# Patient Record
Sex: FEMALE | Race: WHITE | NOT HISPANIC OR LATINO | Employment: OTHER | URBAN - METROPOLITAN AREA
[De-identification: names, ages, dates, MRNs, and addresses within clinical notes are randomized per-mention and may not be internally consistent; named-entity substitution may affect disease eponyms.]

---

## 2019-09-06 DIAGNOSIS — R10.32 ABDOMINAL PAIN, LEFT LOWER QUADRANT: Primary | ICD-10-CM

## 2019-09-10 DIAGNOSIS — K21.9 GASTROESOPHAGEAL REFLUX DISEASE, ESOPHAGITIS PRESENCE NOT SPECIFIED: Primary | ICD-10-CM

## 2019-09-10 RX ORDER — ESOMEPRAZOLE MAGNESIUM 40 MG/1
40 CAPSULE, DELAYED RELEASE ORAL 2 TIMES DAILY
COMMUNITY
Start: 2019-03-11 | End: 2019-09-10 | Stop reason: SDUPTHER

## 2019-09-10 NOTE — TELEPHONE ENCOUNTER
----- Message from Lizzette Williamson sent at 9/10/2019  3:08 PM CDT -----  Contact: Danielle  Refill the brand name Nexium 2 a day no  Generic. [facility] in  [facility] 90 pts # [phone] GH

## 2019-09-11 RX ORDER — ESOMEPRAZOLE MAGNESIUM 40 MG/1
40 CAPSULE, DELAYED RELEASE ORAL 2 TIMES DAILY
Qty: 60 CAPSULE | Refills: 11 | Status: SHIPPED | OUTPATIENT
Start: 2019-09-11 | End: 2019-11-05 | Stop reason: SDUPTHER

## 2019-09-13 ENCOUNTER — HOSPITAL ENCOUNTER (OUTPATIENT)
Dept: RADIOLOGY | Facility: HOSPITAL | Age: 74
Discharge: HOME | End: 2019-09-13
Attending: INTERNAL MEDICINE
Payer: MEDICARE

## 2019-09-13 DIAGNOSIS — R10.32 ABDOMINAL PAIN, LEFT LOWER QUADRANT: ICD-10-CM

## 2019-09-13 LAB
CREAT SERPL-MCNC: 1 MG/DL (ref 0.5–1.4)
SAMPLE: NORMAL

## 2019-09-13 PROCEDURE — 25500020 PHARM REV CODE 255: Mod: PO | Performed by: INTERNAL MEDICINE

## 2019-09-13 PROCEDURE — 74177 CT ABD & PELVIS W/CONTRAST: CPT | Mod: TC,PO

## 2019-09-13 RX ADMIN — IOHEXOL 100 ML: 350 INJECTION, SOLUTION INTRAVENOUS at 09:09

## 2019-11-04 ENCOUNTER — TELEPHONE (OUTPATIENT)
Dept: FAMILY MEDICINE | Facility: CLINIC | Age: 74
End: 2019-11-04

## 2019-11-04 PROBLEM — M51.9 LUMBAR DISC DISEASE: Status: ACTIVE | Noted: 2017-04-26

## 2019-11-04 PROBLEM — R13.10 DYSPHAGIA: Status: ACTIVE | Noted: 2019-11-04

## 2019-11-04 PROBLEM — M75.122 COMPLETE TEAR OF LEFT ROTATOR CUFF: Status: ACTIVE | Noted: 2019-11-04

## 2019-11-04 PROBLEM — J44.89 OTHER SPECIFIED CHRONIC OBSTRUCTIVE AIRWAYS DISEASE: Status: ACTIVE | Noted: 2018-09-27

## 2019-11-04 PROBLEM — D64.9 ANEMIA: Status: ACTIVE | Noted: 2019-11-04

## 2019-11-05 ENCOUNTER — OFFICE VISIT (OUTPATIENT)
Dept: FAMILY MEDICINE | Facility: CLINIC | Age: 74
End: 2019-11-05
Payer: MEDICARE

## 2019-11-05 VITALS
DIASTOLIC BLOOD PRESSURE: 70 MMHG | WEIGHT: 205.38 LBS | BODY MASS INDEX: 41.4 KG/M2 | SYSTOLIC BLOOD PRESSURE: 130 MMHG | TEMPERATURE: 98 F | HEART RATE: 62 BPM | HEIGHT: 59 IN

## 2019-11-05 DIAGNOSIS — E03.9 ACQUIRED HYPOTHYROIDISM: ICD-10-CM

## 2019-11-05 DIAGNOSIS — R73.03 PRE-DIABETES: ICD-10-CM

## 2019-11-05 DIAGNOSIS — J43.1 PANLOBULAR EMPHYSEMA: ICD-10-CM

## 2019-11-05 DIAGNOSIS — I10 ESSENTIAL (PRIMARY) HYPERTENSION: ICD-10-CM

## 2019-11-05 DIAGNOSIS — Z23 FLU VACCINE NEED: Primary | ICD-10-CM

## 2019-11-05 DIAGNOSIS — R13.10 DYSPHAGIA, UNSPECIFIED TYPE: ICD-10-CM

## 2019-11-05 DIAGNOSIS — K21.9 GASTRO-ESOPHAGEAL REFLUX DISEASE WITHOUT ESOPHAGITIS: ICD-10-CM

## 2019-11-05 DIAGNOSIS — D62 ANEMIA DUE TO ACUTE BLOOD LOSS: ICD-10-CM

## 2019-11-05 DIAGNOSIS — F41.1 GENERALIZED ANXIETY DISORDER: ICD-10-CM

## 2019-11-05 DIAGNOSIS — M51.9 LUMBAR DISC DISEASE: ICD-10-CM

## 2019-11-05 DIAGNOSIS — I48.0 PAROXYSMAL ATRIAL FIBRILLATION: ICD-10-CM

## 2019-11-05 DIAGNOSIS — F34.1 DYSTHYMIA: ICD-10-CM

## 2019-11-05 DIAGNOSIS — K21.9 GASTROESOPHAGEAL REFLUX DISEASE, ESOPHAGITIS PRESENCE NOT SPECIFIED: ICD-10-CM

## 2019-11-05 PROCEDURE — 90662 FLU VACCINE - HIGH DOSE (65+) PRESERVATIVE FREE IM: ICD-10-PCS | Mod: S$GLB,,, | Performed by: FAMILY MEDICINE

## 2019-11-05 PROCEDURE — 90662 IIV NO PRSV INCREASED AG IM: CPT | Mod: S$GLB,,, | Performed by: FAMILY MEDICINE

## 2019-11-05 PROCEDURE — G0008 FLU VACCINE - HIGH DOSE (65+) PRESERVATIVE FREE IM: ICD-10-PCS | Mod: S$GLB,,, | Performed by: FAMILY MEDICINE

## 2019-11-05 PROCEDURE — 99214 OFFICE O/P EST MOD 30 MIN: CPT | Mod: 25,S$GLB,, | Performed by: FAMILY MEDICINE

## 2019-11-05 PROCEDURE — G0008 ADMIN INFLUENZA VIRUS VAC: HCPCS | Mod: S$GLB,,, | Performed by: FAMILY MEDICINE

## 2019-11-05 PROCEDURE — 99214 PR OFFICE/OUTPT VISIT, EST, LEVL IV, 30-39 MIN: ICD-10-PCS | Mod: 25,S$GLB,, | Performed by: FAMILY MEDICINE

## 2019-11-05 RX ORDER — FLUOXETINE HYDROCHLORIDE 20 MG/1
20 CAPSULE ORAL DAILY
Qty: 30 CAPSULE | Refills: 5 | Status: SHIPPED | OUTPATIENT
Start: 2019-11-05 | End: 2021-03-05

## 2019-11-05 RX ORDER — NITROGLYCERIN 0.4 MG/1
0.4 TABLET SUBLINGUAL EVERY 5 MIN PRN
COMMUNITY
End: 2021-08-26 | Stop reason: SDUPTHER

## 2019-11-05 RX ORDER — CANDESARTAN CILEXETIL AND HYDROCHLOROTHIAZIDE 32; 12.5 MG/1; MG/1
1 TABLET ORAL DAILY
Qty: 30 TABLET | Refills: 5 | Status: ON HOLD | OUTPATIENT
Start: 2019-11-05 | End: 2020-05-28 | Stop reason: HOSPADM

## 2019-11-05 RX ORDER — ALBUTEROL SULFATE 90 UG/1
2 AEROSOL, METERED RESPIRATORY (INHALATION) EVERY 4 HOURS PRN
Qty: 1 INHALER | Refills: 5 | Status: SHIPPED | OUTPATIENT
Start: 2019-11-05 | End: 2021-08-26 | Stop reason: SDUPTHER

## 2019-11-05 RX ORDER — LEVOTHYROXINE SODIUM 25 UG/1
25 TABLET ORAL
Qty: 30 TABLET | Refills: 5 | Status: SHIPPED | OUTPATIENT
Start: 2019-11-05 | End: 2021-04-28 | Stop reason: SDUPTHER

## 2019-11-05 RX ORDER — LOSARTAN POTASSIUM 50 MG/1
TABLET ORAL
COMMUNITY
End: 2019-11-05 | Stop reason: ALTCHOICE

## 2019-11-05 RX ORDER — ALBUTEROL SULFATE 90 UG/1
AEROSOL, METERED RESPIRATORY (INHALATION)
COMMUNITY
End: 2019-11-05 | Stop reason: SDUPTHER

## 2019-11-05 RX ORDER — GABAPENTIN 300 MG/1
300 CAPSULE ORAL 2 TIMES DAILY
Qty: 60 CAPSULE | Refills: 5 | Status: ON HOLD | OUTPATIENT
Start: 2019-11-05 | End: 2020-05-28 | Stop reason: HOSPADM

## 2019-11-05 RX ORDER — LEVOTHYROXINE SODIUM 25 UG/1
TABLET ORAL
COMMUNITY
End: 2019-11-05 | Stop reason: SDUPTHER

## 2019-11-05 RX ORDER — ALPRAZOLAM 1 MG/1
1 TABLET ORAL
COMMUNITY
Start: 2017-09-18 | End: 2019-11-05 | Stop reason: SDUPTHER

## 2019-11-05 RX ORDER — GABAPENTIN 300 MG/1
CAPSULE ORAL
COMMUNITY
Start: 2018-05-17 | End: 2019-11-05 | Stop reason: SDUPTHER

## 2019-11-05 RX ORDER — ALPRAZOLAM 1 MG/1
1 TABLET ORAL 2 TIMES DAILY PRN
Qty: 60 TABLET | Refills: 2 | Status: SHIPPED | OUTPATIENT
Start: 2019-11-05 | End: 2020-03-25 | Stop reason: SDUPTHER

## 2019-11-05 RX ORDER — APIXABAN 5 MG/1
5 TABLET, FILM COATED ORAL 2 TIMES DAILY
Qty: 60 TABLET | Refills: 5 | Status: SHIPPED | OUTPATIENT
Start: 2019-11-05 | End: 2021-04-28 | Stop reason: SDUPTHER

## 2019-11-05 RX ORDER — ESOMEPRAZOLE MAGNESIUM 40 MG/1
40 CAPSULE, DELAYED RELEASE ORAL 2 TIMES DAILY
Qty: 60 CAPSULE | Refills: 11 | Status: SHIPPED | OUTPATIENT
Start: 2019-11-05 | End: 2020-01-10 | Stop reason: SDUPTHER

## 2019-11-05 RX ORDER — APIXABAN 5 MG/1
5 TABLET, FILM COATED ORAL 2 TIMES DAILY
Refills: 0 | COMMUNITY
Start: 2019-10-03 | End: 2019-11-05 | Stop reason: SDUPTHER

## 2019-11-05 RX ORDER — SOTALOL HYDROCHLORIDE 80 MG/1
TABLET ORAL
COMMUNITY
End: 2019-11-05 | Stop reason: SDUPTHER

## 2019-11-05 RX ORDER — CANDESARTAN CILEXETIL AND HYDROCHLOROTHIAZIDE 32; 12.5 MG/1; MG/1
TABLET ORAL
COMMUNITY
End: 2019-11-05 | Stop reason: SDUPTHER

## 2019-11-05 RX ORDER — FLUTICASONE PROPIONATE 50 MCG
1 SPRAY, SUSPENSION (ML) NASAL DAILY PRN
COMMUNITY
End: 2021-08-26 | Stop reason: SDUPTHER

## 2019-11-05 RX ORDER — SOTALOL HYDROCHLORIDE 80 MG/1
80 TABLET ORAL 2 TIMES DAILY
Qty: 60 TABLET | Refills: 5 | Status: SHIPPED | OUTPATIENT
Start: 2019-11-05 | End: 2021-04-28

## 2019-11-05 NOTE — PROGRESS NOTES
SUBJECTIVE:    Patient ID: Danielle Martinez is a 74 y.o. female.    Chief Complaint: Follow-up; Rash (On both arms; started about a month ago); and Immunizations (Receiving flu shot.)    This 74-year-old female comes in for a recheck on her anxiety.  She has been having numerous family members with serious health problems and her stress and anxiety levels have increased.  She uses Xanax p.r.n..  She has chronic back pain and has not been taking her gabapentin are Cymbalta.  She also has rectal bleeding that is bright red in small amounts she has been scoped for colonoscopy in 2016 by Dr. Crowe.  She wishes to go cm in the office as soon as possible to get this recheck to get she has some straining with soft stools.  She has some GERD symptoms.  She does not exercise she has gained a few lb and is worried about this.  She complains about a rash on her upper arms that is self excoriations from itching.      Hospital Outpatient Visit on 09/13/2019   Component Date Value Ref Range Status    POC Creatinine 09/13/2019 1.0  0.5 - 1.4 mg/dL Final    Sample 09/13/2019 JULIA   Final       Past Medical History:   Diagnosis Date    Arthritis     Juvenile diagnosed age 17    Hx of hiatal hernia      Past Surgical History:   Procedure Laterality Date    BREAST LUMPECTOMY Left     in 40's    COLONOSCOPY  2016    [facility] 5 years    HIATAL HERNIA REPAIR  2005    SHOULDER ARTHROSCOPY  2015     History reviewed. No pertinent family history.    Marital Status:   Alcohol History:  reports that she drank alcohol.  Tobacco History:  reports that she has never smoked. She has never used smokeless tobacco.  Drug History:  has no drug history on file.    Review of patient's allergies indicates:   Allergen Reactions    Clove flavoring [flavoring agent]     Latex, natural rubber        Current Outpatient Medications:     albuterol (VENTOLIN HFA) 90 mcg/actuation inhaler, Inhale 2 puffs into the lungs every 4 (four)  hours as needed for Wheezing. Rescue, Disp: 1 Inhaler, Rfl: 5    ALPRAZolam (XANAX) 1 MG tablet, Take 1 tablet (1 mg total) by mouth 2 (two) times daily as needed for Anxiety., Disp: 60 tablet, Rfl: 2    candesartan-hydrochlorothiazid (ATACAND HCT) 32-12.5 mg per tablet, Take 1 tablet by mouth once daily., Disp: 30 tablet, Rfl: 5    ELIQUIS 5 mg Tab, Take 1 tablet (5 mg total) by mouth 2 (two) times daily., Disp: 60 tablet, Rfl: 5    esomeprazole (NEXIUM) 40 MG capsule, Take 1 capsule (40 mg total) by mouth 2 (two) times daily. BRAND NAME ONLY, Disp: 60 capsule, Rfl: 11    fluticasone propionate (FLONASE) 50 mcg/actuation nasal spray, Flonase 50 mcg/actuation nasal spray,suspension, Disp: , Rfl:     fluticasone-umeclidin-vilanter (TRELEGY ELLIPTA) 100-62.5-25 mcg DsDv, Trelegy Ellipta, Disp: , Rfl:     fluticasone-umeclidin-vilanter (TRELEGY ELLIPTA) 100-62.5-25 mcg DsDv, Trelegy Ellipta 100 mcg-62.5 mcg-25 mcg powder for inhalation, Disp: , Rfl:     gabapentin (NEURONTIN) 300 MG capsule, Take 1 capsule (300 mg total) by mouth 2 (two) times daily., Disp: 60 capsule, Rfl: 5    levothyroxine (SYNTHROID) 25 MCG tablet, Take 1 tablet (25 mcg total) by mouth before breakfast. Name brand only.  RIRI, Disp: 30 tablet, Rfl: 5    nitroGLYCERIN (NITROSTAT) 0.4 MG SL tablet, nitroglycerin 0.4 mg sublingual tablet, Disp: , Rfl:     sotalol (BETAPACE) 80 MG tablet, Take 1 tablet (80 mg total) by mouth 2 (two) times daily., Disp: 60 tablet, Rfl: 5    FLUoxetine 20 MG capsule, Take 1 capsule (20 mg total) by mouth once daily., Disp: 30 capsule, Rfl: 5    Review of Systems   Constitutional: Positive for appetite change. Negative for chills, fatigue, fever and unexpected weight change.   HENT: Negative for congestion, ear pain, sinus pain, sore throat and trouble swallowing.    Eyes: Negative for pain, discharge and visual disturbance.   Respiratory: Negative for apnea, cough, shortness of breath and wheezing.   "  Cardiovascular: Negative for chest pain, palpitations and leg swelling.   Gastrointestinal: Positive for anal bleeding (bleeding BRBPR). Negative for abdominal pain, blood in stool, constipation, diarrhea, nausea and vomiting.        Some straining, soft stools,    Endocrine: Negative for heat intolerance, polydipsia and polyuria.   Genitourinary: Negative for difficulty urinating, dyspareunia, dysuria, frequency, hematuria and menstrual problem.   Musculoskeletal: Negative for arthralgias, back pain, gait problem, joint swelling and myalgias.   Allergic/Immunologic: Negative for environmental allergies, food allergies and immunocompromised state.   Neurological: Negative for dizziness, tremors, seizures, numbness and headaches.   Psychiatric/Behavioral: Positive for agitation and dysphoric mood. Negative for behavioral problems, confusion, hallucinations and suicidal ideas. The patient is nervous/anxious.         Anxiety over  Family's health issues ,           Objective:      Vitals:    11/05/19 1156   BP: 130/70   Pulse: 62   Temp: 97.8 °F (36.6 °C)   Weight: 93.2 kg (205 lb 6.4 oz)   Height: 4' 11" (1.499 m)     Body mass index is 41.49 kg/m².  Physical Exam      Assessment:       1. Flu vaccine need    2. Pre-diabetes    3. Lumbar disc disease    4. Generalized anxiety disorder    5. Dysthymia    6. Panlobular emphysema    7. Essential (primary) hypertension    8. Paroxysmal atrial fibrillation    9. Anemia due to acute blood loss    10. Dysphagia, unspecified type    11. Gastro-esophageal reflux disease without esophagitis    12. Gastroesophageal reflux disease, esophagitis presence not specified    13. Acquired hypothyroidism         Plan:       Flu vaccine need  -     Influenza - High Dose (65+) (PF) (IM)  Flu shot today  Pre-diabetes  -     Hemoglobin A1c; Future; Expected date: 11/05/2019  A1c ordered  Lumbar disc disease  -     gabapentin (NEURONTIN) 300 MG capsule; Take 1 capsule (300 mg total) by " mouth 2 (two) times daily.  Dispense: 60 capsule; Refill: 5  Restart the gabapentin 300 mg twice a day for pain management  Generalized anxiety disorder  -     ALPRAZolam (XANAX) 1 MG tablet; Take 1 tablet (1 mg total) by mouth 2 (two) times daily as needed for Anxiety.  Dispense: 60 tablet; Refill: 2  Continue p.r.n. use of Xanax  Dysthymia  -     FLUoxetine 20 MG capsule; Take 1 capsule (20 mg total) by mouth once daily.  Dispense: 30 capsule; Refill: 5  Added fluoxetine today to help reduce anxiety and lift her moods  Panlobular emphysema  -     albuterol (VENTOLIN HFA) 90 mcg/actuation inhaler; Inhale 2 puffs into the lungs every 4 (four) hours as needed for Wheezing. Rescue  Dispense: 1 Inhaler; Refill: 5  Okay to refill rescue inhaler  Essential (primary) hypertension  -     CBC auto differential; Future; Expected date: 11/05/2019  -     Comprehensive metabolic panel; Future; Expected date: 11/05/2019  -     Lipid panel; Future; Expected date: 11/05/2019  -     TSH; Future; Expected date: 11/05/2019  -     candesartan-hydrochlorothiazid (ATACAND HCT) 32-12.5 mg per tablet; Take 1 tablet by mouth once daily.  Dispense: 30 tablet; Refill: 5  Controlled well with blood pressure medicines as is  Paroxysmal atrial fibrillation  -     ELIQUIS 5 mg Tab; Take 1 tablet (5 mg total) by mouth 2 (two) times daily.  Dispense: 60 tablet; Refill: 5  -     sotalol (BETAPACE) 80 MG tablet; Take 1 tablet (80 mg total) by mouth 2 (two) times daily.  Dispense: 60 tablet; Refill: 5    Anemia due to acute blood loss  CBC ordered.  Refer to Dr. Crowe for further colon evaluation  Dysphagia, unspecified type  -     esomeprazole (NEXIUM) 40 MG capsule; Take 1 capsule (40 mg total) by mouth 2 (two) times daily. BRAND NAME ONLY  Dispense: 60 capsule; Refill: 11    Gastro-esophageal reflux disease without esophagitis    Gastroesophageal reflux disease, esophagitis presence not specified  -     esomeprazole (NEXIUM) 40 MG capsule; Take  1 capsule (40 mg total) by mouth 2 (two) times daily. BRAND NAME ONLY  Dispense: 60 capsule; Refill: 11    Acquired hypothyroidism  -     levothyroxine (SYNTHROID) 25 MCG tablet; Take 1 tablet (25 mcg total) by mouth before breakfast. Name brand only.  RIRI  Dispense: 30 tablet; Refill: 5  Name brand Levothyroid recommended.  TSH due now    No follow-ups on file.

## 2019-11-05 NOTE — PROGRESS NOTES
SUBJECTIVE:    Patient ID: Danielle Martinez is a 74 y.o. female.    Chief Complaint: Follow-up; Rash (On both arms; started about a month ago); and Immunizations (Receiving flu shot.)    [facility]    Hospital Outpatient Visit on 09/13/2019   Component Date Value Ref Range Status    POC Creatinine 09/13/2019 1.0  0.5 - 1.4 mg/dL Final    Sample 09/13/2019 JULIA   Final       Past Medical History:   Diagnosis Date    Arthritis     Juvenile diagnosed age 17    Hx of hiatal hernia      Past Surgical History:   Procedure Laterality Date    BREAST LUMPECTOMY Left     in 40's    HIATAL HERNIA REPAIR  2005    SHOULDER ARTHROSCOPY  2015     History reviewed. No pertinent family history.    Marital Status:   Alcohol History:  reports that she drank alcohol.  Tobacco History:  reports that she has never smoked. She has never used smokeless tobacco.  Drug History:  has no drug history on file.    Review of patient's allergies indicates:   Allergen Reactions    Clove flavoring [flavoring agent]     Latex, natural rubber        Current Outpatient Medications:     albuterol (VENTOLIN HFA) 90 mcg/actuation inhaler, Ventolin HFA 90 mcg/actuation aerosol inhaler, Disp: , Rfl:     ALPRAZolam (XANAX) 1 MG tablet, Take 1 mg by mouth., Disp: , Rfl:     candesartan-hydrochlorothiazid (ATACAND HCT) 32-12.5 mg per tablet, candesartan 32 mg-hydrochlorothiazide 12.5 mg tablet, Disp: , Rfl:     ELIQUIS 5 mg Tab, Take 5 mg by mouth 2 (two) times daily., Disp: , Rfl: 0    esomeprazole (NEXIUM) 40 MG capsule, Take 1 capsule (40 mg total) by mouth 2 (two) times daily. BRAND NAME ONLY, Disp: 60 capsule, Rfl: 11    fluticasone propionate (FLONASE) 50 mcg/actuation nasal spray, Flonase 50 mcg/actuation nasal spray,suspension, Disp: , Rfl:     fluticasone-umeclidin-vilanter (TRELEGY ELLIPTA) 100-62.5-25 mcg DsDv, Trelegy Ellipta, Disp: , Rfl:     fluticasone-umeclidin-vilanter (TRELEGY ELLIPTA) 100-62.5-25 mcg DsDv, Trelegy Ellipta 100  "mcg-62.5 mcg-25 mcg powder for inhalation, Disp: , Rfl:     gabapentin (NEURONTIN) 300 MG capsule, gabapentin 300 mg capsule, Disp: , Rfl:     levothyroxine (SYNTHROID) 25 MCG tablet, Synthroid 25 mcg tablet, Disp: , Rfl:     losartan (COZAAR) 50 MG tablet, losartan 50 mg tablet   1 tablet every day by oral route., Disp: , Rfl:     nitroGLYCERIN (NITROSTAT) 0.4 MG SL tablet, nitroglycerin 0.4 mg sublingual tablet, Disp: , Rfl:     sotalol (BETAPACE) 80 MG tablet, sotalol 80 mg tablet, Disp: , Rfl:     Review of Systems   Constitutional: Negative for appetite change, chills, fatigue, fever and unexpected weight change.   HENT: Negative for congestion, ear pain, sinus pain, sore throat and trouble swallowing.    Eyes: Negative for pain, discharge and visual disturbance.   Respiratory: Negative for apnea, cough, shortness of breath and wheezing.    Cardiovascular: Negative for chest pain, palpitations and leg swelling.   Gastrointestinal: Negative for abdominal pain, blood in stool, constipation, diarrhea, nausea and vomiting.   Endocrine: Negative for heat intolerance, polydipsia and polyuria.   Genitourinary: Negative for difficulty urinating, dyspareunia, dysuria, frequency, hematuria and menstrual problem.   Musculoskeletal: Negative for arthralgias, back pain, gait problem, joint swelling and myalgias.   Allergic/Immunologic: Negative for environmental allergies, food allergies and immunocompromised state.   Neurological: Negative for dizziness, tremors, seizures, numbness and headaches.   Psychiatric/Behavioral: Negative for behavioral problems, confusion, hallucinations and suicidal ideas. The patient is not nervous/anxious.           Objective:      Vitals:    11/05/19 1156   BP: 130/70   Pulse: 62   Temp: 97.8 °F (36.6 °C)   Weight: 93.2 kg (205 lb 6.4 oz)   Height: 4' 11" (1.499 m)     Body mass index is 41.49 kg/m².  Physical Exam      Assessment:       1. Flu vaccine need         Plan:       Flu " vaccine need      No follow-ups on file.

## 2019-11-06 LAB
ALBUMIN SERPL-MCNC: 4.3 G/DL (ref 3.6–5.1)
ALBUMIN/GLOB SERPL: 1.5 (CALC) (ref 1–2.5)
ALP SERPL-CCNC: 63 U/L (ref 33–130)
ALT SERPL-CCNC: 13 U/L (ref 6–29)
AST SERPL-CCNC: 13 U/L (ref 10–35)
BASOPHILS # BLD AUTO: 62 CELLS/UL (ref 0–200)
BASOPHILS NFR BLD AUTO: 0.7 %
BILIRUB SERPL-MCNC: 0.6 MG/DL (ref 0.2–1.2)
BUN SERPL-MCNC: 24 MG/DL (ref 7–25)
BUN/CREAT SERPL: NORMAL (CALC) (ref 6–22)
CALCIUM SERPL-MCNC: 9.9 MG/DL (ref 8.6–10.4)
CHLORIDE SERPL-SCNC: 103 MMOL/L (ref 98–110)
CHOLEST SERPL-MCNC: 199 MG/DL
CHOLEST/HDLC SERPL: 4.9 (CALC)
CO2 SERPL-SCNC: 32 MMOL/L (ref 20–32)
CREAT SERPL-MCNC: 0.77 MG/DL (ref 0.6–0.93)
EOSINOPHIL # BLD AUTO: 134 CELLS/UL (ref 15–500)
EOSINOPHIL NFR BLD AUTO: 1.5 %
ERYTHROCYTE [DISTWIDTH] IN BLOOD BY AUTOMATED COUNT: 13.3 % (ref 11–15)
GFRSERPLBLD MDRD-ARVRAT: 77 ML/MIN/1.73M2
GLOBULIN SER CALC-MCNC: 2.8 G/DL (CALC) (ref 1.9–3.7)
GLUCOSE SERPL-MCNC: 124 MG/DL (ref 65–139)
HBA1C MFR BLD: 6.8 % OF TOTAL HGB
HCT VFR BLD AUTO: 37.6 % (ref 35–45)
HDLC SERPL-MCNC: 41 MG/DL
HGB BLD-MCNC: 12.3 G/DL (ref 11.7–15.5)
LDLC SERPL CALC-MCNC: 125 MG/DL (CALC)
LYMPHOCYTES # BLD AUTO: 2456 CELLS/UL (ref 850–3900)
LYMPHOCYTES NFR BLD AUTO: 27.6 %
MCH RBC QN AUTO: 29.3 PG (ref 27–33)
MCHC RBC AUTO-ENTMCNC: 32.7 G/DL (ref 32–36)
MCV RBC AUTO: 89.5 FL (ref 80–100)
MONOCYTES # BLD AUTO: 694 CELLS/UL (ref 200–950)
MONOCYTES NFR BLD AUTO: 7.8 %
NEUTROPHILS # BLD AUTO: 5554 CELLS/UL (ref 1500–7800)
NEUTROPHILS NFR BLD AUTO: 62.4 %
NONHDLC SERPL-MCNC: 158 MG/DL (CALC)
PLATELET # BLD AUTO: 384 THOUSAND/UL (ref 140–400)
PMV BLD REES-ECKER: 10.1 FL (ref 7.5–12.5)
POTASSIUM SERPL-SCNC: 4 MMOL/L (ref 3.5–5.3)
PROT SERPL-MCNC: 7.1 G/DL (ref 6.1–8.1)
RBC # BLD AUTO: 4.2 MILLION/UL (ref 3.8–5.1)
SODIUM SERPL-SCNC: 144 MMOL/L (ref 135–146)
TRIGL SERPL-MCNC: 211 MG/DL
TSH SERPL-ACNC: 1.64 MIU/L (ref 0.4–4.5)
WBC # BLD AUTO: 8.9 THOUSAND/UL (ref 3.8–10.8)

## 2019-11-11 ENCOUNTER — TELEPHONE (OUTPATIENT)
Dept: FAMILY MEDICINE | Facility: CLINIC | Age: 74
End: 2019-11-11

## 2019-11-11 NOTE — TELEPHONE ENCOUNTER
----- Message from Antony Lopez MD sent at 11/9/2019  9:17 PM CST -----  Call patient.  Diabetes is under good control with an A1c of 6.8.  CBC shows no anemia, kidneys and liver functions are very normal.  Thyroid looks normal as well.  Continue current medications and recheck in 6 months

## 2020-01-10 DIAGNOSIS — K21.9 GASTROESOPHAGEAL REFLUX DISEASE, ESOPHAGITIS PRESENCE NOT SPECIFIED: ICD-10-CM

## 2020-01-10 DIAGNOSIS — R13.10 DYSPHAGIA, UNSPECIFIED TYPE: ICD-10-CM

## 2020-01-10 RX ORDER — ESOMEPRAZOLE MAGNESIUM 40 MG/1
40 CAPSULE, DELAYED RELEASE ORAL 2 TIMES DAILY
Qty: 60 CAPSULE | Refills: 11 | Status: ON HOLD | OUTPATIENT
Start: 2020-01-10 | End: 2020-05-28 | Stop reason: HOSPADM

## 2020-01-10 NOTE — TELEPHONE ENCOUNTER
"----- Message from Hugh Victoria sent at 1/10/2020 10:55 AM CST -----  Contact: Danielle Skeltons  Pt says she can't take the generic Nexium it has to be the brand name . She says that the pharmacy will be call to "Ok" this with the doctor.   Pt# [phone]  "

## 2020-01-13 ENCOUNTER — TELEPHONE (OUTPATIENT)
Dept: FAMILY MEDICINE | Facility: CLINIC | Age: 75
End: 2020-01-13

## 2020-01-13 NOTE — TELEPHONE ENCOUNTER
----- Message from Lizzette Williamson sent at 1/13/2020  5:13 PM CST -----  Contact: LMOR   The patient said we need to call this # [phone] and tell them she has to have name brand Nexium. pts # [phone] GH

## 2020-01-23 DIAGNOSIS — K21.9 GASTRO-ESOPHAGEAL REFLUX DISEASE WITHOUT ESOPHAGITIS: Primary | ICD-10-CM

## 2020-01-23 RX ORDER — PANTOPRAZOLE SODIUM 40 MG/1
40 TABLET, DELAYED RELEASE ORAL DAILY
Qty: 30 TABLET | Refills: 1 | Status: SHIPPED | OUTPATIENT
Start: 2020-01-23 | End: 2021-04-28

## 2020-01-23 NOTE — TELEPHONE ENCOUNTER
----- Message from Sona Castro MA sent at 1/23/2020  9:45 AM CST -----  Contact: Danielle Juan      ----- Message -----  From: Hugh Victoria  Sent: 1/21/2020  11:16 AM CST  To: [facility] Staff    Pt says she spoke with her insurance in regards to her insurance. They says that she needs to try at least 3 different Generic versions of the Nexium before she can get the Brand name. The patient says that previously was sick from the generic that was prescribed . She says she will try 1 more generic brand of the Nexium that's not the Esomeprazole and let dr. Lopez know how it works out for her  Pt# [phone] or [phone]

## 2020-01-23 NOTE — TELEPHONE ENCOUNTER
Spoke with pt let her know and she said okay thank you and that she is not going to take it and will discuss this with Dr. Lopez at her next visit

## 2020-02-17 ENCOUNTER — TELEPHONE (OUTPATIENT)
Dept: FAMILY MEDICINE | Facility: CLINIC | Age: 75
End: 2020-02-17

## 2020-02-17 NOTE — TELEPHONE ENCOUNTER
----- Message from Ramon Simeon sent at 2/17/2020  2:07 PM CST -----  Pt is needing a callback about a med that is cheaper because of her insurance   Pt [phone]

## 2020-03-16 ENCOUNTER — TELEPHONE (OUTPATIENT)
Dept: FAMILY MEDICINE | Facility: CLINIC | Age: 75
End: 2020-03-16

## 2020-03-16 NOTE — TELEPHONE ENCOUNTER
----- Message from Hugh Victoria sent at 3/16/2020  2:54 PM CDT -----  Contact: Danielle Juan  Pt needs the nurse to give her a call back . Tomorrow she has to go see Dr. Kate and would like to get [facility] for the [facility] at [facility] or at least go somewhere to get tested.  She says she is only having congestion which has gotten a little better, but she wants to be on the safe side.    Please give the patient a call back # [phone]

## 2020-03-25 DIAGNOSIS — F41.1 GENERALIZED ANXIETY DISORDER: ICD-10-CM

## 2020-03-25 RX ORDER — ALPRAZOLAM 1 MG/1
1 TABLET ORAL 2 TIMES DAILY PRN
Qty: 60 TABLET | Refills: 2 | Status: SHIPPED | OUTPATIENT
Start: 2020-03-25 | End: 2020-09-03 | Stop reason: SDUPTHER

## 2020-03-25 NOTE — TELEPHONE ENCOUNTER
----- Message from Arina Rodriguez MA sent at 3/25/2020  9:45 AM CDT -----  Pt is requesting a Rx refill    Alprazolam 1 mg     [place]  [facility] [address] in [place]    Pt - [phone]

## 2020-03-30 ENCOUNTER — TELEPHONE (OUTPATIENT)
Dept: FAMILY MEDICINE | Facility: CLINIC | Age: 75
End: 2020-03-30

## 2020-03-30 NOTE — TELEPHONE ENCOUNTER
"----- Message from Arina Rodriguez MA sent at 3/30/2020 11:05 AM CDT -----  Pt states she needs Dr. Lopez to send an "urgent appeal for name brand Nexium" to [phone].  Pt states the pharmacy is going to send a fax to us. States this is the "only" medication she can take.    Pt - [phone]  "

## 2020-05-14 ENCOUNTER — TELEPHONE (OUTPATIENT)
Dept: FAMILY MEDICINE | Facility: CLINIC | Age: 75
End: 2020-05-14

## 2020-05-14 NOTE — TELEPHONE ENCOUNTER
----- Message from Tess Garner sent at 5/14/2020 11:38 AM CDT -----  Refill on Levothyroxine to [facility] in [place].  Pt would like an appt to discuss heartburn.   cb # [phone]

## 2020-05-22 ENCOUNTER — TELEPHONE (OUTPATIENT)
Dept: FAMILY MEDICINE | Facility: CLINIC | Age: 75
End: 2020-05-22

## 2020-05-22 ENCOUNTER — HOSPITAL ENCOUNTER (INPATIENT)
Facility: HOSPITAL | Age: 75
LOS: 6 days | Discharge: HOME HEALTH | DRG: 682 | End: 2020-05-28
Attending: EMERGENCY MEDICINE | Admitting: INTERNAL MEDICINE
Payer: MEDICARE

## 2020-05-22 DIAGNOSIS — M54.30 SCIATICA, UNSPECIFIED LATERALITY: ICD-10-CM

## 2020-05-22 DIAGNOSIS — R53.1 WEAKNESS GENERALIZED: ICD-10-CM

## 2020-05-22 DIAGNOSIS — E87.6 HYPOKALEMIA: Primary | ICD-10-CM

## 2020-05-22 DIAGNOSIS — I50.9 CHF (CONGESTIVE HEART FAILURE): ICD-10-CM

## 2020-05-22 DIAGNOSIS — I48.0 PAROXYSMAL ATRIAL FIBRILLATION: ICD-10-CM

## 2020-05-22 DIAGNOSIS — K21.9 GASTRO-ESOPHAGEAL REFLUX DISEASE WITHOUT ESOPHAGITIS: ICD-10-CM

## 2020-05-22 DIAGNOSIS — I10 ESSENTIAL (PRIMARY) HYPERTENSION: ICD-10-CM

## 2020-05-22 DIAGNOSIS — B37.0 ORAL THRUSH: ICD-10-CM

## 2020-05-22 DIAGNOSIS — N17.9 AKI (ACUTE KIDNEY INJURY): ICD-10-CM

## 2020-05-22 DIAGNOSIS — G47.33 OSA ON CPAP: ICD-10-CM

## 2020-05-22 PROBLEM — R19.7 DIARRHEA: Status: ACTIVE | Noted: 2020-05-22

## 2020-05-22 PROBLEM — J81.0 ACUTE PULMONARY EDEMA: Status: ACTIVE | Noted: 2020-05-22

## 2020-05-22 LAB
ALBUMIN SERPL BCP-MCNC: 3.8 G/DL (ref 3.5–5.2)
ALP SERPL-CCNC: 42 U/L (ref 55–135)
ALT SERPL W/O P-5'-P-CCNC: 8 U/L (ref 10–44)
ANION GAP SERPL CALC-SCNC: 11 MMOL/L (ref 8–16)
AST SERPL-CCNC: 12 U/L (ref 10–40)
BACTERIA #/AREA URNS HPF: NEGATIVE /HPF
BASOPHILS # BLD AUTO: 0.05 K/UL (ref 0–0.2)
BASOPHILS NFR BLD: 0.5 % (ref 0–1.9)
BILIRUB SERPL-MCNC: 1.2 MG/DL (ref 0.1–1)
BILIRUB UR QL STRIP: NEGATIVE
BUN SERPL-MCNC: 60 MG/DL (ref 8–23)
CALCIUM SERPL-MCNC: 8.8 MG/DL (ref 8.7–10.5)
CHLORIDE SERPL-SCNC: 105 MMOL/L (ref 95–110)
CK SERPL-CCNC: 17 U/L (ref 20–180)
CLARITY UR: CLEAR
CO2 SERPL-SCNC: 25 MMOL/L (ref 23–29)
COLOR UR: YELLOW
CREAT SERPL-MCNC: 5.2 MG/DL (ref 0.5–1.4)
DIFFERENTIAL METHOD: ABNORMAL
EOSINOPHIL # BLD AUTO: 0.2 K/UL (ref 0–0.5)
EOSINOPHIL NFR BLD: 1.8 % (ref 0–8)
ERYTHROCYTE [DISTWIDTH] IN BLOOD BY AUTOMATED COUNT: 16.5 % (ref 11.5–14.5)
EST. GFR  (AFRICAN AMERICAN): 8.8 ML/MIN/1.73 M^2
EST. GFR  (NON AFRICAN AMERICAN): 7.6 ML/MIN/1.73 M^2
GLUCOSE SERPL-MCNC: 107 MG/DL (ref 70–110)
GLUCOSE UR QL STRIP: NEGATIVE
HCT VFR BLD AUTO: 37.9 % (ref 37–48.5)
HGB BLD-MCNC: 12.1 G/DL (ref 12–16)
HGB UR QL STRIP: NEGATIVE
HYALINE CASTS #/AREA URNS LPF: 7 /LPF
IMM GRANULOCYTES # BLD AUTO: 0.03 K/UL (ref 0–0.04)
IMM GRANULOCYTES NFR BLD AUTO: 0.3 % (ref 0–0.5)
KETONES UR QL STRIP: NEGATIVE
LEUKOCYTE ESTERASE UR QL STRIP: ABNORMAL
LYMPHOCYTES # BLD AUTO: 1.6 K/UL (ref 1–4.8)
LYMPHOCYTES NFR BLD: 15.3 % (ref 18–48)
MAGNESIUM SERPL-MCNC: 2.4 MG/DL (ref 1.6–2.6)
MCH RBC QN AUTO: 27.6 PG (ref 27–31)
MCHC RBC AUTO-ENTMCNC: 31.9 G/DL (ref 32–36)
MCV RBC AUTO: 87 FL (ref 82–98)
MICROSCOPIC COMMENT: ABNORMAL
MONOCYTES # BLD AUTO: 1.1 K/UL (ref 0.3–1)
MONOCYTES NFR BLD: 10.5 % (ref 4–15)
NEUTROPHILS # BLD AUTO: 7.7 K/UL (ref 1.8–7.7)
NEUTROPHILS NFR BLD: 71.6 % (ref 38–73)
NITRITE UR QL STRIP: NEGATIVE
NRBC BLD-RTO: 0 /100 WBC
PH UR STRIP: 5 [PH] (ref 5–8)
PLATELET # BLD AUTO: 308 K/UL (ref 150–350)
PMV BLD AUTO: 10.5 FL (ref 9.2–12.9)
POTASSIUM SERPL-SCNC: 3.1 MMOL/L (ref 3.5–5.1)
PROT SERPL-MCNC: 7.5 G/DL (ref 6–8.4)
PROT UR QL STRIP: NEGATIVE
RBC # BLD AUTO: 4.38 M/UL (ref 4–5.4)
RBC #/AREA URNS HPF: 7 /HPF (ref 0–4)
SARS-COV-2 RDRP RESP QL NAA+PROBE: NEGATIVE
SODIUM SERPL-SCNC: 141 MMOL/L (ref 136–145)
SP GR UR STRIP: 1.01 (ref 1–1.03)
SQUAMOUS #/AREA URNS HPF: 9 /HPF
TROPONIN I SERPL DL<=0.01 NG/ML-MCNC: <0.03 NG/ML
TSH SERPL DL<=0.005 MIU/L-ACNC: 2.36 UIU/ML (ref 0.34–5.6)
URN SPEC COLLECT METH UR: ABNORMAL
UROBILINOGEN UR STRIP-ACNC: NEGATIVE EU/DL
WBC # BLD AUTO: 10.67 K/UL (ref 3.9–12.7)
WBC #/AREA URNS HPF: 21 /HPF (ref 0–5)

## 2020-05-22 PROCEDURE — 81001 URINALYSIS AUTO W/SCOPE: CPT

## 2020-05-22 PROCEDURE — 85025 COMPLETE CBC W/AUTO DIFF WBC: CPT

## 2020-05-22 PROCEDURE — 82550 ASSAY OF CK (CPK): CPT

## 2020-05-22 PROCEDURE — 36415 COLL VENOUS BLD VENIPUNCTURE: CPT

## 2020-05-22 PROCEDURE — 96374 THER/PROPH/DIAG INJ IV PUSH: CPT

## 2020-05-22 PROCEDURE — 25000003 PHARM REV CODE 250: Performed by: NURSE PRACTITIONER

## 2020-05-22 PROCEDURE — 84484 ASSAY OF TROPONIN QUANT: CPT

## 2020-05-22 PROCEDURE — 96375 TX/PRO/DX INJ NEW DRUG ADDON: CPT

## 2020-05-22 PROCEDURE — 12000002 HC ACUTE/MED SURGE SEMI-PRIVATE ROOM

## 2020-05-22 PROCEDURE — 80053 COMPREHEN METABOLIC PANEL: CPT

## 2020-05-22 PROCEDURE — U0002 COVID-19 LAB TEST NON-CDC: HCPCS

## 2020-05-22 PROCEDURE — 87086 URINE CULTURE/COLONY COUNT: CPT

## 2020-05-22 PROCEDURE — 83735 ASSAY OF MAGNESIUM: CPT

## 2020-05-22 PROCEDURE — 84443 ASSAY THYROID STIM HORMONE: CPT

## 2020-05-22 PROCEDURE — 99285 EMERGENCY DEPT VISIT HI MDM: CPT | Mod: 25

## 2020-05-22 PROCEDURE — 93005 ELECTROCARDIOGRAM TRACING: CPT | Performed by: INTERNAL MEDICINE

## 2020-05-22 PROCEDURE — 63600175 PHARM REV CODE 636 W HCPCS: Performed by: EMERGENCY MEDICINE

## 2020-05-22 PROCEDURE — 25000003 PHARM REV CODE 250: Performed by: EMERGENCY MEDICINE

## 2020-05-22 RX ORDER — TALC
6 POWDER (GRAM) TOPICAL NIGHTLY PRN
Status: DISCONTINUED | OUTPATIENT
Start: 2020-05-22 | End: 2020-05-28 | Stop reason: HOSPADM

## 2020-05-22 RX ORDER — FLUTICASONE PROPIONATE 50 MCG
1 SPRAY, SUSPENSION (ML) NASAL DAILY
Status: DISCONTINUED | OUTPATIENT
Start: 2020-05-23 | End: 2020-05-28 | Stop reason: HOSPADM

## 2020-05-22 RX ORDER — POTASSIUM CHLORIDE 20 MEQ/1
40 TABLET, EXTENDED RELEASE ORAL ONCE
Status: COMPLETED | OUTPATIENT
Start: 2020-05-22 | End: 2020-05-22

## 2020-05-22 RX ORDER — HYDROMORPHONE HYDROCHLORIDE 1 MG/ML
0.5 INJECTION, SOLUTION INTRAMUSCULAR; INTRAVENOUS; SUBCUTANEOUS
Status: COMPLETED | OUTPATIENT
Start: 2020-05-22 | End: 2020-05-22

## 2020-05-22 RX ORDER — SODIUM CHLORIDE 9 MG/ML
1000 INJECTION, SOLUTION INTRAVENOUS CONTINUOUS
Status: ACTIVE | OUTPATIENT
Start: 2020-05-22 | End: 2020-05-22

## 2020-05-22 RX ORDER — NITROGLYCERIN 0.4 MG/1
0.4 TABLET SUBLINGUAL EVERY 5 MIN PRN
Status: DISCONTINUED | OUTPATIENT
Start: 2020-05-22 | End: 2020-05-28 | Stop reason: HOSPADM

## 2020-05-22 RX ORDER — LEVOTHYROXINE SODIUM 25 UG/1
25 TABLET ORAL
Status: DISCONTINUED | OUTPATIENT
Start: 2020-05-23 | End: 2020-05-28 | Stop reason: HOSPADM

## 2020-05-22 RX ORDER — ALPRAZOLAM 0.5 MG/1
1 TABLET ORAL 2 TIMES DAILY PRN
Status: DISCONTINUED | OUTPATIENT
Start: 2020-05-22 | End: 2020-05-28 | Stop reason: HOSPADM

## 2020-05-22 RX ORDER — ACETAMINOPHEN 325 MG/1
650 TABLET ORAL EVERY 4 HOURS PRN
Status: DISCONTINUED | OUTPATIENT
Start: 2020-05-22 | End: 2020-05-28 | Stop reason: HOSPADM

## 2020-05-22 RX ORDER — AMLODIPINE BESYLATE 5 MG/1
5 TABLET ORAL DAILY
Status: DISCONTINUED | OUTPATIENT
Start: 2020-05-23 | End: 2020-05-22

## 2020-05-22 RX ORDER — SOTALOL HYDROCHLORIDE 80 MG/1
80 TABLET ORAL 2 TIMES DAILY
Status: DISCONTINUED | OUTPATIENT
Start: 2020-05-22 | End: 2020-05-23

## 2020-05-22 RX ORDER — SODIUM CHLORIDE 9 MG/ML
1000 INJECTION, SOLUTION INTRAVENOUS CONTINUOUS
Status: DISCONTINUED | OUTPATIENT
Start: 2020-05-22 | End: 2020-05-22

## 2020-05-22 RX ORDER — SODIUM CHLORIDE 0.9 % (FLUSH) 0.9 %
10 SYRINGE (ML) INJECTION
Status: DISCONTINUED | OUTPATIENT
Start: 2020-05-22 | End: 2020-05-28 | Stop reason: HOSPADM

## 2020-05-22 RX ORDER — PANTOPRAZOLE SODIUM 40 MG/1
40 TABLET, DELAYED RELEASE ORAL DAILY
Status: DISCONTINUED | OUTPATIENT
Start: 2020-05-22 | End: 2020-05-28 | Stop reason: HOSPADM

## 2020-05-22 RX ORDER — NYSTATIN 100000 [USP'U]/ML
500000 SUSPENSION ORAL
Status: DISCONTINUED | OUTPATIENT
Start: 2020-05-22 | End: 2020-05-28 | Stop reason: HOSPADM

## 2020-05-22 RX ORDER — ASCORBIC ACID 500 MG
500 TABLET ORAL DAILY
Status: DISCONTINUED | OUTPATIENT
Start: 2020-05-23 | End: 2020-05-24

## 2020-05-22 RX ORDER — OXYCODONE HYDROCHLORIDE 5 MG/1
5 TABLET ORAL EVERY 6 HOURS PRN
Status: DISCONTINUED | OUTPATIENT
Start: 2020-05-22 | End: 2020-05-24

## 2020-05-22 RX ORDER — GABAPENTIN 100 MG/1
100 CAPSULE ORAL 3 TIMES DAILY
Status: DISCONTINUED | OUTPATIENT
Start: 2020-05-22 | End: 2020-05-28 | Stop reason: HOSPADM

## 2020-05-22 RX ORDER — FLUOXETINE HYDROCHLORIDE 20 MG/1
20 CAPSULE ORAL DAILY
Status: DISCONTINUED | OUTPATIENT
Start: 2020-05-22 | End: 2020-05-28 | Stop reason: HOSPADM

## 2020-05-22 RX ORDER — ONDANSETRON 2 MG/ML
4 INJECTION INTRAMUSCULAR; INTRAVENOUS
Status: COMPLETED | OUTPATIENT
Start: 2020-05-22 | End: 2020-05-22

## 2020-05-22 RX ORDER — CALCIUM CARBONATE 200(500)MG
1 TABLET,CHEWABLE ORAL DAILY
COMMUNITY
End: 2021-09-26

## 2020-05-22 RX ADMIN — POTASSIUM CHLORIDE 40 MEQ: 20 TABLET, EXTENDED RELEASE ORAL at 12:05

## 2020-05-22 RX ADMIN — NYSTATIN 500000 UNITS: 500000 SUSPENSION ORAL at 04:05

## 2020-05-22 RX ADMIN — HYDROMORPHONE HYDROCHLORIDE 0.5 MG: 1 INJECTION, SOLUTION INTRAMUSCULAR; INTRAVENOUS; SUBCUTANEOUS at 12:05

## 2020-05-22 RX ADMIN — NYSTATIN 500000 UNITS: 500000 SUSPENSION ORAL at 08:05

## 2020-05-22 RX ADMIN — SODIUM CHLORIDE 500 ML: 0.9 INJECTION, SOLUTION INTRAVENOUS at 03:05

## 2020-05-22 RX ADMIN — GABAPENTIN 100 MG: 100 CAPSULE ORAL at 08:05

## 2020-05-22 RX ADMIN — SODIUM CHLORIDE 1000 ML: 0.9 INJECTION, SOLUTION INTRAVENOUS at 12:05

## 2020-05-22 RX ADMIN — ONDANSETRON 4 MG: 2 INJECTION INTRAMUSCULAR; INTRAVENOUS at 12:05

## 2020-05-22 RX ADMIN — SODIUM CHLORIDE 1000 ML: 0.9 INJECTION, SOLUTION INTRAVENOUS at 04:05

## 2020-05-22 RX ADMIN — MELATONIN 6 MG: at 11:05

## 2020-05-22 RX ADMIN — APIXABAN 2.5 MG: 2.5 TABLET, FILM COATED ORAL at 08:05

## 2020-05-22 RX ADMIN — OXYCODONE HYDROCHLORIDE 5 MG: 5 TABLET ORAL at 08:05

## 2020-05-22 NOTE — H&P
[facility]  History & Physical    DOS: 05/22/2020  1:18 PM      Patient Name: Danielle Martinez  MRN: [id number]  Admission Date: 5/22/2020  Attending Physician: Dr. Valenzuela  Primary Care Provider: Antony Lopez MD         Patient information was obtained from patient and ER records.     Subjective:     Principal Problem:HAILY (acute kidney injury)    Chief Complaint:   Chief Complaint   Patient presents with    Leg Pain     c/o L sciatica pain, generalized weakness. pt states hasn't eaten well this week due to thrush in mouth        HPI: Ms. Martinez presents today with complaints of weakness. It is severe. It is associated with leg cramps, diarrhea, mouth pain, oliguria, anorexia, and fatigue. She denies fever, chills, N/V, dizziness, cough, chest pain, flank pain, or LOC. She has a history of HTN, HLD, PAF on eliquis, chronic back pain, anxiety/depression, and diverticulitis. She called her doctor with complaints of diarrhea and was prescribed an unknown abx which she's been taking over a week. This hasn't stopped her diarrhea, but has caused thrush in her mouth. D/t the thrush, she hasn't been able to eat. She's had multiple episodes of watery diarrhea, with the last one, last night. She is unsure the last time she urinated. She is sleepy on exam, but did recently receive dilaudid for back pain. A renal US had just completed at the time of exam.     Past Medical History:   Diagnosis Date    Arthritis     Juvenile diagnosed age 17    Hx of hiatal hernia        Past Surgical History:   Procedure Laterality Date    BREAST LUMPECTOMY Left     in 40's    COLONOSCOPY  2016    Dr. Reyes 5 years    COLONOSCOPY  2019    Dr. Reyes 5 years    HIATAL HERNIA REPAIR  2005    SHOULDER ARTHROSCOPY  2015       Review of patient's allergies indicates:   Allergen Reactions    Promethazine Anaphylaxis    Clove flavoring [flavoring agent]     Codeine Itching    Latex, natural  rubber     Lorazepam Other (See Comments)    Lovastatin Other (See Comments)    Meclizine Other (See Comments)    Morphine Itching    Simvastatin Other (See Comments)    Tramadol-acetaminophen Itching       No current facility-administered medications on file prior to encounter.      Current Outpatient Medications on File Prior to Encounter   Medication Sig    albuterol (VENTOLIN HFA) 90 mcg/actuation inhaler Inhale 2 puffs into the lungs every 4 (four) hours as needed for Wheezing. Rescue    ALPRAZolam (XANAX) 1 MG tablet Take 1 tablet (1 mg total) by mouth 2 (two) times daily as needed for Anxiety.    ascorbic acid, vitamin C, (VITAMIN C) 100 MG tablet Take 100 mg by mouth once daily.    calcium carbonate (TUMS) 200 mg calcium (500 mg) chewable tablet Take 1 tablet by mouth once daily.    candesartan-hydrochlorothiazid (ATACAND HCT) 32-12.5 mg per tablet Take 1 tablet by mouth once daily.    ELIQUIS 5 mg Tab Take 1 tablet (5 mg total) by mouth 2 (two) times daily.    FLUoxetine 20 MG capsule Take 1 capsule (20 mg total) by mouth once daily.    fluticasone propionate (FLONASE) 50 mcg/actuation nasal spray 1 spray by Each Nostril route daily as needed.     fluticasone-umeclidin-vilanter (TRELEGY ELLIPTA) 100-62.5-25 mcg DsDv Inhale 1 puff into the lungs daily as needed.     gabapentin (NEURONTIN) 300 MG capsule Take 1 capsule (300 mg total) by mouth 2 (two) times daily.    levothyroxine (SYNTHROID) 25 MCG tablet Take 1 tablet (25 mcg total) by mouth before breakfast. Name brand only.  RIRI    mv-min/folic/vit K/lut/[id number] (ALIVE WOMEN'S 50 PLUS, BLEND, ORAL) Take 1 tablet by mouth daily as needed.    pantoprazole (PROTONIX) 40 MG tablet Take 1 tablet (40 mg total) by mouth once daily.    sotalol (BETAPACE) 80 MG tablet Take 1 tablet (80 mg total) by mouth 2 (two) times daily.    esomeprazole (NEXIUM) 40 MG capsule Take 1 capsule (40 mg total) by mouth 2 (two) times daily. BRAND NAME ONLY     fluticasone-umeclidin-vilanter (TRELEGY ELLIPTA) 100-62.5-25 mcg DsDv Trelegy Ellipta    nitroGLYCERIN (NITROSTAT) 0.4 MG SL tablet Place 0.4 mg under the tongue every 5 (five) minutes as needed.      Family History     None        Tobacco Use    Smoking status: Never Smoker    Smokeless tobacco: Never Used   Substance and Sexual Activity    Alcohol use: Not Currently    Drug use: Not on file    Sexual activity: Not on file     Review of Systems   Constitutional: Positive for fatigue. Negative for activity change, appetite change, chills, diaphoresis, fever and unexpected weight change.   HENT: Negative for congestion, ear pain, facial swelling, hearing loss, sore throat and trouble swallowing.    Eyes: Negative for pain and discharge.   Respiratory: Negative for cough, chest tightness, shortness of breath and wheezing.    Cardiovascular: Negative for chest pain, palpitations and leg swelling.   Gastrointestinal: Positive for diarrhea. Negative for abdominal pain, blood in stool, nausea and vomiting.   Endocrine: Negative for polydipsia, polyphagia and polyuria.   Genitourinary: Positive for decreased urine volume. Negative for difficulty urinating, dysuria, flank pain, frequency and urgency.   Musculoskeletal: Positive for back pain. Negative for arthralgias, joint swelling, neck pain and neck stiffness.   Skin: Negative for rash and wound.   Allergic/Immunologic: Negative for environmental allergies and immunocompromised state.   Neurological: Positive for weakness. Negative for dizziness, seizures, syncope, speech difficulty, light-headedness, numbness and headaches.   Hematological: Negative for adenopathy.   Psychiatric/Behavioral: Negative for sleep disturbance and suicidal ideas. The patient is not nervous/anxious.    All other systems reviewed and are negative.    Objective:     Vital Signs (Most Recent):  Temp: 98 °F (36.7 °C) (05/22/20 1346)  Pulse: (!) 52 (05/22/20 1331)  Resp: (!) 21 (05/22/20  1331)  BP: (!) 104/49 (05/22/20 1331)  SpO2: 99 % (05/22/20 1331) Vital Signs (24h Range):  Temp:  [98 °F (36.7 °C)] 98 °F (36.7 °C)  Pulse:  [52-58] 52  Resp:  [17-23] 21  SpO2:  [91 %-99 %] 99 %  BP: (104-140)/(49-63) 104/49     Weight: 90.7 kg (200 lb)  Body mass index is 40.4 kg/m².    Physical Exam   Constitutional: She is oriented to person, place, and time. She appears well-developed and well-nourished.   HENT:   Head: Normocephalic and atraumatic.   Eyes: Pupils are equal, round, and reactive to light. EOM are normal.   Neck: Normal range of motion. Neck supple.   Cardiovascular: Regular rhythm, normal heart sounds and intact distal pulses.   No murmur heard.  bradycardia   Pulmonary/Chest: Effort normal and breath sounds normal. No stridor. No respiratory distress. She has no wheezes.   Abdominal: Soft. Bowel sounds are normal. She exhibits no distension. There is no tenderness.   Musculoskeletal: Normal range of motion.   Neurological: She is oriented to person, place, and time.   drowsy   Skin: Skin is warm and dry. Capillary refill takes less than 2 seconds.   Psychiatric: She has a normal mood and affect.   Nursing note and vitals reviewed.        CRANIAL NERVES     CN III, IV, VI   Pupils are equal, round, and reactive to light.  Extraocular motions are normal.        Significant Labs:   CBC:   Recent Labs   Lab 05/22/20  1024   WBC 10.67   HGB 12.1   HCT 37.9        CMP:   Recent Labs   Lab 05/22/20  1024      K 3.1*      CO2 25      BUN 60*   CREATININE 5.2*   CALCIUM 8.8   PROT 7.5   ALBUMIN 3.8   BILITOT 1.2*   ALKPHOS 42*   AST 12   ALT 8*   ANIONGAP 11   EGFRNONAA 7.6*       Significant Imaging: CXR: I have reviewed all pertinent results/findings within the past 24 hours and my personal findings are:  cardiomegaly with atelectasis vs. mild pulmonary vascular congestion    Assessment/Plan:     * HAILY (acute kidney injury)  Admit to med/surg tele  Consult   Kovachev  Check CPK  Likely hypovolemia compounded with nephrotoxic meds   Hold ARB/HCTZ   Renally dose all meds  Repeat bmp in AM   IV hydration - monitor for overload      Hypokalemia  Replaced in ED   Repeat level in AM         Thrush  Nystatin swish and swallow      Acute pulmonary edema  Mild pulm edema vs. Atelectasis   Monitor for overload        Diarrhea  Stool studies  Check c. Diff        Paroxysmal atrial fibrillation  Continue amio  Renally dose eliquis  Currently sinus ford      Essential (primary) hypertension  Held ARB/HCTZ start amlodipine in the AM         VTE Risk Mitigation (From admission, onward)         Ordered     apixaban tablet 2.5 mg  2 times daily      05/22/20 1248     IP VTE HIGH RISK PATIENT  Once      05/22/20 1248     Place sequential compression device  Until discontinued      05/22/20 1248                   Suha Petit NP  Department of Hospital Medicine   [facility]

## 2020-05-22 NOTE — ASSESSMENT & PLAN NOTE
Admit to med/surg tele  Consult Dr. Cabrera  Check CPK  Likely hypovolemia compounded with nephrotoxic meds   Hold ARB/HCTZ   Renally dose all meds  Repeat bmp in AM   IV hydration - monitor for overload

## 2020-05-22 NOTE — ED NOTES
Pt states having left leg pain for known Sciatic. States took Xanax last night but unable to sleep. Denies CP or SOB.

## 2020-05-22 NOTE — TELEPHONE ENCOUNTER
----- Message from Ramon Simeon sent at 5/22/2020  8:56 AM CDT -----  Pt just went to [facility], she hasn't been eating and cant walk.   Pt  santos [phone]

## 2020-05-22 NOTE — HPI
Ms. Martinez presents today with complaints of weakness. It is severe. It is associated with leg cramps, diarrhea, mouth pain, oliguria, anorexia, and fatigue. She denies fever, chills, N/V, dizziness, cough, chest pain, flank pain, or LOC. She has a history of HTN, HLD, PAF on eliquis, chronic back pain, anxiety/depression, and diverticulitis. She called her doctor with complaints of diarrhea and was prescribed an unknown abx which she's been taking over a week. This hasn't stopped her diarrhea, but has caused thrush in her mouth. D/t the thrush, she hasn't been able to eat. She's had multiple episodes of watery diarrhea, with the last one, last night. She is unsure the last time she urinated. She is sleepy on exam, but did recently receive dilaudid for back pain. A renal US had just completed at the time of exam.

## 2020-05-22 NOTE — SUBJECTIVE & OBJECTIVE
Past Medical History:   Diagnosis Date    Arthritis     Juvenile diagnosed age 17    Hx of hiatal hernia        Past Surgical History:   Procedure Laterality Date    BREAST LUMPECTOMY Left     in 40's    COLONOSCOPY  2016    Dr. Crowe-RTADOLPH 5 years    COLONOSCOPY  2019    Dr. Reyes 5 years    HIATAL HERNIA REPAIR  2005    SHOULDER ARTHROSCOPY  2015       Review of patient's allergies indicates:   Allergen Reactions    Promethazine Anaphylaxis    Clove flavoring [flavoring agent]     Codeine Itching    Latex, natural rubber     Lorazepam Other (See Comments)    Lovastatin Other (See Comments)    Meclizine Other (See Comments)    Morphine Itching    Simvastatin Other (See Comments)    Tramadol-acetaminophen Itching       No current facility-administered medications on file prior to encounter.      Current Outpatient Medications on File Prior to Encounter   Medication Sig    albuterol (VENTOLIN HFA) 90 mcg/actuation inhaler Inhale 2 puffs into the lungs every 4 (four) hours as needed for Wheezing. Rescue    ALPRAZolam (XANAX) 1 MG tablet Take 1 tablet (1 mg total) by mouth 2 (two) times daily as needed for Anxiety.    ascorbic acid, vitamin C, (VITAMIN C) 100 MG tablet Take 100 mg by mouth once daily.    calcium carbonate (TUMS) 200 mg calcium (500 mg) chewable tablet Take 1 tablet by mouth once daily.    candesartan-hydrochlorothiazid (ATACAND HCT) 32-12.5 mg per tablet Take 1 tablet by mouth once daily.    ELIQUIS 5 mg Tab Take 1 tablet (5 mg total) by mouth 2 (two) times daily.    FLUoxetine 20 MG capsule Take 1 capsule (20 mg total) by mouth once daily.    fluticasone propionate (FLONASE) 50 mcg/actuation nasal spray 1 spray by Each Nostril route daily as needed.     fluticasone-umeclidin-vilanter (TRELEGY ELLIPTA) 100-62.5-25 mcg DsDv Inhale 1 puff into the lungs daily as needed.     gabapentin (NEURONTIN) 300 MG capsule Take 1 capsule (300 mg total) by mouth 2 (two) times daily.     levothyroxine (SYNTHROID) 25 MCG tablet Take 1 tablet (25 mcg total) by mouth before breakfast. Name brand only.  RIRI    mv-min/folic/vit K/lut/[id number] (ALIVE WOMEN'S 50 PLUS, BLEND, ORAL) Take 1 tablet by mouth daily as needed.    pantoprazole (PROTONIX) 40 MG tablet Take 1 tablet (40 mg total) by mouth once daily.    sotalol (BETAPACE) 80 MG tablet Take 1 tablet (80 mg total) by mouth 2 (two) times daily.    esomeprazole (NEXIUM) 40 MG capsule Take 1 capsule (40 mg total) by mouth 2 (two) times daily. BRAND NAME ONLY    fluticasone-umeclidin-vilanter (TRELEGY ELLIPTA) 100-62.5-25 mcg DsDv Trelegy Ellipta    nitroGLYCERIN (NITROSTAT) 0.4 MG SL tablet Place 0.4 mg under the tongue every 5 (five) minutes as needed.      Family History     None        Tobacco Use    Smoking status: Never Smoker    Smokeless tobacco: Never Used   Substance and Sexual Activity    Alcohol use: Not Currently    Drug use: Not on file    Sexual activity: Not on file     Review of Systems   Constitutional: Positive for fatigue. Negative for activity change, appetite change, chills, diaphoresis, fever and unexpected weight change.   HENT: Negative for congestion, ear pain, facial swelling, hearing loss, sore throat and trouble swallowing.    Eyes: Negative for pain and discharge.   Respiratory: Negative for cough, chest tightness, shortness of breath and wheezing.    Cardiovascular: Negative for chest pain, palpitations and leg swelling.   Gastrointestinal: Positive for diarrhea. Negative for abdominal pain, blood in stool, nausea and vomiting.   Endocrine: Negative for polydipsia, polyphagia and polyuria.   Genitourinary: Positive for decreased urine volume. Negative for difficulty urinating, dysuria, flank pain, frequency and urgency.   Musculoskeletal: Positive for back pain. Negative for arthralgias, joint swelling, neck pain and neck stiffness.   Skin: Negative for rash and wound.   Allergic/Immunologic: Negative for  environmental allergies and immunocompromised state.   Neurological: Positive for weakness. Negative for dizziness, seizures, syncope, speech difficulty, light-headedness, numbness and headaches.   Hematological: Negative for adenopathy.   Psychiatric/Behavioral: Negative for sleep disturbance and suicidal ideas. The patient is not nervous/anxious.    All other systems reviewed and are negative.    Objective:     Vital Signs (Most Recent):  Temp: 98 °F (36.7 °C) (05/22/20 1346)  Pulse: (!) 52 (05/22/20 1331)  Resp: (!) 21 (05/22/20 1331)  BP: (!) 104/49 (05/22/20 1331)  SpO2: 99 % (05/22/20 1331) Vital Signs (24h Range):  Temp:  [98 °F (36.7 °C)] 98 °F (36.7 °C)  Pulse:  [52-58] 52  Resp:  [17-23] 21  SpO2:  [91 %-99 %] 99 %  BP: (104-140)/(49-63) 104/49     Weight: 90.7 kg (200 lb)  Body mass index is 40.4 kg/m².    Physical Exam   Constitutional: She is oriented to person, place, and time. She appears well-developed and well-nourished.   HENT:   Head: Normocephalic and atraumatic.   Eyes: Pupils are equal, round, and reactive to light. EOM are normal.   Neck: Normal range of motion. Neck supple.   Cardiovascular: Regular rhythm, normal heart sounds and intact distal pulses.   No murmur heard.  bradycardia   Pulmonary/Chest: Effort normal and breath sounds normal. No stridor. No respiratory distress. She has no wheezes.   Abdominal: Soft. Bowel sounds are normal. She exhibits no distension. There is no tenderness.   Musculoskeletal: Normal range of motion.   Neurological: She is oriented to person, place, and time.   drowsy   Skin: Skin is warm and dry. Capillary refill takes less than 2 seconds.   Psychiatric: She has a normal mood and affect.   Nursing note and vitals reviewed.        CRANIAL NERVES     CN III, IV, VI   Pupils are equal, round, and reactive to light.  Extraocular motions are normal.        Significant Labs:   CBC:   Recent Labs   Lab 05/22/20  1024   WBC 10.67   HGB 12.1   HCT 37.9         CMP:   Recent Labs   Lab 05/22/20  1024      K 3.1*      CO2 25      BUN 60*   CREATININE 5.2*   CALCIUM 8.8   PROT 7.5   ALBUMIN 3.8   BILITOT 1.2*   ALKPHOS 42*   AST 12   ALT 8*   ANIONGAP 11   EGFRNONAA 7.6*       Significant Imaging: CXR: I have reviewed all pertinent results/findings within the past 24 hours and my personal findings are:  cardiomegaly with atelectasis vs. mild pulmonary vascular congestion

## 2020-05-22 NOTE — CONSULTS
Nephrology Consult Note        Patient Name: Danielle Martinez  MRN: [id number]    Patient Class: IP- Inpatient   Admission Date: 5/22/2020  Length of Stay: 0 days  Date of Service: 5/22/2020    Attending Physician: Ernie Henley Jr., *  Primary Care Provider: Antony Lopez MD    Reason for Consult: haily/hypokalemia/diarrhea/diverticulitis/htn/anemia    SUBJECTIVE:     HPI: 74F with GERD, HTN, hypothyroidism, depression, COPD, BRITTANIE, pAF, anemia presents with generalized weakness, nausea without any vomiting, diarrhea. Has prior GI bleed and currently on therapy for diverticulitis, does complain of having thrush orally as well. Also has left-sided leg pain consistent with her prior sciatica. She has been out of her thyroid medicine for at least a week. Renal is consulted for HAILY, hypokalemia - sCr is 5.2, was previously 0.7. Renal US is pending, as well as UA. Advised ID to give her some IVF - 100 cc/hr.    Past Medical History:   Diagnosis Date    Arthritis     Juvenile diagnosed age 17    Hx of hiatal hernia      Past Surgical History:   Procedure Laterality Date    BREAST LUMPECTOMY Left     in 40's    COLONOSCOPY  2016    Dr. Reyes 5 years    COLONOSCOPY  2019    Dr. Reyes 5 years    HIATAL HERNIA REPAIR  2005    SHOULDER ARTHROSCOPY  2015     No family history on file.  Social History     Tobacco Use    Smoking status: Never Smoker    Smokeless tobacco: Never Used   Substance Use Topics    Alcohol use: Not Currently    Drug use: Not on file       Review of patient's allergies indicates:   Allergen Reactions    Promethazine Anaphylaxis    Clove flavoring [flavoring agent]     Codeine Itching    Latex, natural rubber     Lorazepam Other (See Comments)    Lovastatin Other (See Comments)    Meclizine Other (See Comments)    Morphine Itching    Simvastatin Other (See Comments)    Tramadol-acetaminophen Itching       Outpatient meds:  No current facility-administered medications on  file prior to encounter.      Current Outpatient Medications on File Prior to Encounter   Medication Sig Dispense Refill    albuterol (VENTOLIN HFA) 90 mcg/actuation inhaler Inhale 2 puffs into the lungs every 4 (four) hours as needed for Wheezing. Rescue 1 Inhaler 5    ALPRAZolam (XANAX) 1 MG tablet Take 1 tablet (1 mg total) by mouth 2 (two) times daily as needed for Anxiety. 60 tablet 2    ascorbic acid, vitamin C, (VITAMIN C) 100 MG tablet Take 100 mg by mouth once daily.      calcium carbonate (TUMS) 200 mg calcium (500 mg) chewable tablet Take 1 tablet by mouth once daily.      candesartan-hydrochlorothiazid (ATACAND HCT) 32-12.5 mg per tablet Take 1 tablet by mouth once daily. 30 tablet 5    ELIQUIS 5 mg Tab Take 1 tablet (5 mg total) by mouth 2 (two) times daily. 60 tablet 5    FLUoxetine 20 MG capsule Take 1 capsule (20 mg total) by mouth once daily. 30 capsule 5    fluticasone propionate (FLONASE) 50 mcg/actuation nasal spray 1 spray by Each Nostril route daily as needed.       fluticasone-umeclidin-vilanter (TRELEGY ELLIPTA) 100-62.5-25 mcg DsDv Inhale 1 puff into the lungs daily as needed.       gabapentin (NEURONTIN) 300 MG capsule Take 1 capsule (300 mg total) by mouth 2 (two) times daily. 60 capsule 5    levothyroxine (SYNTHROID) 25 MCG tablet Take 1 tablet (25 mcg total) by mouth before breakfast. Name brand only.  RIRI 30 tablet 5    mv-min/folic/vit K/lut/sgiz125 (ALIVE WOMEN'S 50 PLUS, BLEND, ORAL) Take 1 tablet by mouth daily as needed.      pantoprazole (PROTONIX) 40 MG tablet Take 1 tablet (40 mg total) by mouth once daily. 30 tablet 1    sotalol (BETAPACE) 80 MG tablet Take 1 tablet (80 mg total) by mouth 2 (two) times daily. 60 tablet 5    esomeprazole (NEXIUM) 40 MG capsule Take 1 capsule (40 mg total) by mouth 2 (two) times daily. BRAND NAME ONLY 60 capsule 11    fluticasone-umeclidin-vilanter (TRELEGY ELLIPTA) 100-62.5-25 mcg DsDv Trelegy Ellipta      nitroGLYCERIN  (NITROSTAT) 0.4 MG SL tablet Place 0.4 mg under the tongue every 5 (five) minutes as needed.          Scheduled meds:   [START ON 5/23/2020] amLODIPine  5 mg Oral Daily    apixaban  2.5 mg Oral BID    ascorbic acid (vitamin C)  100 mg Oral Daily    FLUoxetine  20 mg Oral Daily    [START ON 5/23/2020] fluticasone propionate  1 spray Each Nostril Daily    fluticasone-umeclidin-vilanter  1 puff Inhalation Daily    gabapentin  100 mg Oral TID    [START ON 5/23/2020] levothyroxine  25 mcg Oral Before breakfast    nystatin  500,000 Units Oral QID (WM & HS)    pantoprazole  40 mg Oral Daily    sotaloL  80 mg Oral BID       Infusions:   sodium chloride 0.9% 1,000 mL (05/22/20 1228)       PRN meds:  acetaminophen, ALPRAZolam, melatonin, nitroGLYCERIN, oxyCODONE, sodium chloride 0.9%    Review of Systems:  Review of Systems   Constitutional: Positive for malaise/fatigue. Negative for chills, fever and weight loss.   HENT: Negative for hearing loss and nosebleeds.    Eyes: Negative for blurred vision, double vision and photophobia.   Respiratory: Negative for cough, shortness of breath and wheezing.    Cardiovascular: Negative for chest pain, palpitations and leg swelling.   Gastrointestinal: Positive for diarrhea and nausea. Negative for abdominal pain, constipation, heartburn and vomiting.   Genitourinary: Negative for dysuria, frequency and urgency.   Musculoskeletal: Negative for falls, joint pain and myalgias.   Skin: Positive for rash (oral thrush). Negative for itching.   Neurological: Negative for dizziness, speech change, focal weakness, loss of consciousness and headaches.   Endo/Heme/Allergies: Does not bruise/bleed easily.   Psychiatric/Behavioral: Negative for depression and substance abuse. The patient is not nervous/anxious.        OBJECTIVE:     Vital Signs and IO (Last 24H):  Temp:  [98 °F (36.7 °C)]   Pulse:  [54-58]   Resp:  [17-23]   BP: (112-140)/(56-63)   SpO2:  [91 %-96 %]   No intake/output  data recorded.    Wt Readings from Last 5 Encounters:   05/22/20 90.7 kg (200 lb)   11/05/19 93.2 kg (205 lb 6.4 oz)         Physical Exam:  Physical Exam   Constitutional: She is oriented to person, place, and time. She appears well-developed and well-nourished.   HENT:   Head: Normocephalic and atraumatic.   Mouth/Throat: Oropharynx is clear and moist.   Eyes: Pupils are equal, round, and reactive to light. EOM are normal. No scleral icterus.   Neck: Neck supple.   Cardiovascular: Normal rate and regular rhythm.   Pulmonary/Chest: Effort normal. No stridor. No respiratory distress.   Abdominal: Soft. She exhibits no distension.   Musculoskeletal: Normal range of motion. She exhibits no edema or deformity.   Neurological: She is alert and oriented to person, place, and time. No cranial nerve deficit.   Skin: Skin is warm and dry. No rash noted. She is not diaphoretic. No erythema.   Psychiatric: She has a normal mood and affect. Her behavior is normal.       Body mass index is 40.4 kg/m².    Laboratory:  Recent Labs   Lab 05/22/20  1024      K 3.1*      CO2 25   BUN 60*   CREATININE 5.2*   ESTGFRAFRICA 8.8*   EGFRNONAA 7.6*          Recent Labs   Lab 05/22/20  1024   CALCIUM 8.8   ALBUMIN 3.8   MG 2.4             No results for input(s): POCTGLUCOSE in the last 168 hours.    Recent Labs   Lab 11/05/19  1337   Hemoglobin A1C 6.8 H       Recent Labs   Lab 05/22/20  1024   WBC 10.67   HGB 12.1   HCT 37.9      MCV 87   MCHC 31.9*   MONO 10.5  1.1*       Recent Labs   Lab 05/22/20  1024   BILITOT 1.2*   PROT 7.5   ALBUMIN 3.8   ALKPHOS 42*   ALT 8*   AST 12             Invalid input(s): LEUCOCYTESUR    Recent Labs   Lab 09/13/19  0912   Sample JULIA       Microbiology Results (last 7 days)     ** No results found for the last 168 hours. **          ASSESSMENT/PLAN:     Active Hospital Problems    Diagnosis  POA    HAILY (acute kidney injury) [N17.9]  Yes      Resolved Hospital Problems   No  resolved problems to display.     HAILY, likely due to volume depletion from diarrhea, meds, poor oral intake due to thrush  Diverticulitis treated with broad abx  Diarrhea  Hypokalemia  No NSAIDs, ACEI/ARB, IV contrast or other nephrotoxins.  Keep MAP > 60, SBP > 100.  Dose meds for GFR < 30 ml/min.  Renal diet - low K, low phos.  IVF for now, do not replete K unless < 3. Mg is OK.  Pending renal US and UA.  Diagnose and treat any infection ? C.diff.    Anemia, non-CKD  Hgb and HCT are acceptable. Monitor.    HTN  BP seem controlled.   Tolerate asymptomatic HTN up to -160.  Hold home meds.    Thank you for allowing us to participate in the care of your patient!   We will follow the patient and provide recommendations as needed.    Duke Cabrera MD    [facility]  [address] [place] [address]  [place] [id number]    [phone] - tel  [phone] - fax    5/22/2020 1:14 PM

## 2020-05-22 NOTE — ED NOTES
Pt requested something for pain but unable to tell what she can take due to allergies of most meds

## 2020-05-22 NOTE — ED PROVIDER NOTES
Encounter Date: 5/22/2020       History   No chief complaint on file.    Old female with a history of multiple medical problems including GERD, hypertension, hypothyroidism, hyperlipidemia, prior GI bleed, diverticulitis for which she is currently being treated, colon polyps, shingles, depression, COPD, paroxysmal atrial fib, obstructive sleep apnea, presents now with complaints of generalized weakness.  The patient admits she has had nausea without any vomiting but has had diarrhea.  She does complain of having thrush orally.  She is currently also complaining of having left-sided leg pain consistent with her prior sciatica.  She is on unknown antibiotics for her diverticular disease for which she attributes to development of thrush.  No fever or chills.  Her weight has been stable.  The patient admits that she has been out of her thyroid medicine for at least a week.  No complaints of any shortness of breath or chest pain.        Review of patient's allergies indicates:   Allergen Reactions    Promethazine Anaphylaxis    Clove flavoring [flavoring agent]     Codeine Itching    Latex, natural rubber     Lorazepam Other (See Comments)    Lovastatin Other (See Comments)    Meclizine Other (See Comments)    Morphine Itching    Simvastatin Other (See Comments)    Tramadol-acetaminophen Itching     Past Medical History:   Diagnosis Date    Arthritis     Juvenile diagnosed age 17    Hx of hiatal hernia      Past Surgical History:   Procedure Laterality Date    BREAST LUMPECTOMY Left     in 40's    COLONOSCOPY  2016    Dr. Crowe-JULISSA 5 years    COLONOSCOPY  2019    Dr. Reyes 5 years    HIATAL HERNIA REPAIR  2005    SHOULDER ARTHROSCOPY  2015     No family history on file.  Social History     Tobacco Use    Smoking status: Never Smoker    Smokeless tobacco: Never Used   Substance Use Topics    Alcohol use: Not Currently    Drug use: Not on file     Review of Systems   Constitutional: Positive  for activity change and appetite change. Negative for chills, diaphoresis and fever.   HENT: Positive for mouth sores and trouble swallowing. Negative for sore throat.    Respiratory: Negative for cough, shortness of breath and wheezing.    Cardiovascular: Negative for chest pain and palpitations.   Gastrointestinal: Positive for abdominal pain, diarrhea and nausea. Negative for blood in stool and vomiting.   Genitourinary: Negative for difficulty urinating, dysuria and hematuria.   Musculoskeletal: Negative for back pain.   Skin: Negative for pallor and rash.   Neurological: Negative for dizziness, syncope, weakness, light-headedness and headaches.   Hematological: Does not bruise/bleed easily.   All other systems reviewed and are negative.      Physical Exam     Initial Vitals   BP Pulse Resp Temp SpO2   -- -- -- -- --      MAP       --         Physical Exam    Vitals reviewed.  Constitutional: She appears well-developed and well-nourished. She is not diaphoretic. No distress.   HENT:   Head: Normocephalic and atraumatic.   Nose: Nose normal.   Mouth/Throat: Oropharynx is clear and moist. No oropharyngeal exudate.   Eyes: Conjunctivae are normal. Pupils are equal, round, and reactive to light. Right eye exhibits no discharge. Left eye exhibits no discharge.   Neck: Normal range of motion. Neck supple. No JVD present.   Cardiovascular: Normal rate, regular rhythm, normal heart sounds and intact distal pulses. Exam reveals no gallop and no friction rub.    No murmur heard.  Pulmonary/Chest: Breath sounds normal. No respiratory distress. She has no wheezes. She has no rhonchi. She has no rales.   Abdominal: Soft. Bowel sounds are normal. She exhibits no distension. There is no tenderness. There is no rebound and no guarding.   Musculoskeletal: Normal range of motion. She exhibits no edema or tenderness.   Tenderness to no direct palpation of the lumbar spine midline.  Left SI joint tenderness is also noted.  Sciatic  notch tenderness is noted.  Distal sensation is intact.   Lymphadenopathy:     She has no cervical adenopathy.   Neurological: She is alert and oriented to person, place, and time. She has normal strength. GCS score is 15. GCS eye subscore is 4. GCS verbal subscore is 5. GCS motor subscore is 6.   Skin: Skin is warm and dry. Capillary refill takes less than 2 seconds. No rash noted. No erythema. No pallor.   Psychiatric: She has a normal mood and affect. Her behavior is normal. Judgment and thought content normal.         ED Course   Procedures  Labs Reviewed - No data to display       Imaging Results    None                       Attending Attestation:             Attending ED Notes:   This 74-year-old female who presented with complaints of thrush, sciatica and generalized weakness, has evidence of an acute kidney injury on labs with BUN of 16 and creatinine of 5.2.  Six months ago the BUN and creatinine was 24 and 0.77 respectively.  Additionally the patient was noted to have had a potassium of 3.1 for which she was given 40 mEq orally.  As a consequence hospital Medicine is consulted along with nephrology.  Dr. Cabrera will be seeing the patient in consult.  Gentle hydration is provided the patient will have renal ultrasound.  Hospital Medicine is consulted for primary admission.                        Clinical Impression:       ICD-10-CM ICD-9-CM   1. Hypokalemia E87.6 276.8   2. Weakness generalized R53.1 780.79   3. HAILY (acute kidney injury) N17.9 584.9   4. Oral thrush B37.0 112.0   5. Sciatica, unspecified laterality M54.30 724.3                                Ernie Henley Jr., MD  05/22/20 1216

## 2020-05-22 NOTE — PROGRESS NOTES
Dr. Valenzuela notified pt BP 90/50 with pt in trendelenburg. Pt bp before position change was 76/52 with manual cuff. Pt states she feels very sleepy. Orders for NS bolus of 500. Will reassess BP after bolus is given.

## 2020-05-23 LAB
ALBUMIN SERPL BCP-MCNC: 3.6 G/DL (ref 3.5–5.2)
ALP SERPL-CCNC: 41 U/L (ref 55–135)
ALT SERPL W/O P-5'-P-CCNC: 9 U/L (ref 10–44)
ANION GAP SERPL CALC-SCNC: 9 MMOL/L (ref 8–16)
AST SERPL-CCNC: 11 U/L (ref 10–40)
BASOPHILS # BLD AUTO: 0.06 K/UL (ref 0–0.2)
BASOPHILS NFR BLD: 0.6 % (ref 0–1.9)
BILIRUB SERPL-MCNC: 1 MG/DL (ref 0.1–1)
BUN SERPL-MCNC: 57 MG/DL (ref 8–23)
CALCIUM SERPL-MCNC: 8.3 MG/DL (ref 8.7–10.5)
CHLORIDE SERPL-SCNC: 109 MMOL/L (ref 95–110)
CO2 SERPL-SCNC: 23 MMOL/L (ref 23–29)
CREAT SERPL-MCNC: 4.3 MG/DL (ref 0.5–1.4)
DIFFERENTIAL METHOD: ABNORMAL
EOSINOPHIL # BLD AUTO: 0.4 K/UL (ref 0–0.5)
EOSINOPHIL NFR BLD: 3.6 % (ref 0–8)
ERYTHROCYTE [DISTWIDTH] IN BLOOD BY AUTOMATED COUNT: 16.8 % (ref 11.5–14.5)
EST. GFR  (AFRICAN AMERICAN): 11.1 ML/MIN/1.73 M^2
EST. GFR  (NON AFRICAN AMERICAN): 9.6 ML/MIN/1.73 M^2
GLUCOSE SERPL-MCNC: 142 MG/DL (ref 70–110)
HCT VFR BLD AUTO: 37.7 % (ref 37–48.5)
HGB BLD-MCNC: 11.7 G/DL (ref 12–16)
IMM GRANULOCYTES # BLD AUTO: 0.03 K/UL (ref 0–0.04)
IMM GRANULOCYTES NFR BLD AUTO: 0.3 % (ref 0–0.5)
LYMPHOCYTES # BLD AUTO: 1.2 K/UL (ref 1–4.8)
LYMPHOCYTES NFR BLD: 12 % (ref 18–48)
MAGNESIUM SERPL-MCNC: 2.3 MG/DL (ref 1.6–2.6)
MCH RBC QN AUTO: 27.6 PG (ref 27–31)
MCHC RBC AUTO-ENTMCNC: 31 G/DL (ref 32–36)
MCV RBC AUTO: 89 FL (ref 82–98)
MONOCYTES # BLD AUTO: 1 K/UL (ref 0.3–1)
MONOCYTES NFR BLD: 9.6 % (ref 4–15)
NEUTROPHILS # BLD AUTO: 7.6 K/UL (ref 1.8–7.7)
NEUTROPHILS NFR BLD: 73.9 % (ref 38–73)
NRBC BLD-RTO: 0 /100 WBC
PHOSPHATE SERPL-MCNC: 5 MG/DL (ref 2.7–4.5)
PLATELET # BLD AUTO: 284 K/UL (ref 150–350)
PMV BLD AUTO: 10.6 FL (ref 9.2–12.9)
POTASSIUM SERPL-SCNC: 3.8 MMOL/L (ref 3.5–5.1)
PROT SERPL-MCNC: 7.2 G/DL (ref 6–8.4)
RBC # BLD AUTO: 4.24 M/UL (ref 4–5.4)
SODIUM SERPL-SCNC: 141 MMOL/L (ref 136–145)
WBC # BLD AUTO: 10.33 K/UL (ref 3.9–12.7)

## 2020-05-23 PROCEDURE — 84100 ASSAY OF PHOSPHORUS: CPT

## 2020-05-23 PROCEDURE — 25000242 PHARM REV CODE 250 ALT 637 W/ HCPCS: Performed by: NURSE PRACTITIONER

## 2020-05-23 PROCEDURE — 85025 COMPLETE CBC W/AUTO DIFF WBC: CPT

## 2020-05-23 PROCEDURE — 25000242 PHARM REV CODE 250 ALT 637 W/ HCPCS: Performed by: INTERNAL MEDICINE

## 2020-05-23 PROCEDURE — 12000002 HC ACUTE/MED SURGE SEMI-PRIVATE ROOM

## 2020-05-23 PROCEDURE — 99900035 HC TECH TIME PER 15 MIN (STAT)

## 2020-05-23 PROCEDURE — 94640 AIRWAY INHALATION TREATMENT: CPT

## 2020-05-23 PROCEDURE — 80053 COMPREHEN METABOLIC PANEL: CPT

## 2020-05-23 PROCEDURE — 25000003 PHARM REV CODE 250: Performed by: NURSE PRACTITIONER

## 2020-05-23 PROCEDURE — 25000003 PHARM REV CODE 250: Performed by: HOSPITALIST

## 2020-05-23 PROCEDURE — 36415 COLL VENOUS BLD VENIPUNCTURE: CPT

## 2020-05-23 PROCEDURE — 94761 N-INVAS EAR/PLS OXIMETRY MLT: CPT

## 2020-05-23 PROCEDURE — 83735 ASSAY OF MAGNESIUM: CPT

## 2020-05-23 RX ORDER — SODIUM CHLORIDE 9 MG/ML
INJECTION, SOLUTION INTRAVENOUS CONTINUOUS
Status: DISCONTINUED | OUTPATIENT
Start: 2020-05-23 | End: 2020-05-24

## 2020-05-23 RX ORDER — ALBUTEROL SULFATE 0.83 MG/ML
2.5 SOLUTION RESPIRATORY (INHALATION) EVERY 4 HOURS PRN
Status: DISCONTINUED | OUTPATIENT
Start: 2020-05-23 | End: 2020-05-28 | Stop reason: HOSPADM

## 2020-05-23 RX ADMIN — SODIUM CHLORIDE: 0.9 INJECTION, SOLUTION INTRAVENOUS at 10:05

## 2020-05-23 RX ADMIN — ALBUTEROL SULFATE 2.5 MG: 2.5 SOLUTION RESPIRATORY (INHALATION) at 07:05

## 2020-05-23 RX ADMIN — APIXABAN 2.5 MG: 2.5 TABLET, FILM COATED ORAL at 08:05

## 2020-05-23 RX ADMIN — SOTALOL HYDROCHLORIDE 80 MG: 80 TABLET ORAL at 08:05

## 2020-05-23 RX ADMIN — FLUOXETINE 20 MG: 20 CAPSULE ORAL at 08:05

## 2020-05-23 RX ADMIN — GABAPENTIN 100 MG: 100 CAPSULE ORAL at 08:05

## 2020-05-23 RX ADMIN — SODIUM CHLORIDE: 0.9 INJECTION, SOLUTION INTRAVENOUS at 11:05

## 2020-05-23 RX ADMIN — NYSTATIN 500000 UNITS: 500000 SUSPENSION ORAL at 05:05

## 2020-05-23 RX ADMIN — FLUTICASONE PROPIONATE 50 MCG: 50 SPRAY, METERED NASAL at 09:05

## 2020-05-23 RX ADMIN — NYSTATIN 500000 UNITS: 500000 SUSPENSION ORAL at 12:05

## 2020-05-23 RX ADMIN — NYSTATIN 500000 UNITS: 500000 SUSPENSION ORAL at 08:05

## 2020-05-23 RX ADMIN — OXYCODONE HYDROCHLORIDE AND ACETAMINOPHEN 500 MG: 500 TABLET ORAL at 08:05

## 2020-05-23 RX ADMIN — GABAPENTIN 100 MG: 100 CAPSULE ORAL at 02:05

## 2020-05-23 RX ADMIN — OXYCODONE HYDROCHLORIDE 5 MG: 5 TABLET ORAL at 05:05

## 2020-05-23 RX ADMIN — LEVOTHYROXINE SODIUM 25 MCG: 25 TABLET ORAL at 05:05

## 2020-05-23 RX ADMIN — PANTOPRAZOLE SODIUM 40 MG: 40 TABLET, DELAYED RELEASE ORAL at 05:05

## 2020-05-23 NOTE — PLAN OF CARE
Problem: Fall Injury Risk  Goal: Absence of Fall and Fall-Related Injury  Outcome: Ongoing, Progressing     Problem: Adult Inpatient Plan of Care  Goal: Optimal Comfort and Wellbeing  Outcome: Ongoing, Progressing

## 2020-05-23 NOTE — ASSESSMENT & PLAN NOTE
Stool studies  Check c. Diff  Since yesterday patient had multiple bowel movements unfortunately no stool studies were sent by nursing staff

## 2020-05-23 NOTE — HOSPITAL COURSE
74-year-old white female with known past medical history of osteoarthritis, hiatal hernia, paroxysmal AFib admitted 05/22/2020 with diagnosis of acute kidney injury due to diarrhea at home, hypokalemia, thrush  Patient was admitted to [facility] with tele monitoring.  Dr. Cabrera was consulted, Arb and HCTZ held, all meds were renally dosed.  Nephrotoxic medications were avoided, IV hydration cautiously with monitoring for fluid overload.  Patient was also dehydrated, patient was hydrated as well as given  Stool studies was ordered for diarrhea.  Patient was consuming lot of Glucerna at home in an attempt to lose weight.  Patient complained of being very weak and chronic joint pains in left leg.   Diarrhea improved, developed some but cough.  Patient was given 40 of IV Lasix with good diuresis and cough improvement  Renal functions improved markedly with hydration  I consulted PT OT for evaluation for sniff versus home health.  Patient informed that she has a camper next to her home which is only 2 steps I and she would stay there.  PT OT evaluation recommended home with PT and OT upon discharge with home health  Kidney functions continue to improve.  Patient ambulating in the room without assistance and difficulty.  Patient does want to go home.  I did inform patient that her A1c in November of 2019 was 6.8 which means patient is diabetic but due to her age I would rather recommend dietary control instead of putting her on hypoglycemic medications.  I discussed with patient the foods that she should and should not eat and how to prevent from becoming uncontrolled diabetic, patient verbalized understanding    I discussed the case with Dr. Swanson who recommended patient can be discharged today for as she is doing much better.  Noted her BUN creatinine was 37/2.2 today (was 60/5.2 on admission), discussed proper hydration with the patient.    I discussed the discharge plan with the patient.  Patient agreed.  Discussed  with patient that I will repeat her serum potassium level around 12:00 p.m. and if it is within normal range I will discharge her later today around 3:04 p.m..  Patient verbalized understanding    Nurse called with the report that her serum potassium is 3.7.  Patient ready to go home.  I have requested discharge with home health with skilled nursing 2 times a week, PT 2 times a week and OT 2 times a week for her    Patient was seen and examined on the day of discharge  For complete physical exam please see the note from earlier today    Advised patient to follow-up with her primary care physician in 2 weeks post hospital discharge  Follow-up with Dr. Cabrera in 2 weeks post hospital discharge follow-up for HAILY

## 2020-05-23 NOTE — PROGRESS NOTES
[facility] Medicine  Progress Note    DOS: 05/23/2020    Patient Name: Danielle Martinez  MRN: [id number]  Patient Class: IP- Inpatient   Admission Date: 5/22/2020  Length of Stay: 1 days  Attending Physician: Kristofer Jimenez MD  Primary Care Provider: Antony Lopez MD        Subjective:     Principal Problem:HAILY (acute kidney injury)        HPI:  Ms. Martinez presents today with complaints of weakness. It is severe. It is associated with leg cramps, diarrhea, mouth pain, oliguria, anorexia, and fatigue. She denies fever, chills, N/V, dizziness, cough, chest pain, flank pain, or LOC. She has a history of HTN, HLD, PAF on eliquis, chronic back pain, anxiety/depression, and diverticulitis. She called her doctor with complaints of diarrhea and was prescribed an unknown abx which she's been taking over a week. This hasn't stopped her diarrhea, but has caused thrush in her mouth. D/t the thrush, she hasn't been able to eat. She's had multiple episodes of watery diarrhea, with the last one, last night. She is unsure the last time she urinated. She is sleepy on exam, but did recently receive dilaudid for back pain. A renal US had just completed at the time of exam.     Overview/Hospital Course:  05/23:  Patient was seen and examined bedside.  She tells me that she is experiencing on and off diarrhea since last 1 month and was given antibiotics for diverticulitis.  Denies any new symptoms except being very weak and chronic joint pains in left leg.  Unfortunately stool studies were not sent by nursing staff.  Received IV hydration.     Interval History: Patient was seen and examined bedside.  She tells me that she is experiencing on and off diarrhea since last 1 month and was given antibiotics for diverticulitis.  Denies any new symptoms except being very weak and chronic joint pains in left leg.  Unfortunately stool studies were not sent by nursing staff.  Received IV hydration.       Review of Systems    Constitutional: Positive for fatigue. Negative for activity change, appetite change, chills, diaphoresis, fever and unexpected weight change.   HENT: Negative for congestion, ear pain, facial swelling, hearing loss, sore throat and trouble swallowing.    Eyes: Negative for pain and discharge.   Respiratory: Negative for cough, chest tightness, shortness of breath and wheezing.    Cardiovascular: Negative for chest pain, palpitations and leg swelling.   Gastrointestinal: Positive for diarrhea. Negative for abdominal pain, blood in stool, nausea and vomiting.   Endocrine: Negative for polydipsia, polyphagia and polyuria.   Genitourinary: Positive for decreased urine volume. Negative for difficulty urinating, dysuria, flank pain, frequency and urgency.   Musculoskeletal: Positive for back pain. Negative for arthralgias, joint swelling, neck pain and neck stiffness.   Skin: Negative for rash and wound.   Allergic/Immunologic: Negative for environmental allergies and immunocompromised state.   Neurological: Positive for weakness. Negative for dizziness, seizures, syncope, speech difficulty, light-headedness, numbness and headaches.   Hematological: Negative for adenopathy.   Psychiatric/Behavioral: Negative for sleep disturbance and suicidal ideas. The patient is not nervous/anxious.    All other systems reviewed and are negative.    Objective:     Vital Signs (Most Recent):  Temp: 98.9 °F (37.2 °C) (05/23/20 1130)  Pulse: (!) 58 (05/23/20 1130)  Resp: 19 (05/23/20 1130)  BP: 111/66 (05/23/20 1130)  SpO2: 95 % (05/23/20 1130) Vital Signs (24h Range):  Temp:  [98.2 °F (36.8 °C)-98.9 °F (37.2 °C)] 98.9 °F (37.2 °C)  Pulse:  [51-62] 58  Resp:  [18-20] 19  SpO2:  [95 %-100 %] 95 %  BP: ()/(35-74) 111/66     Weight: 90.7 kg (199 lb 15.3 oz)  Body mass index is 40.39 kg/m².    Intake/Output Summary (Last 24 hours) at 5/23/2020 2489  Last data filed at 5/23/2020 1054  Gross per 24 hour   Intake --   Output 350 ml   Net  -350 ml      Physical Exam   Constitutional: She is oriented to person, place, and time. She appears well-developed and well-nourished.   HENT:   Head: Normocephalic and atraumatic.   Eyes: Pupils are equal, round, and reactive to light. EOM are normal.   Neck: Normal range of motion. Neck supple.   Cardiovascular: Regular rhythm, normal heart sounds and intact distal pulses.   No murmur heard.  bradycardia   Pulmonary/Chest: Effort normal and breath sounds normal. No stridor. No respiratory distress. She has no wheezes.   Abdominal: Soft. Bowel sounds are normal. She exhibits no distension. There is no tenderness.   Musculoskeletal: Normal range of motion.   Neurological: She is oriented to person, place, and time.   drowsy   Skin: Skin is warm and dry. Capillary refill takes less than 2 seconds.   Psychiatric: She has a normal mood and affect.   Nursing note and vitals reviewed.      Significant Labs: All pertinent labs within the past 24 hours have been reviewed.    Significant Imaging: I have reviewed all pertinent imaging results/findings within the past 24 hours.      Assessment/Plan:      * HAILY (acute kidney injury)  Improving  Likely due to ongoing diarrhea  Consult Dr. Cabrera  Hold ARB/HCTZ   Renally dose all meds  Repeat bmp in AM   Gentle IV hydration - monitor for overload  Accurate charting of urine output      Diarrhea  Stool studies  Check c. Diff  Since yesterday patient had multiple bowel movements unfortunately no stool studies were sent by nursing staff        Thrush  Nystatin swish and swallow.      Hypokalemia  Replaced in ED   Repeat level in AM  Judicious replacement considering renal failure        Acute pulmonary edema  Mild pulm edema vs. Atelectasis   Monitor for overload.        Paroxysmal atrial fibrillation  Continue amio  Renally dose eliquis and sotalol  Currently sinus ford      Essential (primary) hypertension  Held ARB/HCTZ start amlodipine in the AM         VTE Risk Mitigation  (From admission, onward)         Ordered     apixaban tablet 2.5 mg  2 times daily      05/22/20 1248     IP VTE HIGH RISK PATIENT  Once      05/22/20 1248     Place sequential compression device  Until discontinued      05/22/20 1248                      Kristofer Jimenez MD  Department of Hospital Medicine   [facility]

## 2020-05-23 NOTE — CONSULTS
Nephrology Consult Note        Patient Name: Danielle Martinez  MRN: [id number]    Patient Class: IP- Inpatient   Admission Date: 5/22/2020  Length of Stay: 1 days  Date of Service: 5/23/2020    Attending Physician: Singh Valenzuela MD  Primary Care Provider: Antony Lopez MD    Reason for Consult: haily/hypokalemia/diarrhea/diverticulitis/htn/anemia    SUBJECTIVE:     HPI: 74F with GERD, HTN, hypothyroidism, depression, COPD, BRITTANIE, pAF, anemia presents with generalized weakness, nausea without any vomiting, diarrhea. Has prior GI bleed and currently on therapy for diverticulitis, does complain of having thrush orally as well. Also has left-sided leg pain consistent with her prior sciatica. She has been out of her thyroid medicine for at least a week. Renal is consulted for HAILY, hypokalemia - sCr is 5.2, was previously 0.7. Renal US is unremarkable, UA is clear.    5/23 VSS, no new complains.    Past Medical History:   Diagnosis Date    HAILY (acute kidney injury)     Arthritis     Juvenile diagnosed age 17    COPD (chronic obstructive pulmonary disease)     Diverticulitis     GERD (gastroesophageal reflux disease)     Hx of hiatal hernia     Hypertension     Sleep apnea      Past Surgical History:   Procedure Laterality Date    BREAST LUMPECTOMY Left     in 40's    COLONOSCOPY  2016    Dr. Crowe-JULISSA 5 years    COLONOSCOPY  2019    Dr. Reyes 5 years    HIATAL HERNIA REPAIR  2005    SHOULDER ARTHROSCOPY  2015     History reviewed. No pertinent family history.  Social History     Tobacco Use    Smoking status: Never Smoker    Smokeless tobacco: Never Used   Substance Use Topics    Alcohol use: Not Currently    Drug use: Not on file       Review of patient's allergies indicates:   Allergen Reactions    Promethazine Anaphylaxis    Clove flavoring [flavoring agent]     Codeine Itching    Latex, natural rubber     Lorazepam Other (See Comments)    Lovastatin Other (See Comments)    Meclizine Other  (See Comments)    Morphine Itching    Simvastatin Other (See Comments)    Tramadol-acetaminophen Itching       Outpatient meds:  No current facility-administered medications on file prior to encounter.      Current Outpatient Medications on File Prior to Encounter   Medication Sig Dispense Refill    albuterol (VENTOLIN HFA) 90 mcg/actuation inhaler Inhale 2 puffs into the lungs every 4 (four) hours as needed for Wheezing. Rescue 1 Inhaler 5    ALPRAZolam (XANAX) 1 MG tablet Take 1 tablet (1 mg total) by mouth 2 (two) times daily as needed for Anxiety. 60 tablet 2    ascorbic acid, vitamin C, (VITAMIN C) 100 MG tablet Take 100 mg by mouth once daily.      calcium carbonate (TUMS) 200 mg calcium (500 mg) chewable tablet Take 1 tablet by mouth once daily.      candesartan-hydrochlorothiazid (ATACAND HCT) 32-12.5 mg per tablet Take 1 tablet by mouth once daily. 30 tablet 5    ELIQUIS 5 mg Tab Take 1 tablet (5 mg total) by mouth 2 (two) times daily. 60 tablet 5    FLUoxetine 20 MG capsule Take 1 capsule (20 mg total) by mouth once daily. 30 capsule 5    fluticasone propionate (FLONASE) 50 mcg/actuation nasal spray 1 spray by Each Nostril route daily as needed.       fluticasone-umeclidin-vilanter (TRELEGY ELLIPTA) 100-62.5-25 mcg DsDv Inhale 1 puff into the lungs daily as needed.       gabapentin (NEURONTIN) 300 MG capsule Take 1 capsule (300 mg total) by mouth 2 (two) times daily. 60 capsule 5    levothyroxine (SYNTHROID) 25 MCG tablet Take 1 tablet (25 mcg total) by mouth before breakfast. Name brand only.  RIRI 30 tablet 5    mv-min/folic/vit K/lut/vnox710 (ALIVE WOMEN'S 50 PLUS, BLEND, ORAL) Take 1 tablet by mouth daily as needed.      pantoprazole (PROTONIX) 40 MG tablet Take 1 tablet (40 mg total) by mouth once daily. 30 tablet 1    sotalol (BETAPACE) 80 MG tablet Take 1 tablet (80 mg total) by mouth 2 (two) times daily. 60 tablet 5    esomeprazole (NEXIUM) 40 MG capsule Take 1 capsule (40 mg  total) by mouth 2 (two) times daily. BRAND NAME ONLY 60 capsule 11    fluticasone-umeclidin-vilanter (TRELEGY ELLIPTA) 100-62.5-25 mcg DsDv Trelegy Ellipta      nitroGLYCERIN (NITROSTAT) 0.4 MG SL tablet Place 0.4 mg under the tongue every 5 (five) minutes as needed.          Scheduled meds:   apixaban  2.5 mg Oral BID    ascorbic acid (vitamin C)  500 mg Oral Daily    FLUoxetine  20 mg Oral Daily    fluticasone propionate  1 spray Each Nostril Daily    fluticasone-umeclidin-vilanter  1 puff Inhalation Daily    gabapentin  100 mg Oral TID    levothyroxine  25 mcg Oral Before breakfast    nystatin  500,000 Units Oral QID (WM & HS)    pantoprazole  40 mg Oral Daily    sodium chloride 0.9%  1,000 mL Intravenous Once    sotaloL  80 mg Oral BID       Infusions:      PRN meds:  acetaminophen, ALPRAZolam, melatonin, nitroGLYCERIN, oxyCODONE, sodium chloride 0.9%    Review of Systems:  ROS    OBJECTIVE:     Vital Signs and IO (Last 24H):  Temp:  [98 °F (36.7 °C)-98.5 °F (36.9 °C)]   Pulse:  [51-62]   Resp:  [17-23]   BP: ()/(35-74)   SpO2:  [91 %-100 %]   No intake/output data recorded.    Wt Readings from Last 5 Encounters:   05/22/20 90.7 kg (199 lb 15.3 oz)   11/05/19 93.2 kg (205 lb 6.4 oz)         Physical Exam:  Physical Exam   Constitutional: She is oriented to person, place, and time. She appears well-developed and well-nourished.   HENT:   Head: Normocephalic and atraumatic.   Mouth/Throat: Oropharynx is clear and moist.   Eyes: Pupils are equal, round, and reactive to light. EOM are normal. No scleral icterus.   Neck: Neck supple.   Cardiovascular: Normal rate and regular rhythm.   Pulmonary/Chest: Effort normal. No stridor. No respiratory distress.   Abdominal: Soft. She exhibits no distension.   Musculoskeletal: Normal range of motion. She exhibits no edema or deformity.   Neurological: She is alert and oriented to person, place, and time. No cranial nerve deficit.   Skin: Skin is warm and  dry. No rash noted. She is not diaphoretic. No erythema.   Psychiatric: She has a normal mood and affect. Her behavior is normal.       Body mass index is 40.39 kg/m².    Laboratory:  Recent Labs   Lab 05/22/20  1024 05/23/20  0512    141   K 3.1* 3.8    109   CO2 25 23   BUN 60* 57*   CREATININE 5.2* 4.3*   ESTGFRAFRICA 8.8* 11.1*   EGFRNONAA 7.6* 9.6*    142*       Recent Labs   Lab 05/22/20  1024 05/23/20  0512   CALCIUM 8.8 8.3*   ALBUMIN 3.8 3.6   PHOS  --  5.0*   MG 2.4 2.3             No results for input(s): POCTGLUCOSE in the last 168 hours.    Recent Labs   Lab 11/05/19  1337   Hemoglobin A1C 6.8 H       Recent Labs   Lab 05/22/20  1024 05/23/20  0512   WBC 10.67 10.33   HGB 12.1 11.7*   HCT 37.9 37.7    284   MCV 87 89   MCHC 31.9* 31.0*   MONO 10.5  1.1* 9.6  1.0       Recent Labs   Lab 05/22/20  1024 05/23/20  0512   BILITOT 1.2* 1.0   PROT 7.5 7.2   ALBUMIN 3.8 3.6   ALKPHOS 42* 41*   ALT 8* 9*   AST 12 11       Recent Labs   Lab 05/22/20  1747   Color, UA Yellow   Appearance, UA Clear   pH, UA 5.0   Specific Gravity, UA 1.010   Protein, UA Negative   Glucose, UA Negative   Ketones, UA Negative   Urobilinogen, UA Negative   Bilirubin (UA) Negative   Occult Blood UA Negative   Nitrite, UA Negative   RBC, UA 7 H   WBC, UA 21 H   Bacteria Negative   Hyaline Casts, UA 7 A       Recent Labs   Lab 09/13/19  0912   Sample JULIA       Microbiology Results (last 7 days)     Procedure Component Value Units Date/Time    Urine culture [[id number]] Collected:  05/22/20 1747    Order Status:  No result Specimen:  Urine Updated:  05/22/20 1813    Stool culture [[id number]]     Order Status:  No result Specimen:  Stool     Clostridium difficile EIA [[id number]]     Order Status:  No result Specimen:  Stool           ASSESSMENT/PLAN:     Active Hospital Problems    Diagnosis  POA    *HAILY (acute kidney injury) [N17.9]  Yes    Diarrhea [R19.7]  Yes    Acute pulmonary edema [J81.0]  Yes     Thrush [B37.0]  Yes    Hypokalemia [E87.6]  Yes    Paroxysmal atrial fibrillation [I48.0]  Yes    Essential (primary) hypertension [I10]  Yes      Resolved Hospital Problems   No resolved problems to display.     HAILY, likely due to volume depletion from diarrhea, meds, poor oral intake due to thrush  Diverticulitis treated with broad abx  Diarrhea  Hypokalemia  No NSAIDs, ACEI/ARB, IV contrast or other nephrotoxins.  Keep MAP > 60, SBP > 100.  Dose meds for GFR < 30 ml/min.  Renal diet - low K, low phos.  IVF for now, do not replete K unless < 3. Mg is OK.  Diagnose and treat any infection ? C.diff.    Anemia, non-CKD  Hgb and HCT are acceptable. Monitor.    HTN  BP seem controlled.   Tolerate asymptomatic HTN up to -160.  Hold home meds.    Thank you for allowing us to participate in the care of your patient!   We will follow the patient and provide recommendations as needed.    Duke Cabrera MD    [facility]  [address] [place] [address]  [place] [address]    [phone] - tel  [phone] - fax    5/23/2020 1:14 PM

## 2020-05-23 NOTE — ASSESSMENT & PLAN NOTE
Improving  Likely due to ongoing diarrhea  Consult Dr. Cabrera  Hold ARB/HCTZ   Renally dose all meds  Repeat bmp in AM   Gentle IV hydration - monitor for overload  Accurate charting of urine output

## 2020-05-23 NOTE — SUBJECTIVE & OBJECTIVE
Interval History: Patient was seen and examined bedside.  She tells me that she is experiencing on and off diarrhea since last 1 month and was given antibiotics for diverticulitis.  Denies any new symptoms except being very weak and chronic joint pains in left leg.  Unfortunately stool studies were not sent by nursing staff.  Received IV hydration.       Review of Systems   Constitutional: Positive for fatigue. Negative for activity change, appetite change, chills, diaphoresis, fever and unexpected weight change.   HENT: Negative for congestion, ear pain, facial swelling, hearing loss, sore throat and trouble swallowing.    Eyes: Negative for pain and discharge.   Respiratory: Negative for cough, chest tightness, shortness of breath and wheezing.    Cardiovascular: Negative for chest pain, palpitations and leg swelling.   Gastrointestinal: Positive for diarrhea. Negative for abdominal pain, blood in stool, nausea and vomiting.   Endocrine: Negative for polydipsia, polyphagia and polyuria.   Genitourinary: Positive for decreased urine volume. Negative for difficulty urinating, dysuria, flank pain, frequency and urgency.   Musculoskeletal: Positive for back pain. Negative for arthralgias, joint swelling, neck pain and neck stiffness.   Skin: Negative for rash and wound.   Allergic/Immunologic: Negative for environmental allergies and immunocompromised state.   Neurological: Positive for weakness. Negative for dizziness, seizures, syncope, speech difficulty, light-headedness, numbness and headaches.   Hematological: Negative for adenopathy.   Psychiatric/Behavioral: Negative for sleep disturbance and suicidal ideas. The patient is not nervous/anxious.    All other systems reviewed and are negative.    Objective:     Vital Signs (Most Recent):  Temp: 98.9 °F (37.2 °C) (05/23/20 1130)  Pulse: (!) 58 (05/23/20 1130)  Resp: 19 (05/23/20 1130)  BP: 111/66 (05/23/20 1130)  SpO2: 95 % (05/23/20 1130) Vital Signs (24h  Range):  Temp:  [98.2 °F (36.8 °C)-98.9 °F (37.2 °C)] 98.9 °F (37.2 °C)  Pulse:  [51-62] 58  Resp:  [18-20] 19  SpO2:  [95 %-100 %] 95 %  BP: ()/(35-74) 111/66     Weight: 90.7 kg (199 lb 15.3 oz)  Body mass index is 40.39 kg/m².    Intake/Output Summary (Last 24 hours) at 5/23/2020 1455  Last data filed at 5/23/2020 1054  Gross per 24 hour   Intake --   Output 350 ml   Net -350 ml      Physical Exam   Constitutional: She is oriented to person, place, and time. She appears well-developed and well-nourished.   HENT:   Head: Normocephalic and atraumatic.   Eyes: Pupils are equal, round, and reactive to light. EOM are normal.   Neck: Normal range of motion. Neck supple.   Cardiovascular: Regular rhythm, normal heart sounds and intact distal pulses.   No murmur heard.  bradycardia   Pulmonary/Chest: Effort normal and breath sounds normal. No stridor. No respiratory distress. She has no wheezes.   Abdominal: Soft. Bowel sounds are normal. She exhibits no distension. There is no tenderness.   Musculoskeletal: Normal range of motion.   Neurological: She is oriented to person, place, and time.   drowsy   Skin: Skin is warm and dry. Capillary refill takes less than 2 seconds.   Psychiatric: She has a normal mood and affect.   Nursing note and vitals reviewed.      Significant Labs: All pertinent labs within the past 24 hours have been reviewed.    Significant Imaging: I have reviewed all pertinent imaging results/findings within the past 24 hours.

## 2020-05-23 NOTE — PLAN OF CARE
Problem: Fall Injury Risk  Goal: Absence of Fall and Fall-Related Injury  Outcome: Ongoing, Progressing     Problem: Adult Inpatient Plan of Care  Goal: Plan of Care Review  Outcome: Ongoing, Progressing     Problem: Adult Inpatient Plan of Care  Goal: Patient-Specific Goal (Individualization)  Outcome: Ongoing, Progressing

## 2020-05-24 ENCOUNTER — CLINICAL SUPPORT (OUTPATIENT)
Dept: CARDIOLOGY | Facility: HOSPITAL | Age: 75
DRG: 682 | End: 2020-05-24
Attending: HOSPITALIST
Payer: MEDICARE

## 2020-05-24 LAB
ALBUMIN SERPL BCP-MCNC: 3.4 G/DL (ref 3.5–5.2)
ALP SERPL-CCNC: 41 U/L (ref 55–135)
ALT SERPL W/O P-5'-P-CCNC: 9 U/L (ref 10–44)
ANION GAP SERPL CALC-SCNC: 9 MMOL/L (ref 8–16)
AST SERPL-CCNC: 11 U/L (ref 10–40)
BACTERIA UR CULT: NORMAL
BACTERIA UR CULT: NORMAL
BASOPHILS # BLD AUTO: 0.04 K/UL (ref 0–0.2)
BASOPHILS NFR BLD: 0.4 % (ref 0–1.9)
BILIRUB SERPL-MCNC: 1.1 MG/DL (ref 0.1–1)
BUN SERPL-MCNC: 47 MG/DL (ref 8–23)
CALCIUM SERPL-MCNC: 8.3 MG/DL (ref 8.7–10.5)
CHLORIDE SERPL-SCNC: 110 MMOL/L (ref 95–110)
CO2 SERPL-SCNC: 22 MMOL/L (ref 23–29)
CREAT SERPL-MCNC: 3.7 MG/DL (ref 0.5–1.4)
DIFFERENTIAL METHOD: ABNORMAL
EOSINOPHIL # BLD AUTO: 0.3 K/UL (ref 0–0.5)
EOSINOPHIL NFR BLD: 2.9 % (ref 0–8)
ERYTHROCYTE [DISTWIDTH] IN BLOOD BY AUTOMATED COUNT: 16.7 % (ref 11.5–14.5)
EST. GFR  (AFRICAN AMERICAN): 13.3 ML/MIN/1.73 M^2
EST. GFR  (NON AFRICAN AMERICAN): 11.5 ML/MIN/1.73 M^2
GLUCOSE SERPL-MCNC: 129 MG/DL (ref 70–110)
HCT VFR BLD AUTO: 36.3 % (ref 37–48.5)
HGB BLD-MCNC: 11.2 G/DL (ref 12–16)
IMM GRANULOCYTES # BLD AUTO: 0.03 K/UL (ref 0–0.04)
IMM GRANULOCYTES NFR BLD AUTO: 0.3 % (ref 0–0.5)
LYMPHOCYTES # BLD AUTO: 1.3 K/UL (ref 1–4.8)
LYMPHOCYTES NFR BLD: 13.7 % (ref 18–48)
MAGNESIUM SERPL-MCNC: 2.1 MG/DL (ref 1.6–2.6)
MCH RBC QN AUTO: 27.7 PG (ref 27–31)
MCHC RBC AUTO-ENTMCNC: 30.9 G/DL (ref 32–36)
MCV RBC AUTO: 90 FL (ref 82–98)
MONOCYTES # BLD AUTO: 0.9 K/UL (ref 0.3–1)
MONOCYTES NFR BLD: 9.2 % (ref 4–15)
NEUTROPHILS # BLD AUTO: 7.2 K/UL (ref 1.8–7.7)
NEUTROPHILS NFR BLD: 73.5 % (ref 38–73)
NRBC BLD-RTO: 0 /100 WBC
PHOSPHATE SERPL-MCNC: 4 MG/DL (ref 2.7–4.5)
PLATELET # BLD AUTO: 291 K/UL (ref 150–350)
PMV BLD AUTO: 10.3 FL (ref 9.2–12.9)
POTASSIUM SERPL-SCNC: 3.6 MMOL/L (ref 3.5–5.1)
PROT SERPL-MCNC: 7.1 G/DL (ref 6–8.4)
RBC # BLD AUTO: 4.05 M/UL (ref 4–5.4)
SODIUM SERPL-SCNC: 141 MMOL/L (ref 136–145)
WBC # BLD AUTO: 9.72 K/UL (ref 3.9–12.7)

## 2020-05-24 PROCEDURE — 94640 AIRWAY INHALATION TREATMENT: CPT

## 2020-05-24 PROCEDURE — 80053 COMPREHEN METABOLIC PANEL: CPT

## 2020-05-24 PROCEDURE — 99900035 HC TECH TIME PER 15 MIN (STAT)

## 2020-05-24 PROCEDURE — 84100 ASSAY OF PHOSPHORUS: CPT

## 2020-05-24 PROCEDURE — 36415 COLL VENOUS BLD VENIPUNCTURE: CPT

## 2020-05-24 PROCEDURE — 25000242 PHARM REV CODE 250 ALT 637 W/ HCPCS: Performed by: INTERNAL MEDICINE

## 2020-05-24 PROCEDURE — 12000002 HC ACUTE/MED SURGE SEMI-PRIVATE ROOM

## 2020-05-24 PROCEDURE — 83735 ASSAY OF MAGNESIUM: CPT

## 2020-05-24 PROCEDURE — 27000221 HC OXYGEN, UP TO 24 HOURS

## 2020-05-24 PROCEDURE — 25000003 PHARM REV CODE 250: Performed by: NURSE PRACTITIONER

## 2020-05-24 PROCEDURE — 25000003 PHARM REV CODE 250: Performed by: HOSPITALIST

## 2020-05-24 PROCEDURE — 85025 COMPLETE CBC W/AUTO DIFF WBC: CPT

## 2020-05-24 PROCEDURE — 94761 N-INVAS EAR/PLS OXIMETRY MLT: CPT

## 2020-05-24 PROCEDURE — 93306 TTE W/DOPPLER COMPLETE: CPT

## 2020-05-24 PROCEDURE — 63600175 PHARM REV CODE 636 W HCPCS: Performed by: HOSPITALIST

## 2020-05-24 RX ORDER — FUROSEMIDE 10 MG/ML
40 INJECTION INTRAMUSCULAR; INTRAVENOUS ONCE
Status: COMPLETED | OUTPATIENT
Start: 2020-05-24 | End: 2020-05-24

## 2020-05-24 RX ORDER — OXYCODONE HYDROCHLORIDE 5 MG/1
5 TABLET ORAL EVERY 4 HOURS PRN
Status: DISCONTINUED | OUTPATIENT
Start: 2020-05-24 | End: 2020-05-28 | Stop reason: HOSPADM

## 2020-05-24 RX ADMIN — OXYCODONE HYDROCHLORIDE 5 MG: 5 TABLET ORAL at 10:05

## 2020-05-24 RX ADMIN — APIXABAN 2.5 MG: 2.5 TABLET, FILM COATED ORAL at 08:05

## 2020-05-24 RX ADMIN — FUROSEMIDE 40 MG: 10 INJECTION, SOLUTION INTRAMUSCULAR; INTRAVENOUS at 02:05

## 2020-05-24 RX ADMIN — NYSTATIN 500000 UNITS: 500000 SUSPENSION ORAL at 11:05

## 2020-05-24 RX ADMIN — LEVOTHYROXINE SODIUM 25 MCG: 25 TABLET ORAL at 05:05

## 2020-05-24 RX ADMIN — NYSTATIN 500000 UNITS: 500000 SUSPENSION ORAL at 08:05

## 2020-05-24 RX ADMIN — OXYCODONE HYDROCHLORIDE 5 MG: 5 TABLET ORAL at 08:05

## 2020-05-24 RX ADMIN — GABAPENTIN 100 MG: 100 CAPSULE ORAL at 08:05

## 2020-05-24 RX ADMIN — OXYCODONE HYDROCHLORIDE AND ACETAMINOPHEN 500 MG: 500 TABLET ORAL at 08:05

## 2020-05-24 RX ADMIN — OXYCODONE HYDROCHLORIDE 5 MG: 5 TABLET ORAL at 04:05

## 2020-05-24 RX ADMIN — GABAPENTIN 100 MG: 100 CAPSULE ORAL at 02:05

## 2020-05-24 RX ADMIN — PANTOPRAZOLE SODIUM 40 MG: 40 TABLET, DELAYED RELEASE ORAL at 05:05

## 2020-05-24 RX ADMIN — ALBUTEROL SULFATE 2.5 MG: 2.5 SOLUTION RESPIRATORY (INHALATION) at 08:05

## 2020-05-24 RX ADMIN — FLUTICASONE PROPIONATE 50 MCG: 50 SPRAY, METERED NASAL at 08:05

## 2020-05-24 RX ADMIN — NYSTATIN 500000 UNITS: 500000 SUSPENSION ORAL at 05:05

## 2020-05-24 NOTE — PLAN OF CARE
05/23/20 1902   Patient Assessment/Suction   Level of Consciousness (AVPU) alert   Respiratory Effort Normal;Unlabored   Expansion/Accessory Muscles/Retractions no retractions;expansion symmetric;no use of accessory muscles   All Lung Fields Breath Sounds wheezes, expiratory;coarse;equal bilaterally   PRE-TX-O2   O2 Device (Oxygen Therapy) nasal cannula   Flow (L/min) 2   SpO2 (!) 94 %   Pulse Oximetry Type Intermittent   $ Pulse Oximetry - Multiple Charge Pulse Oximetry - Multiple   Pulse 64   Resp 20   Aerosol Therapy   $ Aerosol Therapy Charges Aerosol Treatment   Daily Review of Necessity (SVN) completed   Respiratory Treatment Status (SVN) given   Treatment Route (SVN) mask;oxygen   Patient Position (SVN) HOB elevated   Post Treatment Assessment (SVN) breath sounds improved   Signs of Intolerance (SVN) none   Breath Sounds Post-Respiratory Treatment   Post-treatment Heart Rate (beats/min) 65   Post-treatment Resp Rate (breaths/min) 20   Respiratory Evaluation   $ Care Plan Tech Time 15 min   Evaluation For New Orders

## 2020-05-24 NOTE — PLAN OF CARE
Problem: Fall Injury Risk  Goal: Absence of Fall and Fall-Related Injury  Outcome: Ongoing, Progressing     Problem: Adult Inpatient Plan of Care  Goal: Absence of Hospital-Acquired Illness or Injury  Outcome: Ongoing, Progressing  Goal: Optimal Comfort and Wellbeing  Outcome: Ongoing, Progressing

## 2020-05-24 NOTE — PROGRESS NOTES
Critical access hospital Medicine  Progress Note    DOS: 05/24/2020    Patient Name: Danielle Martinez  MRN: [id number]  Patient Class: IP- Inpatient   Admission Date: 5/22/2020  Length of Stay: 2 days  Attending Physician: Kristofer Jimenez MD  Primary Care Provider: Antony Lopez MD        Subjective:     Principal Problem:HAILY (acute kidney injury)        HPI:  Ms. Martinez presents today with complaints of weakness. It is severe. It is associated with leg cramps, diarrhea, mouth pain, oliguria, anorexia, and fatigue. She denies fever, chills, N/V, dizziness, cough, chest pain, flank pain, or LOC. She has a history of HTN, HLD, PAF on eliquis, chronic back pain, anxiety/depression, and diverticulitis. She called her doctor with complaints of diarrhea and was prescribed an unknown abx which she's been taking over a week. This hasn't stopped her diarrhea, but has caused thrush in her mouth. D/t the thrush, she hasn't been able to eat. She's had multiple episodes of watery diarrhea, with the last one, last night. She is unsure the last time she urinated. She is sleepy on exam, but did recently receive dilaudid for back pain. A renal US had just completed at the time of exam.     Overview/Hospital Course:  05/23:  Patient was seen and examined bedside.  She tells me that she is experiencing on and off diarrhea since last 1 month and was given antibiotics for diverticulitis.  Denies any new symptoms except being very weak and chronic joint pains in left leg.  Unfortunately stool studies were not sent by nursing staff.  Received IV hydration.     05/24:  Patient was seen and examined bedside.  She keeps complaining of her chronic left sciatic pain.  She is also having some wet cough.  No bowel movement since yesterday hence no stool studies nor C diff sample.  No other acute events overnight as per nursing staff.     Interval History: Patient was seen and examined bedside.  She keeps complaining of her chronic  left sciatic pain.  She is also having some wet cough.  No bowel movement since yesterday hence no stool studies nor C diff sample.  No other acute events overnight as per nursing staff.     Review of Systems   Constitutional: Positive for fatigue. Negative for activity change, appetite change, chills, diaphoresis, fever and unexpected weight change.   HENT: Negative for congestion, ear pain, facial swelling, hearing loss, sore throat and trouble swallowing.    Eyes: Negative for pain and discharge.   Respiratory: Negative for cough, chest tightness, shortness of breath and wheezing.    Cardiovascular: Negative for chest pain, palpitations and leg swelling.   Gastrointestinal: Negative for abdominal pain, blood in stool, nausea and vomiting.   Endocrine: Negative for polydipsia, polyphagia and polyuria.   Genitourinary: Positive for decreased urine volume. Negative for difficulty urinating, dysuria, flank pain, frequency and urgency.   Musculoskeletal: Positive for back pain. Negative for arthralgias, joint swelling, neck pain and neck stiffness.   Skin: Negative for rash and wound.   Allergic/Immunologic: Negative for environmental allergies and immunocompromised state.   Neurological: Positive for weakness. Negative for dizziness, seizures, syncope, speech difficulty, light-headedness, numbness and headaches.   Hematological: Negative for adenopathy.   Psychiatric/Behavioral: Negative for sleep disturbance and suicidal ideas. The patient is not nervous/anxious.    All other systems reviewed and are negative.    Objective:     Vital Signs (Most Recent):  Temp: 98 °F (36.7 °C) (05/24/20 1142)  Pulse: 62 (05/24/20 1142)  Resp: 18 (05/24/20 1142)  BP: 131/61 (05/24/20 1142)  SpO2: 97 % (05/24/20 1142) Vital Signs (24h Range):  Temp:  [97.9 °F (36.6 °C)-98.9 °F (37.2 °C)] 98 °F (36.7 °C)  Pulse:  [60-72] 62  Resp:  [18-20] 18  SpO2:  [92 %-98 %] 97 %  BP: (114-143)/(54-78) 131/61     Weight: 94.5 kg (208 lb 5.4  oz)  Body mass index is 42.08 kg/m².    Intake/Output Summary (Last 24 hours) at 5/24/2020 1241  Last data filed at 5/24/2020 0537  Gross per 24 hour   Intake 675 ml   Output 700 ml   Net -25 ml      Physical Exam   Constitutional: She is oriented to person, place, and time. She appears well-developed and well-nourished.   HENT:   Head: Normocephalic and atraumatic.   Eyes: Pupils are equal, round, and reactive to light. EOM are normal.   Neck: Normal range of motion. Neck supple.   Cardiovascular: Regular rhythm, normal heart sounds and intact distal pulses.   No murmur heard.  Pulmonary/Chest: Effort normal and breath sounds normal. No stridor. No respiratory distress. She has no wheezes.   Bilateral basal crackles, on oxygen   Abdominal: Soft. Bowel sounds are normal. She exhibits no distension. There is no tenderness.   Musculoskeletal: Normal range of motion.   Neurological: She is oriented to person, place, and time.   Skin: Skin is warm and dry. Capillary refill takes less than 2 seconds.   Psychiatric: She has a normal mood and affect.   Nursing note and vitals reviewed.      Significant Labs: All pertinent labs within the past 24 hours have been reviewed.    Significant Imaging: I have reviewed all pertinent imaging results/findings within the past 24 hours.      Assessment/Plan:      * HAILY (acute kidney injury)  Improving  Likely due to ongoing diarrhea  Consult Dr. Cabrera  Hold ARB/HCTZ   Renally dose all meds  Stop IV hydration as patient has crackles on examined pulmonary edema on chest x-ray.  Trial of 40 mg IV Lasix  Accurate charting of urine output      Acute pulmonary edema  Worsening  2D echo  Trial of 40 mg IV Lasix  Accurate charting of urine output      Diarrhea  Stool studies  Check c. Diff  No bowel movement in last 24 hr.         Thrush  Nystatin swish and swallow      Essential (primary) hypertension  Held ARB/HCTZ    BRITTANIE on CPAP  CPAP q.h.s.      Paroxysmal atrial fibrillation  Continue  amio  Renally dose eliquis and sotalol  Currently sinus    Hypokalemia  Replaced in ED   Repeat level in AM  Judicious replacement considering renal failure        Lumbar disc disease  Chronic left lower extremity pain for which she goes for nerve block every 6 months  P.r.n. Analgesia  PT, OT        VTE Risk Mitigation (From admission, onward)         Ordered     apixaban tablet 2.5 mg  2 times daily      05/22/20 1248     IP VTE HIGH RISK PATIENT  Once      05/22/20 1248     Place sequential compression device  Until discontinued      05/22/20 1248                      Kristofer Jimenez MD  Department of Hospital Medicine   [facility]

## 2020-05-24 NOTE — NURSING
1845  Upon entering the room, pt was coughing excessively. Audible course, crackles noted. Dinner tray was in front of her, but she denies choking on anything. Pt sitting in high fowlers in bed with O2 2L NC, O2 Sat 96%. Notified Respiratory requesting breathing treatment.     1915 Pt stated she is feeling better post breathing treatment. Advised to call if her symptoms worsened.     1945 Pt stated she coughed up moderate amount of thick sputum and is starting to feel better. Expiratory course lung sounds noted. Advised to notify if symptoms worsened. Pt verbalized understanding. No distress noted. Pt resting in bed, visitor at bedside. Will continue to monitor.

## 2020-05-24 NOTE — PLAN OF CARE
05/24/20 0807   Patient Assessment/Suction   Level of Consciousness (AVPU) alert   Respiratory Effort Normal;Unlabored   Expansion/Accessory Muscles/Retractions no use of accessory muscles;no retractions;expansion symmetric   All Lung Fields Breath Sounds clear   Rhythm/Pattern, Respiratory unlabored;pattern regular;depth regular;chest wiggle adequate;no shortness of breath reported   Cough Frequency no cough   PRE-TX-O2   O2 Device (Oxygen Therapy) room air   SpO2 95 %   Pulse Oximetry Type Intermittent   $ Pulse Oximetry - Multiple Charge Pulse Oximetry - Multiple   Pulse 66   Resp 18   Aerosol Therapy   $ Aerosol Therapy Charges PRN treatment not required   Respiratory Evaluation   $ Care Plan Tech Time 15 min

## 2020-05-24 NOTE — ASSESSMENT & PLAN NOTE
Chronic left lower extremity pain for which she goes for nerve block every 6 months  P.r.n. Analgesia  PT, OT

## 2020-05-24 NOTE — CONSULTS
Nephrology Consult Note        Patient Name: Danielle Martinez  MRN: [id number]    Patient Class: IP- Inpatient   Admission Date: 5/22/2020  Length of Stay: 2 days  Date of Service: 5/24/2020    Attending Physician: Kristofer Jimenez MD  Primary Care Provider: Antony Lopez MD    Reason for Consult: haily/hypokalemia/diarrhea/diverticulitis/htn/anemia    SUBJECTIVE:     HPI: 74F with GERD, HTN, hypothyroidism, depression, COPD, BRITTANIE, pAF, anemia presents with generalized weakness, nausea without any vomiting, diarrhea. Has prior GI bleed and currently on therapy for diverticulitis, does complain of having thrush orally as well. Also has left-sided leg pain consistent with her prior sciatica. She has been out of her thyroid medicine for at least a week. Renal is consulted for HAILY, hypokalemia - sCr is 5.2, was previously 0.7. Renal US is unremarkable, UA is clear.    5/23 VSS, no new complains.  5/24 VSS, no new complains.    Past Medical History:   Diagnosis Date    HAILY (acute kidney injury)     Arthritis     Juvenile diagnosed age 17    COPD (chronic obstructive pulmonary disease)     Diverticulitis     GERD (gastroesophageal reflux disease)     Hx of hiatal hernia     Hypertension     Sleep apnea      Past Surgical History:   Procedure Laterality Date    BREAST LUMPECTOMY Left     in 40's    COLONOSCOPY  2016    Dr. Crowe-JULISSA 5 years    COLONOSCOPY  2019    Dr. Reyes 5 years    HIATAL HERNIA REPAIR  2005    SHOULDER ARTHROSCOPY  2015     History reviewed. No pertinent family history.  Social History     Tobacco Use    Smoking status: Never Smoker    Smokeless tobacco: Never Used   Substance Use Topics    Alcohol use: Not Currently    Drug use: Not on file       Review of patient's allergies indicates:   Allergen Reactions    Promethazine Anaphylaxis    Clove flavoring [flavoring agent]     Codeine Itching    Latex, natural rubber     Lorazepam Other (See Comments)    Lovastatin Other  (See Comments)    Meclizine Other (See Comments)    Morphine Itching    Simvastatin Other (See Comments)    Tramadol-acetaminophen Itching       Outpatient meds:  No current facility-administered medications on file prior to encounter.      Current Outpatient Medications on File Prior to Encounter   Medication Sig Dispense Refill    albuterol (VENTOLIN HFA) 90 mcg/actuation inhaler Inhale 2 puffs into the lungs every 4 (four) hours as needed for Wheezing. Rescue 1 Inhaler 5    ALPRAZolam (XANAX) 1 MG tablet Take 1 tablet (1 mg total) by mouth 2 (two) times daily as needed for Anxiety. 60 tablet 2    ascorbic acid, vitamin C, (VITAMIN C) 100 MG tablet Take 100 mg by mouth once daily.      calcium carbonate (TUMS) 200 mg calcium (500 mg) chewable tablet Take 1 tablet by mouth once daily.      candesartan-hydrochlorothiazid (ATACAND HCT) 32-12.5 mg per tablet Take 1 tablet by mouth once daily. 30 tablet 5    ELIQUIS 5 mg Tab Take 1 tablet (5 mg total) by mouth 2 (two) times daily. 60 tablet 5    FLUoxetine 20 MG capsule Take 1 capsule (20 mg total) by mouth once daily. 30 capsule 5    fluticasone propionate (FLONASE) 50 mcg/actuation nasal spray 1 spray by Each Nostril route daily as needed.       fluticasone-umeclidin-vilanter (TRELEGY ELLIPTA) 100-62.5-25 mcg DsDv Inhale 1 puff into the lungs daily as needed.       gabapentin (NEURONTIN) 300 MG capsule Take 1 capsule (300 mg total) by mouth 2 (two) times daily. 60 capsule 5    levothyroxine (SYNTHROID) 25 MCG tablet Take 1 tablet (25 mcg total) by mouth before breakfast. Name brand only.  RIRI 30 tablet 5    mv-min/folic/vit K/lut/vxer995 (ALIVE WOMEN'S 50 PLUS, BLEND, ORAL) Take 1 tablet by mouth daily as needed.      pantoprazole (PROTONIX) 40 MG tablet Take 1 tablet (40 mg total) by mouth once daily. 30 tablet 1    sotalol (BETAPACE) 80 MG tablet Take 1 tablet (80 mg total) by mouth 2 (two) times daily. 60 tablet 5    esomeprazole (NEXIUM) 40 MG  capsule Take 1 capsule (40 mg total) by mouth 2 (two) times daily. BRAND NAME ONLY 60 capsule 11    fluticasone-umeclidin-vilanter (TRELEGY ELLIPTA) 100-62.5-25 mcg DsDv Trelegy Ellipta      nitroGLYCERIN (NITROSTAT) 0.4 MG SL tablet Place 0.4 mg under the tongue every 5 (five) minutes as needed.          Scheduled meds:   apixaban  2.5 mg Oral BID    ascorbic acid (vitamin C)  500 mg Oral Daily    FLUoxetine  20 mg Oral Daily    fluticasone propionate  1 spray Each Nostril Daily    fluticasone-umeclidin-vilanter  1 puff Inhalation Daily    gabapentin  100 mg Oral TID    levothyroxine  25 mcg Oral Before breakfast    nystatin  500,000 Units Oral QID (WM & HS)    pantoprazole  40 mg Oral Daily       Infusions:   sodium chloride 0.9% 75 mL/hr at 05/23/20 2301       PRN meds:  acetaminophen, albuterol sulfate, ALPRAZolam, melatonin, nitroGLYCERIN, oxyCODONE, sodium chloride 0.9%    Review of Systems:  ROS    OBJECTIVE:     Vital Signs and IO (Last 24H):  Temp:  [97.9 °F (36.6 °C)-98.9 °F (37.2 °C)]   Pulse:  [58-72]   Resp:  [18-20]   BP: (111-143)/(54-78)   SpO2:  [92 %-98 %]   I/O last 3 completed shifts:  In: 675 [I.V.:675]  Out: 1050 [Urine:1050]    Wt Readings from Last 5 Encounters:   05/24/20 94.5 kg (208 lb 5.4 oz)   11/05/19 93.2 kg (205 lb 6.4 oz)         Physical Exam:  Physical Exam   Constitutional: She is oriented to person, place, and time. She appears well-developed and well-nourished.   HENT:   Head: Normocephalic and atraumatic.   Mouth/Throat: Oropharynx is clear and moist.   Eyes: Pupils are equal, round, and reactive to light. EOM are normal. No scleral icterus.   Neck: Neck supple.   Cardiovascular: Normal rate and regular rhythm.   Pulmonary/Chest: Effort normal. No stridor. No respiratory distress.   Abdominal: Soft. She exhibits no distension.   Musculoskeletal: Normal range of motion. She exhibits no edema or deformity.   Neurological: She is alert and oriented to person, place,  and time. No cranial nerve deficit.   Skin: Skin is warm and dry. No rash noted. She is not diaphoretic. No erythema.   Psychiatric: She has a normal mood and affect. Her behavior is normal.       Body mass index is 42.08 kg/m².    Laboratory:  Recent Labs   Lab 05/22/20  1024 05/23/20  0512 05/24/20  0610    141 141   K 3.1* 3.8 3.6    109 110   CO2 25 23 22*   BUN 60* 57* 47*   CREATININE 5.2* 4.3* 3.7*   ESTGFRAFRICA 8.8* 11.1* 13.3*   EGFRNONAA 7.6* 9.6* 11.5*    142* 129*       Recent Labs   Lab 05/22/20  1024 05/23/20  0512 05/24/20  0610   CALCIUM 8.8 8.3* 8.3*   ALBUMIN 3.8 3.6 3.4*   PHOS  --  5.0* 4.0   MG 2.4 2.3 2.1             No results for input(s): POCTGLUCOSE in the last 168 hours.    Recent Labs   Lab 11/05/19  1337   Hemoglobin A1C 6.8 H       Recent Labs   Lab 05/22/20  1024 05/23/20  0512 05/24/20  0610   WBC 10.67 10.33 9.72   HGB 12.1 11.7* 11.2*   HCT 37.9 37.7 36.3*    284 291   MCV 87 89 90   MCHC 31.9* 31.0* 30.9*   MONO 10.5  1.1* 9.6  1.0 9.2  0.9       Recent Labs   Lab 05/22/20  1024 05/23/20  0512 05/24/20  0610   BILITOT 1.2* 1.0 1.1*   PROT 7.5 7.2 7.1   ALBUMIN 3.8 3.6 3.4*   ALKPHOS 42* 41* 41*   ALT 8* 9* 9*   AST 12 11 11       Recent Labs   Lab 05/22/20  1747   Color, UA Yellow   Appearance, UA Clear   pH, UA 5.0   Specific Gravity, UA 1.010   Protein, UA Negative   Glucose, UA Negative   Ketones, UA Negative   Urobilinogen, UA Negative   Bilirubin (UA) Negative   Occult Blood UA Negative   Nitrite, UA Negative   RBC, UA 7 H   WBC, UA 21 H   Bacteria Negative   Hyaline Casts, UA 7 A       Recent Labs   Lab 09/13/19  0912   Sample JULIA       Microbiology Results (last 7 days)     Procedure Component Value Units Date/Time    Urine culture [[id number]] Collected:  05/22/20 1747    Order Status:  Completed Specimen:  Urine Updated:  05/24/20 0711     Urine Culture, Routine Multiple organisms isolated. None in predominance.  Repeat if      clinically  necessary.    Narrative:       Preferred Collection Type->Urine, Clean Catch  Specimen Source->Urine    Stool culture [[id number]]     Order Status:  No result Specimen:  Stool     Clostridium difficile EIA [[id number]]     Order Status:  Canceled Specimen:  Stool           ASSESSMENT/PLAN:     Active Hospital Problems    Diagnosis  POA    *HAILY (acute kidney injury) [N17.9]  Yes    Diarrhea [R19.7]  Yes    Acute pulmonary edema [J81.0]  Yes    Thrush [B37.0]  Yes    Hypokalemia [E87.6]  Yes    Paroxysmal atrial fibrillation [I48.0]  Yes    Essential (primary) hypertension [I10]  Yes      Resolved Hospital Problems   No resolved problems to display.     HAILY, likely due to volume depletion from diarrhea, meds, poor oral intake due to thrush  Diverticulitis treated with broad abx  Diarrhea  Hypokalemia  No NSAIDs, ACEI/ARB, IV contrast or other nephrotoxins.  Keep MAP > 60, SBP > 100.  Dose meds for GFR < 30 ml/min.  Renal diet - low K, low phos.  IVF for now, do not replete K unless < 3. Mg is OK.  Diagnose and treat any infection.    Anemia, non-CKD  Hgb and HCT are acceptable. Monitor.    HTN  BP seem controlled.   Tolerate asymptomatic HTN up to -160.  Hold home meds.    Thank you for allowing us to participate in the care of your patient!   We will follow the patient and provide recommendations as needed.    Duke Cabrera MD    [facility]  [address] [place] [address]  BR[place] [id number]    [phone] - tel  [phone] - fax    5/24/2020 1:14 PM

## 2020-05-24 NOTE — ASSESSMENT & PLAN NOTE
Improving  Likely due to ongoing diarrhea  Consult Dr. Cabrera  Hold ARB/HCTZ   Renally dose all meds  Stop IV hydration as patient has crackles on examined pulmonary edema on chest x-ray.  Trial of 40 mg IV Lasix  Accurate charting of urine output

## 2020-05-24 NOTE — SUBJECTIVE & OBJECTIVE
Interval History: Patient was seen and examined bedside.  She keeps complaining of her chronic left sciatic pain.  She is also having some wet cough.  No bowel movement since yesterday hence no stool studies nor C diff sample.  No other acute events overnight as per nursing staff.     Review of Systems   Constitutional: Positive for fatigue. Negative for activity change, appetite change, chills, diaphoresis, fever and unexpected weight change.   HENT: Negative for congestion, ear pain, facial swelling, hearing loss, sore throat and trouble swallowing.    Eyes: Negative for pain and discharge.   Respiratory: Negative for cough, chest tightness, shortness of breath and wheezing.    Cardiovascular: Negative for chest pain, palpitations and leg swelling.   Gastrointestinal: Negative for abdominal pain, blood in stool, nausea and vomiting.   Endocrine: Negative for polydipsia, polyphagia and polyuria.   Genitourinary: Positive for decreased urine volume. Negative for difficulty urinating, dysuria, flank pain, frequency and urgency.   Musculoskeletal: Positive for back pain. Negative for arthralgias, joint swelling, neck pain and neck stiffness.   Skin: Negative for rash and wound.   Allergic/Immunologic: Negative for environmental allergies and immunocompromised state.   Neurological: Positive for weakness. Negative for dizziness, seizures, syncope, speech difficulty, light-headedness, numbness and headaches.   Hematological: Negative for adenopathy.   Psychiatric/Behavioral: Negative for sleep disturbance and suicidal ideas. The patient is not nervous/anxious.    All other systems reviewed and are negative.    Objective:     Vital Signs (Most Recent):  Temp: 98 °F (36.7 °C) (05/24/20 1142)  Pulse: 62 (05/24/20 1142)  Resp: 18 (05/24/20 1142)  BP: 131/61 (05/24/20 1142)  SpO2: 97 % (05/24/20 1142) Vital Signs (24h Range):  Temp:  [97.9 °F (36.6 °C)-98.9 °F (37.2 °C)] 98 °F (36.7 °C)  Pulse:  [60-72] 62  Resp:  [18-20]  18  SpO2:  [92 %-98 %] 97 %  BP: (114-143)/(54-78) 131/61     Weight: 94.5 kg (208 lb 5.4 oz)  Body mass index is 42.08 kg/m².    Intake/Output Summary (Last 24 hours) at 5/24/2020 1241  Last data filed at 5/24/2020 0537  Gross per 24 hour   Intake 675 ml   Output 700 ml   Net -25 ml      Physical Exam   Constitutional: She is oriented to person, place, and time. She appears well-developed and well-nourished.   HENT:   Head: Normocephalic and atraumatic.   Eyes: Pupils are equal, round, and reactive to light. EOM are normal.   Neck: Normal range of motion. Neck supple.   Cardiovascular: Regular rhythm, normal heart sounds and intact distal pulses.   No murmur heard.  Pulmonary/Chest: Effort normal and breath sounds normal. No stridor. No respiratory distress. She has no wheezes.   Bilateral basal crackles, on oxygen   Abdominal: Soft. Bowel sounds are normal. She exhibits no distension. There is no tenderness.   Musculoskeletal: Normal range of motion.   Neurological: She is oriented to person, place, and time.   Skin: Skin is warm and dry. Capillary refill takes less than 2 seconds.   Psychiatric: She has a normal mood and affect.   Nursing note and vitals reviewed.      Significant Labs: All pertinent labs within the past 24 hours have been reviewed.    Significant Imaging: I have reviewed all pertinent imaging results/findings within the past 24 hours.

## 2020-05-25 LAB
ALBUMIN SERPL BCP-MCNC: 3.4 G/DL (ref 3.5–5.2)
ALP SERPL-CCNC: 51 U/L (ref 55–135)
ALT SERPL W/O P-5'-P-CCNC: 13 U/L (ref 10–44)
ANION GAP SERPL CALC-SCNC: 7 MMOL/L (ref 8–16)
AORTIC ROOT ANNULUS: 2.52 CM
AORTIC VALVE CUSP SEPERATION: 1.28 CM
AST SERPL-CCNC: 18 U/L (ref 10–40)
AV INDEX (PROSTH): 0.82
AV MEAN GRADIENT: 5 MMHG
AV PEAK GRADIENT: 11 MMHG
AV VALVE AREA: 2.62 CM2
AV VELOCITY RATIO: 70.8
BASOPHILS # BLD AUTO: 0.04 K/UL (ref 0–0.2)
BASOPHILS NFR BLD: 0.4 % (ref 0–1.9)
BILIRUB SERPL-MCNC: 1.2 MG/DL (ref 0.1–1)
BSA FOR ECHO PROCEDURE: 1.94 M2
BUN SERPL-MCNC: 45 MG/DL (ref 8–23)
C DIFF GDH STL QL: NEGATIVE
C DIFF TOX A+B STL QL IA: NEGATIVE
CALCIUM SERPL-MCNC: 8.5 MG/DL (ref 8.7–10.5)
CHLORIDE SERPL-SCNC: 106 MMOL/L (ref 95–110)
CO2 SERPL-SCNC: 23 MMOL/L (ref 23–29)
CREAT SERPL-MCNC: 3.5 MG/DL (ref 0.5–1.4)
CV ECHO LV RWT: 0.43 CM
DIFFERENTIAL METHOD: ABNORMAL
DOP CALC AO PEAK VEL: 1.64 M/S
DOP CALC AO VTI: 26.91 CM
DOP CALC LVOT AREA: 3.2 CM2
DOP CALC LVOT DIAMETER: 2.02 CM
DOP CALC LVOT PEAK VEL: 116.11 M/S
DOP CALC LVOT STROKE VOLUME: 70.37 CM3
DOP CALCLVOT PEAK VEL VTI: 21.97 CM
E WAVE DECELERATION TIME: 178.19 MSEC
E/A RATIO: 1.8
E/E' RATIO: 11.7 M/S
ECHO LV POSTERIOR WALL: 0.92 CM (ref 0.6–1.1)
EOSINOPHIL # BLD AUTO: 0.3 K/UL (ref 0–0.5)
EOSINOPHIL NFR BLD: 2.5 % (ref 0–8)
ERYTHROCYTE [DISTWIDTH] IN BLOOD BY AUTOMATED COUNT: 16.6 % (ref 11.5–14.5)
EST. GFR  (AFRICAN AMERICAN): 14.2 ML/MIN/1.73 M^2
EST. GFR  (NON AFRICAN AMERICAN): 12.3 ML/MIN/1.73 M^2
FRACTIONAL SHORTENING: 38 % (ref 28–44)
GLUCOSE SERPL-MCNC: 143 MG/DL (ref 70–110)
HCT VFR BLD AUTO: 38.4 % (ref 37–48.5)
HGB BLD-MCNC: 11.8 G/DL (ref 12–16)
IMM GRANULOCYTES # BLD AUTO: 0.04 K/UL (ref 0–0.04)
IMM GRANULOCYTES NFR BLD AUTO: 0.4 % (ref 0–0.5)
INTERVENTRICULAR SEPTUM: 0.92 CM (ref 0.6–1.1)
LEFT ATRIUM SIZE: 4.4 CM
LEFT INTERNAL DIMENSION IN SYSTOLE: 2.63 CM (ref 2.1–4)
LEFT VENTRICLE DIASTOLIC VOLUME INDEX: 31.81 ML/M2
LEFT VENTRICLE DIASTOLIC VOLUME: 59.68 ML
LEFT VENTRICLE MASS INDEX: 66 G/M2
LEFT VENTRICLE SYSTOLIC VOLUME INDEX: 14.6 ML/M2
LEFT VENTRICLE SYSTOLIC VOLUME: 27.31 ML
LEFT VENTRICULAR INTERNAL DIMENSION IN DIASTOLE: 4.23 CM (ref 3.5–6)
LEFT VENTRICULAR MASS: 123.7 G
LV LATERAL E/E' RATIO: 10.64 M/S
LV SEPTAL E/E' RATIO: 13 M/S
LYMPHOCYTES # BLD AUTO: 1.4 K/UL (ref 1–4.8)
LYMPHOCYTES NFR BLD: 14.1 % (ref 18–48)
MAGNESIUM SERPL-MCNC: 1.9 MG/DL (ref 1.6–2.6)
MCH RBC QN AUTO: 27.4 PG (ref 27–31)
MCHC RBC AUTO-ENTMCNC: 30.7 G/DL (ref 32–36)
MCV RBC AUTO: 89 FL (ref 82–98)
MONOCYTES # BLD AUTO: 0.8 K/UL (ref 0.3–1)
MONOCYTES NFR BLD: 8.4 % (ref 4–15)
MV PEAK A VEL: 0.65 M/S
MV PEAK E VEL: 1.17 M/S
NEUTROPHILS # BLD AUTO: 7.3 K/UL (ref 1.8–7.7)
NEUTROPHILS NFR BLD: 74.2 % (ref 38–73)
NRBC BLD-RTO: 0 /100 WBC
OB PNL STL: NEGATIVE
PHOSPHATE SERPL-MCNC: 3.6 MG/DL (ref 2.7–4.5)
PISA TR MAX VEL: 4.72 M/S
PLATELET # BLD AUTO: 306 K/UL (ref 150–350)
PMV BLD AUTO: 10.2 FL (ref 9.2–12.9)
POTASSIUM SERPL-SCNC: 3.5 MMOL/L (ref 3.5–5.1)
PROT SERPL-MCNC: 7.2 G/DL (ref 6–8.4)
PV PEAK VELOCITY: 105.11 CM/S
RA PRESSURE: 15 MMHG
RBC # BLD AUTO: 4.3 M/UL (ref 4–5.4)
SODIUM SERPL-SCNC: 136 MMOL/L (ref 136–145)
TDI LATERAL: 0.11 M/S
TDI SEPTAL: 0.09 M/S
TDI: 0.1 M/S
TR MAX PG: 89 MMHG
TV REST PULMONARY ARTERY PRESSURE: 104 MMHG
WBC # BLD AUTO: 9.87 K/UL (ref 3.9–12.7)
WBC #/AREA STL HPF: NORMAL /[HPF]

## 2020-05-25 PROCEDURE — 97535 SELF CARE MNGMENT TRAINING: CPT

## 2020-05-25 PROCEDURE — 87045 FECES CULTURE AEROBIC BACT: CPT

## 2020-05-25 PROCEDURE — 36415 COLL VENOUS BLD VENIPUNCTURE: CPT

## 2020-05-25 PROCEDURE — 87209 SMEAR COMPLEX STAIN: CPT

## 2020-05-25 PROCEDURE — 25000003 PHARM REV CODE 250: Performed by: NURSE PRACTITIONER

## 2020-05-25 PROCEDURE — 97530 THERAPEUTIC ACTIVITIES: CPT

## 2020-05-25 PROCEDURE — 97165 OT EVAL LOW COMPLEX 30 MIN: CPT

## 2020-05-25 PROCEDURE — 87046 STOOL CULTR AEROBIC BACT EA: CPT | Mod: 59

## 2020-05-25 PROCEDURE — 87015 SPECIMEN INFECT AGNT CONCNTJ: CPT

## 2020-05-25 PROCEDURE — 99900035 HC TECH TIME PER 15 MIN (STAT)

## 2020-05-25 PROCEDURE — 83735 ASSAY OF MAGNESIUM: CPT

## 2020-05-25 PROCEDURE — 97162 PT EVAL MOD COMPLEX 30 MIN: CPT

## 2020-05-25 PROCEDURE — 89055 LEUKOCYTE ASSESSMENT FECAL: CPT

## 2020-05-25 PROCEDURE — 84100 ASSAY OF PHOSPHORUS: CPT

## 2020-05-25 PROCEDURE — 85025 COMPLETE CBC W/AUTO DIFF WBC: CPT

## 2020-05-25 PROCEDURE — 87449 NOS EACH ORGANISM AG IA: CPT

## 2020-05-25 PROCEDURE — 94640 AIRWAY INHALATION TREATMENT: CPT

## 2020-05-25 PROCEDURE — 82272 OCCULT BLD FECES 1-3 TESTS: CPT

## 2020-05-25 PROCEDURE — 80053 COMPREHEN METABOLIC PANEL: CPT

## 2020-05-25 PROCEDURE — 87324 CLOSTRIDIUM AG IA: CPT

## 2020-05-25 PROCEDURE — 97116 GAIT TRAINING THERAPY: CPT

## 2020-05-25 PROCEDURE — 94761 N-INVAS EAR/PLS OXIMETRY MLT: CPT

## 2020-05-25 PROCEDURE — 27000221 HC OXYGEN, UP TO 24 HOURS

## 2020-05-25 PROCEDURE — 12000002 HC ACUTE/MED SURGE SEMI-PRIVATE ROOM

## 2020-05-25 RX ORDER — GUAIFENESIN/DEXTROMETHORPHAN 100-10MG/5
5 SYRUP ORAL EVERY 4 HOURS PRN
Status: DISCONTINUED | OUTPATIENT
Start: 2020-05-25 | End: 2020-05-28 | Stop reason: HOSPADM

## 2020-05-25 RX ADMIN — NYSTATIN 500000 UNITS: 500000 SUSPENSION ORAL at 09:05

## 2020-05-25 RX ADMIN — FLUOXETINE 20 MG: 20 CAPSULE ORAL at 09:05

## 2020-05-25 RX ADMIN — APIXABAN 2.5 MG: 2.5 TABLET, FILM COATED ORAL at 09:05

## 2020-05-25 RX ADMIN — LEVOTHYROXINE SODIUM 25 MCG: 25 TABLET ORAL at 05:05

## 2020-05-25 RX ADMIN — GABAPENTIN 100 MG: 100 CAPSULE ORAL at 09:05

## 2020-05-25 RX ADMIN — PANTOPRAZOLE SODIUM 40 MG: 40 TABLET, DELAYED RELEASE ORAL at 05:05

## 2020-05-25 RX ADMIN — NYSTATIN 500000 UNITS: 500000 SUSPENSION ORAL at 04:05

## 2020-05-25 NOTE — PLAN OF CARE
05/24/20 2000   Patient Assessment/Suction   Level of Consciousness (AVPU) alert   Respiratory Effort Unlabored   Expansion/Accessory Muscles/Retractions no use of accessory muscles   All Lung Fields Breath Sounds clear   Rhythm/Pattern, Respiratory unlabored   Cough Frequency no cough   PRE-TX-O2   O2 Device (Oxygen Therapy) CPAP  (PTS HOME CPAP NO O2)   SpO2 99 %   Pulse 65   Resp 18   Aerosol Therapy   $ Aerosol Therapy Charges PRN treatment not required   Respiratory Treatment Status (SVN) PRN treatment not required   Respiratory Evaluation   $ Care Plan Tech Time 15 min   Evaluation For   (CARE PLAN)

## 2020-05-25 NOTE — CONSULTS
Nephrology Consult Note        Patient Name: Danielle Martinez  MRN: [id number]    Patient Class: IP- Inpatient   Admission Date: 5/22/2020  Length of Stay: 3 days  Date of Service: 5/25/2020    Attending Physician: Kristofer Jimenez MD  Primary Care Provider: Antony Lopez MD    Reason for Consult: haily/hypokalemia/diarrhea/diverticulitis/htn/anemia    SUBJECTIVE:     HPI: 74F with GERD, HTN, hypothyroidism, depression, COPD, BRITTANIE, pAF, anemia presents with generalized weakness, nausea without any vomiting, diarrhea. Has prior GI bleed and currently on therapy for diverticulitis, does complain of having thrush orally as well. Also has left-sided leg pain consistent with her prior sciatica. She has been out of her thyroid medicine for at least a week. Renal is consulted for HAILY, hypokalemia - sCr is 5.2, was previously 0.7. Renal US is unremarkable, UA is clear.    5/23 VSS, no new complains.  5/24 VSS, no new complains.  5/25 VSS, no new complains.    Past Medical History:   Diagnosis Date    HAILY (acute kidney injury)     Arthritis     Juvenile diagnosed age 17    COPD (chronic obstructive pulmonary disease)     Diverticulitis     GERD (gastroesophageal reflux disease)     Hx of hiatal hernia     Hypertension     Sleep apnea      Past Surgical History:   Procedure Laterality Date    BREAST LUMPECTOMY Left     in 40's    COLONOSCOPY  2016    Dr. Crowe-RTADOLPH 5 years    COLONOSCOPY  2019    Dr. Reyes 5 years    HIATAL HERNIA REPAIR  2005    SHOULDER ARTHROSCOPY  2015     History reviewed. No pertinent family history.  Social History     Tobacco Use    Smoking status: Never Smoker    Smokeless tobacco: Never Used   Substance Use Topics    Alcohol use: Not Currently    Drug use: Not on file       Review of patient's allergies indicates:   Allergen Reactions    Promethazine Anaphylaxis    Clove flavoring [flavoring agent]     Codeine Itching    Latex, natural rubber     Lorazepam Other (See  Comments)    Lovastatin Other (See Comments)    Meclizine Other (See Comments)    Morphine Itching    Simvastatin Other (See Comments)    Tramadol-acetaminophen Itching       Outpatient meds:  No current facility-administered medications on file prior to encounter.      Current Outpatient Medications on File Prior to Encounter   Medication Sig Dispense Refill    albuterol (VENTOLIN HFA) 90 mcg/actuation inhaler Inhale 2 puffs into the lungs every 4 (four) hours as needed for Wheezing. Rescue 1 Inhaler 5    ALPRAZolam (XANAX) 1 MG tablet Take 1 tablet (1 mg total) by mouth 2 (two) times daily as needed for Anxiety. 60 tablet 2    ascorbic acid, vitamin C, (VITAMIN C) 100 MG tablet Take 100 mg by mouth once daily.      calcium carbonate (TUMS) 200 mg calcium (500 mg) chewable tablet Take 1 tablet by mouth once daily.      candesartan-hydrochlorothiazid (ATACAND HCT) 32-12.5 mg per tablet Take 1 tablet by mouth once daily. 30 tablet 5    ELIQUIS 5 mg Tab Take 1 tablet (5 mg total) by mouth 2 (two) times daily. 60 tablet 5    FLUoxetine 20 MG capsule Take 1 capsule (20 mg total) by mouth once daily. 30 capsule 5    fluticasone propionate (FLONASE) 50 mcg/actuation nasal spray 1 spray by Each Nostril route daily as needed.       fluticasone-umeclidin-vilanter (TRELEGY ELLIPTA) 100-62.5-25 mcg DsDv Inhale 1 puff into the lungs daily as needed.       gabapentin (NEURONTIN) 300 MG capsule Take 1 capsule (300 mg total) by mouth 2 (two) times daily. 60 capsule 5    levothyroxine (SYNTHROID) 25 MCG tablet Take 1 tablet (25 mcg total) by mouth before breakfast. Name brand only.  RIRI 30 tablet 5    mv-min/folic/vit K/lut/[id number] (ALIVE WOMEN'S 50 PLUS, BLEND, ORAL) Take 1 tablet by mouth daily as needed.      pantoprazole (PROTONIX) 40 MG tablet Take 1 tablet (40 mg total) by mouth once daily. 30 tablet 1    sotalol (BETAPACE) 80 MG tablet Take 1 tablet (80 mg total) by mouth 2 (two) times daily. 60 tablet 5     esomeprazole (NEXIUM) 40 MG capsule Take 1 capsule (40 mg total) by mouth 2 (two) times daily. BRAND NAME ONLY 60 capsule 11    fluticasone-umeclidin-vilanter (TRELEGY ELLIPTA) 100-62.5-25 mcg DsDv Trelegy Ellipta      nitroGLYCERIN (NITROSTAT) 0.4 MG SL tablet Place 0.4 mg under the tongue every 5 (five) minutes as needed.          Scheduled meds:   apixaban  2.5 mg Oral BID    FLUoxetine  20 mg Oral Daily    fluticasone propionate  1 spray Each Nostril Daily    fluticasone-umeclidin-vilanter  1 puff Inhalation Daily    gabapentin  100 mg Oral TID    levothyroxine  25 mcg Oral Before breakfast    nystatin  500,000 Units Oral QID (WM & HS)    pantoprazole  40 mg Oral Daily       Infusions:      PRN meds:  acetaminophen, albuterol sulfate, ALPRAZolam, melatonin, nitroGLYCERIN, oxyCODONE, sodium chloride 0.9%    Review of Systems:  ROS    OBJECTIVE:     Vital Signs and IO (Last 24H):  Temp:  [98 °F (36.7 °C)-99.6 °F (37.6 °C)]   Pulse:  []   Resp:  [17-19]   BP: ()/(48-70)   SpO2:  [95 %-99 %]   I/O last 3 completed shifts:  In: -   Out: 2400 [Urine:2400]    Wt Readings from Last 5 Encounters:   05/24/20 94.5 kg (208 lb 5.4 oz)   11/05/19 93.2 kg (205 lb 6.4 oz)         Physical Exam:  Physical Exam   Constitutional: She is oriented to person, place, and time. She appears well-developed and well-nourished.   HENT:   Head: Normocephalic and atraumatic.   Mouth/Throat: Oropharynx is clear and moist.   Eyes: Pupils are equal, round, and reactive to light. EOM are normal. No scleral icterus.   Neck: Neck supple.   Cardiovascular: Normal rate and regular rhythm.   Pulmonary/Chest: Effort normal. No stridor. No respiratory distress.   Abdominal: Soft. She exhibits no distension.   Musculoskeletal: Normal range of motion. She exhibits no edema or deformity.   Neurological: She is alert and oriented to person, place, and time. No cranial nerve deficit.   Skin: Skin is warm and dry. No rash noted. She  is not diaphoretic. No erythema.   Psychiatric: She has a normal mood and affect. Her behavior is normal.       Body mass index is 42.08 kg/m².    Laboratory:  Recent Labs   Lab 05/23/20  0512 05/24/20  0610 05/25/20  0524    141 136   K 3.8 3.6 3.5    110 106   CO2 23 22* 23   BUN 57* 47* 45*   CREATININE 4.3* 3.7* 3.5*   ESTGFRAFRICA 11.1* 13.3* 14.2*   EGFRNONAA 9.6* 11.5* 12.3*   * 129* 143*       Recent Labs   Lab 05/23/20 0512 05/24/20 0610 05/25/20  0524   CALCIUM 8.3* 8.3* 8.5*   ALBUMIN 3.6 3.4* 3.4*   PHOS 5.0* 4.0 3.6   MG 2.3 2.1 1.9             No results for input(s): POCTGLUCOSE in the last 168 hours.    Recent Labs   Lab 11/05/19  1337   Hemoglobin A1C 6.8 H       Recent Labs   Lab 05/23/20  0512 05/24/20  0610 05/25/20  0524   WBC 10.33 9.72 9.87   HGB 11.7* 11.2* 11.8*   HCT 37.7 36.3* 38.4    291 306   MCV 89 90 89   MCHC 31.0* 30.9* 30.7*   MONO 9.6  1.0 9.2  0.9 8.4  0.8       Recent Labs   Lab 05/23/20  0512 05/24/20  0610 05/25/20  0524   BILITOT 1.0 1.1* 1.2*   PROT 7.2 7.1 7.2   ALBUMIN 3.6 3.4* 3.4*   ALKPHOS 41* 41* 51*   ALT 9* 9* 13   AST 11 11 18       Recent Labs   Lab 05/22/20  1747   Color, UA Yellow   Appearance, UA Clear   pH, UA 5.0   Specific Gravity, UA 1.010   Protein, UA Negative   Glucose, UA Negative   Ketones, UA Negative   Urobilinogen, UA Negative   Bilirubin (UA) Negative   Occult Blood UA Negative   Nitrite, UA Negative   RBC, UA 7 H   WBC, UA 21 H   Bacteria Negative   Hyaline Casts, UA 7 A       Recent Labs   Lab 09/13/19  0912   Sample JULIA       Microbiology Results (last 7 days)     Procedure Component Value Units Date/Time    Urine culture [[id number]] Collected:  05/22/20 8667    Order Status:  Completed Specimen:  Urine Updated:  05/24/20 0711     Urine Culture, Routine Multiple organisms isolated. None in predominance.  Repeat if      clinically necessary.    Narrative:       Preferred Collection Type->Urine, Clean Catch  Specimen  Source->Urine    Stool culture [[id number]]     Order Status:  No result Specimen:  Stool     Clostridium difficile EIA [[id number]]     Order Status:  Canceled Specimen:  Stool           ASSESSMENT/PLAN:     Active Hospital Problems    Diagnosis  POA    *HAILY (acute kidney injury) [N17.9]  Yes    Diarrhea [R19.7]  Yes    Acute pulmonary edema [J81.0]  Yes    Thrush [B37.0]  Yes    Hypokalemia [E87.6]  Yes    Lumbar disc disease [M51.9]  Yes    Paroxysmal atrial fibrillation [I48.0]  Yes    BRITTANIE on CPAP [G47.33, Z99.89]  Not Applicable    Essential (primary) hypertension [I10]  Yes      Resolved Hospital Problems   No resolved problems to display.     HAILY, likely due to volume depletion from diarrhea, meds, poor oral intake due to thrush  Diverticulitis treated with broad abx  Diarrhea  Hypokalemia  No NSAIDs, ACEI/ARB, IV contrast or other nephrotoxins.  Keep MAP > 60, SBP > 100.  Dose meds for GFR < 30 ml/min.  Renal diet - low K, low phos.  IVF for now, do not replete K unless < 3. Mg is OK.  Diagnose and treat any infection.    Anemia, non-CKD  Hgb and HCT are acceptable. Monitor.    HTN  BP seem controlled.   Tolerate asymptomatic HTN up to -160.  Hold home meds.    Thank you for allowing us to participate in the care of your patient!   We will follow the patient and provide recommendations as needed.    Duke Cabrera MD    [facility]  [address] [place] [address]  [place] [address]    [phone] - tel  [phone] - fax    5/25/2020 1:14 PM

## 2020-05-25 NOTE — ASSESSMENT & PLAN NOTE
Stool studies  Check c. Diff-so far unable to provide sample  No bowel movement in last 24 hr.

## 2020-05-25 NOTE — PLAN OF CARE
Problem: Occupational Therapy Goal  Goal: Occupational Therapy Goal  Description  Goals to be met by: d/c     Patient will increase functional independence with ADLs by performing:    LE Dressing with Stand-by Assistance.  Grooming while standing at sink with Stand-by Assistance.  Toileting from toilet with Stand-by Assistance for hygiene and clothing management.   Toilet transfer to toilet with Stand-by Assistance.     Outcome: Ongoing, Progressing

## 2020-05-25 NOTE — ASSESSMENT & PLAN NOTE
Improved  2D echo  Tried 40 mg Lasix yesterday and made good urine output.  We might try 20 mg IV Lasix today blood pressure permits  Accurate charting of urine output

## 2020-05-25 NOTE — ASSESSMENT & PLAN NOTE
Improving  Likely due to ongoing diarrhea  Consult Dr. Cabrera  Hold ARB/HCTZ  Renally dose all meds  She made excellent urine output with 40 mg IV Lasix yesterday.  Today blood pressure is running on the lower side however we might try 20 mg IV Lasix later in the day if blood pressure permits  Accurate charting of urine output

## 2020-05-25 NOTE — PT/OT/SLP EVAL
Physical Therapy Evaluation    Patient Name:  Danielle Martinez   MRN:  [id number]    Recommendations:     Discharge Recommendations:  home with home health   Discharge Equipment Recommendations: none   Barriers to discharge: None and pt has 24 steps to enter her home but has camper with AC on her property with 2 steps to enter and her  can stay with her.     Assessment:     Danielle Martinez is a 74 y.o. female admitted with a medical diagnosis of HAILY (acute kidney injury).  She presents with the following impairments/functional limitations:  weakness, impaired functional mobilty, gait instability, impaired endurance, pain, impaired balance, impaired self care skills . Pt agrees to PT. Pt reports chronic pain in left hip and usually gets rhizotomy's every 6 months but has not been able to go due to Covid crisis. PLOF independent, driving and able to ambulate up and down 24 steps with rail. Pt on 2L o2, does not wear at home.     Rehab Prognosis: Good; patient would benefit from acute skilled PT services to address these deficits and reach maximum level of function.    Recent Surgery: * No surgery found *      Plan:     During this hospitalization, patient to be seen 6 x/week to address the identified rehab impairments via gait training, therapeutic activities, therapeutic exercises and progress toward the following goals:    · Plan of Care Expires:  06/25/20    Subjective     Chief Complaint: weakness and Left hip pain  Patient/Family Comments/goals: to get better  Pain/Comfort:  · Pain Rating 1: (pain in left leg--sciatica, chronic, did not give number)  · Location - Side 1: Left  · Location 1: leg  · Pain Addressed 1: Reposition    Patients cultural, spiritual, Anglican conflicts given the current situation:      Living Environment:  Pt lives with , does not use assistive device, drives, no oxygen. Has 24 steps to enter house with rail.  Prior to admission, patients level of function was modified  independent for time.  Equipment used at home: none.  DME owned (not currently used): rollator.  Upon discharge, patient will have assistance from family.    Objective:     Communicated with rn prior to session.  Patient found supine with bed alarm, oxygen, peripheral IV, telemetry  upon PT entry to room.    General Precautions: Standard, fall   Orthopedic Precautions:N/A   Braces: N/A     Exams:  · Cognitive Exam:  Patient is oriented to Person, Place, Time and Situation  · Gross Motor Coordination:  WFL  · RUE ROM: WFL  · LUE ROM: WFL  · RLE ROM: WFL  · RLE Strength: WFL  · LLE ROM: limited to 75% due to pain  · LLE Strength: 3/5 due to pain    Functional Mobility:  · Bed Mobility:     · Rolling Left:  minimum assistance  · Scooting: minimum assistance  · Supine to Sit: minimum assistance  · Transfers:     · Sit to Stand:  contact guard assistance with rolling walker  · Gait: pt able to ambulate in room slowly 15 ft RW mild limp on left, cues needed for walker. Mild dizziness, no lob, but noted fatigue.       Therapeutic Activities and Exercises:   education, fall prevention, poc, dc planning.     AM-PAC 6 CLICK MOBILITY  Total Score:17     Patient left up in chair with all lines intact, call button in reach, rn notified and O2 intact at 2L, added extension to it. .    GOALS:   Multidisciplinary Problems     Physical Therapy Goals        Problem: Physical Therapy Goal    Goal Priority Disciplines Outcome Goal Variances Interventions   Physical Therapy Goal     PT, PT/OT      Description:  Goals to be met by: discharge     Patient will increase functional independence with mobility by performin. Supine to sit with Stand-by Assistance  2. Sit to stand transfer with Supervision  3. Bed to chair transfer with Supervision using Rolling Walker  4. Gait  x 50 feet with Supervision using Rolling Walker.                       History:     Past Medical History:   Diagnosis Date    HAILY (acute kidney injury)      Arthritis     Juvenile diagnosed age 17    COPD (chronic obstructive pulmonary disease)     Diverticulitis     GERD (gastroesophageal reflux disease)     Hx of hiatal hernia     Hypertension     Sleep apnea        Past Surgical History:   Procedure Laterality Date    BREAST LUMPECTOMY Left     in 40's    COLONOSCOPY  2016    Dr. Reyes 5 years    COLONOSCOPY  2019    Dr. Reyes 5 years    HIATAL HERNIA REPAIR  2005    SHOULDER ARTHROSCOPY  2015       Time Tracking:     PT Received On: 05/25/20  PT Start Time: 0855     PT Stop Time: 0925  PT Total Time (min): 30 min     Billable Minutes: Evaluation 7, Gait Training 11 and Therapeutic Activity 12      Ami Pinon, PT  05/25/2020

## 2020-05-25 NOTE — PROGRESS NOTES
[facility]  Progress Note    DOS: 05/25/2020    Patient Name: Danielle Martinez  MRN: [id number]  Patient Class: IP- Inpatient   Admission Date: 5/22/2020  Length of Stay: 3 days  Attending Physician: Kristofer Jimenez MD  Primary Care Provider: Antony Lopez MD        Subjective:     Principal Problem:HAILY (acute kidney injury)        HPI:  Ms. Martinez presents today with complaints of weakness. It is severe. It is associated with leg cramps, diarrhea, mouth pain, oliguria, anorexia, and fatigue. She denies fever, chills, N/V, dizziness, cough, chest pain, flank pain, or LOC. She has a history of HTN, HLD, PAF on eliquis, chronic back pain, anxiety/depression, and diverticulitis. She called her doctor with complaints of diarrhea and was prescribed an unknown abx which she's been taking over a week. This hasn't stopped her diarrhea, but has caused thrush in her mouth. D/t the thrush, she hasn't been able to eat. She's had multiple episodes of watery diarrhea, with the last one, last night. She is unsure the last time she urinated. She is sleepy on exam, but did recently receive dilaudid for back pain. A renal US had just completed at the time of exam.     Overview/Hospital Course:  05/23:  Patient was seen and examined bedside.  She tells me that she is experiencing on and off diarrhea since last 1 month and was given antibiotics for diverticulitis.  Denies any new symptoms except being very weak and chronic joint pains in left leg.  Unfortunately stool studies were not sent by nursing staff.  Received IV hydration.     05/24:  Patient was seen and examined bedside.  She keeps complaining of her chronic left sciatic pain.  She is also having some wet cough.  No bowel movement since yesterday hence no stool studies nor C diff sample.  No other acute events overnight as per nursing staff.     05/25:  Patient was seen and examined at bedside.  She had 1 small bowel movement however it was  formed and C diff testing was not possible.  She made good urine output with 40 mg IV Lasix yesterday.  Keeps complaining of her chronic left sciatic pain.  Cough has improved.  No other acute events overnight as per nursing staff.     Interval History: Patient was seen and examined at bedside.  She had 1 small bowel movement however it was formed and C diff testing was not possible.  She made good urine output with 40 mg IV Lasix yesterday.  Keeps complaining of her chronic left sciatic pain.  Cough has improved.  No other acute events overnight as per nursing staff.       Review of Systems   Constitutional: Positive for fatigue. Negative for activity change, appetite change, chills, diaphoresis, fever and unexpected weight change.   HENT: Negative for congestion, ear pain, facial swelling, hearing loss, sore throat and trouble swallowing.    Eyes: Negative for pain and discharge.   Respiratory: Negative for cough, chest tightness, shortness of breath and wheezing.    Cardiovascular: Negative for chest pain, palpitations and leg swelling.   Gastrointestinal: Negative for abdominal pain, blood in stool, nausea and vomiting.   Endocrine: Negative for polydipsia, polyphagia and polyuria.   Genitourinary: Negative for difficulty urinating, dysuria, flank pain, frequency and urgency.   Musculoskeletal: Positive for back pain. Negative for arthralgias, joint swelling, neck pain and neck stiffness.   Skin: Negative for rash and wound.   Allergic/Immunologic: Negative for environmental allergies and immunocompromised state.   Neurological: Positive for weakness. Negative for dizziness, seizures, syncope, speech difficulty, light-headedness, numbness and headaches.   Hematological: Negative for adenopathy.   Psychiatric/Behavioral: Negative for sleep disturbance and suicidal ideas. The patient is not nervous/anxious.    All other systems reviewed and are negative.    Objective:     Vital Signs (Most Recent):  Temp: 99.2 °F  (37.3 °C) (05/25/20 1222)  Pulse: 95 (05/25/20 1222)  Resp: 18 (05/25/20 1222)  BP: (!) 88/61 (05/25/20 1222)  SpO2: 97 % (05/25/20 1222) Vital Signs (24h Range):  Temp:  [98.3 °F (36.8 °C)-99.6 °F (37.6 °C)] 99.2 °F (37.3 °C)  Pulse:  [] 95  Resp:  [17-19] 18  SpO2:  [95 %-99 %] 97 %  BP: ()/(48-70) 88/61     Weight: 94.5 kg (208 lb 5.4 oz)  Body mass index is 42.08 kg/m².    Intake/Output Summary (Last 24 hours) at 5/25/2020 1300  Last data filed at 5/25/2020 0500  Gross per 24 hour   Intake --   Output 1600 ml   Net -1600 ml      Physical Exam   Constitutional: She is oriented to person, place, and time. She appears well-developed and well-nourished.   HENT:   Head: Normocephalic and atraumatic.   Eyes: Pupils are equal, round, and reactive to light. EOM are normal.   Neck: Normal range of motion. Neck supple.   Cardiovascular: Regular rhythm, normal heart sounds and intact distal pulses.   No murmur heard.  Pulmonary/Chest: Effort normal and breath sounds normal. No stridor. No respiratory distress. She has no wheezes.   Bilateral basal crackles-improved, on oxygen-improved   Abdominal: Soft. Bowel sounds are normal. She exhibits no distension. There is no tenderness.   Musculoskeletal: Normal range of motion.   Neurological: She is oriented to person, place, and time.   Skin: Skin is warm and dry. Capillary refill takes less than 2 seconds.   Psychiatric: She has a normal mood and affect.   Nursing note and vitals reviewed.      Significant Labs: All pertinent labs within the past 24 hours have been reviewed.    Significant Imaging: I have reviewed all pertinent imaging results/findings within the past 24 hours.      Assessment/Plan:      * HAILY (acute kidney injury)  Improving  Likely due to ongoing diarrhea  Consult Dr. Cabrera  Hold ARB/HCTZ  Renally dose all meds  She made excellent urine output with 40 mg IV Lasix yesterday.  Today blood pressure is running on the lower side however we might  try 20 mg IV Lasix later in the day if blood pressure permits  Accurate charting of urine output    Acute pulmonary edema  Improved  2D echo  Tried 40 mg Lasix yesterday and made good urine output.  We might try 20 mg IV Lasix today blood pressure permits  Accurate charting of urine output      Diarrhea  Stool studies  Check c. Diff-so far unable to provide sample  No bowel movement in last 24 hr.         Thrush  Nystatin swish and swallow.      Essential (primary) hypertension  Held ARB/HCTZ.    BRITTANIE on CPAP  CPAP q.h.s      Paroxysmal atrial fibrillation  Continue amio  Renally dose eliquis and sotalol  Currently sinus.    Hypokalemia  Replaced in ED   Repeat level in AM  Judicious replacement considering renal failure        Lumbar disc disease  Chronic left lower extremity pain for which she goes for nerve block every 6 months  P.r.n. Analgesia  PT, OT        VTE Risk Mitigation (From admission, onward)         Ordered     apixaban tablet 2.5 mg  2 times daily      05/22/20 1248     IP VTE HIGH RISK PATIENT  Once      05/22/20 1248     Place sequential compression device  Until discontinued      05/22/20 1248                      Kristofer Jimenez MD  Department of Hospital Medicine   [facility]

## 2020-05-25 NOTE — PLAN OF CARE
Problem: Fall Injury Risk  Goal: Absence of Fall and Fall-Related Injury  Outcome: Ongoing, Progressing     Problem: Adult Inpatient Plan of Care  Goal: Plan of Care Review  Outcome: Ongoing, Progressing  Goal: Patient-Specific Goal (Individualization)  Outcome: Ongoing, Progressing  Goal: Absence of Hospital-Acquired Illness or Injury  Outcome: Ongoing, Progressing  Goal: Optimal Comfort and Wellbeing  Outcome: Ongoing, Progressing  Goal: Readiness for Transition of Care  Outcome: Ongoing, Progressing  Goal: Rounds/Family Conference  Outcome: Ongoing, Progressing     Problem: Bariatric Environmental Safety  Goal: Safety Maintained with Care  Outcome: Ongoing, Progressing     Problem: Infection  Goal: Infection Symptom Resolution  Outcome: Ongoing, Progressing     Problem: Electrolyte Imbalance (Acute Kidney Injury/Impairment)  Goal: Serum Electrolyte Balance  Outcome: Ongoing, Progressing     Problem: Fluid Imbalance (Acute Kidney Injury/Impairment)  Goal: Optimal Fluid Balance  Outcome: Ongoing, Progressing     Problem: Hematologic Alteration (Acute Kidney Injury/Impairment)  Goal: Hemoglobin, Hematocrit and Platelets Within Normal Range  Outcome: Ongoing, Progressing     Problem: Oral Intake Inadequate (Acute Kidney Injury/Impairment)  Goal: Optimal Nutrition Intake  Outcome: Ongoing, Progressing     Problem: Renal Function Impairment (Acute Kidney Injury/Impairment)  Goal: Effective Renal Function  Outcome: Ongoing, Progressing     Problem: Skin Injury Risk Increased  Goal: Skin Health and Integrity  Outcome: Ongoing, Progressing     Problem: Suicide Risk  Goal: Absence of Self-Harm  Outcome: Ongoing, Progressing

## 2020-05-25 NOTE — PLAN OF CARE
05/25/20 0700   Patient Assessment/Suction   Level of Consciousness (AVPU) alert   Respiratory Effort Unlabored   All Lung Fields Breath Sounds clear;diminished   PRE-TX-O2   O2 Device (Oxygen Therapy) nasal cannula   $ Is the patient on Low Flow Oxygen? Yes   Flow (L/min) 1   SpO2 96 %   Pulse Oximetry Type Intermittent   $ Pulse Oximetry - Multiple Charge Pulse Oximetry - Multiple   Pulse 86   Resp 17   Inhaler   $ Inhaler Charges MDI (Metered Dose Inahler) Treatment   Daily Review of Necessity (Inhaler) completed   Respiratory Treatment Status (Inhaler) given   Treatment Route (Inhaler) mouthpiece   Patient Position (Inhaler) HOB elevated   Post Treatment Assessment (Inhaler) breath sounds unchanged   Signs of Intolerance (Inhaler) none

## 2020-05-25 NOTE — PT/OT/SLP EVAL
Occupational Therapy   Evaluation    Name: Danielle Martinez  MRN: [id number]  Admitting Diagnosis:  HAILY (acute kidney injury)      Recommendations:     Discharge Recommendations: home with home health  Discharge Equipment Recommendations:  none  Barriers to discharge:  Decreased caregiver support    Assessment:     Danielle Martinez is a 74 y.o. female with a medical diagnosis of HAILY (acute kidney injury).  She presents with generalized weakness/fatigue. Performance deficits affecting function: weakness, impaired endurance, impaired self care skills, impaired functional mobilty, gait instability, impaired balance.      Rehab Prognosis: Good; patient would benefit from acute skilled OT services to address these deficits and reach maximum level of function.       Plan:     Patient to be seen 5 x/week to address the above listed problems via self-care/home management, therapeutic activities, therapeutic exercises  · Plan of Care Expires: 06/24/20  · Plan of Care Reviewed with: patient    Subjective     Chief Complaint: fatigue  Patient/Family Comments/goals: to go home     Occupational Profile:  Living Environment: Lives with spouse in raised home with 24STE (with rails); plans to stay either with one of her children or in her camper (2 KRISTI and working A/C) at d/c  Previous level of function: (I) to Mod (I) at times with rollator   Roles and Routines: caretaker for self, mother, spouse, grandmother   Equipment Used at Home:  rollator(owns but does not use shower chair)  Assistance upon Discharge: family     Pain/Comfort:  · Pain Rating 1: (pain in L leg, sciatica, not rated)  · Pain Rating Post-Intervention 1: (pain in L leg, sciatica, not rated)      Objective:     Communicated with: RN prior to session.  Patient found on bedside commode with bed alarm, oxygen, peripheral IV, telemetry upon OT entry to room.    General Precautions: Standard, fall   Orthopedic Precautions:N/A   Braces: N/A     Occupational Performance:    Bed  Mobility:    · Patient completed Sit to Supine with minimum assistance    Functional Mobility/Transfers:  · Patient completed Sit <> Stand Transfer with contact guard assistance  with  rolling walker   · Patient completed SPT from bedside commode to EOB with CGA with rolling walker     Activities of Daily Living:  · Lower Body Dressing: maximal assistance to don depends  · Toileting: maximal assistance for hygiene/clothing mgmt on BSC    Cognitive/Visual Perceptual:  Cognitive/Psychosocial Skills:     -       Oriented to: Person, Place, Time and Situation   -       Follows Commands/attention:Follows multistep  commands  -       Communication: clear/fluent  -       Memory: TBA  -       Safety awareness/insight to disability: intact   -       Mood/Affect/Coping skills/emotional control: Appropriate to situation    Physical Exam:  UE strength/ROM TBA    AMPAC 6 Click ADL:  AMPAC Total Score: 16    Treatment & Education:  OT role/POC  Education:    Patient left supine with all lines intact, call button in reach and bed alarm on    GOALS:   Multidisciplinary Problems     Occupational Therapy Goals        Problem: Occupational Therapy Goal    Goal Priority Disciplines Outcome Interventions   Occupational Therapy Goal     OT, PT/OT Ongoing, Progressing    Description:  Goals to be met by: d/c     Patient will increase functional independence with ADLs by performing:    LE Dressing with Stand-by Assistance.  Grooming while standing at sink with Stand-by Assistance.  Toileting from toilet with Stand-by Assistance for hygiene and clothing management.   Toilet transfer to toilet with Stand-by Assistance.                      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 Reyes 5 years    COLONOSCOPY  2019    Dr. Reyes 5 years    HIATAL HERNIA REPAIR  2005    SHOULDER ARTHROSCOPY  2015       Time Tracking:     OT Date of Treatment: 05/25/20  OT Start Time: 1009  OT Stop Time: 1039  OT Total Time (min): 30 min    Billable Minutes:Evaluation 07  Self Care/Home Management 23    Chris Monge, OT  5/25/2020

## 2020-05-25 NOTE — SUBJECTIVE & OBJECTIVE
Interval History: Patient was seen and examined at bedside.  She had 1 small bowel movement however it was formed and C diff testing was not possible.  She made good urine output with 40 mg IV Lasix yesterday.  Keeps complaining of her chronic left sciatic pain.  Cough has improved.  No other acute events overnight as per nursing staff.       Review of Systems   Constitutional: Positive for fatigue. Negative for activity change, appetite change, chills, diaphoresis, fever and unexpected weight change.   HENT: Negative for congestion, ear pain, facial swelling, hearing loss, sore throat and trouble swallowing.    Eyes: Negative for pain and discharge.   Respiratory: Negative for cough, chest tightness, shortness of breath and wheezing.    Cardiovascular: Negative for chest pain, palpitations and leg swelling.   Gastrointestinal: Negative for abdominal pain, blood in stool, nausea and vomiting.   Endocrine: Negative for polydipsia, polyphagia and polyuria.   Genitourinary: Negative for difficulty urinating, dysuria, flank pain, frequency and urgency.   Musculoskeletal: Positive for back pain. Negative for arthralgias, joint swelling, neck pain and neck stiffness.   Skin: Negative for rash and wound.   Allergic/Immunologic: Negative for environmental allergies and immunocompromised state.   Neurological: Positive for weakness. Negative for dizziness, seizures, syncope, speech difficulty, light-headedness, numbness and headaches.   Hematological: Negative for adenopathy.   Psychiatric/Behavioral: Negative for sleep disturbance and suicidal ideas. The patient is not nervous/anxious.    All other systems reviewed and are negative.    Objective:     Vital Signs (Most Recent):  Temp: 99.2 °F (37.3 °C) (05/25/20 1222)  Pulse: 95 (05/25/20 1222)  Resp: 18 (05/25/20 1222)  BP: (!) 88/61 (05/25/20 1222)  SpO2: 97 % (05/25/20 1222) Vital Signs (24h Range):  Temp:  [98.3 °F (36.8 °C)-99.6 °F (37.6 °C)] 99.2 °F (37.3 °C)  Pulse:   [] 95  Resp:  [17-19] 18  SpO2:  [95 %-99 %] 97 %  BP: ()/(48-70) 88/61     Weight: 94.5 kg (208 lb 5.4 oz)  Body mass index is 42.08 kg/m².    Intake/Output Summary (Last 24 hours) at 5/25/2020 1300  Last data filed at 5/25/2020 0500  Gross per 24 hour   Intake --   Output 1600 ml   Net -1600 ml      Physical Exam   Constitutional: She is oriented to person, place, and time. She appears well-developed and well-nourished.   HENT:   Head: Normocephalic and atraumatic.   Eyes: Pupils are equal, round, and reactive to light. EOM are normal.   Neck: Normal range of motion. Neck supple.   Cardiovascular: Regular rhythm, normal heart sounds and intact distal pulses.   No murmur heard.  Pulmonary/Chest: Effort normal and breath sounds normal. No stridor. No respiratory distress. She has no wheezes.   Bilateral basal crackles-improved, on oxygen-improved   Abdominal: Soft. Bowel sounds are normal. She exhibits no distension. There is no tenderness.   Musculoskeletal: Normal range of motion.   Neurological: She is oriented to person, place, and time.   Skin: Skin is warm and dry. Capillary refill takes less than 2 seconds.   Psychiatric: She has a normal mood and affect.   Nursing note and vitals reviewed.      Significant Labs: All pertinent labs within the past 24 hours have been reviewed.    Significant Imaging: I have reviewed all pertinent imaging results/findings within the past 24 hours.

## 2020-05-26 LAB
ALBUMIN SERPL BCP-MCNC: 3.1 G/DL (ref 3.5–5.2)
ALP SERPL-CCNC: 54 U/L (ref 55–135)
ALT SERPL W/O P-5'-P-CCNC: 18 U/L (ref 10–44)
ANION GAP SERPL CALC-SCNC: 11 MMOL/L (ref 8–16)
AST SERPL-CCNC: 27 U/L (ref 10–40)
BASOPHILS # BLD AUTO: 0.07 K/UL (ref 0–0.2)
BASOPHILS NFR BLD: 0.8 % (ref 0–1.9)
BILIRUB SERPL-MCNC: 0.9 MG/DL (ref 0.1–1)
BUN SERPL-MCNC: 48 MG/DL (ref 8–23)
CALCIUM SERPL-MCNC: 8.8 MG/DL (ref 8.7–10.5)
CHLORIDE SERPL-SCNC: 111 MMOL/L (ref 95–110)
CO2 SERPL-SCNC: 21 MMOL/L (ref 23–29)
CREAT SERPL-MCNC: 3.2 MG/DL (ref 0.5–1.4)
DIFFERENTIAL METHOD: ABNORMAL
EOSINOPHIL # BLD AUTO: 0.4 K/UL (ref 0–0.5)
EOSINOPHIL NFR BLD: 4.9 % (ref 0–8)
ERYTHROCYTE [DISTWIDTH] IN BLOOD BY AUTOMATED COUNT: 16.2 % (ref 11.5–14.5)
EST. GFR  (AFRICAN AMERICAN): 15.8 ML/MIN/1.73 M^2
EST. GFR  (NON AFRICAN AMERICAN): 13.7 ML/MIN/1.73 M^2
GLUCOSE SERPL-MCNC: 117 MG/DL (ref 70–110)
HCT VFR BLD AUTO: 38.9 % (ref 37–48.5)
HGB BLD-MCNC: 12 G/DL (ref 12–16)
IMM GRANULOCYTES # BLD AUTO: 0.02 K/UL (ref 0–0.04)
IMM GRANULOCYTES NFR BLD AUTO: 0.2 % (ref 0–0.5)
LYMPHOCYTES # BLD AUTO: 1.6 K/UL (ref 1–4.8)
LYMPHOCYTES NFR BLD: 19.5 % (ref 18–48)
MAGNESIUM SERPL-MCNC: 2 MG/DL (ref 1.6–2.6)
MCH RBC QN AUTO: 27.5 PG (ref 27–31)
MCHC RBC AUTO-ENTMCNC: 30.8 G/DL (ref 32–36)
MCV RBC AUTO: 89 FL (ref 82–98)
MONOCYTES # BLD AUTO: 0.8 K/UL (ref 0.3–1)
MONOCYTES NFR BLD: 9.5 % (ref 4–15)
NEUTROPHILS # BLD AUTO: 5.4 K/UL (ref 1.8–7.7)
NEUTROPHILS NFR BLD: 65.1 % (ref 38–73)
NRBC BLD-RTO: 0 /100 WBC
PHOSPHATE SERPL-MCNC: 3.2 MG/DL (ref 2.7–4.5)
PLATELET # BLD AUTO: 323 K/UL (ref 150–350)
PMV BLD AUTO: 10.8 FL (ref 9.2–12.9)
POTASSIUM SERPL-SCNC: 3.9 MMOL/L (ref 3.5–5.1)
PROT SERPL-MCNC: 6.5 G/DL (ref 6–8.4)
RBC # BLD AUTO: 4.36 M/UL (ref 4–5.4)
SODIUM SERPL-SCNC: 143 MMOL/L (ref 136–145)
WBC # BLD AUTO: 8.31 K/UL (ref 3.9–12.7)

## 2020-05-26 PROCEDURE — 25000003 PHARM REV CODE 250: Performed by: NURSE PRACTITIONER

## 2020-05-26 PROCEDURE — 84100 ASSAY OF PHOSPHORUS: CPT

## 2020-05-26 PROCEDURE — 36415 COLL VENOUS BLD VENIPUNCTURE: CPT

## 2020-05-26 PROCEDURE — 85025 COMPLETE CBC W/AUTO DIFF WBC: CPT

## 2020-05-26 PROCEDURE — 97535 SELF CARE MNGMENT TRAINING: CPT

## 2020-05-26 PROCEDURE — 27000221 HC OXYGEN, UP TO 24 HOURS

## 2020-05-26 PROCEDURE — 83735 ASSAY OF MAGNESIUM: CPT

## 2020-05-26 PROCEDURE — 25000003 PHARM REV CODE 250: Performed by: FAMILY MEDICINE

## 2020-05-26 PROCEDURE — 94640 AIRWAY INHALATION TREATMENT: CPT

## 2020-05-26 PROCEDURE — 99900035 HC TECH TIME PER 15 MIN (STAT)

## 2020-05-26 PROCEDURE — 80053 COMPREHEN METABOLIC PANEL: CPT

## 2020-05-26 PROCEDURE — 94761 N-INVAS EAR/PLS OXIMETRY MLT: CPT

## 2020-05-26 PROCEDURE — 12000002 HC ACUTE/MED SURGE SEMI-PRIVATE ROOM

## 2020-05-26 RX ADMIN — GUAIFENESIN AND DEXTROMETHORPHAN 5 ML: 100; 10 SYRUP ORAL at 09:05

## 2020-05-26 RX ADMIN — LEVOTHYROXINE SODIUM 25 MCG: 25 TABLET ORAL at 05:05

## 2020-05-26 RX ADMIN — NYSTATIN 500000 UNITS: 500000 SUSPENSION ORAL at 09:05

## 2020-05-26 RX ADMIN — NYSTATIN 500000 UNITS: 500000 SUSPENSION ORAL at 08:05

## 2020-05-26 RX ADMIN — APIXABAN 2.5 MG: 2.5 TABLET, FILM COATED ORAL at 08:05

## 2020-05-26 RX ADMIN — GABAPENTIN 100 MG: 100 CAPSULE ORAL at 03:05

## 2020-05-26 RX ADMIN — FLUOXETINE 20 MG: 20 CAPSULE ORAL at 09:05

## 2020-05-26 RX ADMIN — GABAPENTIN 100 MG: 100 CAPSULE ORAL at 09:05

## 2020-05-26 RX ADMIN — NYSTATIN 500000 UNITS: 500000 SUSPENSION ORAL at 04:05

## 2020-05-26 RX ADMIN — FLUTICASONE PROPIONATE 50 MCG: 50 SPRAY, METERED NASAL at 09:05

## 2020-05-26 RX ADMIN — APIXABAN 2.5 MG: 2.5 TABLET, FILM COATED ORAL at 09:05

## 2020-05-26 RX ADMIN — GUAIFENESIN AND DEXTROMETHORPHAN 5 ML: 100; 10 SYRUP ORAL at 12:05

## 2020-05-26 RX ADMIN — PANTOPRAZOLE SODIUM 40 MG: 40 TABLET, DELAYED RELEASE ORAL at 05:05

## 2020-05-26 NOTE — PLAN OF CARE
Problem: Oral Intake Inadequate  Goal: Improved Oral Intake  Outcome: Ongoing, Progressing  Intervention: Promote and Optimize Oral Intake  Flowsheets (Taken 5/26/2020 1153)  Oral Nutrition Promotion: calorie dense liquids provided   Added Nepro BID to aid in meeting estimated needs when meal intake is insufficient.

## 2020-05-26 NOTE — PT/OT/SLP PROGRESS
Physical Therapy      Patient Name:  Danielle Martinez   MRN:  [id number]    Patient not seen today secondary to Other (Comment)(1st attempt- patient unavailable working with OT. Second attempt- patient declined tx stating that she just got up with OT). Will follow-up 05/27/20.    Corinne Velez, PTA

## 2020-05-26 NOTE — PROGRESS NOTES
"[facility]  Adult Nutrition   Progress Note (Initial Assessment)     SUMMARY     Recommendations/Interventions:    Recommendation/Intervention: 1. Continue current diet as tolerated, encourage intake. 2. Added Nepro BID to aid in meeting estimated needs when meal intake is insufficient.  Goals: 1. Patient to meet at least 75% of estimated needs via PO intake of meals and supplements.  Nutrition Goal Status: new    Dietitian Rounds Brief:  · Seen 2' LOS. Per progress notes: admitted due to  "complaints of weakness. It is severe. It is associated with leg cramps, diarrhea, mouth pain, oliguria, anorexia, and fatigue"  · Persistent diarrhea prior to admit, MD believes this could be the cause of HAILY. C-Diff negative. Intake poor-provided ONS. Will continue to monitor intake, labs, and plan of care.    Reason for Assessment  Reason For Assessment: length of stay  Diagnosis: (HAILY)  Relevant Medical History: HAILY, COPD, GERD, Diverticulitis, HTN  Interdisciplinary Rounds: did not attend    Nutrition Risk Screen  Nutrition Risk Screen: no indicators present    Nutrition/Diet History  Food Allergies: (Latex, clove flavoring)  Factors Affecting Nutritional Intake: decreased appetite    Anthropometrics  Temp: 98.1 °F (36.7 °C)  Height Method: Stated  Height: 4' 11" (149.9 cm)  Height (inches): 59 in  Weight Method: Bed Scale  Weight: 94.5 kg (208 lb 5.4 oz)  Weight (lb): 208.34 lb  Ideal Body Weight (IBW), Female: 95 lb  % Ideal Body Weight, Female (lb): 210.48 %  BMI (Calculated): 42.1  BMI Grade: greater than 40 - morbid obesity     Weight History:  Wt Readings from Last 10 Encounters:   05/24/20 94.5 kg (208 lb 5.4 oz)   11/05/19 93.2 kg (205 lb 6.4 oz)     Lab/Procedures/Meds: Pertinent Labs Reviewed  Clinical Chemistry:  Recent Labs   Lab 05/24/20  0610 05/25/20  0524 05/26/20  0422    136 143   K 3.6 3.5 3.9    106 111*   CO2 22* 23 21*   * 143* 117*   BUN 47* 45* 48*   CREATININE 3.7* " 3.5* 3.2*   CALCIUM 8.3* 8.5* 8.8   PROT 7.1 7.2 6.5   ALBUMIN 3.4* 3.4* 3.1*   BILITOT 1.1* 1.2* 0.9   ALKPHOS 41* 51* 54*   AST 11 18 27   ALT 9* 13 18   ANIONGAP 9 7* 11   ESTGFRAFRICA 13.3* 14.2* 15.8*   EGFRNONAA 11.5* 12.3* 13.7*   MG 2.1 1.9 2.0   PHOS 4.0 3.6 3.2     CBC:   Recent Labs   Lab 05/26/20  0422   WBC 8.31   RBC 4.36   HGB 12.0   HCT 38.9      MCV 89   MCH 27.5   MCHC 30.8*     Cardiac Profile:  Recent Labs   Lab 05/22/20  1024   CPK 17*   TROPONINI <0.030     Thyroid & Parathyroid:  Recent Labs   Lab 05/22/20  1024   TSH 2.360     Medications: Pertinent Medications reviewed  Scheduled Meds:   apixaban  2.5 mg Oral BID    FLUoxetine  20 mg Oral Daily    fluticasone propionate  1 spray Each Nostril Daily    fluticasone-umeclidin-vilanter  1 puff Inhalation Daily    gabapentin  100 mg Oral TID    levothyroxine  25 mcg Oral Before breakfast    nystatin  500,000 Units Oral QID (WM & HS)    pantoprazole  40 mg Oral Daily     Continuous Infusions:  PRN Meds:.acetaminophen, albuterol sulfate, ALPRAZolam, dextromethorphan-guaifenesin  mg/5 ml, melatonin, nitroGLYCERIN, oxyCODONE, sodium chloride 0.9%    Estimated/Assessed Needs    Weight Used For Calorie Calculations: 94.5 kg (208 lb 5.4 oz)  Energy Calorie Requirements (kcal): 1890 kcals/day (20 kcals/kg)  Energy Need Method: Kcal/kg  Protein Requirements: 57-76 g/day (0.6-1.8 g/kg)  Weight Used For Protein Calculations: 94.5 kg (208 lb 5.4 oz)     Estimated Fluid Requirement Method: RDA Method    Nutrition Prescription Ordered    Current Diet Order: Renal Diet     Evaluation of Received Nutrient/Fluid Intake    Energy Calories Required: not meeting needs  Protein Required: not meeting needs  Fluid Required: meeting needs  Tolerance: tolerating  % Intake of Estimated Energy Needs: 0 - 25 %  % Meal Intake: 0 - 25 %    Intake/Output Summary (Last 24 hours) at 5/26/2020 1149  Last data filed at 5/26/2020 0832  Gross per 24 hour   Intake  240 ml   Output 650 ml   Net -410 ml      Nutrition Risk    Level of Risk/Frequency of Follow-up: high   Monitor and Evaluation    Food and Nutrient Intake: energy intake, food and beverage intake  Food and Nutrient Adminstration: diet order  Physical Activity and Function: nutrition-related ADLs and IADLs, factors affecting access to physical activity  Anthropometric Measurements: weight, weight change  Biochemical Data, Medical Tests and Procedures: electrolyte and renal panel, inflammatory profile, lipid profile, gastrointestinal profile, glucose/endocrine profile  Nutrition-Focused Physical Findings: overall appearance     Nutrition Follow-Up    RD Follow-up?: Yes  Danielle He RD 05/26/2020 11:53 AM

## 2020-05-26 NOTE — PLAN OF CARE
05/26/20 0700   Patient Assessment/Suction   Level of Consciousness (AVPU) alert   Respiratory Effort Unlabored   All Lung Fields Breath Sounds diminished;clear   Cough Type congested   PRE-TX-O2   O2 Device (Oxygen Therapy) nasal cannula   $ Is the patient on Low Flow Oxygen? Yes   Flow (L/min) 2   SpO2 (!) 90 %   Pulse Oximetry Type Intermittent   $ Pulse Oximetry - Multiple Charge Pulse Oximetry - Multiple   Pulse 90   Resp 18   Inhaler   $ Inhaler Charges MDI (Metered Dose Inahler) Treatment   Daily Review of Necessity (Inhaler) completed   Respiratory Treatment Status (Inhaler) given   Treatment Route (Inhaler) mouthpiece   Patient Position (Inhaler) HOB elevated   Post Treatment Assessment (Inhaler) breath sounds unchanged   Signs of Intolerance (Inhaler) none   Respiratory Evaluation   $ Care Plan Tech Time 15 min

## 2020-05-26 NOTE — PLAN OF CARE
05/25/20 2127   Patient Assessment/Suction   Level of Consciousness (AVPU) alert   Respiratory Effort Unlabored;Normal   Expansion/Accessory Muscles/Retractions no use of accessory muscles   All Lung Fields Breath Sounds diminished   Rhythm/Pattern, Respiratory unlabored   Cough Frequency no cough   PRE-TX-O2   O2 Device (Oxygen Therapy) room air   SpO2 (!) 93 %   Pulse Oximetry Type Intermittent   $ Pulse Oximetry - Multiple Charge Pulse Oximetry - Multiple   Pulse 71   Resp 18   Respiratory Evaluation   $ Care Plan Tech Time 15 min

## 2020-05-26 NOTE — PLAN OF CARE
Problem: Fall Injury Risk  Goal: Absence of Fall and Fall-Related Injury  Outcome: Ongoing, Progressing     Problem: Adult Inpatient Plan of Care  Goal: Plan of Care Review  Outcome: Ongoing, Progressing     Problem: Adult Inpatient Plan of Care  Goal: Optimal Comfort and Wellbeing  Outcome: Ongoing, Progressing     Problem: Renal Function Impairment (Acute Kidney Injury/Impairment)  Goal: Effective Renal Function  Outcome: Ongoing, Progressing     Problem: Skin Injury Risk Increased  Goal: Skin Health and Integrity  Outcome: Ongoing, Progressing

## 2020-05-26 NOTE — PLAN OF CARE
05/26/20 1123   Discharge Assessment   Assessment Type Discharge Planning Assessment   Confirmed/corrected address and phone number on facesheet? Yes   Assessment information obtained from? Patient   Communicated expected length of stay with patient/caregiver yes   Prior to hospitilization cognitive status: Alert/Oriented   Prior to hospitalization functional status: Needs Assistance   Current cognitive status: Alert/Oriented   Current Functional Status: Needs Assistance   Lives With spouse   Able to Return to Prior Arrangements yes   Is patient able to care for self after discharge? Yes   Patient's perception of discharge disposition home or selfcare   Readmission Within the Last 30 Days no previous admission in last 30 days   Patient currently being followed by outpatient case management? No   Patient currently receives any other outside agency services? No   Equipment Currently Used at Home CPAP;walker, standard   Do you have any problems affording any of your prescribed medications? No   Is the patient taking medications as prescribed? yes   Does the patient have transportation home? Yes   Transportation Anticipated family or friend will provide   Dialysis Name and Scheduled days N/A   Does the patient receive services at the [facility]? No   Discharge Plan A Home   Discharge Plan B Home with family   DME Needed Upon Discharge  none   Patient/Family in Agreement with Plan yes   No needs at this time.

## 2020-05-26 NOTE — CONSULTS
Nephrology Consult Note        Patient Name: Danielle Martinez  MRN: [id number]    Patient Class: IP- Inpatient   Admission Date: 5/22/2020  Length of Stay: 4 days  Date of Service: 5/26/2020    Attending Physician: Luke Rivero MD  Primary Care Provider: Antony Lopez MD    Reason for Consult: haily/hypokalemia/diarrhea/diverticulitis/htn/anemia    SUBJECTIVE:     HPI: 74F with GERD, HTN, hypothyroidism, depression, COPD, BRITTANIE, pAF, anemia presents with generalized weakness, nausea without any vomiting, diarrhea. Has prior GI bleed and currently on therapy for diverticulitis, does complain of having thrush orally as well. Also has left-sided leg pain consistent with her prior sciatica. She has been out of her thyroid medicine for at least a week. Renal is consulted for HAILY, hypokalemia - sCr is 5.2, was previously 0.7. Renal US is unremarkable, UA is clear.    5/23 VSS, no new complains.  5/24 VSS, no new complains.  5/25 VSS, no new complains.  5/26 VSS, No nausea, chest pain, sob, fever, urinary or bowel complaint, new neurologic symptoms, new joint pain,      Past Medical History:   Diagnosis Date    HAILY (acute kidney injury)     Arthritis     Juvenile diagnosed age 17    COPD (chronic obstructive pulmonary disease)     Diverticulitis     GERD (gastroesophageal reflux disease)     Hx of hiatal hernia     Hypertension     Sleep apnea      Past Surgical History:   Procedure Laterality Date    BREAST LUMPECTOMY Left     in 40's    COLONOSCOPY  2016    Dr. Reyes 5 years    COLONOSCOPY  2019    Dr. Reyes 5 years    HIATAL HERNIA REPAIR  2005    SHOULDER ARTHROSCOPY  2015     History reviewed. No pertinent family history.  Social History     Tobacco Use    Smoking status: Never Smoker    Smokeless tobacco: Never Used   Substance Use Topics    Alcohol use: Not Currently    Drug use: Not on file       Review of patient's allergies indicates:   Allergen Reactions    Promethazine Anaphylaxis     Clove flavoring [flavoring agent]     Codeine Itching    Latex, natural rubber     Lorazepam Other (See Comments)    Lovastatin Other (See Comments)    Meclizine Other (See Comments)    Morphine Itching    Simvastatin Other (See Comments)    Tramadol-acetaminophen Itching       Outpatient meds:  No current facility-administered medications on file prior to encounter.      Current Outpatient Medications on File Prior to Encounter   Medication Sig Dispense Refill    albuterol (VENTOLIN HFA) 90 mcg/actuation inhaler Inhale 2 puffs into the lungs every 4 (four) hours as needed for Wheezing. Rescue 1 Inhaler 5    ALPRAZolam (XANAX) 1 MG tablet Take 1 tablet (1 mg total) by mouth 2 (two) times daily as needed for Anxiety. 60 tablet 2    ascorbic acid, vitamin C, (VITAMIN C) 100 MG tablet Take 100 mg by mouth once daily.      calcium carbonate (TUMS) 200 mg calcium (500 mg) chewable tablet Take 1 tablet by mouth once daily.      candesartan-hydrochlorothiazid (ATACAND HCT) 32-12.5 mg per tablet Take 1 tablet by mouth once daily. 30 tablet 5    ELIQUIS 5 mg Tab Take 1 tablet (5 mg total) by mouth 2 (two) times daily. 60 tablet 5    FLUoxetine 20 MG capsule Take 1 capsule (20 mg total) by mouth once daily. 30 capsule 5    fluticasone propionate (FLONASE) 50 mcg/actuation nasal spray 1 spray by Each Nostril route daily as needed.       fluticasone-umeclidin-vilanter (TRELEGY ELLIPTA) 100-62.5-25 mcg DsDv Inhale 1 puff into the lungs daily as needed.       gabapentin (NEURONTIN) 300 MG capsule Take 1 capsule (300 mg total) by mouth 2 (two) times daily. 60 capsule 5    levothyroxine (SYNTHROID) 25 MCG tablet Take 1 tablet (25 mcg total) by mouth before breakfast. Name brand only.  RIRI 30 tablet 5    mv-min/folic/vit K/lut/nisv936 (ALIVE WOMEN'S 50 PLUS, BLEND, ORAL) Take 1 tablet by mouth daily as needed.      pantoprazole (PROTONIX) 40 MG tablet Take 1 tablet (40 mg total) by mouth once daily. 30 tablet 1     sotalol (BETAPACE) 80 MG tablet Take 1 tablet (80 mg total) by mouth 2 (two) times daily. 60 tablet 5    esomeprazole (NEXIUM) 40 MG capsule Take 1 capsule (40 mg total) by mouth 2 (two) times daily. BRAND NAME ONLY 60 capsule 11    fluticasone-umeclidin-vilanter (TRELEGY ELLIPTA) 100-62.5-25 mcg DsDv Trelegy Ellipta      nitroGLYCERIN (NITROSTAT) 0.4 MG SL tablet Place 0.4 mg under the tongue every 5 (five) minutes as needed.          Scheduled meds:   apixaban  2.5 mg Oral BID    FLUoxetine  20 mg Oral Daily    fluticasone propionate  1 spray Each Nostril Daily    fluticasone-umeclidin-vilanter  1 puff Inhalation Daily    gabapentin  100 mg Oral TID    levothyroxine  25 mcg Oral Before breakfast    nystatin  500,000 Units Oral QID (WM & HS)    pantoprazole  40 mg Oral Daily       Infusions:      PRN meds:  acetaminophen, albuterol sulfate, ALPRAZolam, dextromethorphan-guaifenesin  mg/5 ml, melatonin, nitroGLYCERIN, oxyCODONE, sodium chloride 0.9%    Review of Systems:  ROS    OBJECTIVE:     Vital Signs and IO (Last 24H):  Temp:  [98 °F (36.7 °C)-99.2 °F (37.3 °C)]   Pulse:  [71-95]   Resp:  [18-19]   BP: ()/(51-79)   SpO2:  [90 %-97 %]   I/O last 3 completed shifts:  In: -   Out: 1850 [Urine:1850]    Wt Readings from Last 5 Encounters:   05/24/20 94.5 kg (208 lb 5.4 oz)   11/05/19 93.2 kg (205 lb 6.4 oz)         Physical Exam:  Physical Exam   Constitutional: She is oriented to person, place, and time. She appears well-developed and well-nourished.   HENT:   Head: Normocephalic and atraumatic.   Mouth/Throat: Oropharynx is clear and moist.   Eyes: Pupils are equal, round, and reactive to light. EOM are normal. No scleral icterus.   Neck: Neck supple.   Cardiovascular: Normal rate and regular rhythm.   Pulmonary/Chest: Effort normal. No stridor. No respiratory distress.   Abdominal: Soft. She exhibits no distension.   Musculoskeletal: Normal range of motion. She exhibits no edema or  deformity.   Neurological: She is alert and oriented to person, place, and time. No cranial nerve deficit.   Skin: Skin is warm and dry. No rash noted. She is not diaphoretic. No erythema.   Psychiatric: She has a normal mood and affect. Her behavior is normal.       Body mass index is 42.08 kg/m².    Laboratory:  Recent Labs   Lab 05/24/20 0610 05/25/20 0524 05/26/20  0422    136 143   K 3.6 3.5 3.9    106 111*   CO2 22* 23 21*   BUN 47* 45* 48*   CREATININE 3.7* 3.5* 3.2*   ESTGFRAFRICA 13.3* 14.2* 15.8*   EGFRNONAA 11.5* 12.3* 13.7*   * 143* 117*       Recent Labs   Lab 05/24/20 0610 05/25/20 0524 05/26/20 0422   CALCIUM 8.3* 8.5* 8.8   ALBUMIN 3.4* 3.4* 3.1*   PHOS 4.0 3.6 3.2   MG 2.1 1.9 2.0             No results for input(s): POCTGLUCOSE in the last 168 hours.    Recent Labs   Lab 11/05/19  1337   Hemoglobin A1C 6.8 H       Recent Labs   Lab 05/24/20 0610 05/25/20 0524 05/26/20  0422   WBC 9.72 9.87 8.31   HGB 11.2* 11.8* 12.0   HCT 36.3* 38.4 38.9    306 323   MCV 90 89 89   MCHC 30.9* 30.7* 30.8*   MONO 9.2  0.9 8.4  0.8 9.5  0.8       Recent Labs   Lab 05/24/20 0610 05/25/20 0524 05/26/20  0422   BILITOT 1.1* 1.2* 0.9   PROT 7.1 7.2 6.5   ALBUMIN 3.4* 3.4* 3.1*   ALKPHOS 41* 51* 54*   ALT 9* 13 18   AST 11 18 27       Recent Labs   Lab 05/22/20  1747   Color, UA Yellow   Appearance, UA Clear   pH, UA 5.0   Specific Gravity, UA 1.010   Protein, UA Negative   Glucose, UA Negative   Ketones, UA Negative   Urobilinogen, UA Negative   Bilirubin (UA) Negative   Occult Blood UA Negative   Nitrite, UA Negative   RBC, UA 7 H   WBC, UA 21 H   Bacteria Negative   Hyaline Casts, UA 7 A       Recent Labs   Lab 09/13/19  0912   Sample JULIA       Microbiology Results (last 7 days)     Procedure Component Value Units Date/Time    Stool culture [[id number]] Collected:  05/25/20 1032    Order Status:  Completed Specimen:  Stool Updated:  05/26/20 0714     Stool Culture Nothing  significant to date    Clostridium difficile EIA [[id number]] Collected:  05/25/20 2137    Order Status:  Completed Specimen:  Stool Updated:  05/25/20 2347     C. diff Antigen Negative     C difficile Toxins A+B, EIA Negative     Comment: Testing not recommended for children <24 months old.       Urine culture [[id number]] Collected:  05/22/20 1747    Order Status:  Completed Specimen:  Urine Updated:  05/24/20 0711     Urine Culture, Routine Multiple organisms isolated. None in predominance.  Repeat if      clinically necessary.    Narrative:       Preferred Collection Type->Urine, Clean Catch  Specimen Source->Urine    Clostridium difficile EIA [[id number]]     Order Status:  Canceled Specimen:  Stool           ASSESSMENT/PLAN:     Active Hospital Problems    Diagnosis  POA    *HAILY (acute kidney injury) [N17.9]  Yes    Diarrhea [R19.7]  Yes    Acute pulmonary edema [J81.0]  Yes    Thrush [B37.0]  Yes    Hypokalemia [E87.6]  Yes    Lumbar disc disease [M51.9]  Yes    Paroxysmal atrial fibrillation [I48.0]  Yes    BRITTANIE on CPAP [G47.33, Z99.89]  Not Applicable    Essential (primary) hypertension [I10]  Yes      Resolved Hospital Problems   No resolved problems to display.     HAILY, likely due to volume depletion from diarrhea, meds, poor oral intake due to thrush  Diverticulitis treated with broad abx  Diarrhea  Hypokalemia  No NSAIDs, ACEI/ARB, IV contrast or other nephrotoxins.  Keep MAP > 60, SBP > 100.  Dose meds for GFR < 30 ml/min.  Renal diet - low K, low phos.  IVF for now, do not replete K unless < 3. Mg is OK.  Diagnose and treat any infection.    Anemia, non-CKD  Hgb and HCT are acceptable. Monitor.    HTN  BP seem controlled.   Tolerate asymptomatic HTN up to -160.  Hold home meds.    Thank you for allowing us to participate in the care of your patient!   We will follow the patient and provide recommendations as needed.    Mj Swanson MD    [facility]  [address] [place] [address]  [place]  [address]    [phone] - tel  [phone] - fax    5/26/2020 1:14 PM

## 2020-05-26 NOTE — PROGRESS NOTES
[facility]    Progress Note    Patient Name: Danielle Martinez  MRN: [id number]  Patient Class: IP- Inpatient   Admission Date: 5/22/2020  9:29 AM  Length of Stay: 4  Attending Physician: AGUSTIN DAS MD  Primary Care Provider: Antony Lopez MD  Face-to-Face encounter date: 05/26/2020  Chief Complaint: Leg Pain (c/o L sciatica pain, generalized weakness. pt states hasn't eaten well this week due to thrush in mouth)         Patient ID: Danielle Martinez is a 74 y.o. female admitted for   Active Hospital Problems    Diagnosis  POA    *HAILY (acute kidney injury) [N17.9]  Yes    Diarrhea [R19.7]  Yes    Acute pulmonary edema [J81.0]  Yes    Thrush [B37.0]  Yes    Hypokalemia [E87.6]  Yes    Lumbar disc disease [M51.9]  Yes    Paroxysmal atrial fibrillation [I48.0]  Yes    BRITTANIE on CPAP [G47.33, Z99.89]  Not Applicable    Essential (primary) hypertension [I10]  Yes      Resolved Hospital Problems   No resolved problems to display.      HPI by Suha Petit NP 05/22/2020:  Ms. Martinez presents today with complaints of weakness. It is severe. It is associated with leg cramps, diarrhea, mouth pain, oliguria, anorexia, and fatigue. She denies fever, chills, N/V, dizziness, cough, chest pain, flank pain, or LOC. She has a history of HTN, HLD, PAF on eliquis, chronic back pain, anxiety/depression, and diverticulitis. She called her doctor with complaints of diarrhea and was prescribed an unknown abx which she's been taking over a week. This hasn't stopped her diarrhea, but has caused thrush in her mouth. D/t the thrush, she hasn't been able to eat. She's had multiple episodes of watery diarrhea, with the last one, last night. She is unsure the last time she urinated. She is sleepy on exam, but did recently receive dilaudid for back pain. A renal US had just completed at the time of exam.      Overview/Hospital Course:  05/23:  Patient was seen and examined bedside.  She tells me that she is  experiencing on and off diarrhea since last 1 month and was given antibiotics for diverticulitis.  Denies any new symptoms except being very weak and chronic joint pains in left leg.  Unfortunately stool studies were not sent by nursing staff.  Received IV hydration.      05/24:  Patient was seen and examined bedside.  She keeps complaining of her chronic left sciatic pain.  She is also having some wet cough.  No bowel movement since yesterday hence no stool studies nor C diff sample.  No other acute events overnight as per nursing staff.      05/25:  Patient was seen and examined at bedside.  She had 1 small bowel movement however it was formed and C diff testing was not possible.  She made good urine output with 40 mg IV Lasix yesterday.  Keeps complaining of her chronic left sciatic pain.  Cough has improved.  No other acute events overnight as per nursing staff.     05/26/2020: I assumed pt's care today, pt seen and examined, No diarrhea, still feeling shaky when gets up, PT/OT evaluating       Subjective:    Interval History: Patient seen and examined. States no further diarrhea since in the hospital. Pt informed that she was drinking Glucerna at home once daily when she started with the diarrhea. Discussed possible lactose intolerance as t has no diarrhea since in the hospital, Pt states she still feels weak when she walks,  at bedside also mentioned that she is shaky when he is holding her while walking,  concerned as there are 28 steps to their home as they built it higher/ elevated after imani.   Family present at bedside.  No concerns/issues overnight reported by the patient or the nursing staff.    Review of Systems All other Review of Systems were found to be negative expect for that mentioned already in HPI.     Objective:     Physical Exam  Vitals:    05/26/20 1902   BP: 135/61   Pulse: 86   Resp: 18   Temp: 98.2 °F (36.8 °C)     Wt Readings from Last 1 Encounters:   05/24/20 94.5 kg  (208 lb 5.4 oz)      Body mass index is 42.08 kg/m².    Vitals reviewed.  Constitutional: No distress. Laying comfortably, morbidly obese   HENT: NC, large neck circumference   Head: Atraumatic.   Cardiovascular: Normal rate, regular rhythm and normal heart sounds.   Pulmonary/Chest: Effort normal. No wheezes. Lungs clear anteriorly  Abdominal: Soft. obese, Bowel sounds are normal. No distension and no mass. No tenderness  Neurological: Alert.   Skin: Skin is warm and dry.   Psych: Appropriate mood and affect    Following labs were Reviewed   CBC:  Recent Labs   Lab 05/26/20  0422   WBC 8.31   HGB 12.0   HCT 38.9        CMP:  Recent Labs   Lab 05/26/20 0422   CALCIUM 8.8   ALBUMIN 3.1*   PROT 6.5      K 3.9   CO2 21*   *   BUN 48*   CREATININE 3.2*   ALKPHOS 54*   ALT 18   AST 27   BILITOT 0.9     Last 72 hour POCT GLUCOSE  No results found for: POCTGLUCOSE     Microbiology Results (last 7 days)     Procedure Component Value Units Date/Time    Stool culture [[id number]] Collected:  05/25/20 1032    Order Status:  Completed Specimen:  Stool Updated:  05/26/20 0714     Stool Culture Nothing significant to date    Clostridium difficile EIA [[id number]] Collected:  05/25/20 2137    Order Status:  Completed Specimen:  Stool Updated:  05/25/20 2347     C. diff Antigen Negative     C difficile Toxins A+B, EIA Negative     Comment: Testing not recommended for children <24 months old.       Urine culture [[id number]] Collected:  05/22/20 1747    Order Status:  Completed Specimen:  Urine Updated:  05/24/20 0711     Urine Culture, Routine Multiple organisms isolated. None in predominance.  Repeat if      clinically necessary.    Narrative:       Preferred Collection Type->Urine, Clean Catch  Specimen Source->Urine    Clostridium difficile EIA [[id number]]     Order Status:  Canceled Specimen:  Stool             CT Abdomen Pelvis  Without Contrast   Final Result      1. Mild bilaterally symmetric perinephric fat  stranding.  This is new when compared to September 2019, but nonspecific.  In the absence of hydronephrosis, infectious or inflammatory involvement should be considered.   2. Sigmoid diverticula without diverticulitis.   3. Additional chronic findings as discussed above.         Electronically signed by: Richie Loco MD   Date:    05/24/2020   Time:    13:17      X-Ray Chest AP Portable   Final Result      1. Mild prominence of the pulmonary vasculature and interstitial markings, slightly increased compared to May 22.  Correlate clinically for mild interstitial edema.   2. Right hilar prominence, likely vascular, as the right hilum was normal in appearance on recent study from May 22.         Electronically signed by: Richie Loco MD   Date:    05/24/2020   Time:    10:54      US Retroperitoneal Complete   Final Result      X-Ray Chest AP Portable   Final Result            Scheduled Meds:   apixaban  2.5 mg Oral BID    FLUoxetine  20 mg Oral Daily    fluticasone propionate  1 spray Each Nostril Daily    fluticasone-umeclidin-vilanter  1 puff Inhalation Daily    gabapentin  100 mg Oral TID    levothyroxine  25 mcg Oral Before breakfast    nystatin  500,000 Units Oral QID (WM & HS)    pantoprazole  40 mg Oral Daily     Continuous Infusions:  PRN Meds:.acetaminophen, albuterol sulfate, ALPRAZolam, dextromethorphan-guaifenesin  mg/5 ml, melatonin, nitroGLYCERIN, oxyCODONE, sodium chloride 0.9%    05/24/2020: Echo  · Normal left ventricular systolic function. The estimated ejection fraction is 60%.  · Grade III (severe) left ventricular diastolic dysfunction consistent with restrictive physiology.  · No wall motion abnormalities.  · Moderate right ventricular enlargement.  · Normal right ventricular systolic function.  · Moderate left atrial enlargement.  · Mild right atrial enlargement.  · Mild tricuspid regurgitation.  · Elevated central venous pressure (15 mmHg).  · The estimated PA systolic  pressure is 104 mmHg.  · Severe pulmonary hypertension present.      Assessment & Plan:     * HAILY (acute kidney injury)  BUN/Cr 48/3.2, was 60/5.2 on admission. improving   Likely due to ongoing diarrhea at the time of admission   Consulted  Dr. Cabrera, appreciate the recs   Hold ARB/HCTZ  Renally dose all meds  She made excellent urine output with 40 mg IV Lasix on admission, then received 20 mg IV Lasix yesterday.  Accurate charting of urine output     Acute pulmonary edema  Improved  2D echo- Grade 3 DD, EF 60%  Received lasix 40 mg BID X q1 day and then 20 mg IV  Breathing much improved, pt laying flat in bed with no difficulty breathing      Diarrhea- resolved   Stool studies  Check c. Diff if develop diarrhea again   No bowel movement in last 24 hr.      Thrush  Nystatin swish and swallow.    Essential (primary) hypertension  Held ARB/HCTZ.  BP stable  Today 118/55     BRITTANIE on CPAP  CPAP q.h.s     Paroxysmal atrial fibrillation  Continue amiodarone   Renally dose eliquis and sotalol  Currently in sinus.    Hypokalemia  Replaced in ED   Repeat level in AM  Renal recommended to only replace K if <3.0     Lumbar disc disease  Chronic left lower extremity pain for which she goes for nerve block every 6 months  P.r.n. Analgesia  PT, OT    Discharge Planning:   Home with [facility] PT/OT or possible SNF as pt has 28 steps ellie limb to go to her home     Above encounter included review of the medical records, interviewing and examining the patient face-to-face, discussion with family and other health care providers, ordering and interpreting lab/test results and formulating a plan of care.     Medical Decision Making:      [_] Low Complexity  [_] Moderate Complexity  [x] High Complexity      Luke Rivero MD  Department of Hospital Medicine   [facility]

## 2020-05-26 NOTE — PLAN OF CARE
05/26/20 1626   Medicare Message   Important Message from Medicare regarding Discharge Appeal Rights Given to patient/caregiver;Explained to patient/caregiver;Signed/date by patient/caregiver   Date IMM was signed 05/26/20   Time IMM was signed [id number]

## 2020-05-26 NOTE — PLAN OF CARE
Problem: Occupational Therapy Goal  Goal: Occupational Therapy Goal  Description  Goals to be met by: d/c     Patient will increase functional independence with ADLs by performing:    LE Dressing with Stand-by Assistance.  Grooming while standing at sink with Stand-by Assistance. Met 5/26  Toileting from toilet with Stand-by Assistance for hygiene and clothing management. Met 5/26  Toilet transfer to toilet with Stand-by Assistance. Met 5/26  Toileting from toilet with Supervision for hygiene and clothing management. New Goal  Toilet transfer to toilet with Supervision.  New Goal   Bathing with Supervision. New Goal       Outcome: Ongoing, Progressing

## 2020-05-26 NOTE — PT/OT/SLP PROGRESS
Occupational Therapy   Treatment    Name: Danielle Martinez  MRN: [id number]  Admitting Diagnosis:  HAILY (acute kidney injury)       Recommendations:     Discharge Recommendations: home with home health  Discharge Equipment Recommendations:  none  Barriers to discharge:  Decreased caregiver support    Assessment:     Danielle Martinez is a 74 y.o. female with a medical diagnosis of HAILY (acute kidney injury).  She presents with improved energy levels/activity tolerance today.  She is now performing functional mobility, toileting, transfers, and standing grooming tasks with stand-by assistance using RW.   Performance deficits affecting function: weakness, impaired endurance, impaired self care skills, impaired functional mobilty.      Rehab Prognosis:  Good; patient would benefit from acute skilled OT services to address these deficits and reach maximum level of function.       Plan:     Patient to be seen 5 x/week to address the above listed problems via self-care/home management, therapeutic activities, therapeutic exercises  · Plan of Care Expires: 06/24/20  · Plan of Care Reviewed with: patient    Subjective     Pain/Comfort:  · Pain Rating 1: 0/10  · Pain Rating Post-Intervention 1: 0/10    Objective:     Communicated with: RN prior to session.  Patient found supine with bed alarm, oxygen, peripheral IV, telemetry upon OT entry to room.    General Precautions: Standard, fall   Orthopedic Precautions:N/A   Braces: N/A     Occupational Performance:     Bed Mobility:    · Patient completed Supine to Sit with modified independence  · Patient completed Sit to Supine with modified independence     Functional Mobility/Transfers:  · Patient completed Sit <> Stand Transfer with stand by assistance  with  rolling walker   · Patient completed Bed <> Chair Transfer using Stand Pivot technique with stand by assistance with rolling walker  · Patient completed Toilet Transfer Stand Pivot technique with stand by assistance with  rolling  walker  · Functional Mobility: SBA bed to/from bathroom with RW    Activities of Daily Living:  · Grooming: stand by assistance standing at sink  · Toileting: stand by assistance on toilet in bathroom    [facility] 6 Click ADL: 21    Patient left supine with all lines intact, call button in reach and bed alarm onEducation:      GOALS:   Multidisciplinary Problems     Occupational Therapy Goals        Problem: Occupational Therapy Goal    Goal Priority Disciplines Outcome Interventions   Occupational Therapy Goal     OT, PT/OT Ongoing, Progressing    Description:  Goals to be met by: d/c     Patient will increase functional independence with ADLs by performing:    LE Dressing with Stand-by Assistance.  Grooming while standing at sink with Stand-by Assistance. Met 5/26  Toileting from toilet with Stand-by Assistance for hygiene and clothing management. Met 5/26  Toilet transfer to toilet with Stand-by Assistance. Met 5/26  Toileting from toilet with Supervision for hygiene and clothing management. New Goal  Toilet transfer to toilet with Supervision.  New Goal   Bathing with Supervision. New Goal                      Time Tracking:     OT Date of Treatment: 05/26/20  OT Start Time: 1006  OT Stop Time: 1029  OT Total Time (min): 23 min    Billable Minutes:Self Care/Home Management 23    Chris Monge, OT  5/26/2020

## 2020-05-27 LAB
ALBUMIN SERPL BCP-MCNC: 3.1 G/DL (ref 3.5–5.2)
ALP SERPL-CCNC: 49 U/L (ref 55–135)
ALT SERPL W/O P-5'-P-CCNC: 14 U/L (ref 10–44)
ANION GAP SERPL CALC-SCNC: 11 MMOL/L (ref 8–16)
AST SERPL-CCNC: 14 U/L (ref 10–40)
BASOPHILS # BLD AUTO: 0.05 K/UL (ref 0–0.2)
BASOPHILS NFR BLD: 0.7 % (ref 0–1.9)
BILIRUB SERPL-MCNC: 1 MG/DL (ref 0.1–1)
BUN SERPL-MCNC: 39 MG/DL (ref 8–23)
CALCIUM SERPL-MCNC: 9.1 MG/DL (ref 8.7–10.5)
CHLORIDE SERPL-SCNC: 106 MMOL/L (ref 95–110)
CO2 SERPL-SCNC: 26 MMOL/L (ref 23–29)
CREAT SERPL-MCNC: 2.8 MG/DL (ref 0.5–1.4)
DIFFERENTIAL METHOD: ABNORMAL
EOSINOPHIL # BLD AUTO: 0.3 K/UL (ref 0–0.5)
EOSINOPHIL NFR BLD: 4.1 % (ref 0–8)
ERYTHROCYTE [DISTWIDTH] IN BLOOD BY AUTOMATED COUNT: 15.9 % (ref 11.5–14.5)
EST. GFR  (AFRICAN AMERICAN): 18.6 ML/MIN/1.73 M^2
EST. GFR  (NON AFRICAN AMERICAN): 16.1 ML/MIN/1.73 M^2
GLUCOSE SERPL-MCNC: 123 MG/DL (ref 70–110)
HCT VFR BLD AUTO: 39.7 % (ref 37–48.5)
HGB BLD-MCNC: 12.6 G/DL (ref 12–16)
IMM GRANULOCYTES # BLD AUTO: 0.02 K/UL (ref 0–0.04)
IMM GRANULOCYTES NFR BLD AUTO: 0.3 % (ref 0–0.5)
LYMPHOCYTES # BLD AUTO: 1.3 K/UL (ref 1–4.8)
LYMPHOCYTES NFR BLD: 16.8 % (ref 18–48)
MAGNESIUM SERPL-MCNC: 1.9 MG/DL (ref 1.6–2.6)
MCH RBC QN AUTO: 27.8 PG (ref 27–31)
MCHC RBC AUTO-ENTMCNC: 31.7 G/DL (ref 32–36)
MCV RBC AUTO: 87 FL (ref 82–98)
MONOCYTES # BLD AUTO: 0.8 K/UL (ref 0.3–1)
MONOCYTES NFR BLD: 10.8 % (ref 4–15)
NEUTROPHILS # BLD AUTO: 5.1 K/UL (ref 1.8–7.7)
NEUTROPHILS NFR BLD: 67.3 % (ref 38–73)
NRBC BLD-RTO: 0 /100 WBC
PHOSPHATE SERPL-MCNC: 3 MG/DL (ref 2.7–4.5)
PLATELET # BLD AUTO: 350 K/UL (ref 150–350)
PMV BLD AUTO: 10.2 FL (ref 9.2–12.9)
POTASSIUM SERPL-SCNC: 3.5 MMOL/L (ref 3.5–5.1)
PROT SERPL-MCNC: 6.9 G/DL (ref 6–8.4)
RBC # BLD AUTO: 4.54 M/UL (ref 4–5.4)
SODIUM SERPL-SCNC: 143 MMOL/L (ref 136–145)
STOOL CULTURE: NORMAL
WBC # BLD AUTO: 7.62 K/UL (ref 3.9–12.7)

## 2020-05-27 PROCEDURE — 25000003 PHARM REV CODE 250: Performed by: HOSPITALIST

## 2020-05-27 PROCEDURE — 27000221 HC OXYGEN, UP TO 24 HOURS

## 2020-05-27 PROCEDURE — 85025 COMPLETE CBC W/AUTO DIFF WBC: CPT

## 2020-05-27 PROCEDURE — 84100 ASSAY OF PHOSPHORUS: CPT

## 2020-05-27 PROCEDURE — 83735 ASSAY OF MAGNESIUM: CPT

## 2020-05-27 PROCEDURE — 97535 SELF CARE MNGMENT TRAINING: CPT

## 2020-05-27 PROCEDURE — 99900035 HC TECH TIME PER 15 MIN (STAT)

## 2020-05-27 PROCEDURE — 25000003 PHARM REV CODE 250: Performed by: NURSE PRACTITIONER

## 2020-05-27 PROCEDURE — 12000002 HC ACUTE/MED SURGE SEMI-PRIVATE ROOM

## 2020-05-27 PROCEDURE — 80053 COMPREHEN METABOLIC PANEL: CPT

## 2020-05-27 PROCEDURE — 94640 AIRWAY INHALATION TREATMENT: CPT

## 2020-05-27 PROCEDURE — 94761 N-INVAS EAR/PLS OXIMETRY MLT: CPT

## 2020-05-27 PROCEDURE — 97530 THERAPEUTIC ACTIVITIES: CPT

## 2020-05-27 PROCEDURE — 97116 GAIT TRAINING THERAPY: CPT

## 2020-05-27 PROCEDURE — 36415 COLL VENOUS BLD VENIPUNCTURE: CPT

## 2020-05-27 RX ADMIN — NYSTATIN 500000 UNITS: 500000 SUSPENSION ORAL at 12:05

## 2020-05-27 RX ADMIN — NYSTATIN 500000 UNITS: 500000 SUSPENSION ORAL at 08:05

## 2020-05-27 RX ADMIN — NYSTATIN 500000 UNITS: 500000 SUSPENSION ORAL at 09:05

## 2020-05-27 RX ADMIN — FLUOXETINE 20 MG: 20 CAPSULE ORAL at 08:05

## 2020-05-27 RX ADMIN — FLUTICASONE PROPIONATE 50 MCG: 50 SPRAY, METERED NASAL at 08:05

## 2020-05-27 RX ADMIN — OXYCODONE HYDROCHLORIDE 5 MG: 5 TABLET ORAL at 09:05

## 2020-05-27 RX ADMIN — GABAPENTIN 100 MG: 100 CAPSULE ORAL at 08:05

## 2020-05-27 RX ADMIN — NYSTATIN 500000 UNITS: 500000 SUSPENSION ORAL at 04:05

## 2020-05-27 RX ADMIN — ALPRAZOLAM 1 MG: 0.5 TABLET ORAL at 12:05

## 2020-05-27 RX ADMIN — PANTOPRAZOLE SODIUM 40 MG: 40 TABLET, DELAYED RELEASE ORAL at 06:05

## 2020-05-27 RX ADMIN — LEVOTHYROXINE SODIUM 25 MCG: 25 TABLET ORAL at 06:05

## 2020-05-27 RX ADMIN — GABAPENTIN 100 MG: 100 CAPSULE ORAL at 09:05

## 2020-05-27 RX ADMIN — APIXABAN 2.5 MG: 2.5 TABLET, FILM COATED ORAL at 09:05

## 2020-05-27 RX ADMIN — GABAPENTIN 100 MG: 100 CAPSULE ORAL at 04:05

## 2020-05-27 RX ADMIN — APIXABAN 2.5 MG: 2.5 TABLET, FILM COATED ORAL at 08:05

## 2020-05-27 RX ADMIN — OXYCODONE HYDROCHLORIDE 5 MG: 5 TABLET ORAL at 12:05

## 2020-05-27 NOTE — PT/OT/SLP PROGRESS
Physical Therapy Treatment    Patient Name:  Danielle Martinez   MRN:  [id number]    Recommendations:     Discharge Recommendations:  home health PT, home health OT   Discharge Equipment Recommendations: none   Barriers to discharge: None    Assessment:     Danielle Martinez is a 74 y.o. female admitted with a medical diagnosis of HAILY (acute kidney injury).  She presents with the following impairments/functional limitations:  weakness, impaired functional mobilty, gait instability, impaired endurance, impaired balance, pain . Pt sitting up in chair, reports feeling better; no O2 and resting at 92%. Pt does not want to go to SNF and would like to go to her camper with HH and eventually back into her house. HHPT can work with pt getting up steps.     Rehab Prognosis: Good; patient would benefit from acute skilled PT services to address these deficits and reach maximum level of function.    Recent Surgery: * No surgery found *      Plan:     During this hospitalization, patient to be seen 6 x/week to address the identified rehab impairments via gait training, therapeutic activities, therapeutic exercises and progress toward the following goals:    · Plan of Care Expires:  06/25/20    Subjective     Chief Complaint: none  Patient/Family Comments/goals: to go home   Pain/Comfort:  · Pain Rating 1: 6/10  · Location - Side 1: Left  · Location 1: leg  · Pain Addressed 1: Reposition, Distraction  · Pain Rating Post-Intervention 1: 6/10      Objective:     Communicated with rn prior to session.  Patient found up in chair with bed alarm, oxygen, peripheral IV, telemetry upon PT entry to room.     General Precautions: Standard, fall   Orthopedic Precautions:N/A   Braces:       Functional Mobility:  · Transfers:     · Sit to Stand:  supervision with rolling walker  · Gait: pt able to ambulate 60 ft in room RW, no limp but pain is 6/10, very slow gait, cautious, no sob and o2 91% during ambulation, no sob. Education provided.       AM-PAC  6 CLICK MOBILITY          Therapeutic Activities and Exercises:   education, fall prevention, poc, dc planning. O2 assessment performed. Education provided for home activity, no O2 needed at this time. Goals updated due to progress. Spoke with MD.     Patient left up in chair with all lines intact, call button in reach and MD notified..    GOALS:   Multidisciplinary Problems     Physical Therapy Goals        Problem: Physical Therapy Goal    Goal Priority Disciplines Outcome Goal Variances Interventions   Physical Therapy Goal     PT, PT/OT Ongoing, Progressing     Description:  Goals to be met by: discharge     Patient will increase functional independence with mobility by performin. Supine to sit with Stand-by Assistance  2. Sit to stand transfer with Supervision (goal met )  3. Bed to chair transfer with Supervision using Rolling Walker  4. Gait  x 50 feet with Supervision using Rolling Walker. ( goal met )    New goal:  5. Gait 150 ft supervision rolling walker.                        Time Tracking:     PT Received On: 20  PT Start Time: 1346     PT Stop Time: 1409  PT Total Time (min): 23 min     Billable Minutes: Gait Training 14 and Therapeutic Activity 9    Treatment Type: Treatment  PT/PTA: PT     PTA Visit Number: 0     Ami Pinon, PT  2020

## 2020-05-27 NOTE — PLAN OF CARE
Problem: Fall Injury Risk  Goal: Absence of Fall and Fall-Related Injury  Outcome: Ongoing, Not Progressing     Problem: Adult Inpatient Plan of Care  Goal: Plan of Care Review  Outcome: Ongoing, Not Progressing     Problem: Adult Inpatient Plan of Care  Goal: Patient-Specific Goal (Individualization)  Outcome: Ongoing, Not Progressing     Problem: Adult Inpatient Plan of Care  Goal: Absence of Hospital-Acquired Illness or Injury  Outcome: Ongoing, Not Progressing     Problem: Adult Inpatient Plan of Care  Goal: Optimal Comfort and Wellbeing  Outcome: Ongoing, Not Progressing     Problem: Adult Inpatient Plan of Care  Goal: Readiness for Transition of Care  Outcome: Ongoing, Not Progressing     Problem: Adult Inpatient Plan of Care  Goal: Rounds/Family Conference  Outcome: Ongoing, Not Progressing     Problem: Bariatric Environmental Safety  Goal: Safety Maintained with Care  Outcome: Ongoing, Not Progressing     Problem: Oral Intake Inadequate (Acute Kidney Injury/Impairment)  Goal: Optimal Nutrition Intake  Outcome: Ongoing, Not Progressing     Problem: Renal Function Impairment (Acute Kidney Injury/Impairment)  Goal: Effective Renal Function  Outcome: Ongoing, Not Progressing

## 2020-05-27 NOTE — PT/OT/SLP PROGRESS
Occupational Therapy   Treatment    Name: Danielle Martinez  MRN: [id number]  Admitting Diagnosis:  HAILY (acute kidney injury)       Recommendations:     Discharge Recommendations: home health PT, home health OT  Discharge Equipment Recommendations:  none  Barriers to discharge:  Decreased caregiver support    Assessment:     Danielle Martinez is a 74 y.o. female with a medical diagnosis of HAILY (acute kidney injury). Pt supine in bed sleeping on OT arrival. Pt easily aroused, but states she was a little confused upon waking; agreeable to OT tx. She presents with the following performance deficits affecting function: weakness, impaired endurance, impaired self care skills, impaired functional mobilty, gait instability.     Rehab Prognosis:  Good; patient would benefit from acute skilled OT services to address these deficits and reach maximum level of function.       Plan:     Patient to be seen 5 x/week to address the above listed problems via self-care/home management, therapeutic activities, therapeutic exercises  · Plan of Care Expires: 06/24/20  · Plan of Care Reviewed with: patient    Subjective     Pain/Comfort:  · Pain Rating 1: 6/10  · Location - Side 1: Left  · Location 1: leg  · Pain Addressed 1: Reposition    Objective:     Communicated with: nurse prior to session.  Patient found HOB elevated with telemetry, oxygen upon OT entry to room.    General Precautions: Standard, fall   Orthopedic Precautions:N/A   Braces: N/A     Occupational Performance:     Bed Mobility:    · Patient completed Supine to Sit with supervision     Functional Mobility/Transfers:  · Patient completed Sit <> Stand Transfer with stand by assistance  with  rolling walker   · Patient completed Toilet Transfer Step Transfer technique with stand by assistance with  rolling walker and grab bars  · Functional Mobility: Pt ambulated within room with SBA and RW.    Activities of Daily Living:  · Grooming: stand by assistance standing at sink for oral  care  · Toileting: supervision for clothing mangagement and hygiene    Treatment & Education:  OT role/POC; safety with use of RW. Pt demonstrated understanding.     Patient left up in chair with all lines intact, call button in reach and PT presentEducation:      GOALS:   Multidisciplinary Problems     Occupational Therapy Goals        Problem: Occupational Therapy Goal    Goal Priority Disciplines Outcome Interventions   Occupational Therapy Goal     OT, PT/OT Ongoing, Progressing    Description:  Goals to be met by: d/c     Patient will increase functional independence with ADLs by performing:    LE Dressing with Stand-by Assistance.  Grooming while standing at sink with Stand-by Assistance. Met 5/26  Toileting from toilet with Stand-by Assistance for hygiene and clothing management. Met 5/26  Toilet transfer to toilet with Stand-by Assistance. Met 5/26  Toileting from toilet with Supervision for hygiene and clothing management. Met 5/27  Toilet transfer to toilet with Supervision.  New Goal   Bathing with Supervision. New Goal                       Time Tracking:     OT Date of Treatment: 05/27/20  OT Start Time: 1329  OT Stop Time: 1353  OT Total Time (min): 24 min    Billable Minutes:Self Care/Home Management 24    Kristina Wiggins, OT  5/27/2020

## 2020-05-27 NOTE — PLAN OF CARE
Problem: Occupational Therapy Goal  Goal: Occupational Therapy Goal  Description  Goals to be met by: d/c     Patient will increase functional independence with ADLs by performing:    LE Dressing with Stand-by Assistance.  Grooming while standing at sink with Stand-by Assistance. Met 5/26  Toileting from toilet with Stand-by Assistance for hygiene and clothing management. Met 5/26  Toilet transfer to toilet with Stand-by Assistance. Met 5/26  Toileting from toilet with Supervision for hygiene and clothing management. Met 5/27  Toilet transfer to toilet with Supervision.  New Goal   Bathing with Supervision. New Goal      Outcome: Ongoing, Progressing

## 2020-05-27 NOTE — PROGRESS NOTES
[facility]    Progress Note    Patient Name: Danielle Martinez  MRN: [id number]  Patient Class: IP- Inpatient   Admission Date: 5/22/2020  9:29 AM  Length of Stay: 5  Attending Physician: AGUSTIN DAS MD  Primary Care Provider: Antony Lopez MD  Face-to-Face encounter date: 05/27/2020  Chief Complaint: Leg Pain (c/o L sciatica pain, generalized weakness. pt states hasn't eaten well this week due to thrush in mouth)         Patient ID: Danielle Martinez is a 74 y.o. female admitted for   Active Hospital Problems    Diagnosis  POA    *HAILY (acute kidney injury) [N17.9]  Yes    Diarrhea [R19.7]  Yes    Acute pulmonary edema [J81.0]  Yes    Thrush [B37.0]  Yes    Hypokalemia [E87.6]  Yes    Lumbar disc disease [M51.9]  Yes    Paroxysmal atrial fibrillation [I48.0]  Yes    BRITTANIE on CPAP [G47.33, Z99.89]  Not Applicable    Essential (primary) hypertension [I10]  Yes      Resolved Hospital Problems   No resolved problems to display.      HPI by Suha Petit NP 05/22/2020:  Ms. Martinez presents today with complaints of weakness. It is severe. It is associated with leg cramps, diarrhea, mouth pain, oliguria, anorexia, and fatigue. She denies fever, chills, N/V, dizziness, cough, chest pain, flank pain, or LOC. She has a history of HTN, HLD, PAF on eliquis, chronic back pain, anxiety/depression, and diverticulitis. She called her doctor with complaints of diarrhea and was prescribed an unknown abx which she's been taking over a week. This hasn't stopped her diarrhea, but has caused thrush in her mouth. D/t the thrush, she hasn't been able to eat. She's had multiple episodes of watery diarrhea, with the last one, last night. She is unsure the last time she urinated. She is sleepy on exam, but did recently receive dilaudid for back pain. A renal US had just completed at the time of exam.      Overview/Hospital Course:  05/23:  Patient was seen and examined bedside.  She tells me that she is  experiencing on and off diarrhea since last 1 month and was given antibiotics for diverticulitis.  Denies any new symptoms except being very weak and chronic joint pains in left leg.  Unfortunately stool studies were not sent by nursing staff.  Received IV hydration.      05/24:  Patient was seen and examined bedside.  She keeps complaining of her chronic left sciatic pain.  She is also having some wet cough.  No bowel movement since yesterday hence no stool studies nor C diff sample.  No other acute events overnight as per nursing staff.      05/25:  Patient was seen and examined at bedside.  She had 1 small bowel movement however it was formed and C diff testing was not possible.  She made good urine output with 40 mg IV Lasix yesterday.  Keeps complaining of her chronic left sciatic pain.  Cough has improved.  No other acute events overnight as per nursing staff.     05/26/2020: I assumed pt's care today, pt seen and examined, No diarrhea, still feeling shaky when gets up, PT/OT evaluating     05/27/2020: doing well, feeling good, ambulated with PT/OT, plan for D/C with  PT/OT upon discharge.      Subjective:    Interval History: Patient seen and examined. States no further diarrhea since in the hospital.Pt states she is feeling much better, pt ambulated with PT/Yuliya. Recommended Home health PT OT.   No Family present at bedside.  No concerns/issues overnight reported by the patient or the nursing staff.    Review of Systems All other Review of Systems were found to be negative expect for that mentioned already in HPI.     Objective:     Physical Exam  Vitals:    05/27/20 0743   BP: 138/70   Pulse: 84   Resp: 19   Temp:      Wt Readings from Last 1 Encounters:   05/27/20 94.5 kg (208 lb 5.4 oz)      Body mass index is 42.08 kg/m².    Vitals reviewed.  Constitutional: No distress. Laying comfortably, morbidly obese   HENT: NC, large neck circumference   Head: Atraumatic.   Cardiovascular: Normal rate, regular  rhythm and normal heart sounds.   Pulmonary/Chest: Effort normal. No wheezes. Lungs clear anteriorly  Abdominal: Soft. obese, Bowel sounds are normal. No distension and no mass. No tenderness  Neurological: Alert.   Skin: Skin is warm and dry.   Psych: Appropriate mood and affect    Following labs were Reviewed   CBC:  Recent Labs   Lab 05/27/20  0516   WBC 7.62   HGB 12.6   HCT 39.7        CMP:  Recent Labs   Lab 05/27/20  0516   CALCIUM 9.1   ALBUMIN 3.1*   PROT 6.9      K 3.5   CO2 26      BUN 39*   CREATININE 2.8*   ALKPHOS 49*   ALT 14   AST 14   BILITOT 1.0     Last 72 hour POCT GLUCOSE  No results found for: POCTGLUCOSE     Microbiology Results (last 7 days)     Procedure Component Value Units Date/Time    Stool culture [[id number]] Collected:  05/25/20 1032    Order Status:  Completed Specimen:  Stool Updated:  05/27/20 0851     Stool Culture No Salmonella, Shigella, Campylobacter, Vibrio, isolated.    Clostridium difficile EIA [[id number]] Collected:  05/25/20 2137    Order Status:  Completed Specimen:  Stool Updated:  05/25/20 2347     C. diff Antigen Negative     C difficile Toxins A+B, EIA Negative     Comment: Testing not recommended for children <24 months old.       Urine culture [[id number]] Collected:  05/22/20 1747    Order Status:  Completed Specimen:  Urine Updated:  05/24/20 0711     Urine Culture, Routine Multiple organisms isolated. None in predominance.  Repeat if      clinically necessary.    Narrative:       Preferred Collection Type->Urine, Clean Catch  Specimen Source->Urine    Clostridium difficile EIA [[id number]]     Order Status:  Canceled Specimen:  Stool             CT Abdomen Pelvis  Without Contrast   Final Result      1. Mild bilaterally symmetric perinephric fat stranding.  This is new when compared to September 2019, but nonspecific.  In the absence of hydronephrosis, infectious or inflammatory involvement should be considered.   2. Sigmoid diverticula without  diverticulitis.   3. Additional chronic findings as discussed above.         Electronically signed by: Richie Loco MD   Date:    05/24/2020   Time:    13:17      X-Ray Chest AP Portable   Final Result      1. Mild prominence of the pulmonary vasculature and interstitial markings, slightly increased compared to May 22.  Correlate clinically for mild interstitial edema.   2. Right hilar prominence, likely vascular, as the right hilum was normal in appearance on recent study from May 22.         Electronically signed by: Richie Loco MD   Date:    05/24/2020   Time:    10:54      US Retroperitoneal Complete   Final Result      X-Ray Chest AP Portable   Final Result            Scheduled Meds:   apixaban  2.5 mg Oral BID    FLUoxetine  20 mg Oral Daily    fluticasone propionate  1 spray Each Nostril Daily    fluticasone-umeclidin-vilanter  1 puff Inhalation Daily    gabapentin  100 mg Oral TID    levothyroxine  25 mcg Oral Before breakfast    nystatin  500,000 Units Oral QID (WM & HS)    pantoprazole  40 mg Oral Daily     Continuous Infusions:  PRN Meds:.acetaminophen, albuterol sulfate, ALPRAZolam, dextromethorphan-guaifenesin  mg/5 ml, melatonin, nitroGLYCERIN, oxyCODONE, sodium chloride 0.9%    05/24/2020: Echo  · Normal left ventricular systolic function. The estimated ejection fraction is 60%.  · Grade III (severe) left ventricular diastolic dysfunction consistent with restrictive physiology.  · No wall motion abnormalities.  · Moderate right ventricular enlargement.  · Normal right ventricular systolic function.  · Moderate left atrial enlargement.  · Mild right atrial enlargement.  · Mild tricuspid regurgitation.  · Elevated central venous pressure (15 mmHg).  · The estimated PA systolic pressure is 104 mmHg.  · Severe pulmonary hypertension present.      Assessment & Plan:     * HAILY (acute kidney injury)- improving   BUN/Cr 39/2.8, was 60/5.2 on admission. improving   Likely due to  ongoing diarrhea at the time of admission, diarrhea now resolved   Consulted  Dr. Cabrera, appreciate the recs   Hold ARB/HCTZ  Renally dose all meds  She made excellent urine output with 40 mg IV Lasix on admission, then received 20 mg IV Lasix yesterday.  Accurate charting of urine output     Acute pulmonary edema- resolved  2D echo- Grade 3 DD, EF 60%  Received lasix 40 mg BID X q1 day and then 20 mg IV  Breathing much improved, pt laying flat in bed with no difficulty breathing   Repeat CXR this am shows interval decrease of pulmonary edema       Diarrhea- resolved   Stool studies negative   No diarrhea in last 48-72 hrs      Thrush  Nystatin swish and swallow.    Essential (primary) hypertension  Held ARB/HCTZ.  BP stable  Today 138/70     BRITTANIE on CPAP  CPAP q.h.s     Paroxysmal atrial fibrillation  Continue amiodarone   Renally dose eliquis and sotalol  Currently in sinus.    Hypokalemia  Replaced in ED   Repeat level in AM  Renal recommended to only replace K if <3.0     Lumbar disc disease  Chronic left lower extremity pain for which she goes for nerve block every 6 months  P.r.n. Analgesia    Lower extremity weakness/debility  PT, OT recommended home health PT and OT    BMI 42.08  Discussed low carb diet, life style modification, proper hydration          Discharge Planning:   Home with [facility] PT/OT     Above encounter included review of the medical records, interviewing and examining the patient face-to-face, discussion with family and other health care providers, ordering and interpreting lab/test results and formulating a plan of care.     Medical Decision Making:      [_] Low Complexity  [_] Moderate Complexity  [x] High Complexity      Luke Rivero MD  Department of Hospital Medicine   [facility]

## 2020-05-27 NOTE — CONSULTS
Nephrology Consult Note        Patient Name: Danielle Martinez  MRN: [id number]    Patient Class: IP- Inpatient   Admission Date: 5/22/2020  Length of Stay: 5 days  Date of Service: 5/27/2020    Attending Physician: Luke Rivero MD  Primary Care Provider: Antony Lopez MD    Reason for Consult: haily/hypokalemia/diarrhea/diverticulitis/htn/anemia    SUBJECTIVE:     HPI: 74F with GERD, HTN, hypothyroidism, depression, COPD, BRITTANIE, pAF, anemia presents with generalized weakness, nausea without any vomiting, diarrhea. Has prior GI bleed and currently on therapy for diverticulitis, does complain of having thrush orally as well. Also has left-sided leg pain consistent with her prior sciatica. She has been out of her thyroid medicine for at least a week. Renal is consulted for HAILY, hypokalemia - sCr is 5.2, was previously 0.7. Renal US is unremarkable, UA is clear.    5/23 VSS, no new complains.  5/24 VSS, no new complains.  5/25 VSS, no new complains.  5/26 VSS, No nausea, chest pain, sob, fever, urinary or bowel complaint, new neurologic symptoms, new joint pain,  2/27 ambulated today.  Eating.  No diarrhea.      Past Medical History:   Diagnosis Date    HAILY (acute kidney injury)     Arthritis     Juvenile diagnosed age 17    COPD (chronic obstructive pulmonary disease)     Diverticulitis     GERD (gastroesophageal reflux disease)     Hx of hiatal hernia     Hypertension     Sleep apnea      Past Surgical History:   Procedure Laterality Date    BREAST LUMPECTOMY Left     in 40's    COLONOSCOPY  2016    Dr. Reyes 5 years    COLONOSCOPY  2019    Dr. Reyes 5 years    HIATAL HERNIA REPAIR  2005    SHOULDER ARTHROSCOPY  2015     History reviewed. No pertinent family history.  Social History     Tobacco Use    Smoking status: Never Smoker    Smokeless tobacco: Never Used   Substance Use Topics    Alcohol use: Not Currently    Drug use: Not on file       Review of patient's allergies indicates:    Allergen Reactions    Promethazine Anaphylaxis    Clove flavoring [flavoring agent]     Codeine Itching    Latex, natural rubber     Lorazepam Other (See Comments)    Lovastatin Other (See Comments)    Meclizine Other (See Comments)    Morphine Itching    Simvastatin Other (See Comments)    Tramadol-acetaminophen Itching       Outpatient meds:  No current facility-administered medications on file prior to encounter.      Current Outpatient Medications on File Prior to Encounter   Medication Sig Dispense Refill    albuterol (VENTOLIN HFA) 90 mcg/actuation inhaler Inhale 2 puffs into the lungs every 4 (four) hours as needed for Wheezing. Rescue 1 Inhaler 5    ALPRAZolam (XANAX) 1 MG tablet Take 1 tablet (1 mg total) by mouth 2 (two) times daily as needed for Anxiety. 60 tablet 2    ascorbic acid, vitamin C, (VITAMIN C) 100 MG tablet Take 100 mg by mouth once daily.      calcium carbonate (TUMS) 200 mg calcium (500 mg) chewable tablet Take 1 tablet by mouth once daily.      candesartan-hydrochlorothiazid (ATACAND HCT) 32-12.5 mg per tablet Take 1 tablet by mouth once daily. 30 tablet 5    ELIQUIS 5 mg Tab Take 1 tablet (5 mg total) by mouth 2 (two) times daily. 60 tablet 5    FLUoxetine 20 MG capsule Take 1 capsule (20 mg total) by mouth once daily. 30 capsule 5    fluticasone propionate (FLONASE) 50 mcg/actuation nasal spray 1 spray by Each Nostril route daily as needed.       fluticasone-umeclidin-vilanter (TRELEGY ELLIPTA) 100-62.5-25 mcg DsDv Inhale 1 puff into the lungs daily as needed.       gabapentin (NEURONTIN) 300 MG capsule Take 1 capsule (300 mg total) by mouth 2 (two) times daily. 60 capsule 5    levothyroxine (SYNTHROID) 25 MCG tablet Take 1 tablet (25 mcg total) by mouth before breakfast. Name brand only.  RIRI 30 tablet 5    mv-min/folic/vit K/lut/vllf850 (ALIVE WOMEN'S 50 PLUS, BLEND, ORAL) Take 1 tablet by mouth daily as needed.      pantoprazole (PROTONIX) 40 MG tablet Take 1  tablet (40 mg total) by mouth once daily. 30 tablet 1    sotalol (BETAPACE) 80 MG tablet Take 1 tablet (80 mg total) by mouth 2 (two) times daily. 60 tablet 5    esomeprazole (NEXIUM) 40 MG capsule Take 1 capsule (40 mg total) by mouth 2 (two) times daily. BRAND NAME ONLY 60 capsule 11    fluticasone-umeclidin-vilanter (TRELEGY ELLIPTA) 100-62.5-25 mcg DsDv Trelegy Ellipta      nitroGLYCERIN (NITROSTAT) 0.4 MG SL tablet Place 0.4 mg under the tongue every 5 (five) minutes as needed.          Scheduled meds:   apixaban  2.5 mg Oral BID    FLUoxetine  20 mg Oral Daily    fluticasone propionate  1 spray Each Nostril Daily    fluticasone-umeclidin-vilanter  1 puff Inhalation Daily    gabapentin  100 mg Oral TID    levothyroxine  25 mcg Oral Before breakfast    nystatin  500,000 Units Oral QID (WM & HS)    pantoprazole  40 mg Oral Daily       Infusions:      PRN meds:  acetaminophen, albuterol sulfate, ALPRAZolam, dextromethorphan-guaifenesin  mg/5 ml, melatonin, nitroGLYCERIN, oxyCODONE, sodium chloride 0.9%    Review of Systems:  ROS    OBJECTIVE:     Vital Signs and IO (Last 24H):  Temp:  [98.1 °F (36.7 °C)-98.2 °F (36.8 °C)]   Pulse:  [75-92]   Resp:  [18-19]   BP: (119-148)/(61-76)   SpO2:  [90 %-99 %]   I/O last 3 completed shifts:  In: 480 [P.O.:480]  Out: 650 [Urine:650]    Wt Readings from Last 5 Encounters:   05/27/20 94.5 kg (208 lb 5.4 oz)   11/05/19 93.2 kg (205 lb 6.4 oz)         Physical Exam:  Physical Exam   Constitutional: She is oriented to person, place, and time. She appears well-developed and well-nourished.   HENT:   Head: Normocephalic and atraumatic.   Mouth/Throat: Oropharynx is clear and moist.   Eyes: Pupils are equal, round, and reactive to light. EOM are normal. No scleral icterus.   Neck: Neck supple.   Cardiovascular: Normal rate and regular rhythm.   Pulmonary/Chest: Effort normal. No stridor. No respiratory distress.   Abdominal: Soft. She exhibits no distension.    Musculoskeletal: Normal range of motion. She exhibits no edema or deformity.   Neurological: She is alert and oriented to person, place, and time. No cranial nerve deficit.   Skin: Skin is warm and dry. No rash noted. She is not diaphoretic. No erythema.   Psychiatric: She has a normal mood and affect. Her behavior is normal.       Body mass index is 42.08 kg/m².    Laboratory:  Recent Labs   Lab 05/25/20 0524 05/26/20 0422 05/27/20  0516    143 143   K 3.5 3.9 3.5    111* 106   CO2 23 21* 26   BUN 45* 48* 39*   CREATININE 3.5* 3.2* 2.8*   ESTGFRAFRICA 14.2* 15.8* 18.6*   EGFRNONAA 12.3* 13.7* 16.1*   * 117* 123*       Recent Labs   Lab 05/25/20 0524 05/26/20 0422 05/27/20  0516   CALCIUM 8.5* 8.8 9.1   ALBUMIN 3.4* 3.1* 3.1*   PHOS 3.6 3.2 3.0   MG 1.9 2.0 1.9             No results for input(s): POCTGLUCOSE in the last 168 hours.    Recent Labs   Lab 11/05/19  1337   Hemoglobin A1C 6.8 H       Recent Labs   Lab 05/25/20 0524 05/26/20 0422 05/27/20  0516   WBC 9.87 8.31 7.62   HGB 11.8* 12.0 12.6   HCT 38.4 38.9 39.7    323 350   MCV 89 89 87   MCHC 30.7* 30.8* 31.7*   MONO 8.4  0.8 9.5  0.8 10.8  0.8       Recent Labs   Lab 05/25/20 0524 05/26/20 0422 05/27/20  0516   BILITOT 1.2* 0.9 1.0   PROT 7.2 6.5 6.9   ALBUMIN 3.4* 3.1* 3.1*   ALKPHOS 51* 54* 49*   ALT 13 18 14   AST 18 27 14       Recent Labs   Lab 05/22/20  1747   Color, UA Yellow   Appearance, UA Clear   pH, UA 5.0   Specific Gravity, UA 1.010   Protein, UA Negative   Glucose, UA Negative   Ketones, UA Negative   Urobilinogen, UA Negative   Bilirubin (UA) Negative   Occult Blood UA Negative   Nitrite, UA Negative   RBC, UA 7 H   WBC, UA 21 H   Bacteria Negative   Hyaline Casts, UA 7 A       Recent Labs   Lab 09/13/19  0912   Sample JULIA       Microbiology Results (last 7 days)     Procedure Component Value Units Date/Time    Stool culture [[id number]] Collected:  05/25/20 1032    Order Status:  Completed Specimen:   Stool Updated:  05/27/20 0851     Stool Culture No Salmonella, Shigella, Campylobacter, Vibrio, isolated.    Clostridium difficile EIA [[id number]] Collected:  05/25/20 2137    Order Status:  Completed Specimen:  Stool Updated:  05/25/20 2347     C. diff Antigen Negative     C difficile Toxins A+B, EIA Negative     Comment: Testing not recommended for children <24 months old.       Urine culture [[id number]] Collected:  05/22/20 1747    Order Status:  Completed Specimen:  Urine Updated:  05/24/20 0711     Urine Culture, Routine Multiple organisms isolated. None in predominance.  Repeat if      clinically necessary.    Narrative:       Preferred Collection Type->Urine, Clean Catch  Specimen Source->Urine    Clostridium difficile EIA [[id number]]     Order Status:  Canceled Specimen:  Stool           ASSESSMENT/PLAN:     Active Hospital Problems    Diagnosis  POA    *HAILY (acute kidney injury) [N17.9]  Yes    Diarrhea [R19.7]  Yes    Acute pulmonary edema [J81.0]  Yes    Thrush [B37.0]  Yes    Hypokalemia [E87.6]  Yes    Lumbar disc disease [M51.9]  Yes    Paroxysmal atrial fibrillation [I48.0]  Yes    BRITTANIE on CPAP [G47.33, Z99.89]  Not Applicable    Essential (primary) hypertension [I10]  Yes      Resolved Hospital Problems   No resolved problems to display.     HAILY, likely due to volume depletion from diarrhea, meds, poor oral intake due to thrush  Diverticulitis treated with broad abx  Diarrhea--resolved  Hypokalemia--normal  No NSAIDs, ACEI/ARB, IV contrast or other nephrotoxins.  Keep MAP > 60, SBP > 100.  Dose meds for GFR < 30 ml/min.  Renal diet - low K, low phos.  IVF for now, do not replete K unless < 3. Mg is OK.  Diagnose and treat any infection.    Anemia, non-CKD  Hgb and HCT are acceptable. Monitor.    HTN  BP seem controlled.   Tolerate asymptomatic HTN up to -160.  Hold home meds.    Thank you for allowing us to participate in the care of your patient!   We will follow the patient and  provide recommendations as needed.    Mj Swanson MD    [facility]  [address] [place] [address] [place] [id number]    [phone] - tel  [phone] - fax    5/27/2020 1:14 PM

## 2020-05-27 NOTE — PLAN OF CARE
05/26/20 2000   Patient Assessment/Suction   Level of Consciousness (AVPU) alert   Respiratory Effort Unlabored   Expansion/Accessory Muscles/Retractions no use of accessory muscles   All Lung Fields Breath Sounds clear   Rhythm/Pattern, Respiratory unlabored   Cough Frequency infrequent   Cough Type nonproductive   PRE-TX-O2   O2 Device (Oxygen Therapy) nasal cannula   Flow (L/min) 2   SpO2 98 %   Pulse 75   Resp 18   Aerosol Therapy   $ Aerosol Therapy Charges PRN treatment not required   Respiratory Treatment Status (SVN) PRN treatment not required   Respiratory Evaluation   $ Care Plan Tech Time 15 min   Evaluation For   (care plan)

## 2020-05-27 NOTE — PLAN OF CARE
Problem: Physical Therapy Goal  Goal: Physical Therapy Goal  Description  Goals to be met by: discharge     Patient will increase functional independence with mobility by performin. Supine to sit with Stand-by Assistance  2. Sit to stand transfer with Supervision (goal met )  3. Bed to chair transfer with Supervision using Rolling Walker  4. Gait  x 50 feet with Supervision using Rolling Walker. ( goal met )    New goal:  5. Gait 150 ft supervision rolling walker.       Outcome: Ongoing, Progressing

## 2020-05-28 VITALS
RESPIRATION RATE: 19 BRPM | HEART RATE: 87 BPM | SYSTOLIC BLOOD PRESSURE: 140 MMHG | DIASTOLIC BLOOD PRESSURE: 83 MMHG | BODY MASS INDEX: 42 KG/M2 | OXYGEN SATURATION: 98 % | WEIGHT: 208.31 LBS | TEMPERATURE: 98 F | HEIGHT: 59 IN

## 2020-05-28 PROBLEM — R19.7 DIARRHEA: Status: RESOLVED | Noted: 2020-05-22 | Resolved: 2020-05-28

## 2020-05-28 PROBLEM — J81.0 ACUTE PULMONARY EDEMA: Status: RESOLVED | Noted: 2020-05-22 | Resolved: 2020-05-28

## 2020-05-28 PROBLEM — B37.0 THRUSH: Status: RESOLVED | Noted: 2020-05-22 | Resolved: 2020-05-28

## 2020-05-28 PROBLEM — E87.6 HYPOKALEMIA: Status: RESOLVED | Noted: 2020-05-22 | Resolved: 2020-05-28

## 2020-05-28 LAB
ANION GAP SERPL CALC-SCNC: 10 MMOL/L (ref 8–16)
BUN SERPL-MCNC: 37 MG/DL (ref 8–23)
CALCIUM SERPL-MCNC: 9.2 MG/DL (ref 8.7–10.5)
CHLORIDE SERPL-SCNC: 107 MMOL/L (ref 95–110)
CO2 SERPL-SCNC: 25 MMOL/L (ref 23–29)
CREAT SERPL-MCNC: 2.2 MG/DL (ref 0.5–1.4)
ERYTHROCYTE [DISTWIDTH] IN BLOOD BY AUTOMATED COUNT: 16.1 % (ref 11.5–14.5)
EST. GFR  (AFRICAN AMERICAN): 24.9 ML/MIN/1.73 M^2
EST. GFR  (NON AFRICAN AMERICAN): 21.6 ML/MIN/1.73 M^2
GLUCOSE SERPL-MCNC: 116 MG/DL (ref 70–110)
HCT VFR BLD AUTO: 39.1 % (ref 37–48.5)
HGB BLD-MCNC: 12.3 G/DL (ref 12–16)
MAGNESIUM SERPL-MCNC: 1.8 MG/DL (ref 1.6–2.6)
MCH RBC QN AUTO: 27.8 PG (ref 27–31)
MCHC RBC AUTO-ENTMCNC: 31.5 G/DL (ref 32–36)
MCV RBC AUTO: 89 FL (ref 82–98)
O+P STL TRI STN: NORMAL
PLATELET # BLD AUTO: 330 K/UL (ref 150–350)
PMV BLD AUTO: 10.7 FL (ref 9.2–12.9)
POTASSIUM SERPL-SCNC: 3 MMOL/L (ref 3.5–5.1)
POTASSIUM SERPL-SCNC: 3.7 MMOL/L (ref 3.5–5.1)
RBC # BLD AUTO: 4.42 M/UL (ref 4–5.4)
SODIUM SERPL-SCNC: 142 MMOL/L (ref 136–145)
WBC # BLD AUTO: 6.75 K/UL (ref 3.9–12.7)

## 2020-05-28 PROCEDURE — 80048 BASIC METABOLIC PNL TOTAL CA: CPT

## 2020-05-28 PROCEDURE — 27000221 HC OXYGEN, UP TO 24 HOURS

## 2020-05-28 PROCEDURE — 25000003 PHARM REV CODE 250: Performed by: HOSPITALIST

## 2020-05-28 PROCEDURE — 94761 N-INVAS EAR/PLS OXIMETRY MLT: CPT

## 2020-05-28 PROCEDURE — 25000003 PHARM REV CODE 250: Performed by: NURSE PRACTITIONER

## 2020-05-28 PROCEDURE — 99900035 HC TECH TIME PER 15 MIN (STAT)

## 2020-05-28 PROCEDURE — 25000003 PHARM REV CODE 250: Performed by: INTERNAL MEDICINE

## 2020-05-28 PROCEDURE — 85027 COMPLETE CBC AUTOMATED: CPT

## 2020-05-28 PROCEDURE — 84132 ASSAY OF SERUM POTASSIUM: CPT

## 2020-05-28 PROCEDURE — 83735 ASSAY OF MAGNESIUM: CPT

## 2020-05-28 PROCEDURE — 36415 COLL VENOUS BLD VENIPUNCTURE: CPT

## 2020-05-28 PROCEDURE — 97116 GAIT TRAINING THERAPY: CPT | Mod: CQ

## 2020-05-28 RX ORDER — GABAPENTIN 100 MG/1
100 CAPSULE ORAL 3 TIMES DAILY
Qty: 90 CAPSULE | Refills: 0 | Status: SHIPPED | OUTPATIENT
Start: 2020-05-28 | End: 2021-04-28

## 2020-05-28 RX ORDER — POTASSIUM CHLORIDE 20 MEQ/1
40 TABLET, EXTENDED RELEASE ORAL ONCE
Status: COMPLETED | OUTPATIENT
Start: 2020-05-28 | End: 2020-05-28

## 2020-05-28 RX ORDER — POTASSIUM CHLORIDE 20 MEQ/1
20 TABLET, EXTENDED RELEASE ORAL ONCE
Status: DISCONTINUED | OUTPATIENT
Start: 2020-05-28 | End: 2020-05-28

## 2020-05-28 RX ADMIN — LEVOTHYROXINE SODIUM 25 MCG: 25 TABLET ORAL at 05:05

## 2020-05-28 RX ADMIN — POTASSIUM CHLORIDE 40 MEQ: 20 TABLET, EXTENDED RELEASE ORAL at 08:05

## 2020-05-28 RX ADMIN — OXYCODONE HYDROCHLORIDE 5 MG: 5 TABLET ORAL at 01:05

## 2020-05-28 RX ADMIN — NYSTATIN 500000 UNITS: 500000 SUSPENSION ORAL at 01:05

## 2020-05-28 RX ADMIN — PANTOPRAZOLE SODIUM 40 MG: 40 TABLET, DELAYED RELEASE ORAL at 05:05

## 2020-05-28 RX ADMIN — NYSTATIN 500000 UNITS: 500000 SUSPENSION ORAL at 08:05

## 2020-05-28 RX ADMIN — FLUTICASONE PROPIONATE 50 MCG: 50 SPRAY, METERED NASAL at 08:05

## 2020-05-28 RX ADMIN — APIXABAN 2.5 MG: 2.5 TABLET, FILM COATED ORAL at 08:05

## 2020-05-28 RX ADMIN — GABAPENTIN 100 MG: 100 CAPSULE ORAL at 08:05

## 2020-05-28 RX ADMIN — FLUOXETINE 20 MG: 20 CAPSULE ORAL at 08:05

## 2020-05-28 NOTE — PT/OT/SLP PROGRESS
Physical Therapy Treatment    Patient Name:  Danielle Martinez   MRN:  [id number]    Recommendations:     Discharge Recommendations:  home health PT   Discharge Equipment Recommendations: none   Barriers to discharge: None    Assessment:     Danielle Martinez is a 74 y.o. female admitted with a medical diagnosis of HAILY (acute kidney injury).  She presents with the following impairments/functional limitations:  weakness, impaired endurance, impaired self care skills, impaired functional mobilty, gait instability. Patient presents resting in bed with HOB elevated. Agreeable to PT treatment. Pt's  present and supportive. Patient progressing with gait and mobility today and ambulated on RA. O2 sats appeared to drop to 86% at one point however patient not symptomatic and unsure of pulse ox accuracy. No LOB or SOB noted throughout treatment. Continue with PT and POC.    Rehab Prognosis: Good; patient would benefit from acute skilled PT services to address these deficits and reach maximum level of function.    Recent Surgery: * No surgery found *      Plan:     During this hospitalization, patient to be seen 6 x/week to address the identified rehab impairments via gait training, therapeutic activities, therapeutic exercises and progress toward the following goals:    · Plan of Care Expires:  06/25/20    Subjective     Chief Complaint: No complaints  Patient/Family Comments/goals:   Pain/Comfort:  · Pain Rating 1: 0/10      Objective:     Communicated with RN prior to session.  Patient found HOB elevated with telemetry, oxygen upon PT entry to room.     General Precautions: Standard, fall   Orthopedic Precautions:N/A   Braces:       Functional Mobility:  · Bed Mobility:     · Supine to Sit: stand by assistance  · Transfers:     · Sit to Stand:  stand by assistance with rolling walker  · Bed to Chair: contact guard assistance with  rolling walker  using  Step Transfer  · Gait: 80ft with RW and CGA      AM-PAC 6 CLICK  MOBILITY          Therapeutic Activities and Exercises:   bed mobility; sitting EOB for trunk control and midline orientation; sit <> stands; transfer training    Patient left up in chair with all lines intact, call button in reach and pt's  present..    GOALS:   Multidisciplinary Problems     Physical Therapy Goals        Problem: Physical Therapy Goal    Goal Priority Disciplines Outcome Goal Variances Interventions   Physical Therapy Goal     PT, PT/OT Ongoing, Progressing     Description:  Goals to be met by: discharge     Patient will increase functional independence with mobility by performin. Supine to sit with Stand-by Assistance  2. Sit to stand transfer with Supervision (goal met )  3. Bed to chair transfer with Supervision using Rolling Walker  4. Gait  x 50 feet with Supervision using Rolling Walker. ( goal met )    New goal:  5. Gait 150 ft supervision rolling walker.                        Time Tracking:     PT Received On: 20  PT Start Time: 1116     PT Stop Time: 1126  PT Total Time (min): 10 min     Billable Minutes: Gait Training 10    Treatment Type: Treatment  PT/PTA: PTA     PTA Visit Number: Chantell Velez PTA  2020

## 2020-05-28 NOTE — DISCHARGE SUMMARY
[facility] Medicine  Discharge Summary      Patient Name: Danielle Martinez  MRN: [id number]  Admission Date: 5/22/2020  Hospital Length of Stay: 6 days  Discharge Date and Time:  05/28/2020 2:29 PM  Attending Physician: Luke Rivero MD   Discharging Provider: Luke Rivero MD  Primary Care Provider: Antony Lopez MD      HPI:   Ms. Martinez presents today with complaints of weakness. It is severe. It is associated with leg cramps, diarrhea, mouth pain, oliguria, anorexia, and fatigue. She denies fever, chills, N/V, dizziness, cough, chest pain, flank pain, or LOC. She has a history of HTN, HLD, PAF on eliquis, chronic back pain, anxiety/depression, and diverticulitis. She called her doctor with complaints of diarrhea and was prescribed an unknown abx which she's been taking over a week. This hasn't stopped her diarrhea, but has caused thrush in her mouth. D/t the thrush, she hasn't been able to eat. She's had multiple episodes of watery diarrhea, with the last one, last night. She is unsure the last time she urinated. She is sleepy on exam, but did recently receive dilaudid for back pain. A renal US had just completed at the time of exam.     * No surgery found *      Hospital Course:   74-year-old white female with known past medical history of osteoarthritis, hiatal hernia, paroxysmal AFib admitted 05/22/2020 with diagnosis of acute kidney injury due to diarrhea at home, hypokalemia, thrush  Patient was admitted to [facility] with tele monitoring.  Dr. Cabrera was consulted, Arb and HCTZ held, all meds were renally dosed.  Nephrotoxic medications were avoided, IV hydration cautiously with monitoring for fluid overload.  Patient was also dehydrated, patient was hydrated as well as given  Stool studies was ordered for diarrhea.  Patient was consuming lot of Glucerna at home in an attempt to lose weight.  Patient complained of being very weak and chronic joint pains in left leg.   Diarrhea improved,  developed some but cough.  Patient was given 40 of IV Lasix with good diuresis and cough improvement  Renal functions improved markedly with hydration  I consulted PT OT for evaluation for sniff versus home health.  Patient informed that she has a camper next to her home which is only 2 steps I and she would stay there.  PT OT evaluation recommended home with PT and OT upon discharge with home health  Kidney functions continue to improve.  Patient ambulating in the room without assistance and difficulty.  Patient does want to go home.  I did inform patient that her A1c in November of 2019 was 6.8 which means patient is diabetic but due to her age I would rather recommend dietary control instead of putting her on hypoglycemic medications.  I discussed with patient the foods that she should and should not eat and how to prevent from becoming uncontrolled diabetic, patient verbalized understanding    I discussed the case with Dr. Swanson who recommended patient can be discharged today for as she is doing much better.  Noted her BUN creatinine was 37/2.2 today (was 60/5.2 on admission), discussed proper hydration with the patient.    I discussed the discharge plan with the patient.  Patient agreed.  Discussed with patient that I will repeat her serum potassium level around 12:00 p.m. and if it is within normal range I will discharge her later today around 3:04 p.m..  Patient verbalized understanding    Nurse called with the report that her serum potassium is 3.7.  Patient ready to go home.  I have requested discharge with home health with skilled nursing 2 times a week, PT 2 times a week and OT 2 times a week for her    Patient was seen and examined on the day of discharge  For complete physical exam please see the note from earlier today    Advised patient to follow-up with her primary care physician in 2 weeks post hospital discharge  Follow-up with Dr. Cabrera in 2 weeks post hospital discharge follow-up for HAILY      Consults:   Consults (From admission, onward)        Status Ordering Provider     Inpatient consult to Nephrology  Once     Provider:  Duke Cabrera MD    Completed ASHWIN MCCOLLUM     Inpatient consult to Social Work/Case Management  Once     Provider:  (Not yet assigned)    Completed AGUSTIN DAS          No new Assessment & Plan notes have been filed under this hospital service since the last note was generated.  Service: Hospital Medicine    Final Active Diagnoses:    Diagnosis Date Noted POA    PRINCIPAL PROBLEM:  HAILY (acute kidney injury) [N17.9] 05/22/2020 Yes    Lumbar disc disease [M51.9] 04/26/2017 Yes    Paroxysmal atrial fibrillation [I48.0] 03/16/2016 Yes    BRITTANIE on CPAP [G47.33, Z99.89] 11/04/2015 Not Applicable    Essential (primary) hypertension [I10] 03/09/2015 Yes      Problems Resolved During this Admission:    Diagnosis Date Noted Date Resolved POA    Diarrhea [R19.7] 05/22/2020 05/28/2020 Yes    Acute pulmonary edema [J81.0] 05/22/2020 05/28/2020 Yes    Thrush [B37.0] 05/22/2020 05/28/2020 Yes    Hypokalemia [E87.6] 05/22/2020 05/28/2020 Yes       Discharged Condition: good    Disposition: Home-Health Care [facility]    Follow Up:  Follow-up Information     Antony Lopez MD. Schedule an appointment as soon as possible for a visit in 2 weeks.    Specialty:  Family Medicine  Why:  Post hospital discharge follow-up for severe HAILY  Contact information:  [address]  [address]  [facility]  [place] [address]  [phone]             Duke Cabrera MD. Schedule an appointment as soon as possible for a visit in 2 weeks.    Specialty:  Nephrology  Why:  Post hospital discharge follow-up for HAILY  Contact information:  [address] [facility] [place]  [place] [address]  [phone]                 Patient Instructions:      Ambulatory referral/consult to Home Health   Standing Status: Future   Referral Priority: Routine Referral Type: Home Health    Referral Reason: Specialty Services Required   Requested Specialty: Home Health Services   Number of Visits Requested: 1     Diet Cardiac     Diet renal     Diet diabetic     Notify your health care provider if you experience any of the following:  difficulty breathing or increased cough     Activity as tolerated       Significant Diagnostic Studies: Labs:   BMP:   Recent Labs   Lab 05/27/20  0516 05/28/20  0450 05/28/20  1227   * 116*  --     142  --    K 3.5 3.0* 3.7    107  --    CO2 26 25  --    BUN 39* 37*  --    CREATININE 2.8* 2.2*  --    CALCIUM 9.1 9.2  --    MG 1.9 1.8  --    , CBC   Recent Labs   Lab 05/27/20  0516 05/28/20  0450   WBC 7.62 6.75   HGB 12.6 12.3   HCT 39.7 39.1    330   , INR No results found for: INR, PROTIME, Lipid Panel   Lab Results   Component Value Date    CHOL 199 11/05/2019    HDL 41 (L) 11/05/2019    LDLCALC 125 (H) 11/05/2019    TRIG 211 (H) 11/05/2019    CHOLHDL 4.9 11/05/2019   , Troponin   Recent Labs   Lab 05/22/20  1024   TROPONINI <0.030    and A1C: No results for input(s): HGBA1C in the last 4320 hours.    Pending Diagnostic Studies:     None         Medications:  Reconciled Home Medications:      Medication List      CHANGE how you take these medications    gabapentin 100 MG capsule  Commonly known as:  NEURONTIN  Take 1 capsule (100 mg total) by mouth 3 (three) times daily.  What changed:    · medication strength  · how much to take  · when to take this        CONTINUE taking these medications    albuterol 90 mcg/actuation inhaler  Commonly known as:  VENTOLIN HFA  Inhale 2 puffs into the lungs every 4 (four) hours as needed for Wheezing. Rescue     ALIVE WOMEN'S 50 PLUS (BLEND) ORAL  Take 1 tablet by mouth daily as needed.     ALPRAZolam 1 MG tablet  Commonly known as:  XANAX  Take 1 tablet (1 mg total) by mouth 2 (two) times daily as needed for Anxiety.     calcium carbonate 200 mg calcium (500 mg) chewable tablet  Commonly known as:   TUMS  Take 1 tablet by mouth once daily.     ELIQUIS 5 mg Tab  Generic drug:  apixaban  Take 1 tablet (5 mg total) by mouth 2 (two) times daily.     FLUoxetine 20 MG capsule  Take 1 capsule (20 mg total) by mouth once daily.     fluticasone propionate 50 mcg/actuation nasal spray  Commonly known as:  FLONASE  1 spray by Each Nostril route daily as needed.     levothyroxine 25 MCG tablet  Commonly known as:  SYNTHROID  Take 1 tablet (25 mcg total) by mouth before breakfast. Name brand only.  RIRI     nitroGLYCERIN 0.4 MG SL tablet  Commonly known as:  NITROSTAT  Place 0.4 mg under the tongue every 5 (five) minutes as needed.     pantoprazole 40 MG tablet  Commonly known as:  PROTONIX  Take 1 tablet (40 mg total) by mouth once daily.     sotaloL 80 MG tablet  Commonly known as:  BETAPACE  Take 1 tablet (80 mg total) by mouth 2 (two) times daily.     * TRELEGY ELLIPTA 100-62.5-25 mcg Dsdv  Generic drug:  fluticasone-umeclidin-vilanter  Trelegy Ellipta     * fluticasone-umeclidin-vilanter 100-62.5-25 mcg Dsdv  Commonly known as:  TRELEGY ELLIPTA  Inhale 1 puff into the lungs daily as needed.     VITAMIN C 100 MG tablet  Generic drug:  ascorbic acid (vitamin C)  Take 100 mg by mouth once daily.         * This list has 2 medication(s) that are the same as other medications prescribed for you. Read the directions carefully, and ask your doctor or other care provider to review them with you.            STOP taking these medications    candesartan-hydrochlorothiazid 32-12.5 mg per tablet  Commonly known as:  ATACAND HCT     esomeprazole 40 MG capsule  Commonly known as:  NexIUM            Indwelling Lines/Drains at time of discharge:   Lines/Drains/Airways     None                 Time spent on the discharge of patient: 40 minutes  Patient was seen and examined on the date of discharge and determined to be suitable for discharge.         Luke Rivero MD  Department of Hospital Medicine  [facility] hospital

## 2020-05-28 NOTE — PROGRESS NOTES
INPATIENT NEPHROLOGY PROGRESS NOTE  [facility] NEPHROLOGY    Danielle CARRASQUILLO Juan  05/28/2020    Reason for consultation:        haily    Chief Complaint:   Chief Complaint   Patient presents with    Leg Pain     c/o L sciatica pain, generalized weakness. pt states hasn't eaten well this week due to thrush in mouth        History of Present Illness:       HPI: 74F with GERD, HTN, hypothyroidism, depression, COPD, BRITTANIE, pAF, anemia presents with generalized weakness, nausea without any vomiting, diarrhea. Has prior GI bleed and currently on therapy for diverticulitis, does complain of having thrush orally as well. Also has left-sided leg pain consistent with her prior sciatica. She has been out of her thyroid medicine for at least a week. Renal is consulted for HAILY, hypokalemia - sCr is 5.2, was previously 0.7. Renal US is unremarkable, UA is clear.     5/23 VSS, no new complains.  5/24 VSS, no new complains.  5/25 VSS, no new complains.  5/26 VSS, No nausea, chest pain, sob, fever, urinary or bowel complaint, new neurologic symptoms, new joint pain,  2/27 ambulated today.  Eating.  No diarrhea.      5/28 No nausea, chest pain, sob, fever, urinary or bowel complaint, new neurologic symptoms, new joint pain,              Plan of Care:     Assessment:    Acute kidney injury  --improving  --keep euvolemic  --continued avoidance on nephrotoxins    Hypokalemia  --replete    Volume depletion  --resolved    Acidosis  --resolved    Stable for d/c from renal standpoint    Thank you for allowing us to participate in this patient's care. We will continue to follow.    Medications:  No current facility-administered medications on file prior to encounter.      Current Outpatient Medications on File Prior to Encounter   Medication Sig Dispense Refill    albuterol (VENTOLIN HFA) 90 mcg/actuation inhaler Inhale 2 puffs into the lungs every 4 (four) hours as needed for Wheezing. Rescue 1 Inhaler 5    ALPRAZolam (XANAX) 1 MG tablet Take 1  tablet (1 mg total) by mouth 2 (two) times daily as needed for Anxiety. 60 tablet 2    ascorbic acid, vitamin C, (VITAMIN C) 100 MG tablet Take 100 mg by mouth once daily.      calcium carbonate (TUMS) 200 mg calcium (500 mg) chewable tablet Take 1 tablet by mouth once daily.      candesartan-hydrochlorothiazid (ATACAND HCT) 32-12.5 mg per tablet Take 1 tablet by mouth once daily. 30 tablet 5    ELIQUIS 5 mg Tab Take 1 tablet (5 mg total) by mouth 2 (two) times daily. 60 tablet 5    FLUoxetine 20 MG capsule Take 1 capsule (20 mg total) by mouth once daily. 30 capsule 5    fluticasone propionate (FLONASE) 50 mcg/actuation nasal spray 1 spray by Each Nostril route daily as needed.       fluticasone-umeclidin-vilanter (TRELEGY ELLIPTA) 100-62.5-25 mcg DsDv Inhale 1 puff into the lungs daily as needed.       gabapentin (NEURONTIN) 300 MG capsule Take 1 capsule (300 mg total) by mouth 2 (two) times daily. 60 capsule 5    levothyroxine (SYNTHROID) 25 MCG tablet Take 1 tablet (25 mcg total) by mouth before breakfast. Name brand only.  RIRI 30 tablet 5    mv-min/folic/vit K/lut/stme058 (ALIVE WOMEN'S 50 PLUS, BLEND, ORAL) Take 1 tablet by mouth daily as needed.      pantoprazole (PROTONIX) 40 MG tablet Take 1 tablet (40 mg total) by mouth once daily. 30 tablet 1    sotalol (BETAPACE) 80 MG tablet Take 1 tablet (80 mg total) by mouth 2 (two) times daily. 60 tablet 5    esomeprazole (NEXIUM) 40 MG capsule Take 1 capsule (40 mg total) by mouth 2 (two) times daily. BRAND NAME ONLY 60 capsule 11    fluticasone-umeclidin-vilanter (TRELEGY ELLIPTA) 100-62.5-25 mcg DsDv Trelegy Ellipta      nitroGLYCERIN (NITROSTAT) 0.4 MG SL tablet Place 0.4 mg under the tongue every 5 (five) minutes as needed.        Scheduled Meds:   apixaban  2.5 mg Oral BID    FLUoxetine  20 mg Oral Daily    fluticasone propionate  1 spray Each Nostril Daily    fluticasone-umeclidin-vilanter  1 puff Inhalation Daily    gabapentin  100 mg  Oral TID    levothyroxine  25 mcg Oral Before breakfast    nystatin  500,000 Units Oral QID (WM & HS)    pantoprazole  40 mg Oral Daily     Continuous Infusions:  PRN Meds:.acetaminophen, albuterol sulfate, ALPRAZolam, dextromethorphan-guaifenesin  mg/5 ml, melatonin, nitroGLYCERIN, oxyCODONE, sodium chloride 0.9%    Allergies:  Promethazine; Clove flavoring [flavoring agent]; Codeine; Latex, natural rubber; Lorazepam; Lovastatin; Meclizine; Morphine; Simvastatin; and Tramadol-acetaminophen    Vital Signs:  Temp Readings from Last 3 Encounters:   05/28/20 98 °F (36.7 °C) (Oral)   11/05/19 97.8 °F (36.6 °C)       Pulse Readings from Last 3 Encounters:   05/28/20 95   11/05/19 62       BP Readings from Last 3 Encounters:   05/28/20 129/81   11/05/19 130/70       Weight:  Wt Readings from Last 3 Encounters:   05/27/20 94.5 kg (208 lb 5.4 oz)   11/05/19 93.2 kg (205 lb 6.4 oz)       Review of Systems:  Review of Systems - All 14 systems reviewed and negative, except as noted in HPI    Physical Exam:    Constitutional: NAD  Neuro: No asterixis  Psych: Calm  EENT: NCAT, anicteric sclera   Neck:  supple  Cards: No rub  Lungs: clear to ausculation  GI:  Pos bowel sounds, no hsm, NTND   MSK:  WNWD  Skin: no purpura, rashes       Results:  Lab Results   Component Value Date     05/28/2020    K 3.0 (L) 05/28/2020     05/28/2020    CO2 25 05/28/2020    BUN 37 (H) 05/28/2020    CREATININE 2.2 (H) 05/28/2020    CALCIUM 9.2 05/28/2020    ANIONGAP 10 05/28/2020    ESTGFRAFRICA 24.9 (A) 05/28/2020    EGFRNONAA 21.6 (A) 05/28/2020       Lab Results   Component Value Date    CALCIUM 9.2 05/28/2020    PHOS 3.0 05/27/2020       Recent Labs   Lab 05/28/20  0450   WBC 6.75   RBC 4.42   HGB 12.3   HCT 39.1      MCV 89   MCH 27.8   MCHC 31.5*       I have personally reviewed pertinent radiological imaging and reports.

## 2020-05-28 NOTE — PLAN OF CARE
Problem: Physical Therapy Goal  Goal: Physical Therapy Goal  Description  Goals to be met by: discharge     Patient will increase functional independence with mobility by performin. Supine to sit with Stand-by Assistance  2. Sit to stand transfer with Supervision (goal met )  3. Bed to chair transfer with Supervision using Rolling Walker  4. Gait  x 50 feet with Supervision using Rolling Walker. ( goal met )    New goal:  5. Gait 150 ft supervision rolling walker.       Outcome: Ongoing, Progressing   Pt continues to progress towards goals.

## 2020-05-28 NOTE — PROGRESS NOTES
[facility]    Progress Note    Patient Name: Danielle Martinez  MRN: [id number]  Patient Class: IP- Inpatient   Admission Date: 5/22/2020  9:29 AM  Length of Stay: 6  Attending Physician: AGUSTIN DAS MD  Primary Care Provider: Antony Lopez MD  Face-to-Face encounter date: 05/28/2020  Chief Complaint: Leg Pain (c/o L sciatica pain, generalized weakness. pt states hasn't eaten well this week due to thrush in mouth)         Patient ID: Danielle Martinez is a 74 y.o. female admitted for   Active Hospital Problems    Diagnosis  POA    *HAILY (acute kidney injury) [N17.9]  Yes    Diarrhea [R19.7]  Yes    Acute pulmonary edema [J81.0]  Yes    Thrush [B37.0]  Yes    Hypokalemia [E87.6]  Yes    Lumbar disc disease [M51.9]  Yes    Paroxysmal atrial fibrillation [I48.0]  Yes    BRITTANIE on CPAP [G47.33, Z99.89]  Not Applicable    Essential (primary) hypertension [I10]  Yes      Resolved Hospital Problems   No resolved problems to display.      HPI by Suha Petit NP 05/22/2020:  Ms. Martinez presents today with complaints of weakness. It is severe. It is associated with leg cramps, diarrhea, mouth pain, oliguria, anorexia, and fatigue. She denies fever, chills, N/V, dizziness, cough, chest pain, flank pain, or LOC. She has a history of HTN, HLD, PAF on eliquis, chronic back pain, anxiety/depression, and diverticulitis. She called her doctor with complaints of diarrhea and was prescribed an unknown abx which she's been taking over a week. This hasn't stopped her diarrhea, but has caused thrush in her mouth. D/t the thrush, she hasn't been able to eat. She's had multiple episodes of watery diarrhea, with the last one, last night. She is unsure the last time she urinated. She is sleepy on exam, but did recently receive dilaudid for back pain. A renal US had just completed at the time of exam.      Overview/Hospital Course:  05/23:  Patient was seen and examined bedside.  She tells me that she is  experiencing on and off diarrhea since last 1 month and was given antibiotics for diverticulitis.  Denies any new symptoms except being very weak and chronic joint pains in left leg.  Unfortunately stool studies were not sent by nursing staff.  Received IV hydration.      05/24:  Patient was seen and examined bedside.  She keeps complaining of her chronic left sciatic pain.  She is also having some wet cough.  No bowel movement since yesterday hence no stool studies nor C diff sample.  No other acute events overnight as per nursing staff.      05/25:  Patient was seen and examined at bedside.  She had 1 small bowel movement however it was formed and C diff testing was not possible.  She made good urine output with 40 mg IV Lasix yesterday.  Keeps complaining of her chronic left sciatic pain.  Cough has improved.  No other acute events overnight as per nursing staff.     05/26/2020: I assumed pt's care today, pt seen and examined, No diarrhea, still feeling shaky when gets up, PT/OT evaluating     05/27/2020: doing well, feeling good, ambulated with PT/OT, plan for D/C with HH PT/OT upon discharge.    05/28/2020: Ambulating freely without any difficulty, wants to go home, discussed will communicate with Dr Swanson as renal functions are improving. Also replacing K and once wnl, possible d/c today with HH. Pt agreed to the plan       Subjective:    Interval History: Patient seen and examined. States no further diarrhea since in the hospital.Pt states she is feeling much better, pt ambulating in the room without difficulty. Recommended Home health PT OT.   Noted K 3.0, replaced and will recheck at 12 pm again  No Family present at bedside.  No concerns/issues overnight reported by the patient or the nursing staff.    Review of Systems All other Review of Systems were found to be negative expect for that mentioned already in HPI.     Objective:     Physical Exam  Vitals:    05/28/20 0759   BP: 128/84   Pulse: 99   Resp:  20   Temp: 98.5 °F (36.9 °C)     Wt Readings from Last 1 Encounters:   05/27/20 94.5 kg (208 lb 5.4 oz)      Body mass index is 42.08 kg/m².    Vitals reviewed.  Constitutional: No distress, morbidly obese , pleasant   HENT: NC, large neck circumference   Head: Atraumatic.   Cardiovascular: Normal rate, regular rhythm and normal heart sounds.   Pulmonary/Chest: Effort normal. No wheezes. Lungs clear anteriorly  Abdominal: Soft. obese, Bowel sounds are normal. No distension and no mass. No tenderness  Neurological: Alert.   Skin: Skin is warm and dry.   Psych: Appropriate mood and affect    Following labs were Reviewed   CBC:  Recent Labs   Lab 05/28/20  0450   WBC 6.75   HGB 12.3   HCT 39.1        CMP:  Recent Labs   Lab 05/28/20  0450   CALCIUM 9.2      K 3.0*   CO2 25      BUN 37*   CREATININE 2.2*     Last 72 hour POCT GLUCOSE  No results found for: POCTGLUCOSE     Microbiology Results (last 7 days)     Procedure Component Value Units Date/Time    Stool culture [[id number]] Collected:  05/25/20 1032    Order Status:  Completed Specimen:  Stool Updated:  05/27/20 0851     Stool Culture No Salmonella, Shigella, Campylobacter, Vibrio, isolated.    Clostridium difficile EIA [[id number]] Collected:  05/25/20 2137    Order Status:  Completed Specimen:  Stool Updated:  05/25/20 2347     C. diff Antigen Negative     C difficile Toxins A+B, EIA Negative     Comment: Testing not recommended for children <24 months old.       Urine culture [[id number]] Collected:  05/22/20 1747    Order Status:  Completed Specimen:  Urine Updated:  05/24/20 0711     Urine Culture, Routine Multiple organisms isolated. None in predominance.  Repeat if      clinically necessary.    Narrative:       Preferred Collection Type->Urine, Clean Catch  Specimen Source->Urine    Clostridium difficile EIA [[id number]]     Order Status:  Canceled Specimen:  Stool             X-Ray Chest AP Portable   Final Result      CT Abdomen Pelvis   Without Contrast   Final Result      1. Mild bilaterally symmetric perinephric fat stranding.  This is new when compared to September 2019, but nonspecific.  In the absence of hydronephrosis, infectious or inflammatory involvement should be considered.   2. Sigmoid diverticula without diverticulitis.   3. Additional chronic findings as discussed above.         Electronically signed by: Richie Loco MD   Date:    05/24/2020   Time:    13:17      X-Ray Chest AP Portable   Final Result      1. Mild prominence of the pulmonary vasculature and interstitial markings, slightly increased compared to May 22.  Correlate clinically for mild interstitial edema.   2. Right hilar prominence, likely vascular, as the right hilum was normal in appearance on recent study from May 22.         Electronically signed by: Richie Loco MD   Date:    05/24/2020   Time:    10:54      US Retroperitoneal Complete   Final Result      X-Ray Chest AP Portable   Final Result            Scheduled Meds:   apixaban  2.5 mg Oral BID    FLUoxetine  20 mg Oral Daily    fluticasone propionate  1 spray Each Nostril Daily    fluticasone-umeclidin-vilanter  1 puff Inhalation Daily    gabapentin  100 mg Oral TID    levothyroxine  25 mcg Oral Before breakfast    nystatin  500,000 Units Oral QID (WM & HS)    pantoprazole  40 mg Oral Daily     Continuous Infusions:  PRN Meds:.acetaminophen, albuterol sulfate, ALPRAZolam, dextromethorphan-guaifenesin  mg/5 ml, melatonin, nitroGLYCERIN, oxyCODONE, sodium chloride 0.9%    05/24/2020: Echo  · Normal left ventricular systolic function. The estimated ejection fraction is 60%.  · Grade III (severe) left ventricular diastolic dysfunction consistent with restrictive physiology.  · No wall motion abnormalities.  · Moderate right ventricular enlargement.  · Normal right ventricular systolic function.  · Moderate left atrial enlargement.  · Mild right atrial enlargement.  · Mild tricuspid  regurgitation.  · Elevated central venous pressure (15 mmHg).  · The estimated PA systolic pressure is 104 mmHg.  · Severe pulmonary hypertension present.      Assessment & Plan:     * HAILY (acute kidney injury)- improving   BUN/Cr 37/2.2, was 60/5.2 on admission. Much improved  Likely due to ongoing diarrhea at the time of admission, diarrhea now resolved   Consulted  Dr. Cabrera/ [facility], appreciate the recs   Hold ARB/HCTZ for now   Renally dose all meds  Pt renally stable to be discharge per Dr Swanson note      Acute pulmonary edema- resolved  2D echo- Grade 3 DD, EF 60%  Received lasix 40 mg BID X q1 day and then 20 mg IV  Breathing much improved, pt laying flat in bed with no difficulty breathing   Repeat CXR this am shows interval decrease of pulmonary edema       Diarrhea- resolved   Stool studies negative   No diarrhea in last 48-72 hrs      Thrush  Nystatin swish and swallow.    Essential (primary) hypertension  Held ARB/HCTZ.  BP stable  Today 138/70     BRITTANIE on CPAP  CPAP q.h.s     Paroxysmal atrial fibrillation  Continue amiodarone   Renally dose eliquis and sotalol  Currently in sinus.    Hypokalemia  k 3.0  Repeat S K at 12pm   Renal recommended to only replace K if <3.0     Lumbar disc disease  Chronic left lower extremity pain for which she goes for nerve block every 6 months  P.r.n. Analgesia    Lower extremity weakness/debility  PT, OT recommended home health PT and OT    BMI 42.08  Discussed low carb diet, life style modification, proper hydration      Discharge Planning:   D/C this afternoon with [facility]/ [facility]    Above encounter included review of the medical records, interviewing and examining the patient face-to-face, discussion with family and other health care providers, ordering and interpreting lab/test results and formulating a plan of care.     Medical Decision Making:      [_] Low Complexity  [_] Moderate Complexity  [x] High Complexity      Luke Rivero MD  Department of Hospital  Medicine   [facility]

## 2020-05-28 NOTE — PROGRESS NOTES
"[facility]  Adult Nutrition   Progress Note (Follow-Up)    SUMMARY     Recommendations  Recommendation/Intervention: 1. Continue diet as tolerated by pt, encourage intake 2.  to assist with meal choices daily 3. RD to discontinue Nepro per pt request  Goals: 1. Pt to meet at least 75% of estimated needs via PO diet  Nutrition Goal Status: progressing towards goal    Dietitian Rounds Brief    Pt seen for f/u. Intake/appetite improving. No N/V reported. Oral thrush resolved. Not consuming Nepro--states doesn't need any more calories. Concerned re: CHO intake-- assisting with meal choices daily. RD to communicate prefs to kitchen.    Reason for Assessment  Reason For Assessment: RD follow-up  Diagnosis: (HAILY)  Relevant Medical History: HAILY, COPD, GERD, Diverticulitis, HTN  Interdisciplinary Rounds: did not attend    Nutrition Risk Screen  Nutrition Risk Screen: no indicators present    Nutrition/Diet History  Food Allergies: (Latex, clove flavoring)  Factors Affecting Nutritional Intake: decreased appetite    Anthropometrics  Temp: 98.5 °F (36.9 °C)  Height Method: Stated  Height: 4' 11" (149.9 cm)  Height (inches): 59 in  Weight Method: Bed Scale  Weight: 94.5 kg (208 lb 5.4 oz)  Weight (lb): 208.34 lb  Ideal Body Weight (IBW), Female: 95 lb  % Ideal Body Weight, Female (lb): 210.48 %  BMI (Calculated): 42.1  BMI Grade: greater than 40 - morbid obesity       Weight History:  Wt Readings from Last 10 Encounters:   05/27/20 94.5 kg (208 lb 5.4 oz)   11/05/19 93.2 kg (205 lb 6.4 oz)       Lab/Procedures/Meds: Pertinent Labs Reviewed  Clinical Chemistry:  Recent Labs   Lab 05/25/20  0524 05/26/20  0422 05/27/20  0516 05/28/20  0450    143 143 142   K 3.5 3.9 3.5 3.0*    111* 106 107   CO2 23 21* 26 25   * 117* 123* 116*   BUN 45* 48* 39* 37*   CREATININE 3.5* 3.2* 2.8* 2.2*   CALCIUM 8.5* 8.8 9.1 9.2   PROT 7.2 6.5 6.9  --    ALBUMIN 3.4* 3.1* 3.1*  --    BILITOT 1.2* 0.9 " 1.0  --    ALKPHOS 51* 54* 49*  --    AST 18 27 14  --    ALT 13 18 14  --    ANIONGAP 7* 11 11 10   ESTGFRAFRICA 14.2* 15.8* 18.6* 24.9*   EGFRNONAA 12.3* 13.7* 16.1* 21.6*   MG 1.9 2.0 1.9 1.8   PHOS 3.6 3.2 3.0  --      CBC:   Recent Labs   Lab 05/28/20  0450   WBC 6.75   RBC 4.42   HGB 12.3   HCT 39.1      MCV 89   MCH 27.8   MCHC 31.5*     Lipid Panel:  No results for input(s): CHOL, HDL, LDLCALC, TRIG, CHOLHDL in the last 168 hours.  Cardiac Profile:  Recent Labs   Lab 05/22/20  1024   CPK 17*   TROPONINI <0.030     Inflammatory Labs:  No results for input(s): CRP in the last 168 hours.  Diabetes:  No results for input(s): HGBA1C, POCTGLUCOSE in the last 168 hours.  Thyroid & Parathyroid:  Recent Labs   Lab 05/22/20  1024   TSH 2.360     Vitamins:  No results for input(s): PIPTSHJT22, [id number] in the last 168 hours.    Medications: Pertinent Medications reviewed  Scheduled Meds:   apixaban  2.5 mg Oral BID    FLUoxetine  20 mg Oral Daily    fluticasone propionate  1 spray Each Nostril Daily    fluticasone-umeclidin-vilanter  1 puff Inhalation Daily    gabapentin  100 mg Oral TID    levothyroxine  25 mcg Oral Before breakfast    nystatin  500,000 Units Oral QID (WM & HS)    pantoprazole  40 mg Oral Daily     Continuous Infusions:  PRN Meds:.acetaminophen, albuterol sulfate, ALPRAZolam, dextromethorphan-guaifenesin  mg/5 ml, melatonin, nitroGLYCERIN, oxyCODONE, sodium chloride 0.9%    Estimated/Assessed Needs  Weight Used For Calorie Calculations: 94.5 kg (208 lb 5.4 oz)  Energy Calorie Requirements (kcal): 1890 kcals/day (20 kcals/kg)  Energy Need Method: Kcal/kg  Protein Requirements: 57-76 g/day (0.6-1.8 g/kg)  Weight Used For Protein Calculations: 94.5 kg (208 lb 5.4 oz)     Estimated Fluid Requirement Method: RDA Method  RDA Method (mL): 1890       Nutrition Prescription Ordered  Current Diet Order: Renal  Oral Nutrition Supplement: Nepro BID    Evaluation of Received Nutrient/Fluid  Intake  Energy Calories Required: not meeting needs  Protein Required: not meeting needs  Tolerance: tolerating     Intake/Output Summary (Last 24 hours) at 5/28/2020 1102  Last data filed at 5/28/2020 0820  Gross per 24 hour   Intake 240 ml   Output --   Net 240 ml        % Meal Intake: 50 - 75 %    Nutrition Risk  Level of Risk/Frequency of Follow-up: moderate - high     Monitor and Evaluation  Food and Nutrient Intake: food and beverage intake, energy intake  Food and Nutrient Adminstration: diet order  Physical Activity and Function: nutrition-related ADLs and IADLs  Anthropometric Measurements: weight change, weight  Biochemical Data, Medical Tests and Procedures: gastrointestinal profile, glucose/endocrine profile, electrolyte and renal panel  Nutrition-Focused Physical Findings: overall appearance     Nutrition Follow-Up  RD Follow-up?: Yes    Stacey Reyna RD 05/28/2020 11:02 AM

## 2020-05-28 NOTE — PLAN OF CARE
05/28/20 1415   Post-Acute Status   Post-Acute Authorization Home Health   Home Health Status Referrals Sent

## 2020-05-28 NOTE — PLAN OF CARE
05/28/20 0709   Patient Assessment/Suction   Level of Consciousness (AVPU) alert   Respiratory Effort Normal;Unlabored   Expansion/Accessory Muscles/Retractions no use of accessory muscles;no retractions;expansion symmetric   All Lung Fields Breath Sounds clear   Rhythm/Pattern, Respiratory unlabored;pattern regular;depth regular;chest wiggle adequate;no shortness of breath reported   Cough Frequency infrequent   Cough Type good   PRE-TX-O2   O2 Device (Oxygen Therapy) nasal cannula   $ Is the patient on Low Flow Oxygen? Yes   Flow (L/min) 2   SpO2 99 %   Pulse Oximetry Type Intermittent   $ Pulse Oximetry - Multiple Charge Pulse Oximetry - Multiple   Pulse 95   Resp 20   Aerosol Therapy   $ Aerosol Therapy Charges PRN treatment not required   Respiratory Evaluation   $ Care Plan Tech Time 15 min

## 2020-05-28 NOTE — PLAN OF CARE
05/28/20 0810   Post-Acute Status   Post-Acute Authorization Home Health   Home Health Status Awaiting Orders for HH   Patient choice form signed by patient/caregiver List from [employer]   Spoke with patient about Freedom of Choice Form, explained to patient and family that they have the right to choose any agency, and a list of agencies was provided to patient and family to review, they verbalized an understanding, pt signed form, and form scanned into CM notes. Patient choice is [facility]. Waiting on HH orders.

## 2020-05-29 ENCOUNTER — TELEPHONE (OUTPATIENT)
Dept: FAMILY MEDICINE | Facility: CLINIC | Age: 75
End: 2020-05-29

## 2020-05-29 ENCOUNTER — PATIENT OUTREACH (OUTPATIENT)
Dept: FAMILY MEDICINE | Facility: CLINIC | Age: 75
End: 2020-05-29

## 2020-05-29 NOTE — PROGRESS NOTES
"Discharge Information     Discharge Date:  05/28/20        Primary Discharge Diagnosis:  Kidney failure      How patient is feeling since discharge from the hospital?  Feeling a little weak         Medication & Order Review     Discharge Medication Review:    Medication reconciliation performed Yes   If no, state reason why not performed: N/A       Did patient have any difficulty/problems filling prescriptions?  No           If yes, state reason and steps taken to assist in resolving issue: N/A     Does patient have any questions regarding medications?  No           If yes, state question and steps taken to answer question:  N/A    Home Health Yes   If yes, has home health contacted patient and/or initiated services? yes, they came out today   Name of Home Health Agency she doesnt remember the name of the company.    Durable Medical Equipment (DME)  No (Example: walker, wheelchair, nebulizer)   If yes, has the DME provider contacted patient and delivered equipment? N/A    DME Company N/A     Red Flag Review     Was patient educated on "red flags" or things to watch for?  No       If yes:  "Red flags" patient was told to watch for:                                         Is patient experiencing any red flags today (or any of these symptoms) (List examples of red flags specifically)?  No       Notes:                    Patient Education & Follow Up               Phone number patient will call if having any questions or problems:  pt was told to call with any questions    Appointment scheduled?  Yes      Follow-up/transition of care appointment, including the date/time and location of your appointment:  Patient was able to state the follow-up appointment correctly.        Notes:            Rescheduled transition of care appointment if the patient has a conflict:    Appointment re-scheduled?  No        Patient informed of this appointment?  No      Notes:          "

## 2020-05-29 NOTE — PT/OT/SLP DISCHARGE
Occupational Therapy Discharge Summary    Danielle Martinez  MRN: [id number]   Principal Problem: HAILY (acute kidney injury)      Patient Discharged from acute Occupational Therapy on 05/28/2020.  Please refer to prior OT note dated 05/28/2020 for functional status.    Assessment:      Patient was discharged unexpectedly.  Information required to complete an accurate discharge summary is unknown.  Refer to therapy initial evaluation and last progress note for initial and most recent functional status and goal achievement.  Recommendations made may be found in medical record.    Objective:     GOALS:   Multidisciplinary Problems     Occupational Therapy Goals        Problem: Occupational Therapy Goal    Goal Priority Disciplines Outcome Interventions   Occupational Therapy Goal     OT, PT/OT Ongoing, Progressing    Description:  Goals to be met by: d/c     Patient will increase functional independence with ADLs by performing:    LE Dressing with Stand-by Assistance.  Grooming while standing at sink with Stand-by Assistance. Met 5/26  Toileting from toilet with Stand-by Assistance for hygiene and clothing management. Met 5/26  Toilet transfer to toilet with Stand-by Assistance. Met 5/26  Toileting from toilet with Supervision for hygiene and clothing management. Met 5/27  Toilet transfer to toilet with Supervision.  New Goal   Bathing with Supervision. New Goal                       Reasons for Discontinuation of Therapy Services  Transfer to alternate level of care.      Plan:     Patient Discharged to: Home with Home Health Service    Antoinette Martinez OT  5/29/2020

## 2020-06-02 ENCOUNTER — TELEPHONE (OUTPATIENT)
Dept: FAMILY MEDICINE | Facility: CLINIC | Age: 75
End: 2020-06-02

## 2020-06-02 ENCOUNTER — OFFICE VISIT (OUTPATIENT)
Dept: FAMILY MEDICINE | Facility: CLINIC | Age: 75
End: 2020-06-02
Payer: MEDICARE

## 2020-06-02 VITALS
HEART RATE: 72 BPM | SYSTOLIC BLOOD PRESSURE: 138 MMHG | WEIGHT: 201 LBS | TEMPERATURE: 98 F | BODY MASS INDEX: 40.52 KG/M2 | DIASTOLIC BLOOD PRESSURE: 68 MMHG | HEIGHT: 59 IN

## 2020-06-02 DIAGNOSIS — I48.0 PAROXYSMAL ATRIAL FIBRILLATION: ICD-10-CM

## 2020-06-02 DIAGNOSIS — Z09 HOSPITAL DISCHARGE FOLLOW-UP: Primary | ICD-10-CM

## 2020-06-02 DIAGNOSIS — N17.9 AKI (ACUTE KIDNEY INJURY): ICD-10-CM

## 2020-06-02 DIAGNOSIS — E03.9 ACQUIRED HYPOTHYROIDISM: ICD-10-CM

## 2020-06-02 PROCEDURE — 99496 TRANSITIONAL CARE MANAGE SERVICE 7 DAY DISCHARGE: ICD-10-PCS | Mod: S$GLB,,, | Performed by: PHYSICIAN ASSISTANT

## 2020-06-02 PROCEDURE — 99496 TRANSJ CARE MGMT HIGH F2F 7D: CPT | Mod: S$GLB,,, | Performed by: PHYSICIAN ASSISTANT

## 2020-06-02 NOTE — TELEPHONE ENCOUNTER
This remind me came up today. Patient has ov with Ryan this afternoon. She recently had CMP and CBC done for another provider. Please review.

## 2020-06-02 NOTE — TELEPHONE ENCOUNTER
----- Message from [facility], RT sent at 11/11/2019 10:37 AM CST -----  Regarding: Lab due  Antony Lopez Staff         Call patient.  Diabetes is under good control with an A1c of 6.8.  CBC shows no anemia, kidneys and liver functions are very normal.  Thyroid looks normal as well.  Continue current medications and recheck in 6 months

## 2020-06-02 NOTE — PROGRESS NOTES
SUBJECTIVE:    Patient ID: Danielle Martinez is a 74 y.o. female.    Chief Complaint: Hospital Follow Up (no bottles// SW)    This is a very pleasant 74-year-old white female who presents today for hospital discharge follow-up.  The discharge summary is pasted below:    Hospital Course:   74-year-old white female with known past medical history of osteoarthritis, hiatal hernia, paroxysmal AFib admitted 05/22/2020 with diagnosis of acute kidney injury due to diarrhea at home, hypokalemia, thrush  Patient was admitted to med [facility] with tele monitoring.  Dr. Cabrera was consulted, Arb and HCTZ held, all meds were renally dosed.  Nephrotoxic medications were avoided, IV hydration cautiously with monitoring for fluid overload.  Patient was also dehydrated, patient was hydrated as well as given  Stool studies was ordered for diarrhea.  Patient was consuming lot of Glucerna at home in an attempt to lose weight.  Patient complained of being very weak and chronic joint pains in left leg.   Diarrhea improved, developed some but cough.  Patient was given 40 of IV Lasix with good diuresis and cough improvement  Renal functions improved markedly with hydration  I consulted PT OT for evaluation for sniff versus home health.  Patient informed that she has a camper next to her home which is only 2 steps I and she would stay there.  PT OT evaluation recommended home with PT and OT upon discharge with home health  Kidney functions continue to improve.  Patient ambulating in the room without assistance and difficulty.  Patient does want to go home.  I did inform patient that her A1c in November of 2019 was 6.8 which means patient is diabetic but due to her age I would rather recommend dietary control instead of putting her on hypoglycemic medications.  I discussed with patient the foods that she should and should not eat and how to prevent from becoming uncontrolled diabetic, patient verbalized understanding     I discussed the case  "with Dr. Swanson who recommended patient can be discharged today for as she is doing much better.  Noted her BUN creatinine was 37/2.2 today (was 60/5.2 on admission), discussed proper hydration with the patient.     I discussed the discharge plan with the patient.  Patient agreed.  Discussed with patient that I will repeat her serum potassium level around 12:00 p.m. and if it is within normal range I will discharge her later today around 3:04 p.m..  Patient verbalized understanding     Nurse called with the report that her serum potassium is 3.7.  Patient ready to go home.  I have requested discharge with home health with skilled nursing 2 times a week, PT 2 times a week and OT 2 times a week for her     Patient was seen and examined on the day of discharge  For complete physical exam please see the note from earlier today    Today she reports that she is slowly but surely getting her energy back. She is trying to stay hydrated. She had just recently seen her cardiologist, Dr. Kate, who was happy with everything. She si doing ok now with regard to swelling. Getting around with the assistance of her rollator. She says that her pressure has been looking good at home.  nurse following closely. Slight "phlegmy" cough.      No results displayed because visit has over 200 results.          Past Medical History:   Diagnosis Date    HAILY (acute kidney injury)     Arthritis     Juvenile diagnosed age 17    COPD (chronic obstructive pulmonary disease)     Diverticulitis     GERD (gastroesophageal reflux disease)     Hx of hiatal hernia     Hypertension     Sleep apnea      Past Surgical History:   Procedure Laterality Date    BREAST LUMPECTOMY Left     in 40's    COLONOSCOPY  2016    Dr. Reyes 5 years    COLONOSCOPY  2019    Dr. Reyes 5 years    HIATAL HERNIA REPAIR  2005    SHOULDER ARTHROSCOPY  2015     History reviewed. No pertinent family history.    Marital Status:   Alcohol History:  " reports that she drank alcohol.  Tobacco History:  reports that she has never smoked. She has never used smokeless tobacco.  Drug History:  has no drug history on file.    Review of patient's allergies indicates:   Allergen Reactions    Promethazine Anaphylaxis    Clove flavoring [flavoring agent]     Codeine Itching    Latex, natural rubber     Lorazepam Other (See Comments)    Lovastatin Other (See Comments)    Meclizine Other (See Comments)    Morphine Itching    Simvastatin Other (See Comments)    Tramadol-acetaminophen Itching       Current Outpatient Medications:     ALPRAZolam (XANAX) 1 MG tablet, Take 1 tablet (1 mg total) by mouth 2 (two) times daily as needed for Anxiety., Disp: 60 tablet, Rfl: 2    ascorbic acid, vitamin C, (VITAMIN C) 100 MG tablet, Take 100 mg by mouth once daily., Disp: , Rfl:     calcium carbonate (TUMS) 200 mg calcium (500 mg) chewable tablet, Take 1 tablet by mouth once daily., Disp: , Rfl:     ELIQUIS 5 mg Tab, Take 1 tablet (5 mg total) by mouth 2 (two) times daily., Disp: 60 tablet, Rfl: 5    mv-min/folic/vit K/lut/scru099 (ALIVE WOMEN'S 50 PLUS, BLEND, ORAL), Take 1 tablet by mouth daily as needed., Disp: , Rfl:     sotalol (BETAPACE) 80 MG tablet, Take 1 tablet (80 mg total) by mouth 2 (two) times daily., Disp: 60 tablet, Rfl: 5    albuterol (VENTOLIN HFA) 90 mcg/actuation inhaler, Inhale 2 puffs into the lungs every 4 (four) hours as needed for Wheezing. Rescue, Disp: 1 Inhaler, Rfl: 5    FLUoxetine 20 MG capsule, Take 1 capsule (20 mg total) by mouth once daily., Disp: 30 capsule, Rfl: 5    fluticasone propionate (FLONASE) 50 mcg/actuation nasal spray, 1 spray by Each Nostril route daily as needed. , Disp: , Rfl:     fluticasone-umeclidin-vilanter (TRELEGY ELLIPTA) 100-62.5-25 mcg DsDv, Trelegy Ellipta, Disp: , Rfl:     fluticasone-umeclidin-vilanter (TRELEGY ELLIPTA) 100-62.5-25 mcg DsDv, Inhale 1 puff into the lungs daily as needed. , Disp: , Rfl:      "gabapentin (NEURONTIN) 100 MG capsule, Take 1 capsule (100 mg total) by mouth 3 (three) times daily., Disp: 90 capsule, Rfl: 0    levothyroxine (SYNTHROID) 25 MCG tablet, Take 1 tablet (25 mcg total) by mouth before breakfast. Name brand only.  RIRI, Disp: 30 tablet, Rfl: 5    nitroGLYCERIN (NITROSTAT) 0.4 MG SL tablet, Place 0.4 mg under the tongue every 5 (five) minutes as needed. , Disp: , Rfl:     pantoprazole (PROTONIX) 40 MG tablet, Take 1 tablet (40 mg total) by mouth once daily., Disp: 30 tablet, Rfl: 1    Review of Systems   Constitutional: Positive for activity change and fatigue.   Respiratory: Negative for cough and shortness of breath.    Cardiovascular: Negative for chest pain and palpitations.   Genitourinary: Negative for difficulty urinating.   Neurological: Positive for weakness (chronic). Negative for dizziness, seizures, light-headedness and numbness.          Objective:      Vitals:    06/02/20 1440 06/02/20 1442 06/02/20 1506   BP: (!) 186/82 (!) 160/88 138/68   Pulse: 68  72   Temp: 98.1 °F (36.7 °C)     Weight: 91.2 kg (201 lb)     Height: 4' 11" (1.499 m)       Physical Exam   Constitutional: She is oriented to person, place, and time. She appears well-developed and well-nourished. No distress.   HENT:   Head: Normocephalic and atraumatic.   Cardiovascular: Normal rate and regular rhythm.   Pulmonary/Chest: Effort normal. No respiratory distress.   Abdominal: Soft. Bowel sounds are normal. She exhibits no distension.   Neurological: She is alert and oriented to person, place, and time.   Skin: Skin is warm and dry.   Psychiatric: She has a normal mood and affect.         Assessment:       1. Hospital discharge follow-up    2. HAILY (acute kidney injury)    3. Paroxysmal atrial fibrillation    4. Acquired hypothyroidism         Plan:       Hospital discharge follow-up  Comments:  Full medical records have been reviewed. Meds reconciled. She appears to be doing MUCH better. getting " stronger.    HAILY (acute kidney injury)  Comments:  Meds reconciled. Nephrotoxic meds have been stopped. Diabetes managed well. will need to followup with renal, Dr. Cabrera.  Orders:  -     CBC auto differential; Future; Expected date: 06/02/2020  -     Basic metabolic panel; Future; Expected date: 06/02/2020  -     Ambulatory referral/consult to Nephrology; Future; Expected date: 06/09/2020    Paroxysmal atrial fibrillation  Comments:  rate controlled. on eliquis. continue as is.    Acquired hypothyroidism  Comments:  stable, continue as is.      Follow up in about 3 months (around 9/2/2020) for Diabetic Check-Up/kidney checkup.        6/2/2020 Bimal Rossi PA-C

## 2020-06-02 NOTE — PATIENT INSTRUCTIONS
Discharge Instructions for Acute Kidney Injury  You have been diagnosed with acute kidney injury. This means that your kidneys are not working properly. When both kidneys are healthy, they help filter out fluid and waste from the blood and body. Acute kidney injury has many causes. These include urinary blockages, infection, lack of enough blood supply, and medicines that can injure kidneys. In some cases, acute kidney injury is short-term (temporary), lasting several days to a few months. This is because the kidney can repair itself. But acute kidney injury can also result in chronic kidney disease or end stage renal failure. Here are some instructions for you to follow as you recover.  Home care  · Follow any instructions for eating and drinking given to you by your healthcare provider.  ¨ Drink less fluid, if instructed by your healthcare provider.  ¨ Keep a record of everything you eat and drink.  · Measure the amount of urine and stool you have each day.  · Weigh yourself every day, at the same time of day, and in the same kind of clothes. Keep a daily record of your daily weights.  · Take your temperature every day. Keep a record of the results.  · Learn to take your own blood pressure. Keep a record of your results. Ask your healthcare provider when you should seek emergency medical attention. Your provider will tell you which blood pressure reading is dangerous.  · Avoid contact with people who have infections (colds, bronchitis, or skin conditions).  · Practice good personal hygiene. This is especially important if you have a catheter in place when you leave the hospital. Doing so helps keep you safe from infection.  · Take your medicines exactly as directed.  · You may require frequent blood and urine tests to monitor your kidney function.  Follow-up care  Follow up with your healthcare provider, or as advised.  When to seek medical care  Call your healthcare provider right away if you have any of the  following:  · Signs of bladder infection (urinating more often than usual, or burning, pain, bleeding, or hesitancy when you urinate)  · Signs of infection around your catheter (redness, swelling, warmth, or drainage)  · Rapid weight loss or weight gain, such as 3 pounds or more in 24 hours or 6 pounds or more in 7 days  · Fever above 100.4°F (38.0°C) or chills  · Muscle aches  · Night sweats  · Very little or no urine output  · Swelling of your hands, legs, or feet  · Back pain  · Abdominal pain  · Extreme tiredness   Date Last Reviewed: 2/1/2017  © 0192-5211 [employer] [address] [place] [address], [place] [address]. All rights reserved. This information is not intended as a substitute for professional medical care. Always follow your healthcare professional's instructions.

## 2020-06-03 LAB
BASOPHILS # BLD AUTO: 73 CELLS/UL (ref 0–200)
BASOPHILS NFR BLD AUTO: 1 %
BUN SERPL-MCNC: 15 MG/DL (ref 7–25)
BUN/CREAT SERPL: 10 (CALC) (ref 6–22)
CALCIUM SERPL-MCNC: 9.7 MG/DL (ref 8.6–10.4)
CHLORIDE SERPL-SCNC: 103 MMOL/L (ref 98–110)
CO2 SERPL-SCNC: 25 MMOL/L (ref 20–32)
CREAT SERPL-MCNC: 1.53 MG/DL (ref 0.6–0.93)
EOSINOPHIL # BLD AUTO: 212 CELLS/UL (ref 15–500)
EOSINOPHIL NFR BLD AUTO: 2.9 %
ERYTHROCYTE [DISTWIDTH] IN BLOOD BY AUTOMATED COUNT: 15.1 % (ref 11–15)
GFRSERPLBLD MDRD-ARVRAT: 33 ML/MIN/1.73M2
GLUCOSE SERPL-MCNC: 107 MG/DL (ref 65–139)
HCT VFR BLD AUTO: 38.6 % (ref 35–45)
HGB BLD-MCNC: 12.2 G/DL (ref 11.7–15.5)
LYMPHOCYTES # BLD AUTO: 1387 CELLS/UL (ref 850–3900)
LYMPHOCYTES NFR BLD AUTO: 19 %
MCH RBC QN AUTO: 27.2 PG (ref 27–33)
MCHC RBC AUTO-ENTMCNC: 31.6 G/DL (ref 32–36)
MCV RBC AUTO: 86.2 FL (ref 80–100)
MONOCYTES # BLD AUTO: 767 CELLS/UL (ref 200–950)
MONOCYTES NFR BLD AUTO: 10.5 %
NEUTROPHILS # BLD AUTO: 4862 CELLS/UL (ref 1500–7800)
NEUTROPHILS NFR BLD AUTO: 66.6 %
PLATELET # BLD AUTO: 444 THOUSAND/UL (ref 140–400)
PMV BLD REES-ECKER: 9.9 FL (ref 7.5–12.5)
POTASSIUM SERPL-SCNC: 3.7 MMOL/L (ref 3.5–5.3)
RBC # BLD AUTO: 4.48 MILLION/UL (ref 3.8–5.1)
SODIUM SERPL-SCNC: 142 MMOL/L (ref 135–146)
WBC # BLD AUTO: 7.3 THOUSAND/UL (ref 3.8–10.8)

## 2020-06-08 ENCOUNTER — TELEPHONE (OUTPATIENT)
Dept: FAMILY MEDICINE | Facility: CLINIC | Age: 75
End: 2020-06-08

## 2020-06-08 NOTE — TELEPHONE ENCOUNTER
Contacted office to verify receipt of referral, gave patient contact information to schedule appointment

## 2020-06-08 NOTE — TELEPHONE ENCOUNTER
----- Message from Bimal Rossi PA-C sent at 6/7/2020  8:28 PM CDT -----  Let her know that her kidneys continue to improve. Will still need to keep followup as scheduled with renal specialist.

## 2020-06-08 NOTE — PROGRESS NOTES
Spoke to patient with results verbatim per Ryan. Said that she has ov with renal June 22nd and will keep it

## 2020-06-08 NOTE — TELEPHONE ENCOUNTER
"----- Message from Arina Rodriguez MA sent at 6/8/2020  8:43 AM CDT -----  Pt called in to advise that the "kidney doctor" that Ryan referred her to, has still not given her a call to schedule.  Asked pt if she contact the "kidney doctor" herself and she got defensive and angry.  Therefore, I am just passing along her message.  Please contact her to advise.    Pt - [phone]   "

## 2020-06-09 DIAGNOSIS — F41.9 ANXIETY: Primary | ICD-10-CM

## 2020-06-09 RX ORDER — CITALOPRAM 10 MG/1
10 TABLET ORAL DAILY
Qty: 30 TABLET | Refills: 2 | Status: SHIPPED | OUTPATIENT
Start: 2020-06-09 | End: 2021-03-05

## 2020-06-09 NOTE — TELEPHONE ENCOUNTER
I would suggest we add low-dose Celexa 10 mg daily in the morning.  If she agrees please load this for me to send in.

## 2020-06-09 NOTE — TELEPHONE ENCOUNTER
----- Message from Ramon Simeon sent at 6/9/2020  9:04 AM CDT -----  Pt takes is saying her xanax isnt working she is taking 2 a day , pt wants to know what to do she feels antsy. She wants something to keep her calm   [phone]

## 2020-07-01 ENCOUNTER — EXTERNAL HOME HEALTH (OUTPATIENT)
Dept: HOME HEALTH SERVICES | Facility: HOSPITAL | Age: 75
End: 2020-07-01

## 2020-07-07 ENCOUNTER — DOCUMENT SCAN (OUTPATIENT)
Dept: HOME HEALTH SERVICES | Facility: HOSPITAL | Age: 75
End: 2020-07-07

## 2020-07-10 ENCOUNTER — DOCUMENT SCAN (OUTPATIENT)
Dept: HOME HEALTH SERVICES | Facility: HOSPITAL | Age: 75
End: 2020-07-10

## 2020-08-18 ENCOUNTER — EXTERNAL HOME HEALTH (OUTPATIENT)
Dept: HOME HEALTH SERVICES | Facility: HOSPITAL | Age: 75
End: 2020-08-18

## 2020-08-21 ENCOUNTER — EXTERNAL HOME HEALTH (OUTPATIENT)
Dept: HOME HEALTH SERVICES | Facility: HOSPITAL | Age: 75
End: 2020-08-21

## 2020-08-26 DIAGNOSIS — R06.02 SHORTNESS OF BREATH: Primary | ICD-10-CM

## 2020-08-27 ENCOUNTER — TELEPHONE (OUTPATIENT)
Dept: FAMILY MEDICINE | Facility: CLINIC | Age: 75
End: 2020-08-27

## 2020-08-27 NOTE — TELEPHONE ENCOUNTER
Spoke to pt regarding her appt on 9/3. Offered virtual visit pt stated she wants to come into the office for her appt. c-19 reviewed

## 2020-08-28 ENCOUNTER — HOSPITAL ENCOUNTER (OUTPATIENT)
Dept: PULMONOLOGY | Facility: HOSPITAL | Age: 75
Discharge: HOME | End: 2020-08-28
Attending: INTERNAL MEDICINE
Payer: MEDICARE

## 2020-08-28 VITALS — HEIGHT: 59 IN | WEIGHT: 201 LBS | BODY MASS INDEX: 40.52 KG/M2

## 2020-08-28 DIAGNOSIS — R06.02 SHORTNESS OF BREATH: ICD-10-CM

## 2020-08-28 PROCEDURE — 94618 PULMONARY STRESS TESTING: CPT

## 2020-08-28 PROCEDURE — 99900035 HC TECH TIME PER 15 MIN (STAT)

## 2020-08-28 NOTE — CARE UPDATE
"   08/28/20 0913   6MW Test   Ordering Provider Dr. Mazin Conroy   Diagnosis Qualify for Oxygen;Shortness of Breath   Height 4' 11" (1.499 m)   Weight 91.2 kg (201 lb)   BMI (Calculated) 40.6   Predicted Distance 193.24   Patient Race    6MWT Status completed without stopping   Patient Reported Dyspnea   Was O2 used? No   6MW Distance walked (feet) 1000 feet   Distance walked (meters) 304.8 meters   Did patient stop? No   Type of assistive device(s) used? a cane   Is extra documentation required for this patient? No   Pre-Exercise   Oxygen Saturation 96 %   Heart Rate 87 bpm   Alyse Dyspnea Rating  moderate   Post Exercise   Oxygen Saturation 95 %   Supplemental Oxygen Room Air   Heart Rate 128 bpm   Alyse Dyspnea Rating  heavy   Recovery   Oxygen Saturation 96 %   Supplemental Oxygen Room Air   Heart Rate 118 bpm   Alyse Dyspnea Rating  moderate   Interpretation   Is procedure ready for interpretation? Yes   Predicted Distance Meters (Calculated) 346.78 meters   Oxygen Qualification   Oxygen Qualification? No     "

## 2020-08-31 ENCOUNTER — TELEPHONE (OUTPATIENT)
Dept: FAMILY MEDICINE | Facility: CLINIC | Age: 75
End: 2020-08-31

## 2020-08-31 NOTE — TELEPHONE ENCOUNTER
----- Message from Ramon Simeon sent at 8/31/2020 11:39 AM CDT -----  Regarding: refills  Xanax   [facility] [place]   Pt [phone]

## 2020-09-03 ENCOUNTER — OFFICE VISIT (OUTPATIENT)
Dept: FAMILY MEDICINE | Facility: CLINIC | Age: 75
End: 2020-09-03
Payer: MEDICARE

## 2020-09-03 VITALS
HEART RATE: 72 BPM | SYSTOLIC BLOOD PRESSURE: 144 MMHG | DIASTOLIC BLOOD PRESSURE: 86 MMHG | HEIGHT: 59 IN | TEMPERATURE: 99 F | WEIGHT: 193 LBS | BODY MASS INDEX: 38.91 KG/M2

## 2020-09-03 DIAGNOSIS — F41.1 GENERALIZED ANXIETY DISORDER: ICD-10-CM

## 2020-09-03 DIAGNOSIS — I48.0 PAROXYSMAL ATRIAL FIBRILLATION: ICD-10-CM

## 2020-09-03 DIAGNOSIS — R10.13 ABDOMINAL PAIN, ACUTE, EPIGASTRIC: ICD-10-CM

## 2020-09-03 DIAGNOSIS — I10 ESSENTIAL (PRIMARY) HYPERTENSION: Primary | ICD-10-CM

## 2020-09-03 DIAGNOSIS — F34.1 DYSTHYMIA: ICD-10-CM

## 2020-09-03 PROCEDURE — 99214 PR OFFICE/OUTPT VISIT, EST, LEVL IV, 30-39 MIN: ICD-10-PCS | Mod: S$GLB,,, | Performed by: PHYSICIAN ASSISTANT

## 2020-09-03 PROCEDURE — 99214 OFFICE O/P EST MOD 30 MIN: CPT | Mod: S$GLB,,, | Performed by: PHYSICIAN ASSISTANT

## 2020-09-03 RX ORDER — ALPRAZOLAM 1 MG/1
1 TABLET ORAL 2 TIMES DAILY PRN
Qty: 60 TABLET | Refills: 2 | Status: SHIPPED | OUTPATIENT
Start: 2020-09-03 | End: 2021-03-19 | Stop reason: SDUPTHER

## 2020-09-03 NOTE — PROGRESS NOTES
SUBJECTIVE:    Patient ID: Danielle Martinez is a 75 y.o. female.    Chief Complaint: Follow-up ((no bottles))    75-year-old female presenting today for follow-up. She reports overall doing ok since her last visit. She has a history of COPD and reports that she still feels short of breath with exertion. She follows with Dr. Conroy and last saw him last week for an at home oxygen test which she passed and does not require supplemental oxygen.  She is currently using her Trelegy inhaler. She reports her Xanax is helping with her anxiety and her depression is well-managed on Celexa.  Additionally, she states that she has a new abdominal pain that starts at the midline in her upper epigastric area and then radiates bilaterally to her flanks that started 1 week ago. She describes the pain as a dull ache. It is more painful when lying down at night or with exertion. She had 1 episode of emesis last night prior to eating dinner. She denies any chest pain, headaches, changes in bowel habits, nausea, hematemesis or hematochezia.       Office Visit on 06/02/2020   Component Date Value Ref Range Status    WBC 06/02/2020 7.3  3.8 - 10.8 Thousand/uL Final    RBC 06/02/2020 4.48  3.80 - 5.10 Million/uL Final    Hemoglobin 06/02/2020 12.2  11.7 - 15.5 g/dL Final    Hematocrit 06/02/2020 38.6  35.0 - 45.0 % Final    Mean Corpuscular Volume 06/02/2020 86.2  80.0 - 100.0 fL Final    Mean Corpuscular Hemoglobin 06/02/2020 27.2  27.0 - 33.0 pg Final    Mean Corpuscular Hemoglobin Conc 06/02/2020 31.6* 32.0 - 36.0 g/dL Final    RDW 06/02/2020 15.1* 11.0 - 15.0 % Final    Platelets 06/02/2020 444* 140 - 400 Thousand/uL Final    MPV 06/02/2020 9.9  7.5 - 12.5 fL Final    Neutrophils Absolute 06/02/2020 4,862  1,500 - 7,800 cells/uL Final    Lymph # 06/02/2020 1,387  850 - 3,900 cells/uL Final    Mono # 06/02/2020 767  200 - 950 cells/uL Final    Eos # 06/02/2020 212  15 - 500 cells/uL Final    Baso # 06/02/2020 73  0 -  200 cells/uL Final    Neutrophils Relative 06/02/2020 66.6  % Final    Lymph% 06/02/2020 19.0  % Final    Mono% 06/02/2020 10.5  % Final    Eosinophil% 06/02/2020 2.9  % Final    Basophil% 06/02/2020 1.0  % Final    Glucose 06/02/2020 107  65 - 139 mg/dL Final    BUN, Bld 06/02/2020 15  7 - 25 mg/dL Final    Creatinine 06/02/2020 1.53* 0.60 - 0.93 mg/dL Final    eGFR if non African American 06/02/2020 33* > OR = 60 mL/min/1.73m2 Final    eGFR if  06/02/2020 39* > OR = 60 mL/min/1.73m2 Final    BUN/Creatinine Ratio 06/02/2020 10  6 - 22 (calc) Final    Sodium 06/02/2020 142  135 - 146 mmol/L Final    Potassium 06/02/2020 3.7  3.5 - 5.3 mmol/L Final    Chloride 06/02/2020 103  98 - 110 mmol/L Final    CO2 06/02/2020 25  20 - 32 mmol/L Final    Calcium 06/02/2020 9.7  8.6 - 10.4 mg/dL Final   No results displayed because visit has over 200 results.          Past Medical History:   Diagnosis Date    HAILY (acute kidney injury)     Arthritis     Juvenile diagnosed age 17    COPD (chronic obstructive pulmonary disease)     Diverticulitis     GERD (gastroesophageal reflux disease)     Hx of hiatal hernia     Hypertension     Sleep apnea      Past Surgical History:   Procedure Laterality Date    BREAST LUMPECTOMY Left     in 40's    COLONOSCOPY  2016    Dr. Reyes 5 years    COLONOSCOPY  2019    Dr. Reyes 5 years    HIATAL HERNIA REPAIR  2005    SHOULDER ARTHROSCOPY  2015     History reviewed. No pertinent family history.    Marital Status:   Alcohol History:  reports previous alcohol use.  Tobacco History:  reports that she has never smoked. She has never used smokeless tobacco.  Drug History:  has no history on file for drug.    Review of patient's allergies indicates:   Allergen Reactions    Promethazine Anaphylaxis    Clove flavoring [flavoring agent]     Codeine Itching    Latex, natural rubber     Lorazepam Other (See Comments)    Lovastatin Other  (See Comments)    Meclizine Other (See Comments)    Morphine Itching    Simvastatin Other (See Comments)    Tramadol-acetaminophen Itching       Current Outpatient Medications:     albuterol (VENTOLIN HFA) 90 mcg/actuation inhaler, Inhale 2 puffs into the lungs every 4 (four) hours as needed for Wheezing. Rescue, Disp: 1 Inhaler, Rfl: 5    ALPRAZolam (XANAX) 1 MG tablet, Take 1 tablet (1 mg total) by mouth 2 (two) times daily as needed for Anxiety., Disp: 60 tablet, Rfl: 2    ascorbic acid, vitamin C, (VITAMIN C) 100 MG tablet, Take 100 mg by mouth once daily., Disp: , Rfl:     calcium carbonate (TUMS) 200 mg calcium (500 mg) chewable tablet, Take 1 tablet by mouth once daily., Disp: , Rfl:     citalopram (CELEXA) 10 MG tablet, Take 1 tablet (10 mg total) by mouth once daily., Disp: 30 tablet, Rfl: 2    ELIQUIS 5 mg Tab, Take 1 tablet (5 mg total) by mouth 2 (two) times daily., Disp: 60 tablet, Rfl: 5    FLUoxetine 20 MG capsule, Take 1 capsule (20 mg total) by mouth once daily., Disp: 30 capsule, Rfl: 5    fluticasone propionate (FLONASE) 50 mcg/actuation nasal spray, 1 spray by Each Nostril route daily as needed. , Disp: , Rfl:     fluticasone-umeclidin-vilanter (TRELEGY ELLIPTA) 100-62.5-25 mcg DsDv, Trelegy Ellipta, Disp: , Rfl:     fluticasone-umeclidin-vilanter (TRELEGY ELLIPTA) 100-62.5-25 mcg DsDv, Inhale 1 puff into the lungs daily as needed. , Disp: , Rfl:     gabapentin (NEURONTIN) 100 MG capsule, Take 1 capsule (100 mg total) by mouth 3 (three) times daily., Disp: 90 capsule, Rfl: 0    levothyroxine (SYNTHROID) 25 MCG tablet, Take 1 tablet (25 mcg total) by mouth before breakfast. Name brand only.  RIRI, Disp: 30 tablet, Rfl: 5    mv-min/folic/vit K/lut/qdef919 (ALIVE WOMEN'S 50 PLUS, BLEND, ORAL), Take 1 tablet by mouth daily as needed., Disp: , Rfl:     nitroGLYCERIN (NITROSTAT) 0.4 MG SL tablet, Place 0.4 mg under the tongue every 5 (five) minutes as needed. , Disp: , Rfl:      "pantoprazole (PROTONIX) 40 MG tablet, Take 1 tablet (40 mg total) by mouth once daily., Disp: 30 tablet, Rfl: 1    sotalol (BETAPACE) 80 MG tablet, Take 1 tablet (80 mg total) by mouth 2 (two) times daily., Disp: 60 tablet, Rfl: 5    Review of Systems   Constitutional: Negative for appetite change, chills, fatigue, fever and unexpected weight change.   HENT: Negative for congestion.    Respiratory: Positive for shortness of breath. Negative for cough and chest tightness.    Cardiovascular: Negative for chest pain and palpitations.   Gastrointestinal: Positive for abdominal pain. Negative for abdominal distention.   Endocrine: Negative for cold intolerance and heat intolerance.   Genitourinary: Negative for difficulty urinating and dysuria.   Musculoskeletal: Negative for arthralgias and back pain.   Neurological: Negative for dizziness, weakness and headaches.          Objective:      Vitals:    09/03/20 0904 09/03/20 0907 09/03/20 0938   BP: (!) 148/92 (!) 150/94 (!) 144/86   Pulse: 72     Temp: 98.6 °F (37 °C)     Weight: 87.5 kg (193 lb)     Height: 4' 11" (1.499 m)       Physical Exam  Constitutional:       General: She is not in acute distress.     Appearance: She is well-developed.   HENT:      Head: Normocephalic and atraumatic.   Eyes:      Conjunctiva/sclera: Conjunctivae normal.      Pupils: Pupils are equal, round, and reactive to light.   Neck:      Musculoskeletal: Normal range of motion and neck supple.      Thyroid: No thyromegaly.   Cardiovascular:      Rate and Rhythm: Normal rate and regular rhythm.      Heart sounds: Normal heart sounds.   Pulmonary:      Effort: Pulmonary effort is normal. No respiratory distress.      Breath sounds: Normal breath sounds.   Abdominal:      General: Bowel sounds are normal. There is no distension.      Palpations: Abdomen is soft.      Tenderness: There is no abdominal tenderness. There is no guarding or rebound.   Musculoskeletal: Normal range of motion. "   Skin:     General: Skin is warm and dry.      Findings: No erythema.   Neurological:      Mental Status: She is alert and oriented to person, place, and time.      Cranial Nerves: No cranial nerve deficit.           Assessment:       1. Essential (primary) hypertension    2. Generalized anxiety disorder    3. Paroxysmal atrial fibrillation    4. Dysthymia    5. Abdominal pain, acute, epigastric    6. Generalized anxiety disorder         Plan:       Essential (primary) hypertension  Comments:  Blood pressure elevated today. She reports she hasnt taking her sotalol yet today. Continue as is.     Generalized anxiety disorder  Comments:  She reports her anxiety is well managed with Celexa and Xanax prn. Continue as is.   Orders:  -     ALPRAZolam (XANAX) 1 MG tablet; Take 1 tablet (1 mg total) by mouth 2 (two) times daily as needed for Anxiety.  Dispense: 60 tablet; Refill: 2    Paroxysmal atrial fibrillation  Comments:  rate controlled on Eliquis. Continue as is.     Dysthymia  Comments:  She reports well-managed on Celexa. Continue as is.     Abdominal pain, acute, epigastric    Generalized anxiety disorder  -     ALPRAZolam (XANAX) 1 MG tablet; Take 1 tablet (1 mg total) by mouth 2 (two) times daily as needed for Anxiety.  Dispense: 60 tablet; Refill: 2      Follow up in about 3 months (around 12/3/2020) for BP Check-Up.        9/3/2020 Bimal Rossi PA-C

## 2020-09-03 NOTE — PATIENT INSTRUCTIONS
Controlling High Blood Pressure  High blood pressure (hypertension) is often called the silent killer. This is because many people who have it dont know it. High blood pressure is defined as 140/90 mm Hg or higher. Know your blood pressure and remember to check it regularly. Doing so can save your life. Here are some things you can do to help control your blood pressure.    Choose heart-healthy foods  · Select low-salt, low-fat foods. Limit sodium intake to 2,400 mg per day or the amount suggested by your healthcare provider.  · Limit canned, dried, cured, packaged, and fast foods. These can contain a lot of salt.  · Eat 8 to 10 servings of fruits and vegetables every day.  · Choose lean meats, fish, or chicken.  · Eat whole-grain pasta, brown rice, and beans.  · Eat 2 to 3 servings of low-fat or fat-free dairy products.  · Ask your doctor about the DASH eating plan. This plan helps reduce blood pressure.  · When you go to a restaurant, ask that your meal be prepared with no added salt.  Maintain a healthy weight  · Ask your healthcare provider how many calories to eat a day. Then stick to that number.  · Ask your healthcare provider what weight range is healthiest for you. If you are overweight, a weight loss of only 3% to 5% of your body weight can help lower blood pressure. Generally, a good weight loss goal is to lose 10% of your body weight in a year.  · Limit snacks and sweets.  · Get regular exercise.  Get up and get active  · Choose activities you enjoy. Find ones you can do with friends or family. This includes bicycling, dancing, walking, and jogging.  · Park farther away from building entrances.  · Use stairs instead of the elevator.  · When you can, walk or bike instead of driving.  · [place] leaves, garden, or do household repairs.  · Be active at a moderate to vigorous level of physical activity for at least 40 minutes for a minimum of 3 to 4 days a week.   Manage stress  · Make time to relax and enjoy  life. Find time to laugh.  · Communicate your concerns with your loved ones and your healthcare provider.  · Visit with family and friends, and keep up with hobbies.  Limit alcohol and quit smoking  · Men should have no more than 2 drinks per day.  · Women should have no more than 1 drink per day.  · Talk with your healthcare provider about quitting smoking. Smoking significantly increases your risk for heart disease and stroke. Ask your healthcare provider about community smoking cessation programs and other options.  Medicines  If lifestyle changes arent enough, your healthcare provider may prescribe high blood pressure medicine. Take all medicines as prescribed. If you have any questions about your medicines, ask your healthcare provider before stopping or changing them.   Date Last Reviewed: 4/27/2016  © 3879-5282 [employer], [employer]. [address] [place] [address], [place] [address]. All rights reserved. This information is not intended as a substitute for professional medical care. Always follow your healthcare professional's instructions.

## 2020-09-08 ENCOUNTER — TELEPHONE (OUTPATIENT)
Dept: FAMILY MEDICINE | Facility: CLINIC | Age: 75
End: 2020-09-08

## 2020-09-08 NOTE — TELEPHONE ENCOUNTER
----- Message from Tess Garner sent at 9/8/2020  9:32 AM CDT -----  Pt calling says she has fluid on both hand & feet for several days now, compared her feet to watermelons. Pt is requesting something be called into [place] [facility].   # [phone]

## 2020-09-10 ENCOUNTER — TELEPHONE (OUTPATIENT)
Dept: FAMILY MEDICINE | Facility: CLINIC | Age: 75
End: 2020-09-10

## 2020-09-10 NOTE — TELEPHONE ENCOUNTER
Pt on care gap list to have mammogram done. Spoke to pt to let her know pt stated she doesn't want to have mammo done

## 2020-09-14 ENCOUNTER — TELEPHONE (OUTPATIENT)
Dept: FAMILY MEDICINE | Facility: CLINIC | Age: 75
End: 2020-09-14

## 2020-09-14 NOTE — TELEPHONE ENCOUNTER
----- Message from Hugh Victoria sent at 9/14/2020  9:42 AM CDT -----  Regarding: Needs lab results  Contact: Danielle Martinez  Pt would like to know the results from her blood work she had done last week . Please give her a call back # [phone]  She says she would really like to know why her feet are so swollen, because her left foot is very swollen . She says she feels as if she's have CHF

## 2020-09-14 NOTE — TELEPHONE ENCOUNTER
If she is still having SOB and cough she should be evaluated with anyone with an opening this week. She wasn't having this when we saw her 2 weeks ago

## 2020-09-14 NOTE — TELEPHONE ENCOUNTER
Spoke to pt regarding message below. Pt stated she spoke to the doctors nurse who ordered the labs and she stated pts labs were fine. Pt stated her feet/ankles  are still swollen, having trouble breathing, slight cough, no fever.

## 2020-09-16 ENCOUNTER — TELEPHONE (OUTPATIENT)
Dept: FAMILY MEDICINE | Facility: CLINIC | Age: 75
End: 2020-09-16

## 2020-09-16 NOTE — TELEPHONE ENCOUNTER
"----- Message from Tess Garner sent at 9/16/2020 10:49 AM CDT -----  When calling lucy's confirmations this pt said she will come but "she just wants to lay in her bed that she does not care anymore what happens"  I offered her to speak to a nurse she declined saying " ill come lucy but there is not point"      "

## 2020-11-17 ENCOUNTER — CLINICAL SUPPORT (OUTPATIENT)
Dept: FAMILY MEDICINE | Facility: CLINIC | Age: 75
End: 2020-11-17
Payer: MEDICARE

## 2020-11-17 DIAGNOSIS — Z23 NEED FOR INFLUENZA VACCINATION: Primary | ICD-10-CM

## 2020-11-17 PROCEDURE — 90662 FLU VACCINE - QUADRIVALENT - HIGH DOSE (65+) PRESERVATIVE FREE IM: ICD-10-PCS | Mod: S$GLB,,, | Performed by: NURSE PRACTITIONER

## 2020-11-17 PROCEDURE — 90662 IIV NO PRSV INCREASED AG IM: CPT | Mod: S$GLB,,, | Performed by: NURSE PRACTITIONER

## 2020-11-17 PROCEDURE — G0008 FLU VACCINE - QUADRIVALENT - HIGH DOSE (65+) PRESERVATIVE FREE IM: ICD-10-PCS | Mod: S$GLB,,, | Performed by: NURSE PRACTITIONER

## 2020-11-17 PROCEDURE — G0008 ADMIN INFLUENZA VIRUS VAC: HCPCS | Mod: S$GLB,,, | Performed by: NURSE PRACTITIONER

## 2021-01-19 ENCOUNTER — TELEPHONE (OUTPATIENT)
Dept: FAMILY MEDICINE | Facility: CLINIC | Age: 76
End: 2021-01-19

## 2021-01-21 ENCOUNTER — TELEPHONE (OUTPATIENT)
Dept: FAMILY MEDICINE | Facility: CLINIC | Age: 76
End: 2021-01-21

## 2021-02-01 ENCOUNTER — TELEPHONE (OUTPATIENT)
Dept: FAMILY MEDICINE | Facility: CLINIC | Age: 76
End: 2021-02-01

## 2021-02-01 DIAGNOSIS — Z79.899 ENCOUNTER FOR LONG-TERM (CURRENT) USE OF OTHER MEDICATIONS: Primary | ICD-10-CM

## 2021-02-01 DIAGNOSIS — I10 ESSENTIAL (PRIMARY) HYPERTENSION: ICD-10-CM

## 2021-02-01 DIAGNOSIS — R73.03 PRE-DIABETES: ICD-10-CM

## 2021-02-04 LAB
ALBUMIN SERPL-MCNC: 4.2 G/DL (ref 3.6–5.1)
ALBUMIN/GLOB SERPL: 1.5 (CALC) (ref 1–2.5)
ALP SERPL-CCNC: 54 U/L (ref 37–153)
ALT SERPL-CCNC: 12 U/L (ref 6–29)
AST SERPL-CCNC: 17 U/L (ref 10–35)
BASOPHILS # BLD AUTO: 50 CELLS/UL (ref 0–200)
BASOPHILS NFR BLD AUTO: 0.7 %
BILIRUB SERPL-MCNC: 1.1 MG/DL (ref 0.2–1.2)
BUN SERPL-MCNC: 16 MG/DL (ref 7–25)
BUN/CREAT SERPL: ABNORMAL (CALC) (ref 6–22)
CALCIUM SERPL-MCNC: 9.6 MG/DL (ref 8.6–10.4)
CHLORIDE SERPL-SCNC: 102 MMOL/L (ref 98–110)
CHOLEST SERPL-MCNC: 169 MG/DL
CHOLEST/HDLC SERPL: 4.1 (CALC)
CO2 SERPL-SCNC: 28 MMOL/L (ref 20–32)
CREAT SERPL-MCNC: 0.87 MG/DL (ref 0.6–0.93)
EOSINOPHIL # BLD AUTO: 130 CELLS/UL (ref 15–500)
EOSINOPHIL NFR BLD AUTO: 1.8 %
ERYTHROCYTE [DISTWIDTH] IN BLOOD BY AUTOMATED COUNT: 13.6 % (ref 11–15)
GFRSERPLBLD MDRD-ARVRAT: 66 ML/MIN/1.73M2
GLOBULIN SER CALC-MCNC: 2.8 G/DL (CALC) (ref 1.9–3.7)
GLUCOSE SERPL-MCNC: 101 MG/DL (ref 65–99)
HBA1C MFR BLD: 5.8 % OF TOTAL HGB
HCT VFR BLD AUTO: 42.9 % (ref 35–45)
HDLC SERPL-MCNC: 41 MG/DL
HGB BLD-MCNC: 13.5 G/DL (ref 11.7–15.5)
LDLC SERPL CALC-MCNC: 105 MG/DL (CALC)
LYMPHOCYTES # BLD AUTO: 1757 CELLS/UL (ref 850–3900)
LYMPHOCYTES NFR BLD AUTO: 24.4 %
MCH RBC QN AUTO: 28.7 PG (ref 27–33)
MCHC RBC AUTO-ENTMCNC: 31.5 G/DL (ref 32–36)
MCV RBC AUTO: 91.1 FL (ref 80–100)
MONOCYTES # BLD AUTO: 540 CELLS/UL (ref 200–950)
MONOCYTES NFR BLD AUTO: 7.5 %
NEUTROPHILS # BLD AUTO: 4723 CELLS/UL (ref 1500–7800)
NEUTROPHILS NFR BLD AUTO: 65.6 %
NONHDLC SERPL-MCNC: 128 MG/DL (CALC)
PLATELET # BLD AUTO: 323 THOUSAND/UL (ref 140–400)
PMV BLD REES-ECKER: 10.5 FL (ref 7.5–12.5)
POTASSIUM SERPL-SCNC: 4.6 MMOL/L (ref 3.5–5.3)
PROT SERPL-MCNC: 7 G/DL (ref 6.1–8.1)
RBC # BLD AUTO: 4.71 MILLION/UL (ref 3.8–5.1)
SODIUM SERPL-SCNC: 141 MMOL/L (ref 135–146)
TRIGL SERPL-MCNC: 135 MG/DL
TSH SERPL-ACNC: 3.17 MIU/L (ref 0.4–4.5)
WBC # BLD AUTO: 7.2 THOUSAND/UL (ref 3.8–10.8)

## 2021-02-09 ENCOUNTER — TELEPHONE (OUTPATIENT)
Dept: FAMILY MEDICINE | Facility: CLINIC | Age: 76
End: 2021-02-09

## 2021-02-25 ENCOUNTER — TELEPHONE (OUTPATIENT)
Dept: FAMILY MEDICINE | Facility: CLINIC | Age: 76
End: 2021-02-25

## 2021-03-05 ENCOUNTER — OFFICE VISIT (OUTPATIENT)
Dept: FAMILY MEDICINE | Facility: CLINIC | Age: 76
End: 2021-03-05
Payer: MEDICARE

## 2021-03-05 ENCOUNTER — TELEPHONE (OUTPATIENT)
Dept: FAMILY MEDICINE | Facility: CLINIC | Age: 76
End: 2021-03-05

## 2021-03-05 VITALS
HEIGHT: 59 IN | WEIGHT: 193 LBS | BODY MASS INDEX: 38.91 KG/M2 | DIASTOLIC BLOOD PRESSURE: 88 MMHG | HEART RATE: 72 BPM | SYSTOLIC BLOOD PRESSURE: 138 MMHG

## 2021-03-05 DIAGNOSIS — Z13.820 SPECIAL SCREENING FOR OSTEOPOROSIS: ICD-10-CM

## 2021-03-05 DIAGNOSIS — F32.1 CURRENT MODERATE EPISODE OF MAJOR DEPRESSIVE DISORDER WITHOUT PRIOR EPISODE: ICD-10-CM

## 2021-03-05 DIAGNOSIS — Z78.0 MENOPAUSE: Primary | ICD-10-CM

## 2021-03-05 PROCEDURE — 99213 OFFICE O/P EST LOW 20 MIN: CPT | Mod: S$GLB,,, | Performed by: NURSE PRACTITIONER

## 2021-03-05 PROCEDURE — 99213 PR OFFICE/OUTPT VISIT, EST, LEVL III, 20-29 MIN: ICD-10-PCS | Mod: S$GLB,,, | Performed by: NURSE PRACTITIONER

## 2021-03-05 RX ORDER — CITALOPRAM 20 MG/1
20 TABLET, FILM COATED ORAL DAILY
Qty: 30 TABLET | Refills: 1 | Status: SHIPPED | OUTPATIENT
Start: 2021-03-05 | End: 2021-04-28 | Stop reason: SDUPTHER

## 2021-03-12 ENCOUNTER — HOSPITAL ENCOUNTER (OUTPATIENT)
Dept: RADIOLOGY | Facility: HOSPITAL | Age: 76
Discharge: HOME | End: 2021-03-12
Attending: NURSE PRACTITIONER
Payer: MEDICARE

## 2021-03-12 DIAGNOSIS — Z78.0 MENOPAUSE: ICD-10-CM

## 2021-03-12 DIAGNOSIS — Z13.820 SPECIAL SCREENING FOR OSTEOPOROSIS: ICD-10-CM

## 2021-03-12 PROCEDURE — 77080 DXA BONE DENSITY AXIAL: CPT | Mod: TC,PO

## 2021-03-15 ENCOUNTER — TELEPHONE (OUTPATIENT)
Dept: FAMILY MEDICINE | Facility: CLINIC | Age: 76
End: 2021-03-15

## 2021-03-16 ENCOUNTER — TELEPHONE (OUTPATIENT)
Dept: FAMILY MEDICINE | Facility: CLINIC | Age: 76
End: 2021-03-16

## 2021-03-19 DIAGNOSIS — F41.1 GENERALIZED ANXIETY DISORDER: ICD-10-CM

## 2021-03-19 RX ORDER — ALPRAZOLAM 1 MG/1
1 TABLET ORAL 2 TIMES DAILY PRN
Qty: 60 TABLET | Refills: 2 | Status: SHIPPED | OUTPATIENT
Start: 2021-03-19 | End: 2021-04-28 | Stop reason: SDUPTHER

## 2021-03-30 ENCOUNTER — OFFICE VISIT (OUTPATIENT)
Dept: FAMILY MEDICINE | Facility: CLINIC | Age: 76
End: 2021-03-30
Payer: MEDICARE

## 2021-03-30 VITALS
SYSTOLIC BLOOD PRESSURE: 130 MMHG | HEART RATE: 64 BPM | WEIGHT: 194 LBS | BODY MASS INDEX: 39.11 KG/M2 | DIASTOLIC BLOOD PRESSURE: 70 MMHG | HEIGHT: 59 IN

## 2021-03-30 DIAGNOSIS — Z79.01 CHRONIC ANTICOAGULATION: ICD-10-CM

## 2021-03-30 DIAGNOSIS — E66.01 CLASS 2 SEVERE OBESITY DUE TO EXCESS CALORIES WITH SERIOUS COMORBIDITY AND BODY MASS INDEX (BMI) OF 39.0 TO 39.9 IN ADULT: ICD-10-CM

## 2021-03-30 DIAGNOSIS — F41.1 GENERALIZED ANXIETY DISORDER: ICD-10-CM

## 2021-03-30 DIAGNOSIS — K21.9 GASTRO-ESOPHAGEAL REFLUX DISEASE WITHOUT ESOPHAGITIS: ICD-10-CM

## 2021-03-30 DIAGNOSIS — F32.1 CURRENT MODERATE EPISODE OF MAJOR DEPRESSIVE DISORDER WITHOUT PRIOR EPISODE: ICD-10-CM

## 2021-03-30 DIAGNOSIS — I48.0 PAROXYSMAL ATRIAL FIBRILLATION: ICD-10-CM

## 2021-03-30 DIAGNOSIS — E03.9 ACQUIRED HYPOTHYROIDISM: ICD-10-CM

## 2021-03-30 DIAGNOSIS — M51.9 LUMBAR DISC DISEASE: Primary | ICD-10-CM

## 2021-03-30 DIAGNOSIS — Z00.00 ENCOUNTER FOR PREVENTIVE HEALTH EXAMINATION: ICD-10-CM

## 2021-03-30 DIAGNOSIS — Z74.09 OTHER REDUCED MOBILITY: ICD-10-CM

## 2021-03-30 DIAGNOSIS — I10 ESSENTIAL (PRIMARY) HYPERTENSION: ICD-10-CM

## 2021-03-30 DIAGNOSIS — J43.1 PANLOBULAR EMPHYSEMA: ICD-10-CM

## 2021-03-30 DIAGNOSIS — M81.0 AGE-RELATED OSTEOPOROSIS WITHOUT CURRENT PATHOLOGICAL FRACTURE: ICD-10-CM

## 2021-03-30 DIAGNOSIS — G47.33 OSA ON CPAP: ICD-10-CM

## 2021-03-30 PROCEDURE — G0439 PR MEDICARE ANNUAL WELLNESS SUBSEQUENT VISIT: ICD-10-PCS | Mod: S$GLB,,, | Performed by: NURSE PRACTITIONER

## 2021-03-30 PROCEDURE — G0439 PPPS, SUBSEQ VISIT: HCPCS | Mod: S$GLB,,, | Performed by: NURSE PRACTITIONER

## 2021-04-28 ENCOUNTER — OFFICE VISIT (OUTPATIENT)
Dept: FAMILY MEDICINE | Facility: CLINIC | Age: 76
End: 2021-04-28
Payer: MEDICARE

## 2021-04-28 VITALS
HEART RATE: 84 BPM | BODY MASS INDEX: 39.92 KG/M2 | HEIGHT: 59 IN | WEIGHT: 198 LBS | DIASTOLIC BLOOD PRESSURE: 84 MMHG | SYSTOLIC BLOOD PRESSURE: 138 MMHG

## 2021-04-28 DIAGNOSIS — J43.1 PANLOBULAR EMPHYSEMA: ICD-10-CM

## 2021-04-28 DIAGNOSIS — I10 ESSENTIAL (PRIMARY) HYPERTENSION: ICD-10-CM

## 2021-04-28 DIAGNOSIS — N18.31 STAGE 3A CHRONIC KIDNEY DISEASE: ICD-10-CM

## 2021-04-28 DIAGNOSIS — J20.9 ACUTE BRONCHITIS, UNSPECIFIED ORGANISM: Primary | ICD-10-CM

## 2021-04-28 DIAGNOSIS — M08.90 JUVENILE ARTHRITIS, UNSPECIFIED, UNSPECIFIED SITE: ICD-10-CM

## 2021-04-28 DIAGNOSIS — F41.1 GENERALIZED ANXIETY DISORDER: ICD-10-CM

## 2021-04-28 DIAGNOSIS — I48.0 PAROXYSMAL ATRIAL FIBRILLATION: ICD-10-CM

## 2021-04-28 DIAGNOSIS — F32.1 CURRENT MODERATE EPISODE OF MAJOR DEPRESSIVE DISORDER WITHOUT PRIOR EPISODE: ICD-10-CM

## 2021-04-28 DIAGNOSIS — E03.9 ACQUIRED HYPOTHYROIDISM: ICD-10-CM

## 2021-04-28 DIAGNOSIS — I50.9 HEART FAILURE, UNSPECIFIED HF CHRONICITY, UNSPECIFIED HEART FAILURE TYPE: ICD-10-CM

## 2021-04-28 DIAGNOSIS — M51.9 LUMBAR DISC DISEASE: ICD-10-CM

## 2021-04-28 DIAGNOSIS — K22.2 SCHATZKI'S RING: ICD-10-CM

## 2021-04-28 DIAGNOSIS — K21.9 GASTRO-ESOPHAGEAL REFLUX DISEASE WITHOUT ESOPHAGITIS: ICD-10-CM

## 2021-04-28 PROCEDURE — 99214 PR OFFICE/OUTPT VISIT, EST, LEVL IV, 30-39 MIN: ICD-10-PCS | Mod: S$GLB,,, | Performed by: FAMILY MEDICINE

## 2021-04-28 PROCEDURE — 99214 OFFICE O/P EST MOD 30 MIN: CPT | Mod: S$GLB,,, | Performed by: FAMILY MEDICINE

## 2021-04-28 RX ORDER — FUROSEMIDE 20 MG/1
20 TABLET ORAL 2 TIMES DAILY
COMMUNITY
End: 2021-08-26 | Stop reason: SDUPTHER

## 2021-04-28 RX ORDER — ALPRAZOLAM 1 MG/1
1 TABLET ORAL 2 TIMES DAILY PRN
Qty: 60 TABLET | Refills: 2 | Status: SHIPPED | OUTPATIENT
Start: 2021-04-28 | End: 2021-08-03 | Stop reason: SDUPTHER

## 2021-04-28 RX ORDER — LEVOTHYROXINE SODIUM 25 UG/1
25 TABLET ORAL
Qty: 30 TABLET | Refills: 5 | Status: SHIPPED | OUTPATIENT
Start: 2021-04-28 | End: 2021-08-26 | Stop reason: SDUPTHER

## 2021-04-28 RX ORDER — CITALOPRAM 20 MG/1
20 TABLET, FILM COATED ORAL DAILY
Qty: 30 TABLET | Refills: 1 | Status: SHIPPED | OUTPATIENT
Start: 2021-04-28 | End: 2023-11-14

## 2021-04-28 RX ORDER — DOXYCYCLINE 100 MG/1
100 CAPSULE ORAL 2 TIMES DAILY
Qty: 20 CAPSULE | Refills: 0 | Status: ON HOLD | OUTPATIENT
Start: 2021-04-28 | End: 2021-06-07 | Stop reason: HOSPADM

## 2021-04-28 RX ORDER — APIXABAN 5 MG/1
5 TABLET, FILM COATED ORAL 2 TIMES DAILY
Qty: 60 TABLET | Refills: 5 | Status: SHIPPED | OUTPATIENT
Start: 2021-04-28 | End: 2021-08-26 | Stop reason: SDUPTHER

## 2021-04-28 RX ORDER — PANTOPRAZOLE SODIUM 40 MG/1
40 TABLET, DELAYED RELEASE ORAL 2 TIMES DAILY
Qty: 60 TABLET | Refills: 5 | Status: SHIPPED | OUTPATIENT
Start: 2021-04-28 | End: 2021-09-26

## 2021-04-28 RX ORDER — METOPROLOL SUCCINATE 100 MG/1
100 TABLET, EXTENDED RELEASE ORAL DAILY
COMMUNITY
Start: 2021-04-01 | End: 2021-08-26 | Stop reason: SDUPTHER

## 2021-04-28 RX ORDER — POTASSIUM CHLORIDE 750 MG/1
10 TABLET, EXTENDED RELEASE ORAL 2 TIMES DAILY
COMMUNITY
End: 2021-08-26 | Stop reason: SDUPTHER

## 2021-05-31 ENCOUNTER — TELEPHONE (OUTPATIENT)
Dept: FAMILY MEDICINE | Facility: CLINIC | Age: 76
End: 2021-05-31

## 2021-05-31 ENCOUNTER — OFFICE VISIT (OUTPATIENT)
Dept: FAMILY MEDICINE | Facility: CLINIC | Age: 76
End: 2021-05-31
Payer: MEDICARE

## 2021-05-31 ENCOUNTER — HOSPITAL ENCOUNTER (OUTPATIENT)
Dept: RADIOLOGY | Facility: HOSPITAL | Age: 76
Discharge: HOME | End: 2021-05-31
Attending: PHYSICIAN ASSISTANT
Payer: MEDICARE

## 2021-05-31 VITALS
WEIGHT: 192 LBS | SYSTOLIC BLOOD PRESSURE: 180 MMHG | OXYGEN SATURATION: 96 % | DIASTOLIC BLOOD PRESSURE: 115 MMHG | BODY MASS INDEX: 38.78 KG/M2 | HEART RATE: 80 BPM

## 2021-05-31 DIAGNOSIS — J40 BRONCHITIS: ICD-10-CM

## 2021-05-31 DIAGNOSIS — J40 BRONCHITIS: Primary | ICD-10-CM

## 2021-05-31 PROCEDURE — 99213 OFFICE O/P EST LOW 20 MIN: CPT | Mod: S$GLB,,, | Performed by: PHYSICIAN ASSISTANT

## 2021-05-31 PROCEDURE — 99213 PR OFFICE/OUTPT VISIT, EST, LEVL III, 20-29 MIN: ICD-10-PCS | Mod: S$GLB,,, | Performed by: PHYSICIAN ASSISTANT

## 2021-05-31 PROCEDURE — 71046 X-RAY EXAM CHEST 2 VIEWS: CPT | Mod: TC,PO

## 2021-05-31 RX ORDER — AMOXICILLIN AND CLAVULANATE POTASSIUM 875; 125 MG/1; MG/1
1 TABLET, FILM COATED ORAL EVERY 12 HOURS
Qty: 20 TABLET | Refills: 0 | Status: ON HOLD | OUTPATIENT
Start: 2021-05-31 | End: 2021-06-07 | Stop reason: HOSPADM

## 2021-05-31 RX ORDER — CODEINE PHOSPHATE AND GUAIFENESIN 10; 100 MG/5ML; MG/5ML
5 SOLUTION ORAL 3 TIMES DAILY PRN
Qty: 118 ML | Refills: 0 | Status: SHIPPED | OUTPATIENT
Start: 2021-05-31 | End: 2021-06-10

## 2021-06-02 ENCOUNTER — TELEPHONE (OUTPATIENT)
Dept: FAMILY MEDICINE | Facility: CLINIC | Age: 76
End: 2021-06-02

## 2021-06-04 ENCOUNTER — TELEPHONE (OUTPATIENT)
Dept: FAMILY MEDICINE | Facility: CLINIC | Age: 76
End: 2021-06-04

## 2021-06-04 DIAGNOSIS — J18.9 PNEUMONIA DUE TO INFECTIOUS ORGANISM, UNSPECIFIED LATERALITY, UNSPECIFIED PART OF LUNG: Primary | ICD-10-CM

## 2021-06-05 ENCOUNTER — HOSPITAL ENCOUNTER (INPATIENT)
Facility: HOSPITAL | Age: 76
LOS: 2 days | Discharge: HOME | DRG: 194 | End: 2021-06-07
Attending: EMERGENCY MEDICINE | Admitting: INTERNAL MEDICINE
Payer: MEDICARE

## 2021-06-05 DIAGNOSIS — J90 PLEURAL EFFUSION: ICD-10-CM

## 2021-06-05 DIAGNOSIS — J18.9 PNEUMONIA DUE TO INFECTIOUS ORGANISM, UNSPECIFIED LATERALITY, UNSPECIFIED PART OF LUNG: Primary | ICD-10-CM

## 2021-06-05 PROBLEM — I50.32 CHRONIC DIASTOLIC HEART FAILURE: Status: ACTIVE | Noted: 2021-06-05

## 2021-06-05 PROBLEM — E66.9 OBESITY: Status: ACTIVE | Noted: 2021-06-05

## 2021-06-05 PROBLEM — J44.9 COPD (CHRONIC OBSTRUCTIVE PULMONARY DISEASE): Status: ACTIVE | Noted: 2021-06-05

## 2021-06-05 LAB
ALBUMIN SERPL BCP-MCNC: 3.9 G/DL (ref 3.5–5.2)
ALP SERPL-CCNC: 46 U/L (ref 55–135)
ALT SERPL W/O P-5'-P-CCNC: 23 U/L (ref 10–44)
ANION GAP SERPL CALC-SCNC: 12 MMOL/L (ref 8–16)
AST SERPL-CCNC: 35 U/L (ref 10–40)
BACTERIA #/AREA URNS HPF: NEGATIVE /HPF
BASOPHILS # BLD AUTO: 0.03 K/UL (ref 0–0.2)
BASOPHILS NFR BLD: 0.4 % (ref 0–1.9)
BILIRUB SERPL-MCNC: 2 MG/DL (ref 0.1–1)
BILIRUB UR QL STRIP: NEGATIVE
BNP SERPL-MCNC: 187 PG/ML (ref 0–99)
BUN SERPL-MCNC: 13 MG/DL (ref 8–23)
CALCIUM SERPL-MCNC: 8.9 MG/DL (ref 8.7–10.5)
CHLORIDE SERPL-SCNC: 96 MMOL/L (ref 95–110)
CLARITY UR: CLEAR
CO2 SERPL-SCNC: 30 MMOL/L (ref 23–29)
COLOR UR: YELLOW
CREAT SERPL-MCNC: 0.9 MG/DL (ref 0.5–1.4)
DIFFERENTIAL METHOD: ABNORMAL
EOSINOPHIL # BLD AUTO: 0.1 K/UL (ref 0–0.5)
EOSINOPHIL NFR BLD: 0.9 % (ref 0–8)
ERYTHROCYTE [DISTWIDTH] IN BLOOD BY AUTOMATED COUNT: 15.6 % (ref 11.5–14.5)
EST. GFR  (AFRICAN AMERICAN): >60 ML/MIN/1.73 M^2
EST. GFR  (NON AFRICAN AMERICAN): >60 ML/MIN/1.73 M^2
GLUCOSE SERPL-MCNC: 125 MG/DL (ref 70–110)
GLUCOSE UR QL STRIP: NEGATIVE
HCT VFR BLD AUTO: 39.5 % (ref 37–48.5)
HGB BLD-MCNC: 12.4 G/DL (ref 12–16)
HGB UR QL STRIP: NEGATIVE
HYALINE CASTS #/AREA URNS LPF: 19 /LPF
IMM GRANULOCYTES # BLD AUTO: 0.03 K/UL (ref 0–0.04)
IMM GRANULOCYTES NFR BLD AUTO: 0.4 % (ref 0–0.5)
INR PPP: 1.9
KETONES UR QL STRIP: ABNORMAL
LACTATE SERPL-SCNC: 1.7 MMOL/L (ref 0.5–1.9)
LEUKOCYTE ESTERASE UR QL STRIP: ABNORMAL
LYMPHOCYTES # BLD AUTO: 1.2 K/UL (ref 1–4.8)
LYMPHOCYTES NFR BLD: 16.3 % (ref 18–48)
MAGNESIUM SERPL-MCNC: 2 MG/DL (ref 1.6–2.6)
MCH RBC QN AUTO: 28.2 PG (ref 27–31)
MCHC RBC AUTO-ENTMCNC: 31.4 G/DL (ref 32–36)
MCV RBC AUTO: 90 FL (ref 82–98)
MICROSCOPIC COMMENT: ABNORMAL
MONOCYTES # BLD AUTO: 0.5 K/UL (ref 0.3–1)
MONOCYTES NFR BLD: 7 % (ref 4–15)
NEUTROPHILS # BLD AUTO: 5.7 K/UL (ref 1.8–7.7)
NEUTROPHILS NFR BLD: 75 % (ref 38–73)
NITRITE UR QL STRIP: NEGATIVE
NRBC BLD-RTO: 0 /100 WBC
PH UR STRIP: 6 [PH] (ref 5–8)
PLATELET # BLD AUTO: 290 K/UL (ref 150–450)
PMV BLD AUTO: 11.2 FL (ref 9.2–12.9)
POTASSIUM SERPL-SCNC: 3.2 MMOL/L (ref 3.5–5.1)
PROT SERPL-MCNC: 7.1 G/DL (ref 6–8.4)
PROT UR QL STRIP: ABNORMAL
PROTHROMBIN TIME: 21.2 SEC (ref 11.8–14.3)
RBC # BLD AUTO: 4.4 M/UL (ref 4–5.4)
RBC #/AREA URNS HPF: 1 /HPF (ref 0–4)
SARS-COV-2 RDRP RESP QL NAA+PROBE: NEGATIVE
SODIUM SERPL-SCNC: 138 MMOL/L (ref 136–145)
SP GR UR STRIP: >1.03 (ref 1–1.03)
SQUAMOUS #/AREA URNS HPF: 2 /HPF
TROPONIN I SERPL DL<=0.01 NG/ML-MCNC: <0.03 NG/ML
URN SPEC COLLECT METH UR: ABNORMAL
UROBILINOGEN UR STRIP-ACNC: ABNORMAL EU/DL
WBC # BLD AUTO: 7.56 K/UL (ref 3.9–12.7)
WBC #/AREA URNS HPF: 7 /HPF (ref 0–5)

## 2021-06-05 PROCEDURE — 63600175 PHARM REV CODE 636 W HCPCS: Performed by: INTERNAL MEDICINE

## 2021-06-05 PROCEDURE — 87040 BLOOD CULTURE FOR BACTERIA: CPT | Mod: 59 | Performed by: EMERGENCY MEDICINE

## 2021-06-05 PROCEDURE — 93010 EKG 12-LEAD: ICD-10-PCS | Mod: 76,,, | Performed by: INTERNAL MEDICINE

## 2021-06-05 PROCEDURE — 85610 PROTHROMBIN TIME: CPT | Performed by: EMERGENCY MEDICINE

## 2021-06-05 PROCEDURE — 25500020 PHARM REV CODE 255: Performed by: EMERGENCY MEDICINE

## 2021-06-05 PROCEDURE — 83735 ASSAY OF MAGNESIUM: CPT | Performed by: EMERGENCY MEDICINE

## 2021-06-05 PROCEDURE — 83605 ASSAY OF LACTIC ACID: CPT | Performed by: EMERGENCY MEDICINE

## 2021-06-05 PROCEDURE — 94640 AIRWAY INHALATION TREATMENT: CPT

## 2021-06-05 PROCEDURE — 99900035 HC TECH TIME PER 15 MIN (STAT)

## 2021-06-05 PROCEDURE — 99900031 HC PATIENT EDUCATION (STAT)

## 2021-06-05 PROCEDURE — 83880 ASSAY OF NATRIURETIC PEPTIDE: CPT | Performed by: EMERGENCY MEDICINE

## 2021-06-05 PROCEDURE — 93010 ELECTROCARDIOGRAM REPORT: CPT | Mod: 76,,, | Performed by: INTERNAL MEDICINE

## 2021-06-05 PROCEDURE — U0002 COVID-19 LAB TEST NON-CDC: HCPCS | Performed by: EMERGENCY MEDICINE

## 2021-06-05 PROCEDURE — 25000242 PHARM REV CODE 250 ALT 637 W/ HCPCS

## 2021-06-05 PROCEDURE — 80053 COMPREHEN METABOLIC PANEL: CPT | Performed by: EMERGENCY MEDICINE

## 2021-06-05 PROCEDURE — 36415 COLL VENOUS BLD VENIPUNCTURE: CPT | Performed by: EMERGENCY MEDICINE

## 2021-06-05 PROCEDURE — 81001 URINALYSIS AUTO W/SCOPE: CPT | Performed by: EMERGENCY MEDICINE

## 2021-06-05 PROCEDURE — 25000003 PHARM REV CODE 250: Performed by: INTERNAL MEDICINE

## 2021-06-05 PROCEDURE — 85025 COMPLETE CBC W/AUTO DIFF WBC: CPT | Performed by: EMERGENCY MEDICINE

## 2021-06-05 PROCEDURE — 63600175 PHARM REV CODE 636 W HCPCS: Performed by: EMERGENCY MEDICINE

## 2021-06-05 PROCEDURE — 99285 EMERGENCY DEPT VISIT HI MDM: CPT | Mod: 25

## 2021-06-05 PROCEDURE — 84484 ASSAY OF TROPONIN QUANT: CPT | Performed by: EMERGENCY MEDICINE

## 2021-06-05 PROCEDURE — 93005 ELECTROCARDIOGRAM TRACING: CPT | Performed by: INTERNAL MEDICINE

## 2021-06-05 PROCEDURE — 12000002 HC ACUTE/MED SURGE SEMI-PRIVATE ROOM

## 2021-06-05 RX ORDER — GUAIFENESIN/DEXTROMETHORPHAN 100-10MG/5
5 SYRUP ORAL EVERY 6 HOURS PRN
Status: DISCONTINUED | OUTPATIENT
Start: 2021-06-05 | End: 2021-06-07 | Stop reason: HOSPADM

## 2021-06-05 RX ORDER — FUROSEMIDE 20 MG/1
20 TABLET ORAL 2 TIMES DAILY
Status: DISCONTINUED | OUTPATIENT
Start: 2021-06-05 | End: 2021-06-07 | Stop reason: HOSPADM

## 2021-06-05 RX ORDER — POTASSIUM CHLORIDE 7.45 MG/ML
20 INJECTION INTRAVENOUS
Status: DISCONTINUED | OUTPATIENT
Start: 2021-06-05 | End: 2021-06-07 | Stop reason: HOSPADM

## 2021-06-05 RX ORDER — POTASSIUM CHLORIDE 20 MEQ/1
20 TABLET, EXTENDED RELEASE ORAL
Status: DISCONTINUED | OUTPATIENT
Start: 2021-06-05 | End: 2021-06-07 | Stop reason: HOSPADM

## 2021-06-05 RX ORDER — ALPRAZOLAM 0.5 MG/1
1 TABLET ORAL 2 TIMES DAILY PRN
Status: DISCONTINUED | OUTPATIENT
Start: 2021-06-05 | End: 2021-06-07 | Stop reason: HOSPADM

## 2021-06-05 RX ORDER — ONDANSETRON 2 MG/ML
4 INJECTION INTRAMUSCULAR; INTRAVENOUS EVERY 6 HOURS PRN
Status: DISCONTINUED | OUTPATIENT
Start: 2021-06-05 | End: 2021-06-07 | Stop reason: HOSPADM

## 2021-06-05 RX ORDER — PANTOPRAZOLE SODIUM 40 MG/1
40 TABLET, DELAYED RELEASE ORAL 2 TIMES DAILY
Status: DISCONTINUED | OUTPATIENT
Start: 2021-06-05 | End: 2021-06-07 | Stop reason: HOSPADM

## 2021-06-05 RX ORDER — MAGNESIUM SULFATE HEPTAHYDRATE 40 MG/ML
4 INJECTION, SOLUTION INTRAVENOUS
Status: DISCONTINUED | OUTPATIENT
Start: 2021-06-05 | End: 2021-06-07 | Stop reason: HOSPADM

## 2021-06-05 RX ORDER — BENZONATATE 100 MG/1
200 CAPSULE ORAL 3 TIMES DAILY PRN
Status: DISCONTINUED | OUTPATIENT
Start: 2021-06-05 | End: 2021-06-07 | Stop reason: HOSPADM

## 2021-06-05 RX ORDER — POTASSIUM CHLORIDE 7.45 MG/ML
40 INJECTION INTRAVENOUS
Status: DISCONTINUED | OUTPATIENT
Start: 2021-06-05 | End: 2021-06-07 | Stop reason: HOSPADM

## 2021-06-05 RX ORDER — BUDESONIDE 0.5 MG/2ML
0.5 INHALANT ORAL EVERY 12 HOURS
Status: DISCONTINUED | OUTPATIENT
Start: 2021-06-06 | End: 2021-06-07 | Stop reason: HOSPADM

## 2021-06-05 RX ORDER — HYOSCYAMINE SULFATE 0.12 MG/1
0.12 TABLET, ORALLY DISINTEGRATING ORAL
COMMUNITY
End: 2021-09-26

## 2021-06-05 RX ORDER — MAGNESIUM SULFATE 1 G/100ML
1 INJECTION INTRAVENOUS
Status: DISCONTINUED | OUTPATIENT
Start: 2021-06-05 | End: 2021-06-07 | Stop reason: HOSPADM

## 2021-06-05 RX ORDER — ALBUTEROL SULFATE 90 UG/1
2 AEROSOL, METERED RESPIRATORY (INHALATION) EVERY 4 HOURS PRN
Status: DISCONTINUED | OUTPATIENT
Start: 2021-06-05 | End: 2021-06-07 | Stop reason: HOSPADM

## 2021-06-05 RX ORDER — METOPROLOL SUCCINATE 25 MG/1
100 TABLET, EXTENDED RELEASE ORAL DAILY
Status: DISCONTINUED | OUTPATIENT
Start: 2021-06-06 | End: 2021-06-07 | Stop reason: HOSPADM

## 2021-06-05 RX ORDER — LEVOFLOXACIN 5 MG/ML
750 INJECTION, SOLUTION INTRAVENOUS
Status: COMPLETED | OUTPATIENT
Start: 2021-06-05 | End: 2021-06-05

## 2021-06-05 RX ORDER — LEVOFLOXACIN 5 MG/ML
500 INJECTION, SOLUTION INTRAVENOUS
Status: DISCONTINUED | OUTPATIENT
Start: 2021-06-06 | End: 2021-06-07 | Stop reason: HOSPADM

## 2021-06-05 RX ORDER — IPRATROPIUM BROMIDE AND ALBUTEROL SULFATE 2.5; .5 MG/3ML; MG/3ML
SOLUTION RESPIRATORY (INHALATION)
Status: COMPLETED
Start: 2021-06-05 | End: 2021-06-05

## 2021-06-05 RX ORDER — LANOLIN ALCOHOL/MO/W.PET/CERES
800 CREAM (GRAM) TOPICAL
Status: DISCONTINUED | OUTPATIENT
Start: 2021-06-05 | End: 2021-06-07 | Stop reason: HOSPADM

## 2021-06-05 RX ORDER — LEVALBUTEROL 1.25 MG/.5ML
1.25 SOLUTION, CONCENTRATE RESPIRATORY (INHALATION)
Status: DISCONTINUED | OUTPATIENT
Start: 2021-06-05 | End: 2021-06-06

## 2021-06-05 RX ORDER — POTASSIUM CHLORIDE 20 MEQ/1
40 TABLET, EXTENDED RELEASE ORAL
Status: DISCONTINUED | OUTPATIENT
Start: 2021-06-05 | End: 2021-06-07 | Stop reason: HOSPADM

## 2021-06-05 RX ORDER — IPRATROPIUM BROMIDE 0.5 MG/2.5ML
0.5 SOLUTION RESPIRATORY (INHALATION) EVERY 8 HOURS
Status: DISCONTINUED | OUTPATIENT
Start: 2021-06-05 | End: 2021-06-06

## 2021-06-05 RX ORDER — MAGNESIUM SULFATE HEPTAHYDRATE 40 MG/ML
2 INJECTION, SOLUTION INTRAVENOUS
Status: DISCONTINUED | OUTPATIENT
Start: 2021-06-05 | End: 2021-06-07 | Stop reason: HOSPADM

## 2021-06-05 RX ORDER — IPRATROPIUM BROMIDE AND ALBUTEROL SULFATE 2.5; .5 MG/3ML; MG/3ML
3 SOLUTION RESPIRATORY (INHALATION) ONCE
Status: DISCONTINUED | OUTPATIENT
Start: 2021-06-05 | End: 2021-06-07

## 2021-06-05 RX ORDER — METHYLPREDNISOLONE SOD SUCC 125 MG
80 VIAL (EA) INJECTION ONCE
Status: COMPLETED | OUTPATIENT
Start: 2021-06-05 | End: 2021-06-05

## 2021-06-05 RX ADMIN — IPRATROPIUM BROMIDE AND ALBUTEROL SULFATE 3 ML: .5; 3 SOLUTION RESPIRATORY (INHALATION) at 12:06

## 2021-06-05 RX ADMIN — PANTOPRAZOLE SODIUM 40 MG: 40 TABLET, DELAYED RELEASE ORAL at 07:06

## 2021-06-05 RX ADMIN — BENZONATATE 200 MG: 100 CAPSULE ORAL at 08:06

## 2021-06-05 RX ADMIN — METHYLPREDNISOLONE SODIUM SUCCINATE 80 MG: 125 INJECTION, POWDER, FOR SOLUTION INTRAMUSCULAR; INTRAVENOUS at 10:06

## 2021-06-05 RX ADMIN — GUAIFENESIN AND DEXTROMETHORPHAN 5 ML: 100; 10 SYRUP ORAL at 08:06

## 2021-06-05 RX ADMIN — IOHEXOL 100 ML: 350 INJECTION, SOLUTION INTRAVENOUS at 03:06

## 2021-06-05 RX ADMIN — LEVOFLOXACIN 750 MG: 750 INJECTION, SOLUTION INTRAVENOUS at 04:06

## 2021-06-05 RX ADMIN — ALPRAZOLAM 1 MG: 0.5 TABLET ORAL at 08:06

## 2021-06-05 RX ADMIN — APIXABAN 5 MG: 5 TABLET, FILM COATED ORAL at 08:06

## 2021-06-06 PROBLEM — I27.20 PULMONARY HTN: Status: ACTIVE | Noted: 2021-06-06

## 2021-06-06 LAB
ALBUMIN SERPL BCP-MCNC: 3.5 G/DL (ref 3.5–5.2)
ALP SERPL-CCNC: 45 U/L (ref 55–135)
ALT SERPL W/O P-5'-P-CCNC: 20 U/L (ref 10–44)
ANION GAP SERPL CALC-SCNC: 14 MMOL/L (ref 8–16)
AST SERPL-CCNC: 21 U/L (ref 10–40)
BASOPHILS # BLD AUTO: 0.01 K/UL (ref 0–0.2)
BASOPHILS NFR BLD: 0.2 % (ref 0–1.9)
BILIRUB SERPL-MCNC: 2.1 MG/DL (ref 0.1–1)
BUN SERPL-MCNC: 11 MG/DL (ref 8–23)
CALCIUM SERPL-MCNC: 9 MG/DL (ref 8.7–10.5)
CHLORIDE SERPL-SCNC: 102 MMOL/L (ref 95–110)
CO2 SERPL-SCNC: 26 MMOL/L (ref 23–29)
CREAT SERPL-MCNC: 0.7 MG/DL (ref 0.5–1.4)
DIFFERENTIAL METHOD: ABNORMAL
EOSINOPHIL # BLD AUTO: 0 K/UL (ref 0–0.5)
EOSINOPHIL NFR BLD: 0 % (ref 0–8)
ERYTHROCYTE [DISTWIDTH] IN BLOOD BY AUTOMATED COUNT: 15.4 % (ref 11.5–14.5)
EST. GFR  (AFRICAN AMERICAN): >60 ML/MIN/1.73 M^2
EST. GFR  (NON AFRICAN AMERICAN): >60 ML/MIN/1.73 M^2
GLUCOSE SERPL-MCNC: 136 MG/DL (ref 70–110)
HCT VFR BLD AUTO: 39.7 % (ref 37–48.5)
HGB BLD-MCNC: 12.4 G/DL (ref 12–16)
IMM GRANULOCYTES # BLD AUTO: 0.01 K/UL (ref 0–0.04)
IMM GRANULOCYTES NFR BLD AUTO: 0.2 % (ref 0–0.5)
LYMPHOCYTES # BLD AUTO: 0.6 K/UL (ref 1–4.8)
LYMPHOCYTES NFR BLD: 10.1 % (ref 18–48)
MAGNESIUM SERPL-MCNC: 1.9 MG/DL (ref 1.6–2.6)
MCH RBC QN AUTO: 28.5 PG (ref 27–31)
MCHC RBC AUTO-ENTMCNC: 31.2 G/DL (ref 32–36)
MCV RBC AUTO: 91 FL (ref 82–98)
MONOCYTES # BLD AUTO: 0.1 K/UL (ref 0.3–1)
MONOCYTES NFR BLD: 1.3 % (ref 4–15)
NEUTROPHILS # BLD AUTO: 5.4 K/UL (ref 1.8–7.7)
NEUTROPHILS NFR BLD: 88.2 % (ref 38–73)
NRBC BLD-RTO: 0 /100 WBC
PLATELET # BLD AUTO: 268 K/UL (ref 150–450)
PMV BLD AUTO: 10.8 FL (ref 9.2–12.9)
POTASSIUM SERPL-SCNC: 3.4 MMOL/L (ref 3.5–5.1)
PROCALCITONIN SERPL IA-MCNC: <0.05 NG/ML (ref 0–0.5)
PROT SERPL-MCNC: 6.7 G/DL (ref 6–8.4)
RBC # BLD AUTO: 4.35 M/UL (ref 4–5.4)
SODIUM SERPL-SCNC: 142 MMOL/L (ref 136–145)
WBC # BLD AUTO: 6.11 K/UL (ref 3.9–12.7)

## 2021-06-06 PROCEDURE — 99900035 HC TECH TIME PER 15 MIN (STAT)

## 2021-06-06 PROCEDURE — 94761 N-INVAS EAR/PLS OXIMETRY MLT: CPT

## 2021-06-06 PROCEDURE — 25000003 PHARM REV CODE 250: Performed by: INTERNAL MEDICINE

## 2021-06-06 PROCEDURE — 12000002 HC ACUTE/MED SURGE SEMI-PRIVATE ROOM

## 2021-06-06 PROCEDURE — 99900031 HC PATIENT EDUCATION (STAT)

## 2021-06-06 PROCEDURE — 36415 COLL VENOUS BLD VENIPUNCTURE: CPT | Performed by: INTERNAL MEDICINE

## 2021-06-06 PROCEDURE — 63600175 PHARM REV CODE 636 W HCPCS: Performed by: INTERNAL MEDICINE

## 2021-06-06 PROCEDURE — 84145 PROCALCITONIN (PCT): CPT | Performed by: INTERNAL MEDICINE

## 2021-06-06 PROCEDURE — 80053 COMPREHEN METABOLIC PANEL: CPT | Performed by: INTERNAL MEDICINE

## 2021-06-06 PROCEDURE — 94640 AIRWAY INHALATION TREATMENT: CPT

## 2021-06-06 PROCEDURE — 83735 ASSAY OF MAGNESIUM: CPT | Performed by: INTERNAL MEDICINE

## 2021-06-06 PROCEDURE — 85025 COMPLETE CBC W/AUTO DIFF WBC: CPT | Performed by: INTERNAL MEDICINE

## 2021-06-06 PROCEDURE — 25000242 PHARM REV CODE 250 ALT 637 W/ HCPCS: Performed by: INTERNAL MEDICINE

## 2021-06-06 RX ORDER — CITALOPRAM 20 MG/1
20 TABLET, FILM COATED ORAL DAILY
Status: DISCONTINUED | OUTPATIENT
Start: 2021-06-06 | End: 2021-06-07 | Stop reason: HOSPADM

## 2021-06-06 RX ORDER — LEVOTHYROXINE SODIUM 25 UG/1
25 TABLET ORAL
Status: DISCONTINUED | OUTPATIENT
Start: 2021-06-07 | End: 2021-06-07 | Stop reason: HOSPADM

## 2021-06-06 RX ORDER — LEVALBUTEROL 1.25 MG/.5ML
1.25 SOLUTION, CONCENTRATE RESPIRATORY (INHALATION)
Status: DISCONTINUED | OUTPATIENT
Start: 2021-06-06 | End: 2021-06-07 | Stop reason: HOSPADM

## 2021-06-06 RX ORDER — IPRATROPIUM BROMIDE 0.5 MG/2.5ML
0.5 SOLUTION RESPIRATORY (INHALATION)
Status: DISCONTINUED | OUTPATIENT
Start: 2021-06-06 | End: 2021-06-07 | Stop reason: HOSPADM

## 2021-06-06 RX ADMIN — APIXABAN 5 MG: 5 TABLET, FILM COATED ORAL at 10:06

## 2021-06-06 RX ADMIN — GUAIFENESIN AND DEXTROMETHORPHAN 5 ML: 100; 10 SYRUP ORAL at 11:06

## 2021-06-06 RX ADMIN — GUAIFENESIN AND DEXTROMETHORPHAN 5 ML: 100; 10 SYRUP ORAL at 01:06

## 2021-06-06 RX ADMIN — IPRATROPIUM BROMIDE 0.5 MG: 0.5 SOLUTION RESPIRATORY (INHALATION) at 02:06

## 2021-06-06 RX ADMIN — BUDESONIDE 0.5 MG: 0.5 INHALANT RESPIRATORY (INHALATION) at 07:06

## 2021-06-06 RX ADMIN — CITALOPRAM HYDROBROMIDE 20 MG: 20 TABLET, FILM COATED ORAL at 02:06

## 2021-06-06 RX ADMIN — IPRATROPIUM BROMIDE 0.5 MG: 0.5 SOLUTION RESPIRATORY (INHALATION) at 09:06

## 2021-06-06 RX ADMIN — BENZONATATE 200 MG: 100 CAPSULE ORAL at 08:06

## 2021-06-06 RX ADMIN — ALPRAZOLAM 1 MG: 0.5 TABLET ORAL at 08:06

## 2021-06-06 RX ADMIN — BUDESONIDE 0.5 MG: 0.5 INHALANT RESPIRATORY (INHALATION) at 09:06

## 2021-06-06 RX ADMIN — PANTOPRAZOLE SODIUM 40 MG: 40 TABLET, DELAYED RELEASE ORAL at 04:06

## 2021-06-06 RX ADMIN — POTASSIUM CHLORIDE 40 MEQ: 20 TABLET, EXTENDED RELEASE ORAL at 08:06

## 2021-06-06 RX ADMIN — BENZONATATE 200 MG: 100 CAPSULE ORAL at 02:06

## 2021-06-06 RX ADMIN — LEVALBUTEROL HYDROCHLORIDE 1.25 MG: 1.25 SOLUTION, CONCENTRATE RESPIRATORY (INHALATION) at 07:06

## 2021-06-06 RX ADMIN — LEVALBUTEROL HYDROCHLORIDE 1.25 MG: 1.25 SOLUTION, CONCENTRATE RESPIRATORY (INHALATION) at 02:06

## 2021-06-06 RX ADMIN — PANTOPRAZOLE SODIUM 40 MG: 40 TABLET, DELAYED RELEASE ORAL at 06:06

## 2021-06-06 RX ADMIN — METOPROLOL SUCCINATE 100 MG: 25 TABLET, FILM COATED, EXTENDED RELEASE ORAL at 10:06

## 2021-06-06 RX ADMIN — APIXABAN 5 MG: 5 TABLET, FILM COATED ORAL at 08:06

## 2021-06-06 RX ADMIN — LEVOFLOXACIN 500 MG: 500 INJECTION, SOLUTION INTRAVENOUS at 04:06

## 2021-06-06 RX ADMIN — LEVALBUTEROL HYDROCHLORIDE 1.25 MG: 1.25 SOLUTION, CONCENTRATE RESPIRATORY (INHALATION) at 09:06

## 2021-06-06 RX ADMIN — BENZONATATE 200 MG: 100 CAPSULE ORAL at 10:06

## 2021-06-06 RX ADMIN — IPRATROPIUM BROMIDE 0.5 MG: 0.5 SOLUTION RESPIRATORY (INHALATION) at 07:06

## 2021-06-06 RX ADMIN — ALPRAZOLAM 1 MG: 0.5 TABLET ORAL at 10:06

## 2021-06-07 ENCOUNTER — TELEPHONE (OUTPATIENT)
Dept: FAMILY MEDICINE | Facility: CLINIC | Age: 76
End: 2021-06-07

## 2021-06-07 VITALS
OXYGEN SATURATION: 93 % | DIASTOLIC BLOOD PRESSURE: 85 MMHG | SYSTOLIC BLOOD PRESSURE: 134 MMHG | HEIGHT: 59 IN | WEIGHT: 188.25 LBS | TEMPERATURE: 97 F | HEART RATE: 88 BPM | RESPIRATION RATE: 16 BRPM | BODY MASS INDEX: 37.95 KG/M2

## 2021-06-07 PROBLEM — J18.9 ACUTE PNEUMONIA: Status: RESOLVED | Noted: 2021-06-05 | Resolved: 2021-06-07

## 2021-06-07 LAB
ALBUMIN SERPL BCP-MCNC: 3.4 G/DL (ref 3.5–5.2)
ALP SERPL-CCNC: 40 U/L (ref 55–135)
ALT SERPL W/O P-5'-P-CCNC: 20 U/L (ref 10–44)
ANION GAP SERPL CALC-SCNC: 10 MMOL/L (ref 8–16)
AST SERPL-CCNC: 20 U/L (ref 10–40)
BASOPHILS # BLD AUTO: 0.01 K/UL (ref 0–0.2)
BASOPHILS NFR BLD: 0.1 % (ref 0–1.9)
BILIRUB SERPL-MCNC: 1.3 MG/DL (ref 0.1–1)
BUN SERPL-MCNC: 17 MG/DL (ref 8–23)
CALCIUM SERPL-MCNC: 9.1 MG/DL (ref 8.7–10.5)
CHLORIDE SERPL-SCNC: 100 MMOL/L (ref 95–110)
CO2 SERPL-SCNC: 30 MMOL/L (ref 23–29)
CREAT SERPL-MCNC: 0.7 MG/DL (ref 0.5–1.4)
DIFFERENTIAL METHOD: ABNORMAL
EOSINOPHIL # BLD AUTO: 0 K/UL (ref 0–0.5)
EOSINOPHIL NFR BLD: 0 % (ref 0–8)
ERYTHROCYTE [DISTWIDTH] IN BLOOD BY AUTOMATED COUNT: 15.4 % (ref 11.5–14.5)
EST. GFR  (AFRICAN AMERICAN): >60 ML/MIN/1.73 M^2
EST. GFR  (NON AFRICAN AMERICAN): >60 ML/MIN/1.73 M^2
GLUCOSE SERPL-MCNC: 192 MG/DL (ref 70–110)
HCT VFR BLD AUTO: 36.1 % (ref 37–48.5)
HGB BLD-MCNC: 11.4 G/DL (ref 12–16)
IMM GRANULOCYTES # BLD AUTO: 0.04 K/UL (ref 0–0.04)
IMM GRANULOCYTES NFR BLD AUTO: 0.5 % (ref 0–0.5)
LYMPHOCYTES # BLD AUTO: 0.7 K/UL (ref 1–4.8)
LYMPHOCYTES NFR BLD: 9.3 % (ref 18–48)
MAGNESIUM SERPL-MCNC: 2 MG/DL (ref 1.6–2.6)
MCH RBC QN AUTO: 28.3 PG (ref 27–31)
MCHC RBC AUTO-ENTMCNC: 31.6 G/DL (ref 32–36)
MCV RBC AUTO: 90 FL (ref 82–98)
MONOCYTES # BLD AUTO: 0.5 K/UL (ref 0.3–1)
MONOCYTES NFR BLD: 6.2 % (ref 4–15)
NEUTROPHILS # BLD AUTO: 6.2 K/UL (ref 1.8–7.7)
NEUTROPHILS NFR BLD: 83.9 % (ref 38–73)
NRBC BLD-RTO: 0 /100 WBC
PLATELET # BLD AUTO: 315 K/UL (ref 150–450)
PMV BLD AUTO: 10.8 FL (ref 9.2–12.9)
POTASSIUM SERPL-SCNC: 3.5 MMOL/L (ref 3.5–5.1)
PROT SERPL-MCNC: 6.3 G/DL (ref 6–8.4)
RBC # BLD AUTO: 4.03 M/UL (ref 4–5.4)
SODIUM SERPL-SCNC: 140 MMOL/L (ref 136–145)
WBC # BLD AUTO: 7.4 K/UL (ref 3.9–12.7)

## 2021-06-07 PROCEDURE — 36415 COLL VENOUS BLD VENIPUNCTURE: CPT | Performed by: INTERNAL MEDICINE

## 2021-06-07 PROCEDURE — 25000003 PHARM REV CODE 250: Performed by: INTERNAL MEDICINE

## 2021-06-07 PROCEDURE — 97161 PT EVAL LOW COMPLEX 20 MIN: CPT

## 2021-06-07 PROCEDURE — 27000221 HC OXYGEN, UP TO 24 HOURS

## 2021-06-07 PROCEDURE — 80053 COMPREHEN METABOLIC PANEL: CPT | Performed by: INTERNAL MEDICINE

## 2021-06-07 PROCEDURE — 25000242 PHARM REV CODE 250 ALT 637 W/ HCPCS: Performed by: INTERNAL MEDICINE

## 2021-06-07 PROCEDURE — 83735 ASSAY OF MAGNESIUM: CPT | Performed by: INTERNAL MEDICINE

## 2021-06-07 PROCEDURE — 94640 AIRWAY INHALATION TREATMENT: CPT

## 2021-06-07 PROCEDURE — 99900031 HC PATIENT EDUCATION (STAT)

## 2021-06-07 PROCEDURE — 94761 N-INVAS EAR/PLS OXIMETRY MLT: CPT

## 2021-06-07 PROCEDURE — 99900035 HC TECH TIME PER 15 MIN (STAT)

## 2021-06-07 PROCEDURE — 85025 COMPLETE CBC W/AUTO DIFF WBC: CPT | Performed by: INTERNAL MEDICINE

## 2021-06-07 PROCEDURE — 97116 GAIT TRAINING THERAPY: CPT

## 2021-06-07 PROCEDURE — 94618 PULMONARY STRESS TESTING: CPT

## 2021-06-07 RX ORDER — LEVOFLOXACIN 750 MG/1
750 TABLET ORAL DAILY
Qty: 4 TABLET | Refills: 0 | Status: SHIPPED | OUTPATIENT
Start: 2021-06-07 | End: 2021-06-11

## 2021-06-07 RX ADMIN — LEVOTHYROXINE SODIUM 25 MCG: 25 TABLET ORAL at 05:06

## 2021-06-07 RX ADMIN — LEVALBUTEROL HYDROCHLORIDE 1.25 MG: 1.25 SOLUTION, CONCENTRATE RESPIRATORY (INHALATION) at 07:06

## 2021-06-07 RX ADMIN — METOPROLOL SUCCINATE 100 MG: 25 TABLET, FILM COATED, EXTENDED RELEASE ORAL at 08:06

## 2021-06-07 RX ADMIN — FUROSEMIDE 20 MG: 20 TABLET ORAL at 08:06

## 2021-06-07 RX ADMIN — APIXABAN 5 MG: 5 TABLET, FILM COATED ORAL at 08:06

## 2021-06-07 RX ADMIN — LEVALBUTEROL HYDROCHLORIDE 1.25 MG: 1.25 SOLUTION, CONCENTRATE RESPIRATORY (INHALATION) at 01:06

## 2021-06-07 RX ADMIN — BUDESONIDE 0.5 MG: 0.5 INHALANT RESPIRATORY (INHALATION) at 07:06

## 2021-06-07 RX ADMIN — PANTOPRAZOLE SODIUM 40 MG: 40 TABLET, DELAYED RELEASE ORAL at 05:06

## 2021-06-07 RX ADMIN — POTASSIUM CHLORIDE 40 MEQ: 20 TABLET, EXTENDED RELEASE ORAL at 08:06

## 2021-06-07 RX ADMIN — IPRATROPIUM BROMIDE 0.5 MG: 0.5 SOLUTION RESPIRATORY (INHALATION) at 07:06

## 2021-06-07 RX ADMIN — CITALOPRAM HYDROBROMIDE 20 MG: 20 TABLET, FILM COATED ORAL at 08:06

## 2021-06-07 RX ADMIN — IPRATROPIUM BROMIDE 0.5 MG: 0.5 SOLUTION RESPIRATORY (INHALATION) at 01:06

## 2021-06-08 ENCOUNTER — PATIENT OUTREACH (OUTPATIENT)
Dept: FAMILY MEDICINE | Facility: CLINIC | Age: 76
End: 2021-06-08

## 2021-06-08 ENCOUNTER — TELEPHONE (OUTPATIENT)
Dept: FAMILY MEDICINE | Facility: CLINIC | Age: 76
End: 2021-06-08

## 2021-06-10 ENCOUNTER — OFFICE VISIT (OUTPATIENT)
Dept: FAMILY MEDICINE | Facility: CLINIC | Age: 76
End: 2021-06-10
Payer: MEDICARE

## 2021-06-10 VITALS — BODY MASS INDEX: 38.17 KG/M2 | DIASTOLIC BLOOD PRESSURE: 84 MMHG | WEIGHT: 189 LBS | SYSTOLIC BLOOD PRESSURE: 138 MMHG

## 2021-06-10 DIAGNOSIS — J18.9 PNEUMONIA OF RIGHT UPPER LOBE DUE TO INFECTIOUS ORGANISM: Primary | ICD-10-CM

## 2021-06-10 DIAGNOSIS — Z09 HOSPITAL DISCHARGE FOLLOW-UP: ICD-10-CM

## 2021-06-10 LAB
BACTERIA BLD CULT: NORMAL
BACTERIA BLD CULT: NORMAL

## 2021-06-10 PROCEDURE — 99496 TRANSITIONAL CARE MANAGE SERVICE 7 DAY DISCHARGE: ICD-10-PCS | Mod: S$GLB,,, | Performed by: PHYSICIAN ASSISTANT

## 2021-06-10 PROCEDURE — 99496 TRANSJ CARE MGMT HIGH F2F 7D: CPT | Mod: S$GLB,,, | Performed by: PHYSICIAN ASSISTANT

## 2021-06-15 ENCOUNTER — HOSPITAL ENCOUNTER (OUTPATIENT)
Dept: RADIOLOGY | Facility: HOSPITAL | Age: 76
Discharge: HOME | End: 2021-06-15
Attending: PHYSICIAN ASSISTANT
Payer: MEDICARE

## 2021-06-15 DIAGNOSIS — J18.9 PNEUMONIA OF RIGHT UPPER LOBE DUE TO INFECTIOUS ORGANISM: ICD-10-CM

## 2021-06-15 DIAGNOSIS — Z09 HOSPITAL DISCHARGE FOLLOW-UP: ICD-10-CM

## 2021-06-15 PROCEDURE — 71046 X-RAY EXAM CHEST 2 VIEWS: CPT | Mod: TC,PO

## 2021-06-16 ENCOUNTER — TELEPHONE (OUTPATIENT)
Dept: FAMILY MEDICINE | Facility: CLINIC | Age: 76
End: 2021-06-16

## 2021-07-27 ENCOUNTER — TELEPHONE (OUTPATIENT)
Dept: FAMILY MEDICINE | Facility: CLINIC | Age: 76
End: 2021-07-27

## 2021-07-27 LAB
ALBUMIN SERPL-MCNC: 4 G/DL (ref 3.6–5.1)
ALBUMIN/GLOB SERPL: 1.5 (CALC) (ref 1–2.5)
ALP SERPL-CCNC: 70 U/L (ref 37–153)
ALT SERPL-CCNC: 9 U/L (ref 6–29)
AST SERPL-CCNC: 15 U/L (ref 10–35)
BASOPHILS # BLD AUTO: 57 CELLS/UL (ref 0–200)
BASOPHILS NFR BLD AUTO: 0.9 %
BILIRUB SERPL-MCNC: 1.7 MG/DL (ref 0.2–1.2)
BUN SERPL-MCNC: 13 MG/DL (ref 7–25)
BUN/CREAT SERPL: ABNORMAL (CALC) (ref 6–22)
CALCIUM SERPL-MCNC: 9.5 MG/DL (ref 8.6–10.4)
CHLORIDE SERPL-SCNC: 105 MMOL/L (ref 98–110)
CHOLEST SERPL-MCNC: 144 MG/DL
CHOLEST/HDLC SERPL: 3.7 (CALC)
CO2 SERPL-SCNC: 31 MMOL/L (ref 20–32)
CREAT SERPL-MCNC: 0.81 MG/DL (ref 0.6–0.93)
EOSINOPHIL # BLD AUTO: 158 CELLS/UL (ref 15–500)
EOSINOPHIL NFR BLD AUTO: 2.5 %
ERYTHROCYTE [DISTWIDTH] IN BLOOD BY AUTOMATED COUNT: 14.7 % (ref 11–15)
GLOBULIN SER CALC-MCNC: 2.6 G/DL (CALC) (ref 1.9–3.7)
GLUCOSE SERPL-MCNC: 94 MG/DL (ref 65–99)
HCT VFR BLD AUTO: 39.1 % (ref 35–45)
HDLC SERPL-MCNC: 39 MG/DL
HGB BLD-MCNC: 12.4 G/DL (ref 11.7–15.5)
LDLC SERPL CALC-MCNC: 87 MG/DL (CALC)
LYMPHOCYTES # BLD AUTO: 1317 CELLS/UL (ref 850–3900)
LYMPHOCYTES NFR BLD AUTO: 20.9 %
MCH RBC QN AUTO: 28.6 PG (ref 27–33)
MCHC RBC AUTO-ENTMCNC: 31.7 G/DL (ref 32–36)
MCV RBC AUTO: 90.1 FL (ref 80–100)
MONOCYTES # BLD AUTO: 391 CELLS/UL (ref 200–950)
MONOCYTES NFR BLD AUTO: 6.2 %
NEUTROPHILS # BLD AUTO: 4379 CELLS/UL (ref 1500–7800)
NEUTROPHILS NFR BLD AUTO: 69.5 %
NONHDLC SERPL-MCNC: 105 MG/DL (CALC)
PLATELET # BLD AUTO: 268 THOUSAND/UL (ref 140–400)
PMV BLD REES-ECKER: 11.1 FL (ref 7.5–12.5)
POTASSIUM SERPL-SCNC: 3.8 MMOL/L (ref 3.5–5.3)
PROT SERPL-MCNC: 6.6 G/DL (ref 6.1–8.1)
RBC # BLD AUTO: 4.34 MILLION/UL (ref 3.8–5.1)
SODIUM SERPL-SCNC: 142 MMOL/L (ref 135–146)
TRIGL SERPL-MCNC: 85 MG/DL
TSH SERPL-ACNC: 3.27 MIU/L (ref 0.4–4.5)
WBC # BLD AUTO: 6.3 THOUSAND/UL (ref 3.8–10.8)

## 2021-07-28 ENCOUNTER — TELEPHONE (OUTPATIENT)
Dept: FAMILY MEDICINE | Facility: CLINIC | Age: 76
End: 2021-07-28

## 2021-08-02 ENCOUNTER — TELEPHONE (OUTPATIENT)
Dept: FAMILY MEDICINE | Facility: CLINIC | Age: 76
End: 2021-08-02

## 2021-08-03 ENCOUNTER — TELEPHONE (OUTPATIENT)
Dept: FAMILY MEDICINE | Facility: CLINIC | Age: 76
End: 2021-08-03

## 2021-08-03 DIAGNOSIS — F41.1 GENERALIZED ANXIETY DISORDER: ICD-10-CM

## 2021-08-03 RX ORDER — ALPRAZOLAM 1 MG/1
1 TABLET ORAL 2 TIMES DAILY PRN
Qty: 60 TABLET | Refills: 2 | Status: SHIPPED | OUTPATIENT
Start: 2021-08-03 | End: 2021-08-26

## 2021-08-09 ENCOUNTER — TELEPHONE (OUTPATIENT)
Dept: FAMILY MEDICINE | Facility: CLINIC | Age: 76
End: 2021-08-09

## 2021-08-16 ENCOUNTER — TELEPHONE (OUTPATIENT)
Dept: FAMILY MEDICINE | Facility: CLINIC | Age: 76
End: 2021-08-16

## 2021-08-19 ENCOUNTER — TELEPHONE (OUTPATIENT)
Dept: FAMILY MEDICINE | Facility: CLINIC | Age: 76
End: 2021-08-19

## 2021-08-24 ENCOUNTER — TELEPHONE (OUTPATIENT)
Dept: FAMILY MEDICINE | Facility: CLINIC | Age: 76
End: 2021-08-24

## 2021-08-26 ENCOUNTER — OFFICE VISIT (OUTPATIENT)
Dept: FAMILY MEDICINE | Facility: CLINIC | Age: 76
End: 2021-08-26
Payer: MEDICARE

## 2021-08-26 VITALS
HEIGHT: 59 IN | SYSTOLIC BLOOD PRESSURE: 132 MMHG | DIASTOLIC BLOOD PRESSURE: 88 MMHG | HEART RATE: 64 BPM | WEIGHT: 184 LBS | BODY MASS INDEX: 37.09 KG/M2

## 2021-08-26 DIAGNOSIS — G47.33 OSA ON CPAP: ICD-10-CM

## 2021-08-26 DIAGNOSIS — E03.9 ACQUIRED HYPOTHYROIDISM: ICD-10-CM

## 2021-08-26 DIAGNOSIS — K21.9 GASTRO-ESOPHAGEAL REFLUX DISEASE WITHOUT ESOPHAGITIS: ICD-10-CM

## 2021-08-26 DIAGNOSIS — I10 ESSENTIAL (PRIMARY) HYPERTENSION: ICD-10-CM

## 2021-08-26 DIAGNOSIS — R73.03 PRE-DIABETES: ICD-10-CM

## 2021-08-26 DIAGNOSIS — M51.9 LUMBAR DISC DISEASE: ICD-10-CM

## 2021-08-26 DIAGNOSIS — I27.20 PULMONARY HTN: ICD-10-CM

## 2021-08-26 DIAGNOSIS — J43.1 PANLOBULAR EMPHYSEMA: ICD-10-CM

## 2021-08-26 DIAGNOSIS — I48.0 PAROXYSMAL ATRIAL FIBRILLATION: ICD-10-CM

## 2021-08-26 DIAGNOSIS — F41.1 GENERALIZED ANXIETY DISORDER: Primary | ICD-10-CM

## 2021-08-26 DIAGNOSIS — J44.9 CHRONIC OBSTRUCTIVE PULMONARY DISEASE, UNSPECIFIED COPD TYPE: ICD-10-CM

## 2021-08-26 DIAGNOSIS — J30.1 SEASONAL ALLERGIC RHINITIS DUE TO POLLEN: ICD-10-CM

## 2021-08-26 DIAGNOSIS — M81.0 AGE-RELATED OSTEOPOROSIS WITHOUT CURRENT PATHOLOGICAL FRACTURE: ICD-10-CM

## 2021-08-26 DIAGNOSIS — F41.9 ANXIETY: ICD-10-CM

## 2021-08-26 DIAGNOSIS — F34.1 DYSTHYMIA: ICD-10-CM

## 2021-08-26 PROCEDURE — 99214 PR OFFICE/OUTPT VISIT, EST, LEVL IV, 30-39 MIN: ICD-10-PCS | Mod: S$GLB,,, | Performed by: FAMILY MEDICINE

## 2021-08-26 PROCEDURE — 99214 OFFICE O/P EST MOD 30 MIN: CPT | Mod: S$GLB,,, | Performed by: FAMILY MEDICINE

## 2021-08-26 RX ORDER — FUROSEMIDE 20 MG/1
20 TABLET ORAL 2 TIMES DAILY
Qty: 180 TABLET | Refills: 1 | Status: SHIPPED | OUTPATIENT
Start: 2021-08-26 | End: 2022-03-03 | Stop reason: SDUPTHER

## 2021-08-26 RX ORDER — POTASSIUM CHLORIDE 750 MG/1
10 TABLET, EXTENDED RELEASE ORAL 2 TIMES DAILY
Qty: 180 TABLET | Refills: 1 | Status: SHIPPED | OUTPATIENT
Start: 2021-08-26

## 2021-08-26 RX ORDER — APIXABAN 5 MG/1
5 TABLET, FILM COATED ORAL 2 TIMES DAILY
Qty: 60 TABLET | Refills: 5 | Status: SHIPPED | OUTPATIENT
Start: 2021-08-26 | End: 2022-03-03 | Stop reason: SDUPTHER

## 2021-08-26 RX ORDER — NITROGLYCERIN 0.4 MG/1
0.4 TABLET SUBLINGUAL EVERY 5 MIN PRN
Qty: 25 TABLET | Refills: 3 | Status: SHIPPED | OUTPATIENT
Start: 2021-08-26

## 2021-08-26 RX ORDER — LEVOTHYROXINE SODIUM 25 UG/1
25 TABLET ORAL
Qty: 30 TABLET | Refills: 5 | Status: SHIPPED | OUTPATIENT
Start: 2021-08-26 | End: 2022-03-03 | Stop reason: SDUPTHER

## 2021-08-26 RX ORDER — FLUTICASONE PROPIONATE 50 MCG
1 SPRAY, SUSPENSION (ML) NASAL DAILY PRN
Qty: 16 G | Refills: 2 | Status: SHIPPED | OUTPATIENT
Start: 2021-08-26

## 2021-08-26 RX ORDER — ALPRAZOLAM 1 MG/1
1 TABLET ORAL 2 TIMES DAILY
Qty: 60 TABLET | Refills: 2 | Status: SHIPPED | OUTPATIENT
Start: 2021-08-26 | End: 2022-01-10 | Stop reason: SDUPTHER

## 2021-08-26 RX ORDER — ALBUTEROL SULFATE 90 UG/1
2 AEROSOL, METERED RESPIRATORY (INHALATION) EVERY 4 HOURS PRN
Qty: 18 G | Refills: 2 | Status: SHIPPED | OUTPATIENT
Start: 2021-08-26 | End: 2022-03-03 | Stop reason: SDUPTHER

## 2021-08-26 RX ORDER — METOPROLOL SUCCINATE 100 MG/1
100 TABLET, EXTENDED RELEASE ORAL DAILY
Qty: 90 TABLET | Refills: 1 | Status: SHIPPED | OUTPATIENT
Start: 2021-08-26 | End: 2022-03-03 | Stop reason: SDUPTHER

## 2021-09-14 ENCOUNTER — TELEPHONE (OUTPATIENT)
Dept: FAMILY MEDICINE | Facility: CLINIC | Age: 76
End: 2021-09-14

## 2021-09-24 DIAGNOSIS — K57.92 DIVERTICULITIS OF INTESTINE WITHOUT PERFORATION OR ABSCESS WITHOUT BLEEDING: Primary | ICD-10-CM

## 2021-09-26 ENCOUNTER — HOSPITAL ENCOUNTER (EMERGENCY)
Facility: HOSPITAL | Age: 76
Discharge: HOME | End: 2021-09-26
Attending: EMERGENCY MEDICINE
Payer: MEDICARE

## 2021-09-26 VITALS
HEIGHT: 59 IN | RESPIRATION RATE: 18 BRPM | BODY MASS INDEX: 36.29 KG/M2 | WEIGHT: 180 LBS | SYSTOLIC BLOOD PRESSURE: 132 MMHG | HEART RATE: 71 BPM | TEMPERATURE: 98 F | DIASTOLIC BLOOD PRESSURE: 81 MMHG | OXYGEN SATURATION: 97 %

## 2021-09-26 DIAGNOSIS — K57.90 DIVERTICULOSIS: Primary | ICD-10-CM

## 2021-09-26 LAB
ALBUMIN SERPL BCP-MCNC: 4.4 G/DL (ref 3.5–5.2)
ALP SERPL-CCNC: 64 U/L (ref 55–135)
ALT SERPL W/O P-5'-P-CCNC: 18 U/L (ref 10–44)
ANION GAP SERPL CALC-SCNC: 11 MMOL/L (ref 8–16)
AST SERPL-CCNC: 35 U/L (ref 10–40)
BASOPHILS # BLD AUTO: 0.07 K/UL (ref 0–0.2)
BASOPHILS NFR BLD: 0.9 % (ref 0–1.9)
BILIRUB SERPL-MCNC: 1.7 MG/DL (ref 0.1–1)
BILIRUB UR QL STRIP: NEGATIVE
BUN SERPL-MCNC: 18 MG/DL (ref 8–23)
CALCIUM SERPL-MCNC: 9.7 MG/DL (ref 8.7–10.5)
CHLORIDE SERPL-SCNC: 104 MMOL/L (ref 95–110)
CLARITY UR: CLEAR
CO2 SERPL-SCNC: 26 MMOL/L (ref 23–29)
COLOR UR: YELLOW
CREAT SERPL-MCNC: 0.9 MG/DL (ref 0.5–1.4)
DIFFERENTIAL METHOD: ABNORMAL
EOSINOPHIL # BLD AUTO: 0.3 K/UL (ref 0–0.5)
EOSINOPHIL NFR BLD: 4 % (ref 0–8)
ERYTHROCYTE [DISTWIDTH] IN BLOOD BY AUTOMATED COUNT: 15.3 % (ref 11.5–14.5)
EST. GFR  (AFRICAN AMERICAN): >60 ML/MIN/1.73 M^2
EST. GFR  (NON AFRICAN AMERICAN): >60 ML/MIN/1.73 M^2
GLUCOSE SERPL-MCNC: 133 MG/DL (ref 70–110)
GLUCOSE UR QL STRIP: NEGATIVE
HCT VFR BLD AUTO: 45.2 % (ref 37–48.5)
HGB BLD-MCNC: 14.3 G/DL (ref 12–16)
HGB UR QL STRIP: NEGATIVE
IMM GRANULOCYTES # BLD AUTO: 0.02 K/UL (ref 0–0.04)
IMM GRANULOCYTES NFR BLD AUTO: 0.2 % (ref 0–0.5)
KETONES UR QL STRIP: NEGATIVE
LEUKOCYTE ESTERASE UR QL STRIP: NEGATIVE
LIPASE SERPL-CCNC: 27 U/L (ref 4–60)
LYMPHOCYTES # BLD AUTO: 1.7 K/UL (ref 1–4.8)
LYMPHOCYTES NFR BLD: 21.2 % (ref 18–48)
MCH RBC QN AUTO: 27.9 PG (ref 27–31)
MCHC RBC AUTO-ENTMCNC: 31.6 G/DL (ref 32–36)
MCV RBC AUTO: 88 FL (ref 82–98)
MONOCYTES # BLD AUTO: 0.8 K/UL (ref 0.3–1)
MONOCYTES NFR BLD: 9.6 % (ref 4–15)
NEUTROPHILS # BLD AUTO: 5.2 K/UL (ref 1.8–7.7)
NEUTROPHILS NFR BLD: 64.1 % (ref 38–73)
NITRITE UR QL STRIP: NEGATIVE
NRBC BLD-RTO: 0 /100 WBC
PH UR STRIP: 6 [PH] (ref 5–8)
PLATELET # BLD AUTO: 334 K/UL (ref 150–450)
PMV BLD AUTO: 10.1 FL (ref 9.2–12.9)
POTASSIUM SERPL-SCNC: 4.2 MMOL/L (ref 3.5–5.1)
PROT SERPL-MCNC: 7.9 G/DL (ref 6–8.4)
PROT UR QL STRIP: NEGATIVE
RBC # BLD AUTO: 5.13 M/UL (ref 4–5.4)
SODIUM SERPL-SCNC: 141 MMOL/L (ref 136–145)
SP GR UR STRIP: >1.03 (ref 1–1.03)
URN SPEC COLLECT METH UR: ABNORMAL
UROBILINOGEN UR STRIP-ACNC: NEGATIVE EU/DL
WBC # BLD AUTO: 8.05 K/UL (ref 3.9–12.7)

## 2021-09-26 PROCEDURE — 25500020 PHARM REV CODE 255: Performed by: NURSE PRACTITIONER

## 2021-09-26 PROCEDURE — 99285 EMERGENCY DEPT VISIT HI MDM: CPT | Mod: 25

## 2021-09-26 PROCEDURE — 83690 ASSAY OF LIPASE: CPT | Performed by: NURSE PRACTITIONER

## 2021-09-26 PROCEDURE — 85025 COMPLETE CBC W/AUTO DIFF WBC: CPT | Performed by: NURSE PRACTITIONER

## 2021-09-26 PROCEDURE — 96374 THER/PROPH/DIAG INJ IV PUSH: CPT

## 2021-09-26 PROCEDURE — 36415 COLL VENOUS BLD VENIPUNCTURE: CPT | Performed by: NURSE PRACTITIONER

## 2021-09-26 PROCEDURE — 63600175 PHARM REV CODE 636 W HCPCS: Performed by: NURSE PRACTITIONER

## 2021-09-26 PROCEDURE — 96375 TX/PRO/DX INJ NEW DRUG ADDON: CPT

## 2021-09-26 PROCEDURE — 81003 URINALYSIS AUTO W/O SCOPE: CPT | Performed by: NURSE PRACTITIONER

## 2021-09-26 PROCEDURE — 80053 COMPREHEN METABOLIC PANEL: CPT | Performed by: NURSE PRACTITIONER

## 2021-09-26 RX ORDER — ONDANSETRON 2 MG/ML
4 INJECTION INTRAMUSCULAR; INTRAVENOUS
Status: COMPLETED | OUTPATIENT
Start: 2021-09-26 | End: 2021-09-26

## 2021-09-26 RX ORDER — CIPROFLOXACIN 500 MG/1
500 TABLET ORAL EVERY 12 HOURS
COMMUNITY
End: 2023-11-14

## 2021-09-26 RX ORDER — HYDROMORPHONE HYDROCHLORIDE 1 MG/ML
0.5 INJECTION, SOLUTION INTRAMUSCULAR; INTRAVENOUS; SUBCUTANEOUS
Status: COMPLETED | OUTPATIENT
Start: 2021-09-26 | End: 2021-09-26

## 2021-09-26 RX ORDER — METRONIDAZOLE 500 MG/1
500 TABLET ORAL EVERY 12 HOURS
COMMUNITY
End: 2023-11-14

## 2021-09-26 RX ADMIN — HYDROMORPHONE HYDROCHLORIDE 0.5 MG: 1 INJECTION, SOLUTION INTRAMUSCULAR; INTRAVENOUS; SUBCUTANEOUS at 01:09

## 2021-09-26 RX ADMIN — ONDANSETRON 4 MG: 2 INJECTION INTRAMUSCULAR; INTRAVENOUS at 01:09

## 2021-09-26 RX ADMIN — IOHEXOL 100 ML: 350 INJECTION, SOLUTION INTRAVENOUS at 02:09

## 2022-01-10 ENCOUNTER — TELEPHONE (OUTPATIENT)
Dept: FAMILY MEDICINE | Facility: CLINIC | Age: 77
End: 2022-01-10
Payer: MEDICARE

## 2022-01-10 DIAGNOSIS — F41.9 ANXIETY: ICD-10-CM

## 2022-01-10 RX ORDER — ALPRAZOLAM 1 MG/1
1 TABLET ORAL 2 TIMES DAILY
Qty: 60 TABLET | Refills: 2 | Status: SHIPPED | OUTPATIENT
Start: 2022-01-10 | End: 2022-03-03 | Stop reason: SDUPTHER

## 2022-01-10 NOTE — TELEPHONE ENCOUNTER
----- Message from Jerri Titus sent at 1/10/2022  9:59 AM CST -----  Refill for alprazolam. [facility] in [place]. Pt #[phone]

## 2022-01-10 NOTE — TELEPHONE ENCOUNTER
----- Message from Lalita Cain sent at 1/10/2022  3:02 PM CST -----  Patient called and stated that she need a refill of her alrazolam called into [facility] in [place] if any questions please give her a call at [phone]

## 2022-01-31 ENCOUNTER — TELEPHONE (OUTPATIENT)
Dept: FAMILY MEDICINE | Facility: CLINIC | Age: 77
End: 2022-01-31
Payer: MEDICARE

## 2022-01-31 DIAGNOSIS — N64.4 BREAST PAIN, LEFT: Primary | ICD-10-CM

## 2022-01-31 NOTE — TELEPHONE ENCOUNTER
----- Message from Tess Garner sent at 1/31/2022 12:35 PM CST -----  Pt calling scheduled an appt for Wednesday for left breast pain said she has a hx of a lumpectomy in this same breast and would like to get a mammogram done. Cb # [phone]

## 2022-02-02 ENCOUNTER — TELEPHONE (OUTPATIENT)
Dept: FAMILY MEDICINE | Facility: CLINIC | Age: 77
End: 2022-02-02
Payer: MEDICARE

## 2022-02-02 NOTE — TELEPHONE ENCOUNTER
----- Message from Tess Garner sent at 2/2/2022 12:40 PM CST -----  Elisabeth otto's  called and cancelled today's appt said he does not want to get her out with the rain and the streets are flooded by his home in [place] he is worried she will fall. He wants to just keep her appt for the 3rd.

## 2022-03-03 ENCOUNTER — OFFICE VISIT (OUTPATIENT)
Dept: FAMILY MEDICINE | Facility: CLINIC | Age: 77
End: 2022-03-03
Payer: MEDICARE

## 2022-03-03 VITALS
BODY MASS INDEX: 39.31 KG/M2 | SYSTOLIC BLOOD PRESSURE: 136 MMHG | HEART RATE: 68 BPM | DIASTOLIC BLOOD PRESSURE: 86 MMHG | HEIGHT: 59 IN | WEIGHT: 195 LBS

## 2022-03-03 DIAGNOSIS — R79.9 ABNORMAL FINDING OF BLOOD CHEMISTRY, UNSPECIFIED: ICD-10-CM

## 2022-03-03 DIAGNOSIS — K21.9 GASTRO-ESOPHAGEAL REFLUX DISEASE WITHOUT ESOPHAGITIS: Primary | ICD-10-CM

## 2022-03-03 DIAGNOSIS — J43.1 PANLOBULAR EMPHYSEMA: ICD-10-CM

## 2022-03-03 DIAGNOSIS — E03.9 ACQUIRED HYPOTHYROIDISM: ICD-10-CM

## 2022-03-03 DIAGNOSIS — J44.9 CHRONIC OBSTRUCTIVE PULMONARY DISEASE, UNSPECIFIED COPD TYPE: ICD-10-CM

## 2022-03-03 DIAGNOSIS — I48.0 PAROXYSMAL ATRIAL FIBRILLATION: ICD-10-CM

## 2022-03-03 DIAGNOSIS — F32.9 REACTIVE DEPRESSION: ICD-10-CM

## 2022-03-03 DIAGNOSIS — F41.9 ANXIETY: ICD-10-CM

## 2022-03-03 PROCEDURE — 99214 PR OFFICE/OUTPT VISIT, EST, LEVL IV, 30-39 MIN: ICD-10-PCS | Mod: S$GLB,,, | Performed by: FAMILY MEDICINE

## 2022-03-03 PROCEDURE — 99214 OFFICE O/P EST MOD 30 MIN: CPT | Mod: S$GLB,,, | Performed by: FAMILY MEDICINE

## 2022-03-03 RX ORDER — APIXABAN 5 MG/1
5 TABLET, FILM COATED ORAL 2 TIMES DAILY
Qty: 60 TABLET | Refills: 5 | Status: SHIPPED | OUTPATIENT
Start: 2022-03-03 | End: 2022-09-07 | Stop reason: SDUPTHER

## 2022-03-03 RX ORDER — LEVOTHYROXINE SODIUM 25 UG/1
25 TABLET ORAL
Qty: 90 TABLET | Refills: 1 | Status: SHIPPED | OUTPATIENT
Start: 2022-03-03 | End: 2022-09-14 | Stop reason: SDUPTHER

## 2022-03-03 RX ORDER — ESOMEPRAZOLE MAGNESIUM 40 MG/1
40 CAPSULE, DELAYED RELEASE ORAL
Qty: 30 CAPSULE | Refills: 3 | Status: SHIPPED | OUTPATIENT
Start: 2022-03-03 | End: 2022-06-06 | Stop reason: SDUPTHER

## 2022-03-03 RX ORDER — METOPROLOL SUCCINATE 100 MG/1
100 TABLET, EXTENDED RELEASE ORAL DAILY
Qty: 90 TABLET | Refills: 1 | Status: SHIPPED | OUTPATIENT
Start: 2022-03-03 | End: 2022-09-07 | Stop reason: SDUPTHER

## 2022-03-03 RX ORDER — FUROSEMIDE 20 MG/1
20 TABLET ORAL DAILY PRN
Qty: 90 TABLET | Refills: 1 | Status: SHIPPED | OUTPATIENT
Start: 2022-03-03 | End: 2023-04-14 | Stop reason: SDUPTHER

## 2022-03-03 RX ORDER — BUPROPION HYDROCHLORIDE 150 MG/1
150 TABLET ORAL DAILY
Qty: 30 TABLET | Refills: 3 | Status: SHIPPED | OUTPATIENT
Start: 2022-03-03 | End: 2022-06-06 | Stop reason: ALTCHOICE

## 2022-03-03 RX ORDER — ALBUTEROL SULFATE 90 UG/1
2 AEROSOL, METERED RESPIRATORY (INHALATION) EVERY 4 HOURS PRN
Qty: 18 G | Refills: 2 | Status: SHIPPED | OUTPATIENT
Start: 2022-03-03 | End: 2023-11-14

## 2022-03-03 RX ORDER — ALPRAZOLAM 1 MG/1
1 TABLET ORAL 2 TIMES DAILY
Qty: 60 TABLET | Refills: 2 | Status: SHIPPED | OUTPATIENT
Start: 2022-03-03 | End: 2022-06-06 | Stop reason: SDUPTHER

## 2022-03-03 NOTE — PROGRESS NOTES
SUBJECTIVE:    Patient ID: Danielle Martinez is a 76 y.o. female.    Chief Complaint: Fatigue (Brought bottles // need refills // no energy // abc)    76-year-old female here for six-month checkup.  She recently returned from a trip from [place].  She states she had shortness of breath in [place].  She has osteoarthritis in the knees.  She has had a left TKR.  She walks with a walker or a cane.  Her knees ache frequently.  She is unable take narcotic medications due to intolerance.    Sees Dr. Valle for COPD and asthma.  Trelegy inhaler use daily.    She is planning on going on a cruise out of [place] with her family.      Dr. Kate-paroxysmal atrial fibrillation.  Currently maintained on Eliquis 5 mg b.i.d. and metoprolol.  100 mg daily.    Feels tired and run down with no motivation.  Not on any antidepressants.  Takes Xanax 1 mg to 2 mg nightly.    She does not want to do a mammogram because it hurts.      Admission on 09/26/2021, Discharged on 09/26/2021   Component Date Value Ref Range Status    WBC 09/26/2021 8.05  3.90 - 12.70 K/uL Final    RBC 09/26/2021 5.13  4.00 - 5.40 M/uL Final    Hemoglobin 09/26/2021 14.3  12.0 - 16.0 g/dL Final    Hematocrit 09/26/2021 45.2  37.0 - 48.5 % Final    MCV 09/26/2021 88  82 - 98 fL Final    MCH 09/26/2021 27.9  27.0 - 31.0 pg Final    MCHC 09/26/2021 31.6 (A) 32.0 - 36.0 g/dL Final    RDW 09/26/2021 15.3 (A) 11.5 - 14.5 % Final    Platelets 09/26/2021 334  150 - 450 K/uL Final    MPV 09/26/2021 10.1  9.2 - 12.9 fL Final    Immature Granulocytes 09/26/2021 0.2  0.0 - 0.5 % Final    Gran # (ANC) 09/26/2021 5.2  1.8 - 7.7 K/uL Final    Immature Grans (Abs) 09/26/2021 0.02  0.00 - 0.04 K/uL Final    Lymph # 09/26/2021 1.7  1.0 - 4.8 K/uL Final    Mono # 09/26/2021 0.8  0.3 - 1.0 K/uL Final    Eos # 09/26/2021 0.3  0.0 - 0.5 K/uL Final    Baso # 09/26/2021 0.07  0.00 - 0.20 K/uL Final    nRBC 09/26/2021 0  0 /100 WBC Final    Gran %  09/26/2021 64.1  38.0 - 73.0 % Final    Lymph % 09/26/2021 21.2  18.0 - 48.0 % Final    Mono % 09/26/2021 9.6  4.0 - 15.0 % Final    Eosinophil % 09/26/2021 4.0  0.0 - 8.0 % Final    Basophil % 09/26/2021 0.9  0.0 - 1.9 % Final    Differential Method 09/26/2021 Automated   Final    Sodium 09/26/2021 141  136 - 145 mmol/L Final    Potassium 09/26/2021 4.2  3.5 - 5.1 mmol/L Final    Chloride 09/26/2021 104  95 - 110 mmol/L Final    CO2 09/26/2021 26  23 - 29 mmol/L Final    Glucose 09/26/2021 133 (A) 70 - 110 mg/dL Final    BUN 09/26/2021 18  8 - 23 mg/dL Final    Creatinine 09/26/2021 0.9  0.5 - 1.4 mg/dL Final    Calcium 09/26/2021 9.7  8.7 - 10.5 mg/dL Final    Total Protein 09/26/2021 7.9  6.0 - 8.4 g/dL Final    Albumin 09/26/2021 4.4  3.5 - 5.2 g/dL Final    Total Bilirubin 09/26/2021 1.7 (A) 0.1 - 1.0 mg/dL Final    Alkaline Phosphatase 09/26/2021 64  55 - 135 U/L Final    AST 09/26/2021 35  10 - 40 U/L Final    ALT 09/26/2021 18  10 - 44 U/L Final    Anion Gap 09/26/2021 11  8 - 16 mmol/L Final    eGFR if African American 09/26/2021 >60.0  >60 mL/min/1.73 m^2 Final    eGFR if non African American 09/26/2021 >60.0  >60 mL/min/1.73 m^2 Final    Lipase 09/26/2021 27  4 - 60 U/L Final    Specimen UA 09/26/2021 Urine, Clean Catch   Final    Color, UA 09/26/2021 Yellow  Yellow, Straw, Sylvia Final    Appearance, UA 09/26/2021 Clear  Clear Final    pH, UA 09/26/2021 6.0  5.0 - 8.0 Final    Specific [place], UA 09/26/2021 >1.030 (A) 1.005 - 1.030 Final    Protein, UA 09/26/2021 Negative  Negative Final    Glucose, UA 09/26/2021 Negative  Negative Final    Ketones, UA 09/26/2021 Negative  Negative Final    Bilirubin (UA) 09/26/2021 Negative  Negative Final    Occult Blood UA 09/26/2021 Negative  Negative Final    Nitrite, UA 09/26/2021 Negative  Negative Final    Urobilinogen, UA 09/26/2021 Negative  Negative EU/dL Final    Leukocytes, UA 09/26/2021 Negative  Negative Final       Past  Medical History:   Diagnosis Date    HAILY (acute kidney injury)     Anxiety     Arthritis     Juvenile diagnosed age 17    Atrial fibrillation     COPD (chronic obstructive pulmonary disease)     Depression     Diverticulitis     Emphysema of lung     GERD (gastroesophageal reflux disease)     Hx of hiatal hernia     Hypertension     Osteoporosis     Pneumonia     Sleep apnea      Social History     Socioeconomic History    Marital status:    Tobacco Use    Smoking status: Never Smoker    Smokeless tobacco: Never Used   Substance and Sexual Activity    Alcohol use: Never    Drug use: Never     Past Surgical History:   Procedure Laterality Date    BREAST LUMPECTOMY Left     in 40's    COLON SURGERY      COLONOSCOPY  2016    Dr. Reyes 5 years    COLONOSCOPY  2019    Dr. Reyes 5 years    HERNIA REPAIR      HIATAL HERNIA REPAIR  2005    HYSTERECTOMY      SHOULDER ARTHROSCOPY  2015     Family History   Problem Relation Age of Onset    Hyperlipidemia Mother        Review of patient's allergies indicates:   Allergen Reactions    Promethazine Anaphylaxis    Clove flavoring [flavoring agent]     Codeine Itching    Latex, natural rubber     Lorazepam Other (See Comments)    Lovastatin Other (See Comments)    Meclizine Other (See Comments)    Morphine Itching    Simvastatin Other (See Comments)    Tramadol-acetaminophen Itching       Current Outpatient Medications:     ascorbic acid, vitamin C, (VITAMIN C) 100 MG tablet, Take 100 mg by mouth once daily., Disp: , Rfl:     fluticasone propionate (FLONASE) 50 mcg/actuation nasal spray, 1 spray (50 mcg total) by Each Nostril route daily as needed for Allergies., Disp: 16 g, Rfl: 2    mv-min/folic/vit K/lut/ints820 (ALIVE WOMEN'S 50 PLUS, BLEND, ORAL), Take 1 tablet by mouth daily as needed., Disp: , Rfl:     nitroGLYCERIN (NITROSTAT) 0.4 MG SL tablet, Place 1 tablet (0.4 mg total) under the tongue every 5 (five) minutes  as needed., Disp: 25 tablet, Rfl: 3    potassium chloride (KLOR-CON) 10 MEQ TbSR, Take 1 tablet (10 mEq total) by mouth 2 (two) times daily., Disp: 180 tablet, Rfl: 1    albuterol (VENTOLIN HFA) 90 mcg/actuation inhaler, Inhale 2 puffs into the lungs every 4 (four) hours as needed for Wheezing. Rescue, Disp: 18 g, Rfl: 2    ALPRAZolam (XANAX) 1 MG tablet, Take 1 tablet (1 mg total) by mouth 2 (two) times daily., Disp: 60 tablet, Rfl: 2    buPROPion (WELLBUTRIN XL) 150 MG TB24 tablet, Take 1 tablet (150 mg total) by mouth once daily. For depression, Disp: 30 tablet, Rfl: 3    ciprofloxacin HCl (CIPRO) 500 MG tablet, Take 500 mg by mouth every 12 (twelve) hours. , Disp: , Rfl:     citalopram (CELEXA) 20 MG tablet, Take 1 tablet (20 mg total) by mouth once daily. (Patient not taking: Reported on 3/3/2022), Disp: 30 tablet, Rfl: 1    ELIQUIS 5 mg Tab, Take 1 tablet (5 mg total) by mouth 2 (two) times daily., Disp: 60 tablet, Rfl: 5    esomeprazole (NEXIUM) 40 MG capsule, Take 1 capsule (40 mg total) by mouth before breakfast., Disp: 30 capsule, Rfl: 3    fluticasone-umeclidin-vilanter (TRELEGY ELLIPTA) 100-62.5-25 mcg DsDv, Inhale 1 puff into the lungs daily as needed., Disp: 60 each, Rfl: 2    furosemide (LASIX) 20 MG tablet, Take 1 tablet (20 mg total) by mouth daily as needed., Disp: 90 tablet, Rfl: 1    levothyroxine (SYNTHROID) 25 MCG tablet, Take 1 tablet (25 mcg total) by mouth before breakfast. Name brand only.  RIRI, Disp: 90 tablet, Rfl: 1    metoprolol succinate (TOPROL-XL) 100 MG 24 hr tablet, Take 1 tablet (100 mg total) by mouth once daily. For 30 days, Disp: 90 tablet, Rfl: 1    metroNIDAZOLE (FLAGYL) 500 MG tablet, Take 500 mg by mouth every 12 (twelve) hours. , Disp: , Rfl:     Review of Systems   Constitutional: Negative for appetite change, chills, fatigue, fever and unexpected weight change.   HENT: Negative for congestion, ear pain, sinus pain, sore throat and trouble swallowing.   "  Eyes: Negative for pain, discharge and visual disturbance.   Respiratory: Positive for shortness of breath. Negative for apnea, cough and wheezing.    Cardiovascular: Negative for chest pain, palpitations and leg swelling.   Gastrointestinal: Negative for abdominal pain, blood in stool, constipation, diarrhea, nausea and vomiting.        Gerd  occas   Endocrine: Negative for heat intolerance, polydipsia and polyuria.   Genitourinary: Negative for difficulty urinating, dyspareunia, dysuria, frequency, hematuria and menstrual problem.   Musculoskeletal: Positive for arthralgias (knees  ache  rt  > left  , left  TKR in place). Negative for back pain, gait problem, joint swelling and myalgias.   Allergic/Immunologic: Negative for environmental allergies, food allergies and immunocompromised state.   Neurological: Negative for dizziness, tremors, seizures, numbness and headaches.   Psychiatric/Behavioral: Positive for dysphoric mood. Negative for behavioral problems, confusion, hallucinations and suicidal ideas. The patient is nervous/anxious.           Objective:      Vitals:    03/03/22 1426   BP: 136/86   Pulse: 68   Weight: 88.5 kg (195 lb)   Height: 4' 11" (1.499 m)     Physical Exam  Vitals and nursing note reviewed.   Constitutional:       General: She is not in acute distress.     Appearance: She is well-developed. She is obese. She is not toxic-appearing.   HENT:      Head: Normocephalic and atraumatic.      Right Ear: Tympanic membrane and external ear normal.      Left Ear: Tympanic membrane and external ear normal.      Nose: Nose normal.      Mouth/Throat:      Pharynx: Oropharynx is clear.   Eyes:      Pupils: Pupils are equal, round, and reactive to light.   Neck:      Thyroid: No thyromegaly.      Vascular: No carotid bruit.   Cardiovascular:      Rate and Rhythm: Normal rate and regular rhythm.      Heart sounds: Normal heart sounds. No murmur heard.  Pulmonary:      Effort: Pulmonary effort is normal. "      Breath sounds: Normal breath sounds. No wheezing or rales.   Abdominal:      General: Bowel sounds are normal. There is no distension.      Palpations: Abdomen is soft.      Tenderness: There is no abdominal tenderness.   Musculoskeletal:         General: No tenderness or deformity. Normal range of motion.      Cervical back: Normal range of motion and neck supple.      Lumbar back: Normal. No spasms.      Comments: Bends 90 degrees at  waist shoulders have good range of motion, right knee is crepitant.  Left TKR has good range of motion.  No pitting edema to lower extremities today.   Lymphadenopathy:      Cervical: No cervical adenopathy.   Skin:     General: Skin is warm and dry.      Findings: No rash.   Neurological:      Mental Status: She is alert and oriented to person, place, and time. Mental status is at baseline.      Cranial Nerves: No cranial nerve deficit.      Coordination: Coordination normal.   Psychiatric:         Behavior: Behavior normal.         Thought Content: Thought content normal.         Judgment: Judgment normal.      Comments: Anxious female           Assessment:       1. Gastro-esophageal reflux disease without esophagitis    2. Paroxysmal atrial fibrillation    3. Panlobular emphysema    4. Acquired hypothyroidism    5. Chronic obstructive pulmonary disease, unspecified COPD type    6. Anxiety    7. Reactive depression    8. Abnormal finding of blood chemistry, unspecified          Plan:       Gastro-esophageal reflux disease without esophagitis  -     esomeprazole (NEXIUM) 40 MG capsule; Take 1 capsule (40 mg total) by mouth before breakfast.  Dispense: 30 capsule; Refill: 3  Will restart esomeprazole 40 mg daily for GERD  Paroxysmal atrial fibrillation  -     metoprolol succinate (TOPROL-XL) 100 MG 24 hr tablet; Take 1 tablet (100 mg total) by mouth once daily. For 30 days  Dispense: 90 tablet; Refill: 1  -     ELIQUIS 5 mg Tab; Take 1 tablet (5 mg total) by mouth 2 (two) times  daily.  Dispense: 60 tablet; Refill: 5  -     furosemide (LASIX) 20 MG tablet; Take 1 tablet (20 mg total) by mouth daily as needed.  Dispense: 90 tablet; Refill: 1  -     CBC Auto Differential; Future; Expected date: 03/03/2022  -     Comprehensive Metabolic Panel; Future; Expected date: 03/03/2022  -     Lipid Panel; Future; Expected date: 03/03/2022  Sinus rhythm today.  Continue current medications.  Panlobular emphysema  -     albuterol (VENTOLIN HFA) 90 mcg/actuation inhaler; Inhale 2 puffs into the lungs every 4 (four) hours as needed for Wheezing. Rescue  Dispense: 18 g; Refill: 2    Acquired hypothyroidism  -     levothyroxine (SYNTHROID) 25 MCG tablet; Take 1 tablet (25 mcg total) by mouth before breakfast. Name brand only.  RIRI  Dispense: 90 tablet; Refill: 1  -     TSH w/reflex to FT4; Future; Expected date: 03/03/2022  TSH due soon  Chronic obstructive pulmonary disease, unspecified COPD type  -     fluticasone-umeclidin-vilanter (TRELEGY ELLIPTA) 100-62.5-25 mcg DsDv; Inhale 1 puff into the lungs daily as needed.  Dispense: 60 each; Refill: 2    Anxiety  -     ALPRAZolam (XANAX) 1 MG tablet; Take 1 tablet (1 mg total) by mouth 2 (two) times daily.  Dispense: 60 tablet; Refill: 2    Reactive depression  -     buPROPion (WELLBUTRIN XL) 150 MG TB24 tablet; Take 1 tablet (150 mg total) by mouth once daily. For depression  Dispense: 30 tablet; Refill: 3  Trial of bupropion  mg q.day  Abnormal finding of blood chemistry, unspecified   -     Lipid Panel; Future; Expected date: 03/03/2022  Labs due next week.  Patient encouraged to go get her mammogram done.  Follow up in about 6 months (around 9/3/2022), or htn.        3/3/2022 Antony Lopez

## 2022-03-09 LAB
ALBUMIN SERPL-MCNC: 4.2 G/DL (ref 3.6–5.1)
ALBUMIN/GLOB SERPL: 1.6 (CALC) (ref 1–2.5)
ALP SERPL-CCNC: 66 U/L (ref 37–153)
ALT SERPL-CCNC: 14 U/L (ref 6–29)
AST SERPL-CCNC: 19 U/L (ref 10–35)
BASOPHILS # BLD AUTO: 62 CELLS/UL (ref 0–200)
BASOPHILS NFR BLD AUTO: 0.8 %
BILIRUB SERPL-MCNC: 1.2 MG/DL (ref 0.2–1.2)
BUN SERPL-MCNC: 20 MG/DL (ref 7–25)
BUN/CREAT SERPL: NORMAL (CALC) (ref 6–22)
CALCIUM SERPL-MCNC: 9.6 MG/DL (ref 8.6–10.4)
CHLORIDE SERPL-SCNC: 105 MMOL/L (ref 98–110)
CHOLEST SERPL-MCNC: 144 MG/DL
CHOLEST/HDLC SERPL: 3.3 (CALC)
CO2 SERPL-SCNC: 27 MMOL/L (ref 20–32)
CREAT SERPL-MCNC: 0.92 MG/DL (ref 0.6–0.93)
EOSINOPHIL # BLD AUTO: 172 CELLS/UL (ref 15–500)
EOSINOPHIL NFR BLD AUTO: 2.2 %
ERYTHROCYTE [DISTWIDTH] IN BLOOD BY AUTOMATED COUNT: 13.9 % (ref 11–15)
GLOBULIN SER CALC-MCNC: 2.7 G/DL (CALC) (ref 1.9–3.7)
GLUCOSE SERPL-MCNC: 99 MG/DL (ref 65–99)
HCT VFR BLD AUTO: 43 % (ref 35–45)
HDLC SERPL-MCNC: 43 MG/DL
HGB BLD-MCNC: 13.5 G/DL (ref 11.7–15.5)
LDLC SERPL CALC-MCNC: 84 MG/DL (CALC)
LYMPHOCYTES # BLD AUTO: 1700 CELLS/UL (ref 850–3900)
LYMPHOCYTES NFR BLD AUTO: 21.8 %
MCH RBC QN AUTO: 28.2 PG (ref 27–33)
MCHC RBC AUTO-ENTMCNC: 31.4 G/DL (ref 32–36)
MCV RBC AUTO: 89.8 FL (ref 80–100)
MONOCYTES # BLD AUTO: 515 CELLS/UL (ref 200–950)
MONOCYTES NFR BLD AUTO: 6.6 %
NEUTROPHILS # BLD AUTO: 5351 CELLS/UL (ref 1500–7800)
NEUTROPHILS NFR BLD AUTO: 68.6 %
NONHDLC SERPL-MCNC: 101 MG/DL (CALC)
PLATELET # BLD AUTO: 283 THOUSAND/UL (ref 140–400)
PMV BLD REES-ECKER: 10.4 FL (ref 7.5–12.5)
POTASSIUM SERPL-SCNC: 4.2 MMOL/L (ref 3.5–5.3)
PROT SERPL-MCNC: 6.9 G/DL (ref 6.1–8.1)
RBC # BLD AUTO: 4.79 MILLION/UL (ref 3.8–5.1)
SODIUM SERPL-SCNC: 143 MMOL/L (ref 135–146)
TRIGL SERPL-MCNC: 77 MG/DL
TSH SERPL-ACNC: 4.05 MIU/L (ref 0.4–4.5)
WBC # BLD AUTO: 7.8 THOUSAND/UL (ref 3.8–10.8)

## 2022-03-13 NOTE — PROGRESS NOTES
Call patient.  Her lab work looks great.  CBC shows no anemia.  Sugar kidneys liver and thyroid all normal.  Cholesterol good at 144. Continue current medications.

## 2022-03-14 ENCOUNTER — TELEPHONE (OUTPATIENT)
Dept: FAMILY MEDICINE | Facility: CLINIC | Age: 77
End: 2022-03-14
Payer: MEDICARE

## 2022-03-14 NOTE — TELEPHONE ENCOUNTER
----- Message from Antony Lopez MD sent at 3/12/2022  8:11 PM CST -----  Call patient.  Her lab work looks great.  CBC shows no anemia.  Sugar kidneys liver and thyroid all normal.  Cholesterol good at 144. Continue current medications.

## 2022-03-25 ENCOUNTER — TELEPHONE (OUTPATIENT)
Dept: FAMILY MEDICINE | Facility: CLINIC | Age: 77
End: 2022-03-25
Payer: MEDICARE

## 2022-03-25 DIAGNOSIS — T75.3XXA MOTION SICKNESS, INITIAL ENCOUNTER: Primary | ICD-10-CM

## 2022-03-25 RX ORDER — SCOLOPAMINE TRANSDERMAL SYSTEM 1 MG/1
1 PATCH, EXTENDED RELEASE TRANSDERMAL
Qty: 4 PATCH | Refills: 0 | Status: SHIPPED | OUTPATIENT
Start: 2022-03-25 | End: 2022-04-04

## 2022-03-25 NOTE — TELEPHONE ENCOUNTER
----- Message from Tess Garner sent at 3/25/2022  8:47 AM CDT -----  Pt calling for nausea patches for a cruise she has coming up.  [phone]

## 2022-03-31 ENCOUNTER — TELEPHONE (OUTPATIENT)
Dept: FAMILY MEDICINE | Facility: CLINIC | Age: 77
End: 2022-03-31
Payer: MEDICARE

## 2022-03-31 NOTE — TELEPHONE ENCOUNTER
Pt states she is on a road trip. She is getting upset more frequently and not getting along with her . States she has many things going on. Pt denies taking xanax as prescribed. Pt states this is nothing that she cant handle and hopefully she is better once she gets on her cruise ship. Encouraged pt to take xanax as prescribed. Call once she is back from vacation to schedule an appt with Ryan  . Pt states understanding.

## 2022-03-31 NOTE — TELEPHONE ENCOUNTER
----- Message from Tess Garner sent at 3/31/2022  2:08 PM CDT -----  Pt called and made an appt for May but then said she feels like she is looking it like she is going crazy said she may need to be admitted but she is on her way to [place] now.  Not sure if it anxiety due to traveling.  She said she will call back if she gets worse even asked not to bother the provider with the message.  She does have her xanax but on a lower dose.  # [phone]

## 2022-04-11 ENCOUNTER — TELEPHONE (OUTPATIENT)
Dept: FAMILY MEDICINE | Facility: CLINIC | Age: 77
End: 2022-04-11

## 2022-04-11 NOTE — TELEPHONE ENCOUNTER
----- Message from Lalita Cain sent at 4/11/2022  9:22 AM CDT -----  Patient called and stated that she need to be seen right away for right ear pain /unable to hear please give her a call at [phone]

## 2022-04-12 ENCOUNTER — OFFICE VISIT (OUTPATIENT)
Dept: FAMILY MEDICINE | Facility: CLINIC | Age: 77
End: 2022-04-12
Payer: MEDICARE

## 2022-04-12 VITALS
DIASTOLIC BLOOD PRESSURE: 86 MMHG | BODY MASS INDEX: 40.52 KG/M2 | TEMPERATURE: 98 F | HEART RATE: 64 BPM | SYSTOLIC BLOOD PRESSURE: 138 MMHG | WEIGHT: 201 LBS | HEIGHT: 59 IN

## 2022-04-12 DIAGNOSIS — F41.9 ANXIETY: ICD-10-CM

## 2022-04-12 DIAGNOSIS — I10 ESSENTIAL (PRIMARY) HYPERTENSION: ICD-10-CM

## 2022-04-12 DIAGNOSIS — E03.9 ACQUIRED HYPOTHYROIDISM: ICD-10-CM

## 2022-04-12 DIAGNOSIS — R73.03 PRE-DIABETES: ICD-10-CM

## 2022-04-12 DIAGNOSIS — H66.91 ACUTE OTITIS MEDIA, RIGHT: Primary | ICD-10-CM

## 2022-04-12 DIAGNOSIS — J44.9 CHRONIC OBSTRUCTIVE PULMONARY DISEASE, UNSPECIFIED COPD TYPE: ICD-10-CM

## 2022-04-12 DIAGNOSIS — I48.0 PAROXYSMAL ATRIAL FIBRILLATION: ICD-10-CM

## 2022-04-12 PROCEDURE — 99214 PR OFFICE/OUTPT VISIT, EST, LEVL IV, 30-39 MIN: ICD-10-PCS | Mod: S$GLB,,, | Performed by: PHYSICIAN ASSISTANT

## 2022-04-12 PROCEDURE — 99214 OFFICE O/P EST MOD 30 MIN: CPT | Mod: S$GLB,,, | Performed by: PHYSICIAN ASSISTANT

## 2022-04-12 RX ORDER — AMOXICILLIN AND CLAVULANATE POTASSIUM 875; 125 MG/1; MG/1
1 TABLET, FILM COATED ORAL 2 TIMES DAILY
Qty: 14 TABLET | Refills: 0 | Status: SHIPPED | OUTPATIENT
Start: 2022-04-12 | End: 2022-04-19

## 2022-04-12 NOTE — PROGRESS NOTES
"  SUBJECTIVE:    Patient ID: Danielle Martinez is a 76 y.o. female.    Chief Complaint: Ear Problem (Right ear since 5 days // no bottles // abc)    Pt is a 76-year-old female who presents today for sick visit with a CC "decrease hearing and discomfort in the right ear." 5 days ago, she experienced an acute on set of decrease hearing that has not subsided and progressively worsening. She denies any Q-tip use recently or placing any foreign bodies in the ear.  The "discomfort" is described as a fullness of the ear.  She denies any ear discharge, ear pain, fevers, trauma to the ear, or recent swimming.     Office Visit on 03/03/2022   Component Date Value Ref Range Status    WBC 03/08/2022 7.8  3.8 - 10.8 Thousand/uL Final    RBC 03/08/2022 4.79  3.80 - 5.10 Million/uL Final    Hemoglobin 03/08/2022 13.5  11.7 - 15.5 g/dL Final    Hematocrit 03/08/2022 43.0  35.0 - 45.0 % Final    MCV 03/08/2022 89.8  80.0 - 100.0 fL Final    MCH 03/08/2022 28.2  27.0 - 33.0 pg Final    MCHC 03/08/2022 31.4 (A) 32.0 - 36.0 g/dL Final    RDW 03/08/2022 13.9  11.0 - 15.0 % Final    Platelets 03/08/2022 283  140 - 400 Thousand/uL Final    MPV 03/08/2022 10.4  7.5 - 12.5 fL Final    Neutrophils, Abs 03/08/2022 5,351  1,500 - 7,800 cells/uL Final    Lymph # 03/08/2022 1,700  850 - 3,900 cells/uL Final    Mono # 03/08/2022 515  200 - 950 cells/uL Final    Eos # 03/08/2022 172  15 - 500 cells/uL Final    Baso # 03/08/2022 62  0 - 200 cells/uL Final    Neutrophils Relative 03/08/2022 68.6  % Final    Lymph % 03/08/2022 21.8  % Final    Mono % 03/08/2022 6.6  % Final    Eosinophil % 03/08/2022 2.2  % Final    Basophil % 03/08/2022 0.8  % Final    Glucose 03/08/2022 99  65 - 99 mg/dL Final    BUN 03/08/2022 20  7 - 25 mg/dL Final    Creatinine 03/08/2022 0.92  0.60 - 0.93 mg/dL Final    eGFR if non  03/08/2022 61  > OR = 60 mL/min/1.73m2 Final    eGFR if  03/08/2022 71  > OR = 60 " mL/min/1.73m2 Final    BUN/Creatinine Ratio 03/08/2022 NOT APPLICABLE  6 - 22 (calc) Final    Sodium 03/08/2022 143  135 - 146 mmol/L Final    Potassium 03/08/2022 4.2  3.5 - 5.3 mmol/L Final    Chloride 03/08/2022 105  98 - 110 mmol/L Final    CO2 03/08/2022 27  20 - 32 mmol/L Final    Calcium 03/08/2022 9.6  8.6 - 10.4 mg/dL Final    Total Protein 03/08/2022 6.9  6.1 - 8.1 g/dL Final    Albumin 03/08/2022 4.2  3.6 - 5.1 g/dL Final    Globulin, Total 03/08/2022 2.7  1.9 - 3.7 g/dL (calc) Final    Albumin/Globulin Ratio 03/08/2022 1.6  1.0 - 2.5 (calc) Final    Total Bilirubin 03/08/2022 1.2  0.2 - 1.2 mg/dL Final    Alkaline Phosphatase 03/08/2022 66  37 - 153 U/L Final    AST 03/08/2022 19  10 - 35 U/L Final    ALT 03/08/2022 14  6 - 29 U/L Final    Cholesterol 03/08/2022 144  <200 mg/dL Final    HDL 03/08/2022 43 (A) > OR = 50 mg/dL Final    Triglycerides 03/08/2022 77  <150 mg/dL Final    LDL Cholesterol 03/08/2022 84  mg/dL (calc) Final    HDL/Cholesterol Ratio 03/08/2022 3.3  <5.0 (calc) Final    Non HDL Chol. (LDL+VLDL) 03/08/2022 101  <130 mg/dL (calc) Final    TSH w/reflex to FT4 03/08/2022 4.05  0.40 - 4.50 mIU/L Final       Past Medical History:   Diagnosis Date    HAILY (acute kidney injury)     Anxiety     Arthritis     Juvenile diagnosed age 17    Atrial fibrillation     COPD (chronic obstructive pulmonary disease)     Depression     Diverticulitis     Emphysema of lung     GERD (gastroesophageal reflux disease)     Hx of hiatal hernia     Hypertension     Osteoporosis     Pneumonia     Sleep apnea      Past Surgical History:   Procedure Laterality Date    BREAST LUMPECTOMY Left     in 40's    COLON SURGERY      COLONOSCOPY  2016    Dr. Reyes 5 years    COLONOSCOPY  2019    Dr. Amrita-[facility] 5 years    HERNIA REPAIR      HIATAL HERNIA REPAIR  2005    HYSTERECTOMY      SHOULDER ARTHROSCOPY  2015     Family History   Problem Relation Age of Onset     Hyperlipidemia Mother        Marital Status:   Alcohol History:  reports no history of alcohol use.  Tobacco History:  reports that she has never smoked. She has never used smokeless tobacco.  Drug History:  reports no history of drug use.    Health Maintenance Topics with due status: Not Due       Topic Last Completion Date    Colorectal Cancer Screening 12/23/2019    DEXA Scan 03/12/2021    Lipid Panel 03/08/2022     Immunization History   Administered Date(s) Administered    COVID-19, MRNA, LN-S, PF (MODERNA FULL 0.5 ML DOSE) 02/13/2021, 03/13/2021, 11/01/2021    Influenza - High Dose - PF (65 years and older) 01/04/2017, 11/05/2019    Influenza - Quadrivalent - High Dose - PF (65 years and older) 11/17/2020    Influenza - Trivalent - PF (ADULT) 11/18/2014    Pneumococcal Polysaccharide - 23 Valent 06/21/2012       Review of patient's allergies indicates:   Allergen Reactions    Promethazine Anaphylaxis    Clove flavoring [flavoring agent]     Codeine Itching    Latex, natural rubber     Lorazepam Other (See Comments)    Lovastatin Other (See Comments)    Meclizine Other (See Comments)    Morphine Itching    Simvastatin Other (See Comments)    Tramadol-acetaminophen Itching       Current Outpatient Medications:     albuterol (VENTOLIN HFA) 90 mcg/actuation inhaler, Inhale 2 puffs into the lungs every 4 (four) hours as needed for Wheezing. Rescue, Disp: 18 g, Rfl: 2    ascorbic acid, vitamin C, (VITAMIN C) 100 MG tablet, Take 100 mg by mouth once daily., Disp: , Rfl:     buPROPion (WELLBUTRIN XL) 150 MG TB24 tablet, Take 1 tablet (150 mg total) by mouth once daily. For depression, Disp: 30 tablet, Rfl: 3    ciprofloxacin HCl (CIPRO) 500 MG tablet, Take 500 mg by mouth every 12 (twelve) hours. , Disp: , Rfl:     citalopram (CELEXA) 20 MG tablet, Take 1 tablet (20 mg total) by mouth once daily., Disp: 30 tablet, Rfl: 1    ELIQUIS 5 mg Tab, Take 1 tablet (5 mg total) by mouth 2 (two) times  "daily., Disp: 60 tablet, Rfl: 5    esomeprazole (NEXIUM) 40 MG capsule, Take 1 capsule (40 mg total) by mouth before breakfast., Disp: 30 capsule, Rfl: 3    fluticasone propionate (FLONASE) 50 mcg/actuation nasal spray, 1 spray (50 mcg total) by Each Nostril route daily as needed for Allergies., Disp: 16 g, Rfl: 2    fluticasone-umeclidin-vilanter (TRELEGY ELLIPTA) 100-62.5-25 mcg DsDv, Inhale 1 puff into the lungs daily as needed., Disp: 60 each, Rfl: 2    furosemide (LASIX) 20 MG tablet, Take 1 tablet (20 mg total) by mouth daily as needed., Disp: 90 tablet, Rfl: 1    levothyroxine (SYNTHROID) 25 MCG tablet, Take 1 tablet (25 mcg total) by mouth before breakfast. Name brand only.  RIRI, Disp: 90 tablet, Rfl: 1    metoprolol succinate (TOPROL-XL) 100 MG 24 hr tablet, Take 1 tablet (100 mg total) by mouth once daily. For 30 days, Disp: 90 tablet, Rfl: 1    metroNIDAZOLE (FLAGYL) 500 MG tablet, Take 500 mg by mouth every 12 (twelve) hours. , Disp: , Rfl:     mv-min/folic/vit K/lut/paea840 (ALIVE WOMEN'S 50 PLUS, BLEND, ORAL), Take 1 tablet by mouth daily as needed., Disp: , Rfl:     nitroGLYCERIN (NITROSTAT) 0.4 MG SL tablet, Place 1 tablet (0.4 mg total) under the tongue every 5 (five) minutes as needed., Disp: 25 tablet, Rfl: 3    potassium chloride (KLOR-CON) 10 MEQ TbSR, Take 1 tablet (10 mEq total) by mouth 2 (two) times daily., Disp: 180 tablet, Rfl: 1    ALPRAZolam (XANAX) 1 MG tablet, Take 1 tablet (1 mg total) by mouth 2 (two) times daily., Disp: 60 tablet, Rfl: 2    amoxicillin-clavulanate 875-125mg (AUGMENTIN) 875-125 mg per tablet, Take 1 tablet by mouth 2 (two) times daily. for 7 days, Disp: 14 tablet, Rfl: 0    Review of Systems   Constitutional: Negative for activity change, chills, fatigue and fever.   HENT: Positive for hearing loss ("Right ear."). Negative for congestion, ear discharge, ear pain, postnasal drip, rhinorrhea, sinus pressure, sinus pain, sore throat and tinnitus.  " "  Respiratory: Negative for cough, shortness of breath and wheezing.    Cardiovascular: Negative for chest pain and palpitations.   Gastrointestinal: Negative for abdominal pain, constipation, diarrhea, nausea and vomiting.   Genitourinary: Negative for dysuria and hematuria.   Musculoskeletal: Negative for arthralgias.   Neurological: Negative for dizziness, syncope, light-headedness and headaches.   Psychiatric/Behavioral: Negative for behavioral problems.          Objective:      Vitals:    04/12/22 0705 04/12/22 0711   BP: (!) 140/88 138/86   Pulse: 64    Temp: 97.9 °F (36.6 °C)    Weight: 91.2 kg (201 lb)    Height: 4' 11" (1.499 m)      Physical Exam  Vitals and nursing note reviewed.   Constitutional:       General: She is not in acute distress.     Appearance: Normal appearance. She is not ill-appearing.   HENT:      Head: Normocephalic and atraumatic.      Right Ear: External ear normal. No drainage. A middle ear effusion is present. There is no impacted cerumen. No mastoid tenderness. Tympanic membrane is bulging. Tympanic membrane is not injected, perforated, erythematous or retracted.      Left Ear: Tympanic membrane, ear canal and external ear normal. No drainage or tenderness.  No middle ear effusion. There is no impacted cerumen. No mastoid tenderness. Tympanic membrane is not injected, perforated or erythematous.      Ears:      Comments: Scant purulent material noted at the base of the right TM.     Nose: Nose normal. No congestion or rhinorrhea.      Mouth/Throat:      Mouth: Mucous membranes are moist.      Pharynx: Oropharynx is clear. No oropharyngeal exudate or posterior oropharyngeal erythema.   Eyes:      General: No scleral icterus.        Right eye: No discharge.         Left eye: No discharge.      Extraocular Movements: Extraocular movements intact.      Conjunctiva/sclera: Conjunctivae normal.   Cardiovascular:      Rate and Rhythm: Normal rate and regular rhythm.      Pulses: Normal " pulses.      Heart sounds: Normal heart sounds. No murmur heard.    No friction rub.   Pulmonary:      Effort: Pulmonary effort is normal. No respiratory distress.      Breath sounds: Normal breath sounds. No wheezing, rhonchi or rales.   Abdominal:      General: There is no distension.      Palpations: Abdomen is soft.      Tenderness: There is no abdominal tenderness. There is no guarding or rebound.   Musculoskeletal:      Cervical back: Normal range of motion and neck supple.      Right lower leg: No edema.      Left lower leg: No edema.   Skin:     General: Skin is warm and dry.      Coloration: Skin is not jaundiced.      Findings: No erythema or rash.   Neurological:      Mental Status: She is alert. Mental status is at baseline.      Cranial Nerves: No cranial nerve deficit.      Gait: Gait normal.   Psychiatric:         Mood and Affect: Mood normal.         Behavior: Behavior normal. Behavior is cooperative.           Assessment:       1. Acute otitis media, right    2. Essential (primary) hypertension    3. Paroxysmal atrial fibrillation    4. Pre-diabetes    5. Anxiety    6. Chronic obstructive pulmonary disease, unspecified COPD type    7. Acquired hypothyroidism           Plan:       Acute otitis media, right  Comments:  Start Augmentin b.i.d. x7 days and p.r.n. OTC Tylenol for symptom control.  Follow-up in 1 week.  If no improvement, consider audiology referral.  Orders:  -     amoxicillin-clavulanate 875-125mg (AUGMENTIN) 875-125 mg per tablet; Take 1 tablet by mouth 2 (two) times daily. for 7 days  Dispense: 14 tablet; Refill: 0    Essential (primary) hypertension  Comments:  Stable and well controlled.  Continue as is.    Paroxysmal atrial fibrillation    Pre-diabetes    Anxiety    Chronic obstructive pulmonary disease, unspecified COPD type    Acquired hypothyroidism      Follow up in about 1 week (around 4/19/2022), or if symptoms worsen or fail to improve, for Right Acute Otitis Media .         4/12/2022 Venkata Edwards PA-C

## 2022-04-19 ENCOUNTER — OFFICE VISIT (OUTPATIENT)
Dept: FAMILY MEDICINE | Facility: CLINIC | Age: 77
End: 2022-04-19
Payer: MEDICARE

## 2022-04-19 VITALS
WEIGHT: 190.38 LBS | DIASTOLIC BLOOD PRESSURE: 68 MMHG | BODY MASS INDEX: 38.38 KG/M2 | HEART RATE: 79 BPM | HEIGHT: 59 IN | SYSTOLIC BLOOD PRESSURE: 124 MMHG | TEMPERATURE: 98 F | OXYGEN SATURATION: 95 %

## 2022-04-19 DIAGNOSIS — I10 ESSENTIAL (PRIMARY) HYPERTENSION: ICD-10-CM

## 2022-04-19 DIAGNOSIS — I48.0 PAROXYSMAL ATRIAL FIBRILLATION: ICD-10-CM

## 2022-04-19 DIAGNOSIS — H91.11 PRESBYCUSIS OF RIGHT EAR, UNSPECIFIED HEARING STATUS ON CONTRALATERAL SIDE: Primary | ICD-10-CM

## 2022-04-19 DIAGNOSIS — F41.9 ANXIETY: ICD-10-CM

## 2022-04-19 DIAGNOSIS — J44.9 CHRONIC OBSTRUCTIVE PULMONARY DISEASE, UNSPECIFIED COPD TYPE: ICD-10-CM

## 2022-04-19 DIAGNOSIS — E03.9 ACQUIRED HYPOTHYROIDISM: ICD-10-CM

## 2022-04-19 PROCEDURE — 99213 OFFICE O/P EST LOW 20 MIN: CPT | Mod: S$GLB,,, | Performed by: PHYSICIAN ASSISTANT

## 2022-04-19 PROCEDURE — 99213 PR OFFICE/OUTPT VISIT, EST, LEVL III, 20-29 MIN: ICD-10-PCS | Mod: S$GLB,,, | Performed by: PHYSICIAN ASSISTANT

## 2022-04-19 RX ORDER — HYDROCORTISONE 25 MG/G
CREAM TOPICAL
COMMUNITY
Start: 2021-10-30

## 2022-04-19 NOTE — PROGRESS NOTES
"  SUBJECTIVE:    Patient ID: Danielle Martinez is a 76 y.o. female.    Chief Complaint: Ear Fullness (Brought bottles/vaccines denied/ ear issues x 2 weeks, abx are done//dp)    Pt is a 76 y.o. female who presents today for a 1 week follow-up after being treated for acute otitis media of the right ear.  She completed a course of Augmentin b.i.d. x7 days, but continues to endorse decreased hearing and ear fulliness in left ear. She describes a "raining sound" that is constantly present in the right ear. She denies any fever, ear pain, ear discharge/drainage, or sinus congestion.    Office Visit on 03/03/2022   Component Date Value Ref Range Status    WBC 03/08/2022 7.8  3.8 - 10.8 Thousand/uL Final    RBC 03/08/2022 4.79  3.80 - 5.10 Million/uL Final    Hemoglobin 03/08/2022 13.5  11.7 - 15.5 g/dL Final    Hematocrit 03/08/2022 43.0  35.0 - 45.0 % Final    MCV 03/08/2022 89.8  80.0 - 100.0 fL Final    MCH 03/08/2022 28.2  27.0 - 33.0 pg Final    MCHC 03/08/2022 31.4 (A) 32.0 - 36.0 g/dL Final    RDW 03/08/2022 13.9  11.0 - 15.0 % Final    Platelets 03/08/2022 283  140 - 400 Thousand/uL Final    MPV 03/08/2022 10.4  7.5 - 12.5 fL Final    Neutrophils, Abs 03/08/2022 5,351  1,500 - 7,800 cells/uL Final    Lymph # 03/08/2022 1,700  850 - 3,900 cells/uL Final    Mono # 03/08/2022 515  200 - 950 cells/uL Final    Eos # 03/08/2022 172  15 - 500 cells/uL Final    Baso # 03/08/2022 62  0 - 200 cells/uL Final    Neutrophils Relative 03/08/2022 68.6  % Final    Lymph % 03/08/2022 21.8  % Final    Mono % 03/08/2022 6.6  % Final    Eosinophil % 03/08/2022 2.2  % Final    Basophil % 03/08/2022 0.8  % Final    Glucose 03/08/2022 99  65 - 99 mg/dL Final    BUN 03/08/2022 20  7 - 25 mg/dL Final    Creatinine 03/08/2022 0.92  0.60 - 0.93 mg/dL Final    eGFR if non  03/08/2022 61  > OR = 60 mL/min/1.73m2 Final    eGFR if  03/08/2022 71  > OR = 60 mL/min/1.73m2 Final    " BUN/Creatinine Ratio 03/08/2022 NOT APPLICABLE  6 - 22 (calc) Final    Sodium 03/08/2022 143  135 - 146 mmol/L Final    Potassium 03/08/2022 4.2  3.5 - 5.3 mmol/L Final    Chloride 03/08/2022 105  98 - 110 mmol/L Final    CO2 03/08/2022 27  20 - 32 mmol/L Final    Calcium 03/08/2022 9.6  8.6 - 10.4 mg/dL Final    Total Protein 03/08/2022 6.9  6.1 - 8.1 g/dL Final    Albumin 03/08/2022 4.2  3.6 - 5.1 g/dL Final    Globulin, Total 03/08/2022 2.7  1.9 - 3.7 g/dL (calc) Final    Albumin/Globulin Ratio 03/08/2022 1.6  1.0 - 2.5 (calc) Final    Total Bilirubin 03/08/2022 1.2  0.2 - 1.2 mg/dL Final    Alkaline Phosphatase 03/08/2022 66  37 - 153 U/L Final    AST 03/08/2022 19  10 - 35 U/L Final    ALT 03/08/2022 14  6 - 29 U/L Final    Cholesterol 03/08/2022 144  <200 mg/dL Final    HDL 03/08/2022 43 (A) > OR = 50 mg/dL Final    Triglycerides 03/08/2022 77  <150 mg/dL Final    LDL Cholesterol 03/08/2022 84  mg/dL (calc) Final    HDL/Cholesterol Ratio 03/08/2022 3.3  <5.0 (calc) Final    Non HDL Chol. (LDL+VLDL) 03/08/2022 101  <130 mg/dL (calc) Final    TSH w/reflex to FT4 03/08/2022 4.05  0.40 - 4.50 mIU/L Final       Past Medical History:   Diagnosis Date    HAILY (acute kidney injury)     Anxiety     Arthritis     Juvenile diagnosed age 17    Atrial fibrillation     COPD (chronic obstructive pulmonary disease)     Depression     Diverticulitis     Emphysema of lung     GERD (gastroesophageal reflux disease)     Hx of hiatal hernia     Hypertension     Osteoporosis     Pneumonia     Sleep apnea      Past Surgical History:   Procedure Laterality Date    BREAST LUMPECTOMY Left     in 40's    COLON SURGERY      COLONOSCOPY  2016    Dr. Reyes 5 years    COLONOSCOPY  2019    Dr. Reyes 5 years    HERNIA REPAIR      HIATAL HERNIA REPAIR  2005    HYSTERECTOMY      SHOULDER ARTHROSCOPY  2015     Family History   Problem Relation Age of Onset    Hyperlipidemia Mother         Marital Status:   Alcohol History:  reports no history of alcohol use.  Tobacco History:  reports that she has never smoked. She has never used smokeless tobacco.  Drug History:  reports no history of drug use.    Health Maintenance Topics with due status: Not Due       Topic Last Completion Date    Colorectal Cancer Screening 12/23/2019    DEXA Scan 03/12/2021    Lipid Panel 03/08/2022     Immunization History   Administered Date(s) Administered    COVID-19, MRNA, LN-S, PF (MODERNA FULL 0.5 ML DOSE) 02/13/2021, 03/13/2021, 11/01/2021    Influenza - High Dose - PF (65 years and older) 01/04/2017, 11/05/2019    Influenza - Quadrivalent - High Dose - PF (65 years and older) 11/17/2020    Influenza - Trivalent - PF (ADULT) 11/18/2014    Pneumococcal Polysaccharide - 23 Valent 06/21/2012       Review of patient's allergies indicates:   Allergen Reactions    Promethazine Anaphylaxis    Clove flavoring [flavoring agent]     Codeine Itching    Latex, natural rubber     Lorazepam Other (See Comments)    Lovastatin Other (See Comments)    Meclizine Other (See Comments)    Morphine Itching    Simvastatin Other (See Comments)    Tramadol-acetaminophen Itching       Current Outpatient Medications:     albuterol (VENTOLIN HFA) 90 mcg/actuation inhaler, Inhale 2 puffs into the lungs every 4 (four) hours as needed for Wheezing. Rescue, Disp: 18 g, Rfl: 2    amoxicillin-clavulanate 875-125mg (AUGMENTIN) 875-125 mg per tablet, Take 1 tablet by mouth 2 (two) times daily. for 7 days, Disp: 14 tablet, Rfl: 0    ascorbic acid, vitamin C, (VITAMIN C) 100 MG tablet, Take 100 mg by mouth once daily., Disp: , Rfl:     buPROPion (WELLBUTRIN XL) 150 MG TB24 tablet, Take 1 tablet (150 mg total) by mouth once daily. For depression, Disp: 30 tablet, Rfl: 3    ciprofloxacin HCl (CIPRO) 500 MG tablet, Take 500 mg by mouth every 12 (twelve) hours. , Disp: , Rfl:     citalopram (CELEXA) 20 MG tablet, Take 1 tablet  "(20 mg total) by mouth once daily., Disp: 30 tablet, Rfl: 1    ELIQUIS 5 mg Tab, Take 1 tablet (5 mg total) by mouth 2 (two) times daily., Disp: 60 tablet, Rfl: 5    esomeprazole (NEXIUM) 40 MG capsule, Take 1 capsule (40 mg total) by mouth before breakfast., Disp: 30 capsule, Rfl: 3    fluticasone propionate (FLONASE) 50 mcg/actuation nasal spray, 1 spray (50 mcg total) by Each Nostril route daily as needed for Allergies., Disp: 16 g, Rfl: 2    fluticasone-umeclidin-vilanter (TRELEGY ELLIPTA) 100-62.5-25 mcg DsDv, Inhale 1 puff into the lungs daily as needed., Disp: 60 each, Rfl: 2    furosemide (LASIX) 20 MG tablet, Take 1 tablet (20 mg total) by mouth daily as needed., Disp: 90 tablet, Rfl: 1    levothyroxine (SYNTHROID) 25 MCG tablet, Take 1 tablet (25 mcg total) by mouth before breakfast. Name brand only.  RIRI, Disp: 90 tablet, Rfl: 1    metoprolol succinate (TOPROL-XL) 100 MG 24 hr tablet, Take 1 tablet (100 mg total) by mouth once daily. For 30 days, Disp: 90 tablet, Rfl: 1    metroNIDAZOLE (FLAGYL) 500 MG tablet, Take 500 mg by mouth every 12 (twelve) hours. , Disp: , Rfl:     mv-min/folic/vit K/lut/xpom313 (ALIVE WOMEN'S 50 PLUS, BLEND, ORAL), Take 1 tablet by mouth daily as needed., Disp: , Rfl:     nitroGLYCERIN (NITROSTAT) 0.4 MG SL tablet, Place 1 tablet (0.4 mg total) under the tongue every 5 (five) minutes as needed., Disp: 25 tablet, Rfl: 3    potassium chloride (KLOR-CON) 10 MEQ TbSR, Take 1 tablet (10 mEq total) by mouth 2 (two) times daily., Disp: 180 tablet, Rfl: 1    PROCTO-MED HC 2.5 % rectal cream, , Disp: , Rfl:     ALPRAZolam (XANAX) 1 MG tablet, Take 1 tablet (1 mg total) by mouth 2 (two) times daily., Disp: 60 tablet, Rfl: 2    Review of Systems   Constitutional: Negative for activity change, chills, fatigue and fever.   HENT: Positive for hearing loss ("Right ear.") and tinnitus ("A raining sound in the right ear."). Negative for congestion, ear discharge, ear pain, postnasal " "drip, rhinorrhea, sinus pressure, sinus pain and sore throat.    Respiratory: Negative for cough, shortness of breath and wheezing.    Cardiovascular: Negative for chest pain, palpitations and leg swelling.   Gastrointestinal: Negative for abdominal pain, constipation, diarrhea, nausea and vomiting.   Musculoskeletal: Negative for arthralgias.   Neurological: Negative for dizziness, syncope and light-headedness.          Objective:      Vitals:    04/19/22 0808   BP: 124/68   Pulse: 79   Temp: 97.9 °F (36.6 °C)   SpO2: 95%   Weight: 86.4 kg (190 lb 6.4 oz)   Height: 4' 11" (1.499 m)     Physical Exam  Vitals and nursing note reviewed.   Constitutional:       General: She is not in acute distress.     Appearance: Normal appearance. She is obese. She is not ill-appearing, toxic-appearing or diaphoretic.   HENT:      Head: Normocephalic and atraumatic.      Right Ear: Ear canal and external ear normal. Decreased hearing noted. No drainage, swelling or tenderness. No middle ear effusion. There is no impacted cerumen. No foreign body. No mastoid tenderness. Tympanic membrane is not injected, perforated, erythematous, retracted or bulging.      Left Ear: Tympanic membrane, ear canal and external ear normal. No decreased hearing noted. No drainage, swelling or tenderness.  No middle ear effusion. There is no impacted cerumen. No foreign body. No mastoid tenderness. Tympanic membrane is not injected, perforated, erythematous, retracted or bulging.      Mouth/Throat:      Pharynx: Oropharynx is clear.   Eyes:      General: No scleral icterus.        Right eye: No discharge.         Left eye: No discharge.      Extraocular Movements: Extraocular movements intact.      Conjunctiva/sclera: Conjunctivae normal.   Cardiovascular:      Rate and Rhythm: Normal rate and regular rhythm.      Pulses: Normal pulses.      Heart sounds: Normal heart sounds. No murmur heard.    No friction rub.   Pulmonary:      Effort: Pulmonary effort " is normal. No respiratory distress.      Breath sounds: Normal breath sounds. No wheezing, rhonchi or rales.   Abdominal:      General: There is no distension.      Palpations: Abdomen is soft.      Tenderness: There is no abdominal tenderness. There is no guarding or rebound.   Musculoskeletal:         General: Normal range of motion.      Cervical back: Normal range of motion and neck supple.      Right lower leg: No edema.      Left lower leg: No edema.   Lymphadenopathy:      Cervical: No cervical adenopathy.   Skin:     General: Skin is warm and dry.      Coloration: Skin is not jaundiced.      Findings: No erythema.   Neurological:      General: No focal deficit present.      Mental Status: She is alert. Mental status is at baseline.      Gait: Gait normal.   Psychiatric:         Mood and Affect: Mood normal.         Behavior: Behavior normal. Behavior is cooperative.           Assessment:       1. Presbycusis of right ear, unspecified hearing status on contralateral side    2. Essential (primary) hypertension    3. Paroxysmal atrial fibrillation    4. Chronic obstructive pulmonary disease, unspecified COPD type    5. Anxiety    6. Acquired hypothyroidism           Plan:       Presbycusis of right ear, unspecified hearing status on contralateral side  Comments:  Recently completed a 7 day course of Augmentin with minimal relief.  Will refer to [facility] today for eval and treat.   [facility]  Orders:  -     Ambulatory referral/consult to Audiology; Future; Expected date: 04/19/2022    Essential (primary) hypertension  BP today: 124/68 mmHg  Currently stable and well controlled.  Continue as is.    Paroxysmal atrial fibrillation  Normal sinus rhythm today in office.  Continue as is.    Chronic obstructive pulmonary disease, unspecified COPD type    Anxiety    Acquired hypothyroidism      Follow up in about 24 days (around 5/13/2022) for savannah Wallis as scheduled in  May.        4/19/2022 Venkata Edwards PA-C

## 2022-04-20 ENCOUNTER — TELEPHONE (OUTPATIENT)
Dept: FAMILY MEDICINE | Facility: CLINIC | Age: 77
End: 2022-04-20

## 2022-04-20 NOTE — TELEPHONE ENCOUNTER
----- Message from Tess Garner sent at 4/20/2022  9:35 AM CDT -----  Pt's  calling wants to know what Audiologist is she suppose to see.  # [phone]

## 2022-04-20 NOTE — TELEPHONE ENCOUNTER
Spoke with pt about message sent. Pt is wanting the information about the audiologist  we referred her to. Verbalized that we sent the referral to [facility] [phone]. Pt acknowledge understanding.

## 2022-05-13 ENCOUNTER — OFFICE VISIT (OUTPATIENT)
Dept: FAMILY MEDICINE | Facility: CLINIC | Age: 77
End: 2022-05-13
Payer: MEDICARE

## 2022-05-13 VITALS
OXYGEN SATURATION: 97 % | HEART RATE: 60 BPM | DIASTOLIC BLOOD PRESSURE: 66 MMHG | HEIGHT: 59 IN | WEIGHT: 199.81 LBS | SYSTOLIC BLOOD PRESSURE: 128 MMHG | BODY MASS INDEX: 40.28 KG/M2

## 2022-05-13 DIAGNOSIS — I10 ESSENTIAL (PRIMARY) HYPERTENSION: ICD-10-CM

## 2022-05-13 DIAGNOSIS — H91.11 PRESBYCUSIS OF RIGHT EAR, UNSPECIFIED HEARING STATUS ON CONTRALATERAL SIDE: ICD-10-CM

## 2022-05-13 DIAGNOSIS — I48.0 PAROXYSMAL ATRIAL FIBRILLATION: ICD-10-CM

## 2022-05-13 DIAGNOSIS — J44.9 CHRONIC OBSTRUCTIVE PULMONARY DISEASE, UNSPECIFIED COPD TYPE: ICD-10-CM

## 2022-05-13 DIAGNOSIS — Z23 NEED FOR PNEUMOCOCCAL VACCINATION: ICD-10-CM

## 2022-05-13 DIAGNOSIS — E03.9 ACQUIRED HYPOTHYROIDISM: Primary | ICD-10-CM

## 2022-05-13 DIAGNOSIS — F41.1 GENERALIZED ANXIETY DISORDER: ICD-10-CM

## 2022-05-13 PROCEDURE — 99214 OFFICE O/P EST MOD 30 MIN: CPT | Mod: S$GLB,,, | Performed by: PHYSICIAN ASSISTANT

## 2022-05-13 PROCEDURE — 90677 PCV20 VACCINE IM: CPT | Mod: S$GLB,,, | Performed by: PHYSICIAN ASSISTANT

## 2022-05-13 PROCEDURE — G0009 PNEUMOCOCCAL CONJUGATE VACCINE 20-VALENT: ICD-10-PCS | Mod: S$GLB,,, | Performed by: PHYSICIAN ASSISTANT

## 2022-05-13 PROCEDURE — 90677 PNEUMOCOCCAL CONJUGATE VACCINE 20-VALENT: ICD-10-PCS | Mod: S$GLB,,, | Performed by: PHYSICIAN ASSISTANT

## 2022-05-13 PROCEDURE — G0009 ADMIN PNEUMOCOCCAL VACCINE: HCPCS | Mod: S$GLB,,, | Performed by: PHYSICIAN ASSISTANT

## 2022-05-13 PROCEDURE — 99214 PR OFFICE/OUTPT VISIT, EST, LEVL IV, 30-39 MIN: ICD-10-PCS | Mod: S$GLB,,, | Performed by: PHYSICIAN ASSISTANT

## 2022-05-13 NOTE — PROGRESS NOTES
SUBJECTIVE:    Patient ID: Danielle Martinez is a 76 y.o. female.    Chief Complaint: Follow-up (No bottles//Pt is here for a check up. Pt is still having hearing loss in the right eat.//Discuss the pna vaccine//ZAIDA)    This is a 76-year-old female who presents today for regular checkup.  She does report continued hearing loss in the right ear.  She saw Venkata Edwards who did treat for acute otitis media but given persistent symptoms of hearing loss she was referred to audiology for further evaluation. She has seen ENT, Dr. Marquez in [place], [place]. He has ordered an MRI for further eval since the hearing loss was so acute. 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       Office Visit on 03/03/2022   Component Date Value Ref Range Status    WBC 03/08/2022 7.8  3.8 - 10.8 Thousand/uL Final    RBC 03/08/2022 4.79  3.80 - 5.10 Million/uL Final    Hemoglobin 03/08/2022 13.5  11.7 - 15.5 g/dL Final    Hematocrit 03/08/2022 43.0  35.0 - 45.0 % Final    MCV 03/08/2022 89.8  80.0 - 100.0 fL Final    MCH 03/08/2022 28.2  27.0 - 33.0 pg Final    MCHC 03/08/2022 31.4 (A) 32.0 - 36.0 g/dL Final    RDW 03/08/2022 13.9  11.0 - 15.0 % Final    Platelets 03/08/2022 283  140 - 400 Thousand/uL Final    MPV 03/08/2022 10.4  7.5 - 12.5 fL Final    Neutrophils, Abs 03/08/2022 5,351  1,500 - 7,800 cells/uL Final    Lymph # 03/08/2022 1,700  850 - 3,900 cells/uL Final    Mono # 03/08/2022 515  200 - 950 cells/uL Final    Eos # 03/08/2022 172  15 - 500 cells/uL Final    Baso # 03/08/2022 62  0 - 200 cells/uL Final    Neutrophils Relative 03/08/2022 68.6  % Final    Lymph % 03/08/2022 21.8  % Final    Mono % 03/08/2022 6.6  % Final    Eosinophil % 03/08/2022 2.2  % Final    Basophil % 03/08/2022 0.8  % Final    Glucose 03/08/2022 99  65 - 99 mg/dL Final    BUN 03/08/2022 20  7 - 25 mg/dL Final    Creatinine 03/08/2022 0.92  0.60 - 0.93 mg/dL Final    eGFR if non  03/08/2022 61  >  OR = 60 mL/min/1.73m2 Final    eGFR if  03/08/2022 71  > OR = 60 mL/min/1.73m2 Final    BUN/Creatinine Ratio 03/08/2022 NOT APPLICABLE  6 - 22 (calc) Final    Sodium 03/08/2022 143  135 - 146 mmol/L Final    Potassium 03/08/2022 4.2  3.5 - 5.3 mmol/L Final    Chloride 03/08/2022 105  98 - 110 mmol/L Final    CO2 03/08/2022 27  20 - 32 mmol/L Final    Calcium 03/08/2022 9.6  8.6 - 10.4 mg/dL Final    Total Protein 03/08/2022 6.9  6.1 - 8.1 g/dL Final    Albumin 03/08/2022 4.2  3.6 - 5.1 g/dL Final    Globulin, Total 03/08/2022 2.7  1.9 - 3.7 g/dL (calc) Final    Albumin/Globulin Ratio 03/08/2022 1.6  1.0 - 2.5 (calc) Final    Total Bilirubin 03/08/2022 1.2  0.2 - 1.2 mg/dL Final    Alkaline Phosphatase 03/08/2022 66  37 - 153 U/L Final    AST 03/08/2022 19  10 - 35 U/L Final    ALT 03/08/2022 14  6 - 29 U/L Final    Cholesterol 03/08/2022 144  <200 mg/dL Final    HDL 03/08/2022 43 (A) > OR = 50 mg/dL Final    Triglycerides 03/08/2022 77  <150 mg/dL Final    LDL Cholesterol 03/08/2022 84  mg/dL (calc) Final    HDL/Cholesterol Ratio 03/08/2022 3.3  <5.0 (calc) Final    Non HDL Chol. (LDL+VLDL) 03/08/2022 101  <130 mg/dL (calc) Final    TSH w/reflex to FT4 03/08/2022 4.05  0.40 - 4.50 mIU/L Final       Past Medical History:   Diagnosis Date    HAILY (acute kidney injury)     Anxiety     Arthritis     Juvenile diagnosed age 17    Atrial fibrillation     COPD (chronic obstructive pulmonary disease)     Depression     Diverticulitis     Emphysema of lung     GERD (gastroesophageal reflux disease)     Hx of hiatal hernia     Hypertension     Osteoporosis     Pneumonia     Sleep apnea      Past Surgical History:   Procedure Laterality Date    BREAST LUMPECTOMY Left     in 40's    COLON SURGERY      COLONOSCOPY  2016    Dr. Reyes 5 years    COLONOSCOPY  2019    Dr. Reyes 5 years    HERNIA REPAIR      HIATAL HERNIA REPAIR  2005    HYSTERECTOMY       SHOULDER ARTHROSCOPY  2015     Family History   Problem Relation Age of Onset    Hyperlipidemia Mother        Marital Status:   Alcohol History:  reports no history of alcohol use.  Tobacco History:  reports that she has never smoked. She has never used smokeless tobacco.  Drug History:  reports no history of drug use.    Review of patient's allergies indicates:   Allergen Reactions    Promethazine Anaphylaxis    Clove flavoring [flavoring agent]     Codeine Itching    Latex, natural rubber     Lorazepam Other (See Comments)    Lovastatin Other (See Comments)    Meclizine Other (See Comments)    Morphine Itching    Simvastatin Other (See Comments)    Tramadol-acetaminophen Itching       Current Outpatient Medications:     albuterol (VENTOLIN HFA) 90 mcg/actuation inhaler, Inhale 2 puffs into the lungs every 4 (four) hours as needed for Wheezing. Rescue, Disp: 18 g, Rfl: 2    ALPRAZolam (XANAX) 1 MG tablet, Take 1 tablet (1 mg total) by mouth 2 (two) times daily., Disp: 60 tablet, Rfl: 2    ascorbic acid, vitamin C, (VITAMIN C) 100 MG tablet, Take 100 mg by mouth once daily., Disp: , Rfl:     buPROPion (WELLBUTRIN XL) 150 MG TB24 tablet, Take 1 tablet (150 mg total) by mouth once daily. For depression, Disp: 30 tablet, Rfl: 3    ciprofloxacin HCl (CIPRO) 500 MG tablet, Take 500 mg by mouth every 12 (twelve) hours. , Disp: , Rfl:     citalopram (CELEXA) 20 MG tablet, Take 1 tablet (20 mg total) by mouth once daily., Disp: 30 tablet, Rfl: 1    ELIQUIS 5 mg Tab, Take 1 tablet (5 mg total) by mouth 2 (two) times daily., Disp: 60 tablet, Rfl: 5    esomeprazole (NEXIUM) 40 MG capsule, Take 1 capsule (40 mg total) by mouth before breakfast., Disp: 30 capsule, Rfl: 3    fluticasone propionate (FLONASE) 50 mcg/actuation nasal spray, 1 spray (50 mcg total) by Each Nostril route daily as needed for Allergies., Disp: 16 g, Rfl: 2    fluticasone-umeclidin-vilanter (TRELEGY ELLIPTA) 100-62.5-25 mcg  "DsDv, Inhale 1 puff into the lungs daily as needed., Disp: 60 each, Rfl: 2    furosemide (LASIX) 20 MG tablet, Take 1 tablet (20 mg total) by mouth daily as needed., Disp: 90 tablet, Rfl: 1    levothyroxine (SYNTHROID) 25 MCG tablet, Take 1 tablet (25 mcg total) by mouth before breakfast. Name brand only.  RIRI, Disp: 90 tablet, Rfl: 1    metoprolol succinate (TOPROL-XL) 100 MG 24 hr tablet, Take 1 tablet (100 mg total) by mouth once daily. For 30 days, Disp: 90 tablet, Rfl: 1    metroNIDAZOLE (FLAGYL) 500 MG tablet, Take 500 mg by mouth every 12 (twelve) hours. , Disp: , Rfl:     mv-min/folic/vit [id number] (ALIVE WOMEN'S 50 PLUS, BLEND, ORAL), Take 1 tablet by mouth daily as needed., Disp: , Rfl:     nitroGLYCERIN (NITROSTAT) 0.4 MG SL tablet, Place 1 tablet (0.4 mg total) under the tongue every 5 (five) minutes as needed., Disp: 25 tablet, Rfl: 3    potassium chloride (KLOR-CON) 10 MEQ TbSR, Take 1 tablet (10 mEq total) by mouth 2 (two) times daily., Disp: 180 tablet, Rfl: 1    PROCTO-MED HC 2.5 % rectal cream, , Disp: , Rfl:     Review of Systems   Constitutional: Negative for activity change, chills, fatigue and fever.   HENT: Positive for hearing loss ("Right ear.") and tinnitus ("A raining sound in the right ear."). Negative for congestion, ear discharge, ear pain, postnasal drip, rhinorrhea, sinus pressure, sinus pain and sore throat.    Respiratory: Negative for cough, shortness of breath and wheezing.    Cardiovascular: Negative for chest pain, palpitations and leg swelling.   Gastrointestinal: Negative for abdominal pain, constipation, diarrhea, nausea and vomiting.   Musculoskeletal: Negative for arthralgias.   Neurological: Negative for dizziness, syncope and light-headedness.          Objective:      Vitals:    05/13/22 0838   BP: 128/66   Pulse: 60   SpO2: 97%   Weight: 90.6 kg (199 lb 12.8 oz)   Height: 4' 11" (1.499 m)     Physical Exam  Vitals and nursing note reviewed.   Constitutional:  "      General: She is not in acute distress.     Appearance: Normal appearance. She is obese. She is not ill-appearing, toxic-appearing or diaphoretic.   HENT:      Head: Normocephalic and atraumatic.      Right Ear: Ear canal and external ear normal. Decreased hearing noted. No drainage, swelling or tenderness. No middle ear effusion. There is no impacted cerumen. No foreign body. No mastoid tenderness. Tympanic membrane is not injected, perforated, erythematous, retracted or bulging.      Left Ear: Tympanic membrane, ear canal and external ear normal. No decreased hearing noted. No drainage, swelling or tenderness.  No middle ear effusion. There is no impacted cerumen. No foreign body. No mastoid tenderness. Tympanic membrane is not injected, perforated, erythematous, retracted or bulging.      Mouth/Throat:      Pharynx: Oropharynx is clear.   Eyes:      General: No scleral icterus.        Right eye: No discharge.         Left eye: No discharge.      Extraocular Movements: Extraocular movements intact.      Conjunctiva/sclera: Conjunctivae normal.   Cardiovascular:      Rate and Rhythm: Normal rate. Rhythm irregular.      Pulses: Normal pulses.      Heart sounds: Normal heart sounds. No murmur heard.    No friction rub.   Pulmonary:      Effort: Pulmonary effort is normal. No respiratory distress.      Breath sounds: Normal breath sounds. No wheezing, rhonchi or rales.   Abdominal:      General: There is no distension.      Palpations: Abdomen is soft.      Tenderness: There is no abdominal tenderness. There is no guarding or rebound.   Musculoskeletal:         General: Normal range of motion.      Cervical back: Normal range of motion and neck supple.      Right lower leg: No edema.      Left lower leg: No edema.   Lymphadenopathy:      Cervical: No cervical adenopathy.   Skin:     General: Skin is warm and dry.      Coloration: Skin is not jaundiced.      Findings: No erythema.   Neurological:      General: No  focal deficit present.      Mental Status: She is alert. Mental status is at baseline.      Gait: Gait normal.   Psychiatric:         Mood and Affect: Mood normal.         Behavior: Behavior normal. Behavior is cooperative.           Assessment:       1. Acquired hypothyroidism    2. Essential (primary) hypertension    3. Chronic obstructive pulmonary disease, unspecified COPD type    4. Generalized anxiety disorder    5. Presbycusis of right ear, unspecified hearing status on contralateral side    6. Need for pneumococcal vaccination    7. Paroxysmal atrial fibrillation         Plan:       Acquired hypothyroidism  Comments:  stable on low dose levothyroxine. will maintain as is.    Essential (primary) hypertension  Comments:  BP is at goal. continue as is.    Chronic obstructive pulmonary disease, unspecified COPD type  Comments:  Maintains on trelegy. Seems to be doing very well. She denies any trouble breathing now.    Generalized anxiety disorder  Comments:  Mood and anxiety very stable at this time. maintain celexa at this time.    Presbycusis of right ear, unspecified hearing status on contralateral side  Comments:  she is seeing ENT now, Dr. Marquez. Planning for MRI. Had audiological exam completed.    Need for pneumococcal vaccination  -     (In Office Administered) Pneumococcal Conjugate Vaccine (20 Valent) (IM)    Paroxysmal atrial fibrillation  Comments:  Follows with Dr. Kate. Maintains on eliquis BID. rate controlled.      Follow up if symptoms worsen or fail to improve, for as scheduled with DR. Lopez.        5/13/2022 Bimal Rossi PA-C

## 2022-05-23 ENCOUNTER — HOSPITAL ENCOUNTER (OUTPATIENT)
Dept: RADIOLOGY | Facility: HOSPITAL | Age: 77
Discharge: HOME | End: 2022-05-23
Attending: OTOLARYNGOLOGY
Payer: MEDICARE

## 2022-05-23 DIAGNOSIS — H91.21 SUDDEN HEARING LOSS, RIGHT: ICD-10-CM

## 2022-05-23 PROCEDURE — 70553 MRI BRAIN W WO CONTRAST: ICD-10-PCS | Mod: 26,,, | Performed by: RADIOLOGY

## 2022-05-23 PROCEDURE — 25500020 PHARM REV CODE 255: Performed by: OTOLARYNGOLOGY

## 2022-05-23 PROCEDURE — A9585 GADOBUTROL INJECTION: HCPCS | Performed by: OTOLARYNGOLOGY

## 2022-05-23 PROCEDURE — 70553 MRI BRAIN STEM W/O & W/DYE: CPT | Mod: 26,,, | Performed by: RADIOLOGY

## 2022-05-23 PROCEDURE — 70553 MRI BRAIN STEM W/O & W/DYE: CPT | Mod: TC

## 2022-05-23 RX ORDER — GADOBUTROL 604.72 MG/ML
9 INJECTION INTRAVENOUS
Status: COMPLETED | OUTPATIENT
Start: 2022-05-23 | End: 2022-05-23

## 2022-05-23 RX ADMIN — GADOBUTROL 9 ML: 604.72 INJECTION INTRAVENOUS at 10:05

## 2022-06-06 ENCOUNTER — OFFICE VISIT (OUTPATIENT)
Dept: FAMILY MEDICINE | Facility: CLINIC | Age: 77
End: 2022-06-06
Payer: MEDICARE

## 2022-06-06 VITALS
HEART RATE: 85 BPM | OXYGEN SATURATION: 96 % | HEIGHT: 59 IN | BODY MASS INDEX: 40.36 KG/M2 | SYSTOLIC BLOOD PRESSURE: 130 MMHG | WEIGHT: 200.19 LBS | DIASTOLIC BLOOD PRESSURE: 78 MMHG

## 2022-06-06 DIAGNOSIS — I48.0 PAROXYSMAL ATRIAL FIBRILLATION: ICD-10-CM

## 2022-06-06 DIAGNOSIS — E03.9 ACQUIRED HYPOTHYROIDISM: ICD-10-CM

## 2022-06-06 DIAGNOSIS — I10 ESSENTIAL (PRIMARY) HYPERTENSION: ICD-10-CM

## 2022-06-06 DIAGNOSIS — H91.10 PRESBYCUSIS, UNSPECIFIED LATERALITY: ICD-10-CM

## 2022-06-06 DIAGNOSIS — F41.9 ANXIETY: ICD-10-CM

## 2022-06-06 DIAGNOSIS — J44.9 CHRONIC OBSTRUCTIVE PULMONARY DISEASE, UNSPECIFIED COPD TYPE: ICD-10-CM

## 2022-06-06 DIAGNOSIS — K21.9 GASTRO-ESOPHAGEAL REFLUX DISEASE WITHOUT ESOPHAGITIS: ICD-10-CM

## 2022-06-06 DIAGNOSIS — F32.9 REACTIVE DEPRESSION: Primary | ICD-10-CM

## 2022-06-06 PROCEDURE — 99214 OFFICE O/P EST MOD 30 MIN: CPT | Mod: S$GLB,,, | Performed by: PHYSICIAN ASSISTANT

## 2022-06-06 PROCEDURE — 99214 PR OFFICE/OUTPT VISIT, EST, LEVL IV, 30-39 MIN: ICD-10-PCS | Mod: S$GLB,,, | Performed by: PHYSICIAN ASSISTANT

## 2022-06-06 RX ORDER — ESOMEPRAZOLE MAGNESIUM 40 MG/1
40 CAPSULE, DELAYED RELEASE ORAL
Qty: 90 CAPSULE | Refills: 1 | Status: SHIPPED | OUTPATIENT
Start: 2022-06-06 | End: 2022-09-07 | Stop reason: SDUPTHER

## 2022-06-06 RX ORDER — PREDNISONE 10 MG/1
TABLET ORAL
COMMUNITY
Start: 2022-05-02

## 2022-06-06 RX ORDER — BUPROPION HYDROCHLORIDE 150 MG/1
150 TABLET ORAL DAILY
Qty: 30 TABLET | Refills: 3 | Status: CANCELLED | OUTPATIENT
Start: 2022-06-06 | End: 2023-06-06

## 2022-06-06 RX ORDER — BUPROPION HYDROCHLORIDE 150 MG/1
150 TABLET, EXTENDED RELEASE ORAL 2 TIMES DAILY
Qty: 60 TABLET | Refills: 5 | Status: SHIPPED | OUTPATIENT
Start: 2022-06-06 | End: 2023-11-14

## 2022-06-06 RX ORDER — ALPRAZOLAM 1 MG/1
1 TABLET ORAL 2 TIMES DAILY
Qty: 60 TABLET | Refills: 2 | Status: SHIPPED | OUTPATIENT
Start: 2022-06-06 | End: 2022-09-07 | Stop reason: SDUPTHER

## 2022-06-06 NOTE — PROGRESS NOTES
SUBJECTIVE:    Patient ID: Danielle Martinez is a 76 y.o. female.    Chief Complaint: Follow-up (Brought bottles/ off balance for about 2 weeks/ depressed due to multiple siblings passing//dp)    Pt is a 76 y.o. female who presents today for an urgent visit with 2 separate CC's.  The first is increased anxiety and depression.  She reports her youngest sister recently passed away 1 week ago secondary to metastatic pancreatic cancer.  Additionally, her brother was recently diagnosed with dementia.  Lastly, her son's wife was also recently diagnosed with cancer.  All of these measures are weighing on her and exacerbating her depressed mood.  She is experiencing sleep disturbances, only averaging approximately 4-6 hours of sleep/night.  Currently, she is on Wellbutrin 150mg q.d. and Xanax 1mg b.i.d..  Her support system at this time is her [employer] group and her .  Additional ways of coping with this increased depression is listening to calming music and reading scripture.  She denies any HI, SI, or increased agitation.    Additionally, she reports decreased hearing, worse in the right ear > left.  She is currently following up with Dr. Marquez (ENT) regarding this issue. Recently completed MRI of the brain.  Denies any ear pain, ear discharge, or fevers.      Lab Visit on 05/23/2022   Component Date Value Ref Range Status    BUN 05/23/2022 30 (A) 8 - 23 mg/dL Final    Creatinine 05/23/2022 0.9  0.5 - 1.4 mg/dL Final    eGFR if African American 05/23/2022 >60.0  >60 mL/min/1.73 m^2 Final    eGFR if non African American 05/23/2022 >60.0  >60 mL/min/1.73 m^2 Final   Office Visit on 03/03/2022   Component Date Value Ref Range Status    WBC 03/08/2022 7.8  3.8 - 10.8 Thousand/uL Final    RBC 03/08/2022 4.79  3.80 - 5.10 Million/uL Final    Hemoglobin 03/08/2022 13.5  11.7 - 15.5 g/dL Final    Hematocrit 03/08/2022 43.0  35.0 - 45.0 % Final    MCV 03/08/2022 89.8  80.0 - 100.0 fL Final    MCH 03/08/2022 28.2   27.0 - 33.0 pg Final    MCHC 03/08/2022 31.4 (A) 32.0 - 36.0 g/dL Final    RDW 03/08/2022 13.9  11.0 - 15.0 % Final    Platelets 03/08/2022 283  140 - 400 Thousand/uL Final    MPV 03/08/2022 10.4  7.5 - 12.5 fL Final    Neutrophils, Abs 03/08/2022 5,351  1,500 - 7,800 cells/uL Final    Lymph # 03/08/2022 1,700  850 - 3,900 cells/uL Final    Mono # 03/08/2022 515  200 - 950 cells/uL Final    Eos # 03/08/2022 172  15 - 500 cells/uL Final    Baso # 03/08/2022 62  0 - 200 cells/uL Final    Neutrophils Relative 03/08/2022 68.6  % Final    Lymph % 03/08/2022 21.8  % Final    Mono % 03/08/2022 6.6  % Final    Eosinophil % 03/08/2022 2.2  % Final    Basophil % 03/08/2022 0.8  % Final    Glucose 03/08/2022 99  65 - 99 mg/dL Final    BUN 03/08/2022 20  7 - 25 mg/dL Final    Creatinine 03/08/2022 0.92  0.60 - 0.93 mg/dL Final    eGFR if non  03/08/2022 61  > OR = 60 mL/min/1.73m2 Final    eGFR if  03/08/2022 71  > OR = 60 mL/min/1.73m2 Final    BUN/Creatinine Ratio 03/08/2022 NOT APPLICABLE  6 - 22 (calc) Final    Sodium 03/08/2022 143  135 - 146 mmol/L Final    Potassium 03/08/2022 4.2  3.5 - 5.3 mmol/L Final    Chloride 03/08/2022 105  98 - 110 mmol/L Final    CO2 03/08/2022 27  20 - 32 mmol/L Final    Calcium 03/08/2022 9.6  8.6 - 10.4 mg/dL Final    Total Protein 03/08/2022 6.9  6.1 - 8.1 g/dL Final    Albumin 03/08/2022 4.2  3.6 - 5.1 g/dL Final    Globulin, Total 03/08/2022 2.7  1.9 - 3.7 g/dL (calc) Final    Albumin/Globulin Ratio 03/08/2022 1.6  1.0 - 2.5 (calc) Final    Total Bilirubin 03/08/2022 1.2  0.2 - 1.2 mg/dL Final    Alkaline Phosphatase 03/08/2022 66  37 - 153 U/L Final    AST 03/08/2022 19  10 - 35 U/L Final    ALT 03/08/2022 14  6 - 29 U/L Final    Cholesterol 03/08/2022 144  <200 mg/dL Final    HDL 03/08/2022 43 (A) > OR = 50 mg/dL Final    Triglycerides 03/08/2022 77  <150 mg/dL Final    LDL Cholesterol 03/08/2022 84  mg/dL (calc) Final     HDL/Cholesterol Ratio 03/08/2022 3.3  <5.0 (calc) Final    Non HDL Chol. (LDL+VLDL) 03/08/2022 101  <130 mg/dL (calc) Final    TSH w/reflex to FT4 03/08/2022 4.05  0.40 - 4.50 mIU/L Final       Past Medical History:   Diagnosis Date    HAILY (acute kidney injury)     Anxiety     Arthritis     Juvenile diagnosed age 17    Atrial fibrillation     COPD (chronic obstructive pulmonary disease)     Depression     Diverticulitis     Emphysema of lung     GERD (gastroesophageal reflux disease)     Hx of hiatal hernia     Hypertension     Osteoporosis     Pneumonia     Sleep apnea      Past Surgical History:   Procedure Laterality Date    BREAST LUMPECTOMY Left     in 40's    COLON SURGERY      COLONOSCOPY  2016    Dr. [facility] 5 years    COLONOSCOPY  2019    Dr. Crowe[facility] 5 years    HERNIA REPAIR      HIATAL HERNIA REPAIR  2005    HYSTERECTOMY      SHOULDER ARTHROSCOPY  2015     Family History   Problem Relation Age of Onset    Hyperlipidemia Mother        Marital Status:   Alcohol History:  reports no history of alcohol use.  Tobacco History:  reports that she has never smoked. She has never used smokeless tobacco.  Drug History:  reports no history of drug use.    Health Maintenance Topics with due status: Not Due       Topic Last Completion Date    Colorectal Cancer Screening 12/23/2019    Influenza Vaccine 11/17/2020    DEXA Scan 03/12/2021    Lipid Panel 03/08/2022     Immunization History   Administered Date(s) Administered    COVID-19, MRNA, LN-S, PF (MODERNA FULL 0.5 ML DOSE) 02/13/2021, 03/13/2021, 11/01/2021    Influenza - High Dose - PF (65 years and older) 01/04/2017, 11/05/2019    Influenza - Quadrivalent - High Dose - PF (65 years and older) 11/17/2020    Influenza - Trivalent - PF (ADULT) 11/18/2014    Pneumococcal Conjugate - 20 Valent 05/13/2022    Pneumococcal Polysaccharide - 23 Valent 06/21/2012       Review of patient's allergies indicates:   Allergen  Reactions    Promethazine Anaphylaxis    Clove flavoring [flavoring agent]     Codeine Itching    Latex, natural rubber     Lorazepam Other (See Comments)    Lovastatin Other (See Comments)    Meclizine Other (See Comments)    Morphine Itching    Simvastatin Other (See Comments)    Tramadol-acetaminophen Itching       Current Outpatient Medications:     albuterol (VENTOLIN HFA) 90 mcg/actuation inhaler, Inhale 2 puffs into the lungs every 4 (four) hours as needed for Wheezing. Rescue, Disp: 18 g, Rfl: 2    ascorbic acid, vitamin C, (VITAMIN C) 100 MG tablet, Take 100 mg by mouth once daily., Disp: , Rfl:     ciprofloxacin HCl (CIPRO) 500 MG tablet, Take 500 mg by mouth every 12 (twelve) hours. , Disp: , Rfl:     ELIQUIS 5 mg Tab, Take 1 tablet (5 mg total) by mouth 2 (two) times daily., Disp: 60 tablet, Rfl: 5    fluticasone propionate (FLONASE) 50 mcg/actuation nasal spray, 1 spray (50 mcg total) by Each Nostril route daily as needed for Allergies., Disp: 16 g, Rfl: 2    fluticasone-umeclidin-vilanter (TRELEGY ELLIPTA) 100-62.5-25 mcg DsDv, Inhale 1 puff into the lungs daily as needed., Disp: 60 each, Rfl: 2    furosemide (LASIX) 20 MG tablet, Take 1 tablet (20 mg total) by mouth daily as needed., Disp: 90 tablet, Rfl: 1    levothyroxine (SYNTHROID) 25 MCG tablet, Take 1 tablet (25 mcg total) by mouth before breakfast. Name brand only.  RIRI, Disp: 90 tablet, Rfl: 1    metoprolol succinate (TOPROL-XL) 100 MG 24 hr tablet, Take 1 tablet (100 mg total) by mouth once daily. For 30 days, Disp: 90 tablet, Rfl: 1    metroNIDAZOLE (FLAGYL) 500 MG tablet, Take 500 mg by mouth every 12 (twelve) hours. , Disp: , Rfl:     mv-min/folic/vit K/lut/xiay882 (ALIVE WOMEN'S 50 PLUS, BLEND, ORAL), Take 1 tablet by mouth daily as needed., Disp: , Rfl:     nitroGLYCERIN (NITROSTAT) 0.4 MG SL tablet, Place 1 tablet (0.4 mg total) under the tongue every 5 (five) minutes as needed., Disp: 25 tablet, Rfl: 3     "potassium chloride (KLOR-CON) 10 MEQ TbSR, Take 1 tablet (10 mEq total) by mouth 2 (two) times daily., Disp: 180 tablet, Rfl: 1    predniSONE (DELTASONE) 10 MG tablet, SMARTSI Tablet(s) By Mouth Every Morning, Disp: , Rfl:     PROCTO-MED HC 2.5 % rectal cream, , Disp: , Rfl:     ALPRAZolam (XANAX) 1 MG tablet, Take 1 tablet (1 mg total) by mouth 2 (two) times daily., Disp: 60 tablet, Rfl: 2    buPROPion (WELLBUTRIN SR) 150 MG TBSR 12 hr tablet, Take 1 tablet (150 mg total) by mouth 2 (two) times daily., Disp: 60 tablet, Rfl: 5    citalopram (CELEXA) 20 MG tablet, Take 1 tablet (20 mg total) by mouth once daily., Disp: 30 tablet, Rfl: 1    esomeprazole (NEXIUM) 40 MG capsule, Take 1 capsule (40 mg total) by mouth before breakfast., Disp: 90 capsule, Rfl: 1    Review of Systems   Constitutional: Negative for activity change, chills, fatigue and fever.   HENT: Positive for hearing loss. Negative for congestion, ear discharge, ear pain, postnasal drip and rhinorrhea.    Eyes: Negative for pain.   Respiratory: Negative for cough, shortness of breath and wheezing.    Cardiovascular: Negative for chest pain and palpitations.   Gastrointestinal: Negative for abdominal pain, constipation, diarrhea, nausea and vomiting.   Endocrine: Negative for cold intolerance and heat intolerance.   Genitourinary: Negative for difficulty urinating and dysuria.   Musculoskeletal: Negative for arthralgias and myalgias.   Neurological: Negative for dizziness, syncope, light-headedness and headaches.   Psychiatric/Behavioral: Positive for sleep disturbance. Negative for agitation, behavioral problems, self-injury and suicidal ideas. The patient is nervous/anxious.           Objective:      Vitals:    22 1013   BP: 130/78   Pulse: 85   SpO2: 96%   Weight: 90.8 kg (200 lb 3.2 oz)   Height: 4' 11" (1.499 m)     Physical Exam  Vitals and nursing note reviewed.   Constitutional:       General: She is not in acute distress.     " Appearance: Normal appearance. She is obese. She is not ill-appearing, toxic-appearing or diaphoretic.   HENT:      Head: Normocephalic and atraumatic.      Right Ear: Tympanic membrane, ear canal and external ear normal. Decreased hearing noted. No drainage, swelling or tenderness. No middle ear effusion. There is no impacted cerumen. No foreign body. No mastoid tenderness. Tympanic membrane is not injected, perforated, erythematous, retracted or bulging.      Left Ear: Tympanic membrane, ear canal and external ear normal. No decreased hearing noted. No drainage, swelling or tenderness.  No middle ear effusion. There is no impacted cerumen. No foreign body. No mastoid tenderness. Tympanic membrane is not injected, perforated, erythematous, retracted or bulging.      Nose: Nose normal. No rhinorrhea.      Mouth/Throat:      Mouth: Mucous membranes are moist.      Pharynx: Oropharynx is clear.   Eyes:      General: No scleral icterus.     Extraocular Movements: Extraocular movements intact.      Conjunctiva/sclera: Conjunctivae normal.      Pupils: Pupils are equal, round, and reactive to light.   Cardiovascular:      Rate and Rhythm: Normal rate and regular rhythm.      Pulses: Normal pulses.      Heart sounds: Normal heart sounds. No murmur heard.    No friction rub.   Pulmonary:      Effort: Pulmonary effort is normal. No respiratory distress.      Breath sounds: Normal breath sounds. No wheezing, rhonchi or rales.   Abdominal:      General: There is no distension.      Palpations: Abdomen is soft.      Tenderness: There is no abdominal tenderness. There is no guarding or rebound.   Musculoskeletal:         General: Normal range of motion.      Cervical back: Normal range of motion and neck supple.      Right lower leg: No edema.      Left lower leg: No edema.   Skin:     General: Skin is warm and dry.      Coloration: Skin is not jaundiced.      Findings: No erythema.   Neurological:      General: No focal  deficit present.      Mental Status: She is alert. Mental status is at baseline.      Gait: Gait normal.   Psychiatric:         Attention and Perception: Attention normal.         Mood and Affect: Mood is depressed. Mood is not anxious. Affect is tearful.         Behavior: Behavior normal. Behavior is cooperative.         Thought Content: Thought content does not include homicidal or suicidal ideation. Thought content does not include homicidal or suicidal plan.           Assessment:       1. Reactive depression    2. Presbycusis, unspecified laterality    3. Gastro-esophageal reflux disease without esophagitis    4. Anxiety    5. Essential (primary) hypertension    6. Acquired hypothyroidism    7. Chronic obstructive pulmonary disease, unspecified COPD type    8. Paroxysmal atrial fibrillation           Plan:       Reactive depression  Comments:  Will increase Wellbutrin to 150mg b.i.d   If no improvement, will consider starting Celexa 20mg q.d.  Orders:  -     buPROPion (WELLBUTRIN SR) 150 MG TBSR 12 hr tablet; Take 1 tablet (150 mg total) by mouth 2 (two) times daily.  Dispense: 60 tablet; Refill: 5    Presbycusis, unspecified laterality  Comments:  Explained the pathology of press because is to patient today, reassuring her that there is no pathological etiology causing her hearing loss.  It is due to age related degenerative changes.  Continue f/u with Dr. Garcia (ENT) as scheduled.    Gastro-esophageal reflux disease without esophagitis  -     esomeprazole (NEXIUM) 40 MG capsule; Take 1 capsule (40 mg total) by mouth before breakfast.  Dispense: 90 capsule; Refill: 1    Anxiety  P.r.n. Xanax is currently efficacious for breakthrough anxiety spells.  Continue as is - refills sent today.  -     ALPRAZolam (XANAX) 1 MG tablet; Take 1 tablet (1 mg total) by mouth 2 (two) times daily.  Dispense: 60 tablet; Refill: 2    Essential (primary) hypertension  Stable and well controlled - continue as is.    Acquired  hypothyroidism    Chronic obstructive pulmonary disease, unspecified COPD type    Paroxysmal atrial fibrillation  Normal sinus rhythm today in office.  Continue current regimen as is.    Follow up for Depression, Anxiety, Keep follow up as scheduled..        6/6/2022 Venkata Edwards PA-C

## 2022-07-13 ENCOUNTER — TELEPHONE (OUTPATIENT)
Dept: FAMILY MEDICINE | Facility: CLINIC | Age: 77
End: 2022-07-13

## 2022-07-13 DIAGNOSIS — H91.90 HEARING LOSS, UNSPECIFIED HEARING LOSS TYPE, UNSPECIFIED LATERALITY: Primary | ICD-10-CM

## 2022-07-13 NOTE — TELEPHONE ENCOUNTER
----- Message from Lalita Cain sent at 7/13/2022  3:28 PM CDT -----  Patient called and stated that she need a referral to go see an ENT she stated that she is having trouble hearing out of her right ear she would like to like to see Dr Phillips in [place] if any questions please give her her a call at [phone]

## 2022-07-25 ENCOUNTER — TELEPHONE (OUTPATIENT)
Dept: FAMILY MEDICINE | Facility: CLINIC | Age: 77
End: 2022-07-25

## 2022-07-25 NOTE — TELEPHONE ENCOUNTER
----- Message from Lalita Cain sent at 7/25/2022 11:16 AM CDT -----  Mary with [facility] called and she need to speak to the nurse she need to see about putting another doctor on the patient paper work so they can get the patient in sooner please give her a call and she can explain her phone number is [phone] please ask for her

## 2022-08-19 ENCOUNTER — TELEPHONE (OUTPATIENT)
Dept: FAMILY MEDICINE | Facility: CLINIC | Age: 77
End: 2022-08-19

## 2022-08-19 NOTE — TELEPHONE ENCOUNTER
----- Message from Lizzette Williamson sent at 8/19/2022 10:46 AM CDT -----  The patient has an appointment 10/20/2022 with Dr. Lopez. She will be out of town Can you get her in sooner with Dr. Lopez or Bimal. She has another appointment on 08/29/2022 with another Dr. She can not come in on that day. Pt's # [phone] GH

## 2022-08-29 ENCOUNTER — TELEPHONE (OUTPATIENT)
Dept: FAMILY MEDICINE | Facility: CLINIC | Age: 77
End: 2022-08-29

## 2022-08-29 NOTE — TELEPHONE ENCOUNTER
----- Message from Lalita Cain sent at 8/29/2022  3:16 PM CDT -----  Moy with Dr Domingo office called and stated that the patient is at the office now and they need a copy of the patient med list and if we have a copy of her hearing test that also if any questions please give her a call at [phone] and the fax number [phone]

## 2022-09-07 DIAGNOSIS — I48.0 PAROXYSMAL ATRIAL FIBRILLATION: ICD-10-CM

## 2022-09-07 DIAGNOSIS — K21.9 GASTRO-ESOPHAGEAL REFLUX DISEASE WITHOUT ESOPHAGITIS: ICD-10-CM

## 2022-09-07 DIAGNOSIS — F41.9 ANXIETY: ICD-10-CM

## 2022-09-07 RX ORDER — APIXABAN 5 MG/1
5 TABLET, FILM COATED ORAL 2 TIMES DAILY
Qty: 60 TABLET | Refills: 5 | Status: SHIPPED | OUTPATIENT
Start: 2022-09-07

## 2022-09-07 RX ORDER — ESOMEPRAZOLE MAGNESIUM 40 MG/1
40 CAPSULE, DELAYED RELEASE ORAL
Qty: 90 CAPSULE | Refills: 1 | Status: SHIPPED | OUTPATIENT
Start: 2022-09-07 | End: 2023-04-14 | Stop reason: SDUPTHER

## 2022-09-07 RX ORDER — METOPROLOL SUCCINATE 100 MG/1
100 TABLET, EXTENDED RELEASE ORAL DAILY
Qty: 90 TABLET | Refills: 1 | Status: SHIPPED | OUTPATIENT
Start: 2022-09-07 | End: 2023-04-14 | Stop reason: SDUPTHER

## 2022-09-07 RX ORDER — ALPRAZOLAM 1 MG/1
1 TABLET ORAL 2 TIMES DAILY
Qty: 60 TABLET | Refills: 2 | Status: SHIPPED | OUTPATIENT
Start: 2022-09-07 | End: 2023-03-15 | Stop reason: SDUPTHER

## 2022-09-07 NOTE — TELEPHONE ENCOUNTER
----- Message from Lalita Cain sent at 9/7/2022 10:26 AM CDT -----  Patient called and stated that she need a refill of her Eliquis, alprazolam, metoprolol succinate and her levothyroxine called into [facility] in [place] if any questions please give her a call at [phone]

## 2022-09-14 ENCOUNTER — OFFICE VISIT (OUTPATIENT)
Dept: FAMILY MEDICINE | Facility: CLINIC | Age: 77
End: 2022-09-14
Payer: MEDICARE

## 2022-09-14 VITALS
DIASTOLIC BLOOD PRESSURE: 80 MMHG | BODY MASS INDEX: 40.72 KG/M2 | HEART RATE: 76 BPM | SYSTOLIC BLOOD PRESSURE: 138 MMHG | WEIGHT: 202 LBS | HEIGHT: 59 IN

## 2022-09-14 DIAGNOSIS — F34.1 DYSTHYMIA: ICD-10-CM

## 2022-09-14 DIAGNOSIS — G47.33 OSA ON CPAP: ICD-10-CM

## 2022-09-14 DIAGNOSIS — E03.9 ACQUIRED HYPOTHYROIDISM: ICD-10-CM

## 2022-09-14 DIAGNOSIS — M81.0 AGE-RELATED OSTEOPOROSIS WITHOUT CURRENT PATHOLOGICAL FRACTURE: ICD-10-CM

## 2022-09-14 DIAGNOSIS — R73.03 PRE-DIABETES: ICD-10-CM

## 2022-09-14 DIAGNOSIS — J43.1 PANLOBULAR EMPHYSEMA: ICD-10-CM

## 2022-09-14 DIAGNOSIS — I27.20 PULMONARY HTN: ICD-10-CM

## 2022-09-14 DIAGNOSIS — K21.9 GASTRO-ESOPHAGEAL REFLUX DISEASE WITHOUT ESOPHAGITIS: ICD-10-CM

## 2022-09-14 DIAGNOSIS — I10 ESSENTIAL (PRIMARY) HYPERTENSION: ICD-10-CM

## 2022-09-14 DIAGNOSIS — F41.1 GENERALIZED ANXIETY DISORDER: ICD-10-CM

## 2022-09-14 DIAGNOSIS — F32.1 CURRENT MODERATE EPISODE OF MAJOR DEPRESSIVE DISORDER WITHOUT PRIOR EPISODE: ICD-10-CM

## 2022-09-14 DIAGNOSIS — M51.9 LUMBAR DISC DISEASE: ICD-10-CM

## 2022-09-14 DIAGNOSIS — I48.0 PAROXYSMAL ATRIAL FIBRILLATION: Primary | ICD-10-CM

## 2022-09-14 DIAGNOSIS — E66.9 OBESITY, UNSPECIFIED CLASSIFICATION, UNSPECIFIED OBESITY TYPE, UNSPECIFIED WHETHER SERIOUS COMORBIDITY PRESENT: ICD-10-CM

## 2022-09-14 DIAGNOSIS — J44.9 CHRONIC OBSTRUCTIVE PULMONARY DISEASE, UNSPECIFIED COPD TYPE: ICD-10-CM

## 2022-09-14 LAB — HBA1C MFR BLD: 6 %

## 2022-09-14 PROCEDURE — 99214 OFFICE O/P EST MOD 30 MIN: CPT | Mod: 25,S$GLB,, | Performed by: FAMILY MEDICINE

## 2022-09-14 PROCEDURE — 99214 PR OFFICE/OUTPT VISIT, EST, LEVL IV, 30-39 MIN: ICD-10-PCS | Mod: 25,S$GLB,, | Performed by: FAMILY MEDICINE

## 2022-09-14 PROCEDURE — 93000 POCT EKG 12-LEAD: ICD-10-PCS | Mod: S$GLB,,, | Performed by: FAMILY MEDICINE

## 2022-09-14 PROCEDURE — 83036 POCT HEMOGLOBIN A1C: ICD-10-PCS | Mod: QW,,, | Performed by: FAMILY MEDICINE

## 2022-09-14 PROCEDURE — 93000 ELECTROCARDIOGRAM COMPLETE: CPT | Mod: S$GLB,,, | Performed by: FAMILY MEDICINE

## 2022-09-14 PROCEDURE — 83036 HEMOGLOBIN GLYCOSYLATED A1C: CPT | Mod: QW,,, | Performed by: FAMILY MEDICINE

## 2022-09-14 RX ORDER — LEVOTHYROXINE SODIUM 25 UG/1
25 TABLET ORAL
Qty: 90 TABLET | Refills: 1 | Status: SHIPPED | OUTPATIENT
Start: 2022-09-14 | End: 2023-04-14 | Stop reason: SDUPTHER

## 2022-09-14 RX ORDER — LEVOTHYROXINE SODIUM 25 UG/1
25 TABLET ORAL
Qty: 90 TABLET | Refills: 1 | Status: CANCELLED | OUTPATIENT
Start: 2022-09-14

## 2022-09-15 LAB
ALBUMIN SERPL-MCNC: 4.4 G/DL (ref 3.6–5.1)
ALBUMIN/GLOB SERPL: 1.7 (CALC) (ref 1–2.5)
ALP SERPL-CCNC: 65 U/L (ref 37–153)
ALT SERPL-CCNC: 10 U/L (ref 6–29)
AST SERPL-CCNC: 17 U/L (ref 10–35)
BASOPHILS # BLD AUTO: 51 CELLS/UL (ref 0–200)
BASOPHILS NFR BLD AUTO: 0.7 %
BILIRUB SERPL-MCNC: 1.2 MG/DL (ref 0.2–1.2)
BUN SERPL-MCNC: 19 MG/DL (ref 7–25)
BUN/CREAT SERPL: ABNORMAL (CALC) (ref 6–22)
CALCIUM SERPL-MCNC: 9.8 MG/DL (ref 8.6–10.4)
CHLORIDE SERPL-SCNC: 103 MMOL/L (ref 98–110)
CHOLEST SERPL-MCNC: 170 MG/DL
CHOLEST/HDLC SERPL: 3.7 (CALC)
CO2 SERPL-SCNC: 30 MMOL/L (ref 20–32)
CREAT SERPL-MCNC: 0.96 MG/DL (ref 0.6–1)
EGFR: 61 ML/MIN/1.73M2
EOSINOPHIL # BLD AUTO: 153 CELLS/UL (ref 15–500)
EOSINOPHIL NFR BLD AUTO: 2.1 %
ERYTHROCYTE [DISTWIDTH] IN BLOOD BY AUTOMATED COUNT: 13.6 % (ref 11–15)
GLOBULIN SER CALC-MCNC: 2.6 G/DL (CALC) (ref 1.9–3.7)
GLUCOSE SERPL-MCNC: 111 MG/DL (ref 65–99)
HCT VFR BLD AUTO: 42.6 % (ref 35–45)
HDLC SERPL-MCNC: 46 MG/DL
HGB BLD-MCNC: 13.6 G/DL (ref 11.7–15.5)
LDLC SERPL CALC-MCNC: 105 MG/DL (CALC)
LYMPHOCYTES # BLD AUTO: 1518 CELLS/UL (ref 850–3900)
LYMPHOCYTES NFR BLD AUTO: 20.8 %
MCH RBC QN AUTO: 28.3 PG (ref 27–33)
MCHC RBC AUTO-ENTMCNC: 31.9 G/DL (ref 32–36)
MCV RBC AUTO: 88.8 FL (ref 80–100)
MONOCYTES # BLD AUTO: 569 CELLS/UL (ref 200–950)
MONOCYTES NFR BLD AUTO: 7.8 %
NEUTROPHILS # BLD AUTO: 5008 CELLS/UL (ref 1500–7800)
NEUTROPHILS NFR BLD AUTO: 68.6 %
NONHDLC SERPL-MCNC: 124 MG/DL (CALC)
PLATELET # BLD AUTO: 285 THOUSAND/UL (ref 140–400)
PMV BLD REES-ECKER: 10 FL (ref 7.5–12.5)
POTASSIUM SERPL-SCNC: 4.1 MMOL/L (ref 3.5–5.3)
PROT SERPL-MCNC: 7 G/DL (ref 6.1–8.1)
RBC # BLD AUTO: 4.8 MILLION/UL (ref 3.8–5.1)
SODIUM SERPL-SCNC: 141 MMOL/L (ref 135–146)
TRIGL SERPL-MCNC: 98 MG/DL
TSH SERPL-ACNC: 2.82 MIU/L (ref 0.4–4.5)
WBC # BLD AUTO: 7.3 THOUSAND/UL (ref 3.8–10.8)

## 2022-09-18 LAB
EKG 12-LEAD: NORMAL
PR INTERVAL: NORMAL
PRT AXES: NORMAL
QRS DURATION: NORMAL
QT/QTC: NORMAL
VENTRICULAR RATE: NORMAL

## 2022-09-18 NOTE — PROGRESS NOTES
SUBJECTIVE:    Patient ID: Danielle Martinez is a 77 y.o. female.    Chief Complaint: Gastroesophageal Reflux and Arthritis (Brought bottles // A1c ordered // right ear hiring loss // stress // abc )    77-year-old female complains of no energy.  Has to climb 24 steps up into her raised elevated home.  She is a great grandmother.  She does know regimen of exercise or walking.  She has gained 2 lb recently.    BRITTANIE on CPAP at night.    She has a trip planned to [place] in October and to [facility] in November.    Chronic atrial fibrillation-cardiologist is Dr. Dajuan Kate.  Maintained on Eliquis 5 mg b.i.d..      Office Visit on 09/14/2022   Component Date Value Ref Range Status    Hemoglobin A1C 09/14/2022 6.0  % Final    WBC 09/14/2022 7.3  3.8 - 10.8 Thousand/uL Final    RBC 09/14/2022 4.80  3.80 - 5.10 Million/uL Final    Hemoglobin 09/14/2022 13.6  11.7 - 15.5 g/dL Final    Hematocrit 09/14/2022 42.6  35.0 - 45.0 % Final    MCV 09/14/2022 88.8  80.0 - 100.0 fL Final    MCH 09/14/2022 28.3  27.0 - 33.0 pg Final    MCHC 09/14/2022 31.9 (L)  32.0 - 36.0 g/dL Final    RDW 09/14/2022 13.6  11.0 - 15.0 % Final    Platelets 09/14/2022 285  140 - 400 Thousand/uL Final    MPV 09/14/2022 10.0  7.5 - 12.5 fL Final    Neutrophils, Abs 09/14/2022 5,008  1,500 - 7,800 cells/uL Final    Lymph # 09/14/2022 1,518  850 - 3,900 cells/uL Final    Mono # 09/14/2022 569  200 - 950 cells/uL Final    Eos # 09/14/2022 153  15 - 500 cells/uL Final    Baso # 09/14/2022 51  0 - 200 cells/uL Final    Neutrophils Relative 09/14/2022 68.6  % Final    Lymph % 09/14/2022 20.8  % Final    Mono % 09/14/2022 7.8  % Final    Eosinophil % 09/14/2022 2.1  % Final    Basophil % 09/14/2022 0.7  % Final    Glucose 09/14/2022 111 (H)  65 - 99 mg/dL Final    BUN 09/14/2022 19  7 - 25 mg/dL Final    Creatinine 09/14/2022 0.96  0.60 - 1.00 mg/dL Final    EGFR 09/14/2022 61  > OR = 60 mL/min/1.73m2 Final    BUN/Creatinine Ratio 09/14/2022 NOT APPLICABLE  6  - 22 (calc) Final    Sodium 09/14/2022 141  135 - 146 mmol/L Final    Potassium 09/14/2022 4.1  3.5 - 5.3 mmol/L Final    Chloride 09/14/2022 103  98 - 110 mmol/L Final    CO2 09/14/2022 30  20 - 32 mmol/L Final    Calcium 09/14/2022 9.8  8.6 - 10.4 mg/dL Final    Total Protein 09/14/2022 7.0  6.1 - 8.1 g/dL Final    Albumin 09/14/2022 4.4  3.6 - 5.1 g/dL Final    Globulin, Total 09/14/2022 2.6  1.9 - 3.7 g/dL (calc) Final    Albumin/Globulin Ratio 09/14/2022 1.7  1.0 - 2.5 (calc) Final    Total Bilirubin 09/14/2022 1.2  0.2 - 1.2 mg/dL Final    Alkaline Phosphatase 09/14/2022 65  37 - 153 U/L Final    AST 09/14/2022 17  10 - 35 U/L Final    ALT 09/14/2022 10  6 - 29 U/L Final    Cholesterol 09/14/2022 170  <200 mg/dL Final    HDL 09/14/2022 46 (L)  > OR = 50 mg/dL Final    Triglycerides 09/14/2022 98  <150 mg/dL Final    LDL Cholesterol 09/14/2022 105 (H)  mg/dL (calc) Final    HDL/Cholesterol Ratio 09/14/2022 3.7  <5.0 (calc) Final    Non HDL Chol. (LDL+VLDL) 09/14/2022 124  <130 mg/dL (calc) Final    TSH w/reflex to FT4 09/14/2022 2.82  0.40 - 4.50 mIU/L Final   Lab Visit on 05/23/2022   Component Date Value Ref Range Status    BUN 05/23/2022 30 (H)  8 - 23 mg/dL Final    Creatinine 05/23/2022 0.9  0.5 - 1.4 mg/dL Final    eGFR if African American 05/23/2022 >60.0  >60 mL/min/1.73 m^2 Final    eGFR if non African American 05/23/2022 >60.0  >60 mL/min/1.73 m^2 Final       Past Medical History:   Diagnosis Date    HAILY (acute kidney injury)     Anxiety     Arthritis     Juvenile diagnosed age 17    Atrial fibrillation     COPD (chronic obstructive pulmonary disease)     Depression     Diverticulitis     Emphysema of lung     GERD (gastroesophageal reflux disease)     Hx of hiatal hernia     Hypertension     Osteoporosis     Pneumonia     Sleep apnea      Social History     Socioeconomic History    Marital status:    Tobacco Use    Smoking status: Never    Smokeless tobacco: Never   Substance and Sexual  Activity    Alcohol use: Never    Drug use: Never     Past Surgical History:   Procedure Laterality Date    BREAST LUMPECTOMY Left     in 40's    COLON SURGERY      COLONOSCOPY  2016    Dr. Crowe-RTADOLPH 5 years    COLONOSCOPY  2019    Dr. Reyes 5 years    HERNIA REPAIR      HIATAL HERNIA REPAIR  2005    HYSTERECTOMY      SHOULDER ARTHROSCOPY  2015     Family History   Problem Relation Age of Onset    Hyperlipidemia Mother        Review of patient's allergies indicates:   Allergen Reactions    Promethazine Anaphylaxis    Clove flavoring [flavoring agent]     Codeine Itching    Latex, natural rubber     Lorazepam Other (See Comments)    Lovastatin Other (See Comments)    Meclizine Other (See Comments)    Morphine Itching    Simvastatin Other (See Comments)    Tramadol-acetaminophen Itching       Current Outpatient Medications:     albuterol (VENTOLIN HFA) 90 mcg/actuation inhaler, Inhale 2 puffs into the lungs every 4 (four) hours as needed for Wheezing. Rescue, Disp: 18 g, Rfl: 2    ALPRAZolam (XANAX) 1 MG tablet, Take 1 tablet (1 mg total) by mouth 2 (two) times daily., Disp: 60 tablet, Rfl: 2    ascorbic acid, vitamin C, (VITAMIN C) 100 MG tablet, Take 100 mg by mouth once daily., Disp: , Rfl:     buPROPion (WELLBUTRIN SR) 150 MG TBSR 12 hr tablet, Take 1 tablet (150 mg total) by mouth 2 (two) times daily., Disp: 60 tablet, Rfl: 5    ELIQUIS 5 mg Tab, Take 1 tablet (5 mg total) by mouth 2 (two) times daily., Disp: 60 tablet, Rfl: 5    esomeprazole (NEXIUM) 40 MG capsule, Take 1 capsule (40 mg total) by mouth before breakfast., Disp: 90 capsule, Rfl: 1    fluticasone propionate (FLONASE) 50 mcg/actuation nasal spray, 1 spray (50 mcg total) by Each Nostril route daily as needed for Allergies., Disp: 16 g, Rfl: 2    fluticasone-umeclidin-vilanter (TRELEGY ELLIPTA) 100-62.5-25 mcg DsDv, Inhale 1 puff into the lungs daily as needed., Disp: 60 each, Rfl: 2    furosemide (LASIX) 20 MG tablet, Take 1 tablet (20 mg  total) by mouth daily as needed., Disp: 90 tablet, Rfl: 1    metoprolol succinate (TOPROL-XL) 100 MG 24 hr tablet, Take 1 tablet (100 mg total) by mouth once daily. For 30 days, Disp: 90 tablet, Rfl: 1    mv-min/folic/vit K/lut/[id number] (ALIVE WOMEN'S 50 PLUS, BLEND, ORAL), Take 1 tablet by mouth daily as needed., Disp: , Rfl:     nitroGLYCERIN (NITROSTAT) 0.4 MG SL tablet, Place 1 tablet (0.4 mg total) under the tongue every 5 (five) minutes as needed., Disp: 25 tablet, Rfl: 3    potassium chloride (KLOR-CON) 10 MEQ TbSR, Take 1 tablet (10 mEq total) by mouth 2 (two) times daily., Disp: 180 tablet, Rfl: 1    predniSONE (DELTASONE) 10 MG tablet, SMARTSI Tablet(s) By Mouth Every Morning, Disp: , Rfl:     PROCTO-MED HC 2.5 % rectal cream, , Disp: , Rfl:     ciprofloxacin HCl (CIPRO) 500 MG tablet, Take 500 mg by mouth every 12 (twelve) hours. , Disp: , Rfl:     citalopram (CELEXA) 20 MG tablet, Take 1 tablet (20 mg total) by mouth once daily. (Patient not taking: Reported on 2022), Disp: 30 tablet, Rfl: 1    levothyroxine (SYNTHROID) 25 MCG tablet, Take 1 tablet (25 mcg total) by mouth before breakfast. Name brand only.  RIRI, Disp: 90 tablet, Rfl: 1    metroNIDAZOLE (FLAGYL) 500 MG tablet, Take 500 mg by mouth every 12 (twelve) hours. , Disp: , Rfl:     Review of Systems   Constitutional:  Positive for fatigue. Negative for appetite change, chills, fever and unexpected weight change.   HENT:  Negative for congestion, ear pain, sinus pain, sore throat and trouble swallowing.    Eyes:  Negative for pain, discharge and visual disturbance.   Respiratory:  Positive for apnea (BRITTANIE on CPAP). Negative for cough, shortness of breath and wheezing.    Cardiovascular:  Negative for chest pain, palpitations and leg swelling.        Chronic atrial fibrillation on medication   Gastrointestinal:  Negative for abdominal pain, blood in stool, constipation, diarrhea, nausea and vomiting.   Endocrine: Negative for heat  "intolerance, polydipsia and polyuria.   Genitourinary:  Negative for difficulty urinating, dyspareunia, dysuria, frequency, hematuria and menstrual problem.   Musculoskeletal:  Negative for arthralgias, back pain, gait problem, joint swelling and myalgias.   Allergic/Immunologic: Negative for environmental allergies, food allergies and immunocompromised state.   Neurological:  Negative for dizziness, tremors, seizures, numbness and headaches.   Psychiatric/Behavioral:  Negative for behavioral problems, confusion, hallucinations and suicidal ideas. The patient is not nervous/anxious.         Objective:      Vitals:    09/14/22 1016 09/14/22 1017   BP: (!) 140/86 138/80   Pulse: 76    Weight: 91.6 kg (202 lb)    Height: 4' 11" (1.499 m)      Physical Exam  Vitals and nursing note reviewed.   Constitutional:       General: She is not in acute distress.     Appearance: She is well-developed. She is obese. She is not toxic-appearing.   HENT:      Head: Normocephalic and atraumatic.      Right Ear: Tympanic membrane and external ear normal.      Left Ear: Tympanic membrane and external ear normal.      Nose: Nose normal.      Mouth/Throat:      Pharynx: Oropharynx is clear.   Eyes:      Pupils: Pupils are equal, round, and reactive to light.   Neck:      Thyroid: No thyromegaly.      Vascular: No carotid bruit.   Cardiovascular:      Rate and Rhythm: Normal rate and regular rhythm.      Heart sounds: Normal heart sounds. No murmur heard.  Pulmonary:      Effort: Pulmonary effort is normal.      Breath sounds: Normal breath sounds. No wheezing or rales.   Abdominal:      General: Bowel sounds are normal. There is no distension.      Palpations: Abdomen is soft.      Tenderness: There is no abdominal tenderness.   Musculoskeletal:         General: No tenderness or deformity. Normal range of motion.      Cervical back: Normal range of motion and neck supple.      Lumbar back: Normal. No spasms.      Comments: Bends 90 " degrees at  waist, shoulders and knees have good range of motion.   Lymphadenopathy:      Cervical: No cervical adenopathy.   Skin:     General: Skin is warm and dry.      Findings: No rash.   Neurological:      Mental Status: She is alert and oriented to person, place, and time.      Cranial Nerves: No cranial nerve deficit.      Coordination: Coordination normal.      Gait: Gait normal.   Psychiatric:         Mood and Affect: Mood normal.         Behavior: Behavior normal.         Thought Content: Thought content normal.         Judgment: Judgment normal.         Assessment:       1. Paroxysmal atrial fibrillation    2. Acquired hypothyroidism    3. Pre-diabetes    4. Lumbar disc disease    5. Generalized anxiety disorder    6. Dysthymia    7. Current moderate episode of major depressive disorder without prior episode    8. Chronic obstructive pulmonary disease, unspecified COPD type    9. Panlobular emphysema    10. Pulmonary HTN    11. Essential (primary) hypertension    12. Obesity, unspecified classification, unspecified obesity type, unspecified whether serious comorbidity present    13. BRITTANIE on CPAP    14. Age-related osteoporosis without current pathological fracture    15. Gastro-esophageal reflux disease without esophagitis           Plan:       Paroxysmal atrial fibrillation  -     POCT EKG 12-LEAD (NOT FOR [facility] USE)  EKG today shows atrial fibrillation with a rate of 72.  No ST changes.  This is probably the cause of her fatigue.  Acquired hypothyroidism  -     levothyroxine (SYNTHROID) 25 MCG tablet; Take 1 tablet (25 mcg total) by mouth before breakfast. Name brand only.  RIRI  Dispense: 90 tablet; Refill: 1  Continue levothyroxine  Pre-diabetes  -     Hemoglobin A1C, POCT  A1c good at 6.0  Lumbar disc disease    Generalized anxiety disorder    Dysthymia    Current moderate episode of major depressive disorder without prior episode    Chronic obstructive pulmonary disease, unspecified COPD  type    Panlobular emphysema    Pulmonary HTN    Essential (primary) hypertension  -     CBC Auto Differential; Future; Expected date: 09/14/2022  -     Comprehensive Metabolic Panel; Future; Expected date: 09/14/2022  -     Lipid Panel; Future; Expected date: 09/14/2022  -     TSH w/reflex to FT4; Future; Expected date: 09/14/2022  Blood pressure well controlled, cholesterol 144 CMP is normal  Obesity, unspecified classification, unspecified obesity type, unspecified whether serious comorbidity present    BRITTANIE on CPAP  Compliant with back  Age-related osteoporosis without current pathological fracture    Gastro-esophageal reflux disease without esophagitis    Follow up in about 6 months (around 3/14/2023), or afib.        9/18/2022 Antony Lopez

## 2022-09-19 ENCOUNTER — TELEPHONE (OUTPATIENT)
Dept: FAMILY MEDICINE | Facility: CLINIC | Age: 77
End: 2022-09-19

## 2022-09-19 NOTE — PROGRESS NOTES
Call patient.  CBC shows no anemia.  Rest of the lab work looks very stable including cholesterol of 170.  No abnormalities found.  Continue current medications.

## 2022-09-19 NOTE — TELEPHONE ENCOUNTER
----- Message from Antony Lopez MD sent at 9/18/2022  8:23 PM CDT -----  Call patient.  CBC shows no anemia.  Rest of the lab work looks very stable including cholesterol of 170.  No abnormalities found.  Continue current medications.

## 2022-11-02 ENCOUNTER — TELEPHONE (OUTPATIENT)
Dept: FAMILY MEDICINE | Facility: CLINIC | Age: 77
End: 2022-11-02

## 2022-11-02 NOTE — TELEPHONE ENCOUNTER
LMOR letting pt know she will need to be evaluated at an UC. Can not send pain medications in without seeing her in the office.

## 2022-11-02 NOTE — TELEPHONE ENCOUNTER
----- Message from Cathryn Parra MA sent at 11/2/2022  3:30 PM CDT -----  Pt is in [place] and she is having trouble with her right arm and shoulder and it has been casuing her pain for a week. She would like something sent in for pain to the [facility] in [place],  [phone]

## 2022-12-21 ENCOUNTER — TELEPHONE (OUTPATIENT)
Dept: FAMILY MEDICINE | Facility: CLINIC | Age: 77
End: 2022-12-21

## 2022-12-21 DIAGNOSIS — R05.8 PRODUCTIVE COUGH: Primary | ICD-10-CM

## 2022-12-21 RX ORDER — DOXYCYCLINE 100 MG/1
100 CAPSULE ORAL 2 TIMES DAILY
Qty: 14 CAPSULE | Refills: 0 | Status: SHIPPED | OUTPATIENT
Start: 2022-12-21 | End: 2022-12-28

## 2022-12-21 NOTE — TELEPHONE ENCOUNTER
----- Message from Jerri Titus sent at 12/21/2022  3:48 PM CST -----  Pt needs a RX for doxycycline for a bad cough. Her and her  both have the same cough. Pt is in [place] with her . RX needs to be sent to [facility] in [facility]. Pt #[phone]

## 2023-01-04 ENCOUNTER — OFFICE VISIT (OUTPATIENT)
Dept: FAMILY MEDICINE | Facility: CLINIC | Age: 78
End: 2023-01-04
Payer: MEDICARE

## 2023-01-04 VITALS
SYSTOLIC BLOOD PRESSURE: 138 MMHG | DIASTOLIC BLOOD PRESSURE: 88 MMHG | BODY MASS INDEX: 37.09 KG/M2 | HEIGHT: 59 IN | HEART RATE: 92 BPM | WEIGHT: 184 LBS

## 2023-01-04 DIAGNOSIS — G47.33 OSA ON CPAP: ICD-10-CM

## 2023-01-04 DIAGNOSIS — R13.10 DYSPHAGIA, UNSPECIFIED TYPE: ICD-10-CM

## 2023-01-04 DIAGNOSIS — F41.1 GENERALIZED ANXIETY DISORDER: ICD-10-CM

## 2023-01-04 DIAGNOSIS — K22.2 SCHATZKI'S RING: ICD-10-CM

## 2023-01-04 DIAGNOSIS — M51.9 LUMBAR DISC DISEASE: ICD-10-CM

## 2023-01-04 DIAGNOSIS — N18.31 STAGE 3A CHRONIC KIDNEY DISEASE: ICD-10-CM

## 2023-01-04 DIAGNOSIS — K21.9 GASTRO-ESOPHAGEAL REFLUX DISEASE WITHOUT ESOPHAGITIS: ICD-10-CM

## 2023-01-04 DIAGNOSIS — F34.1 PERSISTENT DEPRESSIVE DISORDER: ICD-10-CM

## 2023-01-04 DIAGNOSIS — M81.0 AGE-RELATED OSTEOPOROSIS WITHOUT CURRENT PATHOLOGICAL FRACTURE: ICD-10-CM

## 2023-01-04 DIAGNOSIS — J44.9 CHRONIC OBSTRUCTIVE PULMONARY DISEASE, UNSPECIFIED COPD TYPE: Primary | ICD-10-CM

## 2023-01-04 DIAGNOSIS — M08.90 JUVENILE ARTHRITIS, UNSPECIFIED, UNSPECIFIED SITE: ICD-10-CM

## 2023-01-04 PROCEDURE — 99213 OFFICE O/P EST LOW 20 MIN: CPT | Mod: S$GLB,,, | Performed by: FAMILY MEDICINE

## 2023-01-04 PROCEDURE — 99213 PR OFFICE/OUTPT VISIT, EST, LEVL III, 20-29 MIN: ICD-10-PCS | Mod: S$GLB,,, | Performed by: FAMILY MEDICINE

## 2023-01-04 RX ORDER — DOXYCYCLINE 100 MG/1
100 CAPSULE ORAL 2 TIMES DAILY
Qty: 20 CAPSULE | Refills: 0 | Status: SHIPPED | OUTPATIENT
Start: 2023-01-04 | End: 2023-11-14

## 2023-01-05 NOTE — PROGRESS NOTES
SUBJECTIVE:    Patient ID: Danielle Martinez is a 77 y.o. female.    Chief Complaint: COPD (No bottles, cough for the last month, pt is fasting for blood work,abc )    77-year-old female who recently went on trips to [place] and to [facility].  She was in systole for 2 weeks then in home for the last 2 months.  She rate is it relates a history of shortness of breath and poor endurance on the hills of [facility] with the hiking.      In November in December she had 2 months where she had a terrible cough.  She and her  had this cough that lasted day and night for many weeks.  She now says it is improving in her sputum is no longer green or yellow but mostly clear.  She has had no recent fevers.    She is still upset over the loss of her sister.    Dysphagia and trouble swallowing.    BRITTANIE on CPAP, her CPAP machine has now crack in it and she would like to see pulmonology to order a new 1.      Office Visit on 09/14/2022   Component Date Value Ref Range Status    Hemoglobin A1C, POC 09/14/2022 6.0  % Final    WBC 09/14/2022 7.3  3.8 - 10.8 Thousand/uL Final    RBC 09/14/2022 4.80  3.80 - 5.10 Million/uL Final    Hemoglobin 09/14/2022 13.6  11.7 - 15.5 g/dL Final    Hematocrit 09/14/2022 42.6  35.0 - 45.0 % Final    MCV 09/14/2022 88.8  80.0 - 100.0 fL Final    MCH 09/14/2022 28.3  27.0 - 33.0 pg Final    MCHC 09/14/2022 31.9 (L)  32.0 - 36.0 g/dL Final    RDW 09/14/2022 13.6  11.0 - 15.0 % Final    Platelets 09/14/2022 285  140 - 400 Thousand/uL Final    MPV 09/14/2022 10.0  7.5 - 12.5 fL Final    Neutrophils, Abs 09/14/2022 5,008  1,500 - 7,800 cells/uL Final    Lymph # 09/14/2022 1,518  850 - 3,900 cells/uL Final    Mono # 09/14/2022 569  200 - 950 cells/uL Final    Eos # 09/14/2022 153  15 - 500 cells/uL Final    Baso # 09/14/2022 51  0 - 200 cells/uL Final    Neutrophils Relative 09/14/2022 68.6  % Final    Lymph % 09/14/2022 20.8  % Final    Mono % 09/14/2022 7.8  % Final    Eosinophil % 09/14/2022 2.1  % Final     Basophil % 09/14/2022 0.7  % Final    Glucose 09/14/2022 111 (H)  65 - 99 mg/dL Final    BUN 09/14/2022 19  7 - 25 mg/dL Final    Creatinine 09/14/2022 0.96  0.60 - 1.00 mg/dL Final    eGFR 09/14/2022 61  > OR = 60 mL/min/1.73m2 Final    BUN/Creatinine Ratio 09/14/2022 NOT APPLICABLE  6 - 22 (calc) Final    Sodium 09/14/2022 141  135 - 146 mmol/L Final    Potassium 09/14/2022 4.1  3.5 - 5.3 mmol/L Final    Chloride 09/14/2022 103  98 - 110 mmol/L Final    CO2 09/14/2022 30  20 - 32 mmol/L Final    Calcium 09/14/2022 9.8  8.6 - 10.4 mg/dL Final    Total Protein 09/14/2022 7.0  6.1 - 8.1 g/dL Final    Albumin 09/14/2022 4.4  3.6 - 5.1 g/dL Final    Globulin, Total 09/14/2022 2.6  1.9 - 3.7 g/dL (calc) Final    Albumin/Globulin Ratio 09/14/2022 1.7  1.0 - 2.5 (calc) Final    Total Bilirubin 09/14/2022 1.2  0.2 - 1.2 mg/dL Final    Alkaline Phosphatase 09/14/2022 65  37 - 153 U/L Final    AST 09/14/2022 17  10 - 35 U/L Final    ALT 09/14/2022 10  6 - 29 U/L Final    Cholesterol 09/14/2022 170  <200 mg/dL Final    HDL 09/14/2022 46 (L)  > OR = 50 mg/dL Final    Triglycerides 09/14/2022 98  <150 mg/dL Final    LDL Cholesterol 09/14/2022 105 (H)  mg/dL (calc) Final    HDL/Cholesterol Ratio 09/14/2022 3.7  <5.0 (calc) Final    Non HDL Chol. (LDL+VLDL) 09/14/2022 124  <130 mg/dL (calc) Final    TSH w/reflex to FT4 09/14/2022 2.82  0.40 - 4.50 mIU/L Final       Past Medical History:   Diagnosis Date    HAILY (acute kidney injury)     Anxiety     Arthritis     Juvenile diagnosed age 17    Atrial fibrillation     COPD (chronic obstructive pulmonary disease)     Depression     Diverticulitis     Emphysema of lung     GERD (gastroesophageal reflux disease)     Hx of hiatal hernia     Hypertension     Osteoporosis     Pneumonia     Sleep apnea      Social History     Socioeconomic History    Marital status:    Tobacco Use    Smoking status: Never    Smokeless tobacco: Never   Substance and Sexual Activity    Alcohol use:  Never    Drug use: Never     Past Surgical History:   Procedure Laterality Date    BREAST LUMPECTOMY Left     in 40's    COLON SURGERY      COLONOSCOPY  2016    Dr. Crowe-RTADOLPH 5 years    COLONOSCOPY  2019    Dr. Reyes 5 years    HERNIA REPAIR      HIATAL HERNIA REPAIR  2005    HYSTERECTOMY      SHOULDER ARTHROSCOPY  2015     Family History   Problem Relation Age of Onset    Hyperlipidemia Mother        Review of patient's allergies indicates:   Allergen Reactions    Promethazine Anaphylaxis    Clove flavoring [flavoring agent]     Codeine Itching    Latex, natural rubber     Lorazepam Other (See Comments)    Lovastatin Other (See Comments)    Meclizine Other (See Comments)    Morphine Itching    Simvastatin Other (See Comments)    Tramadol-acetaminophen Itching       Current Outpatient Medications:     albuterol (VENTOLIN HFA) 90 mcg/actuation inhaler, Inhale 2 puffs into the lungs every 4 (four) hours as needed for Wheezing. Rescue, Disp: 18 g, Rfl: 2    ascorbic acid, vitamin C, (VITAMIN C) 100 MG tablet, Take 100 mg by mouth once daily., Disp: , Rfl:     buPROPion (WELLBUTRIN SR) 150 MG TBSR 12 hr tablet, Take 1 tablet (150 mg total) by mouth 2 (two) times daily., Disp: 60 tablet, Rfl: 5    ciprofloxacin HCl (CIPRO) 500 MG tablet, Take 500 mg by mouth every 12 (twelve) hours. , Disp: , Rfl:     ELIQUIS 5 mg Tab, Take 1 tablet (5 mg total) by mouth 2 (two) times daily., Disp: 60 tablet, Rfl: 5    esomeprazole (NEXIUM) 40 MG capsule, Take 1 capsule (40 mg total) by mouth before breakfast., Disp: 90 capsule, Rfl: 1    fluticasone propionate (FLONASE) 50 mcg/actuation nasal spray, 1 spray (50 mcg total) by Each Nostril route daily as needed for Allergies., Disp: 16 g, Rfl: 2    fluticasone-umeclidin-vilanter (TRELEGY ELLIPTA) 100-62.5-25 mcg DsDv, Inhale 1 puff into the lungs daily as needed., Disp: 60 each, Rfl: 2    furosemide (LASIX) 20 MG tablet, Take 1 tablet (20 mg total) by mouth daily as needed.,  Disp: 90 tablet, Rfl: 1    levothyroxine (SYNTHROID) 25 MCG tablet, Take 1 tablet (25 mcg total) by mouth before breakfast. Name brand only.  RIRI, Disp: 90 tablet, Rfl: 1    metoprolol succinate (TOPROL-XL) 100 MG 24 hr tablet, Take 1 tablet (100 mg total) by mouth once daily. For 30 days, Disp: 90 tablet, Rfl: 1    metroNIDAZOLE (FLAGYL) 500 MG tablet, Take 500 mg by mouth every 12 (twelve) hours. , Disp: , Rfl:     mv-min/folic/vit K/lut/fxhw415 (ALIVE WOMEN'S 50 PLUS, BLEND, ORAL), Take 1 tablet by mouth daily as needed., Disp: , Rfl:     nitroGLYCERIN (NITROSTAT) 0.4 MG SL tablet, Place 1 tablet (0.4 mg total) under the tongue every 5 (five) minutes as needed., Disp: 25 tablet, Rfl: 3    potassium chloride (KLOR-CON) 10 MEQ TbSR, Take 1 tablet (10 mEq total) by mouth 2 (two) times daily., Disp: 180 tablet, Rfl: 1    predniSONE (DELTASONE) 10 MG tablet, SMARTSI Tablet(s) By Mouth Every Morning, Disp: , Rfl:     PROCTO-MED HC 2.5 % rectal cream, , Disp: , Rfl:     ALPRAZolam (XANAX) 1 MG tablet, Take 1 tablet (1 mg total) by mouth 2 (two) times daily., Disp: 60 tablet, Rfl: 2    citalopram (CELEXA) 20 MG tablet, Take 1 tablet (20 mg total) by mouth once daily. (Patient not taking: Reported on 2022), Disp: 30 tablet, Rfl: 1    doxycycline (MONODOX) 100 MG capsule, Take 1 capsule (100 mg total) by mouth 2 (two) times daily., Disp: 20 capsule, Rfl: 0    Review of Systems   Constitutional:  Negative for appetite change, chills, fatigue, fever and unexpected weight change.   HENT:  Negative for congestion, ear pain, sinus pain, sore throat and trouble swallowing.    Eyes:  Negative for pain, discharge and visual disturbance.   Respiratory:  Positive for cough, chest tightness and shortness of breath. Negative for apnea and wheezing.    Cardiovascular:  Negative for chest pain, palpitations and leg swelling.   Gastrointestinal:  Negative for abdominal pain, blood in stool, constipation, diarrhea, nausea and  "vomiting.   Endocrine: Negative for heat intolerance, polydipsia and polyuria.   Genitourinary:  Negative for difficulty urinating, dyspareunia, dysuria, frequency, hematuria and menstrual problem.   Musculoskeletal:  Negative for arthralgias, back pain, gait problem, joint swelling and myalgias.   Allergic/Immunologic: Negative for environmental allergies, food allergies and immunocompromised state.   Neurological:  Negative for dizziness, tremors, seizures, numbness and headaches.   Psychiatric/Behavioral:  Negative for behavioral problems, confusion, hallucinations and suicidal ideas. The patient is not nervous/anxious.         Objective:      Vitals:    01/04/23 0744   BP: 138/88   Pulse: 92   Weight: 83.5 kg (184 lb)   Height: 4' 11" (1.499 m)     Physical Exam  Vitals and nursing note reviewed.   Constitutional:       General: She is not in acute distress.     Appearance: She is well-developed. She is obese. She is not toxic-appearing.   HENT:      Head: Normocephalic and atraumatic.      Right Ear: Tympanic membrane and external ear normal.      Left Ear: Tympanic membrane and external ear normal.      Nose: Nose normal.      Mouth/Throat:      Pharynx: Oropharynx is clear.   Eyes:      Pupils: Pupils are equal, round, and reactive to light.   Neck:      Thyroid: No thyromegaly.      Vascular: No carotid bruit.   Cardiovascular:      Rate and Rhythm: Normal rate and regular rhythm.      Heart sounds: Normal heart sounds. No murmur heard.  Pulmonary:      Effort: Pulmonary effort is normal.      Breath sounds: Normal breath sounds. No wheezing or rales.   Abdominal:      General: Bowel sounds are normal. There is no distension.      Palpations: Abdomen is soft.      Tenderness: There is no abdominal tenderness.   Musculoskeletal:         General: No tenderness or deformity. Normal range of motion.      Cervical back: Normal range of motion and neck supple.      Lumbar back: Normal. No spasms.      Comments: " Bends 90 degrees at  waist, shoulders and knees good range of motion no pitting edema to the lower extremities   Lymphadenopathy:      Cervical: No cervical adenopathy.   Skin:     General: Skin is warm and dry.      Findings: No rash.   Neurological:      Mental Status: She is alert and oriented to person, place, and time.      Cranial Nerves: No cranial nerve deficit.      Coordination: Coordination normal.   Psychiatric:         Behavior: Behavior normal.         Thought Content: Thought content normal.         Judgment: Judgment normal.         Assessment:       1. Chronic obstructive pulmonary disease, unspecified COPD type    2. Persistent depressive disorder    3. Generalized anxiety disorder    4. Lumbar disc disease    5. Stage 3a chronic kidney disease    6. Juvenile arthritis, unspecified, unspecified site    7. Dysphagia, unspecified type    8. Gastro-esophageal reflux disease without esophagitis    9. Schatzki's ring    10. Age-related osteoporosis without current pathological fracture    11. BRITTANIE on CPAP           Plan:       Chronic obstructive pulmonary disease, unspecified COPD type  -     doxycycline (MONODOX) 100 MG capsule; Take 1 capsule (100 mg total) by mouth 2 (two) times daily.  Dispense: 20 capsule; Refill: 0  Add doxycycline 100 mg b.i.d. for chronic bronchitis in COPD  Persistent depressive disorder    Generalized anxiety disorder    Lumbar disc disease    Stage 3a chronic kidney disease    Juvenile arthritis, unspecified, unspecified site    Dysphagia, unspecified type    Gastro-esophageal reflux disease without esophagitis    Schatzki's ring    Age-related osteoporosis without current pathological fracture    BRITTANIE on CPAP  She will call her pulmonologist to set up appointment to discuss new CPAP machine    Follow up in about 2 months (around 3/4/2023).        1/4/2023 Antony Lopez

## 2023-02-06 ENCOUNTER — TELEPHONE (OUTPATIENT)
Dept: FAMILY MEDICINE | Facility: CLINIC | Age: 78
End: 2023-02-06

## 2023-02-06 NOTE — TELEPHONE ENCOUNTER
----- Message from Lalita Cain sent at 2/6/2023 10:10 AM CST -----  Patient called and stated that she would like to see if the doctor can order some test that she can have before she comes in? She would like a MRI for her head she has a cyst,a CT of her pancreas because  cancer runs in her family [phone]

## 2023-02-06 NOTE — TELEPHONE ENCOUNTER
Spoke with patient who states she forgot to tell Dr. Lopez when she was here  that she has a cyst in her head that they saw back in May 2022 - states when she went to [place] in October she had a terrible cough and she had just had an endoscopy - she thought it was from that but they recommended she be tested for covid. She went and states it was negative. They gave her a zpack for the cough and told her to take otc cough drops. When she got back her cough was worse, it is still lingering since the middle of October and she was reading that it could be something in her brain causing this.     Pancreatic cancer runs in her family and a lot of her cousins  from this. Her sister beat this cancer but  because when they removed the cancer they took out of her gallbladder which caused cancer in her liver. She states she has just been very depressed and in a bad mood for months. She really thinks something is going on and read an article about the cough so she thinks something serious is happening and would like imaging.    Printed.

## 2023-02-20 ENCOUNTER — TELEPHONE (OUTPATIENT)
Dept: FAMILY MEDICINE | Facility: CLINIC | Age: 78
End: 2023-02-20

## 2023-02-20 NOTE — TELEPHONE ENCOUNTER
----- Message from Nubia Arevalo MA sent at 2/20/2023  9:53 AM CST -----  Regarding: needs to reschedule appt  Patient has appt for 2/20/23 but [place]. She will need appt soon after they leave due to afib  [phone]

## 2023-03-02 ENCOUNTER — TELEPHONE (OUTPATIENT)
Dept: FAMILY MEDICINE | Facility: CLINIC | Age: 78
End: 2023-03-02

## 2023-03-02 NOTE — TELEPHONE ENCOUNTER
----- Message from Nubia Lopez sent at 3/2/2023 11:37 AM CST -----  Pt would like to know why her appt was cancelled on 03/15 and would like a phone call. [phone]

## 2023-03-02 NOTE — TELEPHONE ENCOUNTER
Spoke with patient and explained her 3/15 appointment was cancelled because she stated she needed to be seen sooner and then she cancelled it.

## 2023-03-15 DIAGNOSIS — F41.9 ANXIETY: ICD-10-CM

## 2023-03-15 RX ORDER — ALPRAZOLAM 1 MG/1
1 TABLET ORAL 2 TIMES DAILY
Qty: 60 TABLET | Refills: 2 | Status: SHIPPED | OUTPATIENT
Start: 2023-03-15 | End: 2023-08-09 | Stop reason: SDUPTHER

## 2023-03-15 NOTE — TELEPHONE ENCOUNTER
----- Message from Lalita Cain sent at 3/15/2023  8:33 AM CDT -----  Patient called and stated that she need a refill of her alprazolam called into [facility] in [place] if any questions please give her a call at [phone] please call in before noon she is leaving to go out of town at one pm

## 2023-03-16 NOTE — TELEPHONE ENCOUNTER
----- Message from Hugh Victoria sent at 1/13/2020  2:56 PM CST -----  Contact: Danielle Martinez  Pt says she Needs a PA on her Nexium Name brand . Insurance company is [employer]   Member ID# [id number]   RxPCN: [id number]  Rx Grp: [id number]  RxBIN: [id number]  .. Does dr. Lopez want PA for this to be done??    no

## 2023-03-31 ENCOUNTER — PATIENT OUTREACH (OUTPATIENT)
Dept: ADMINISTRATIVE | Facility: HOSPITAL | Age: 78
End: 2023-03-31
Payer: MEDICARE

## 2023-03-31 NOTE — PROGRESS NOTES
Chart review completed 2023.     Orders reviewed for any that may be . No orders removed     Immunizations reviewed.

## 2023-04-14 ENCOUNTER — OFFICE VISIT (OUTPATIENT)
Dept: FAMILY MEDICINE | Facility: CLINIC | Age: 78
End: 2023-04-14
Attending: FAMILY MEDICINE
Payer: MEDICARE

## 2023-04-14 VITALS
DIASTOLIC BLOOD PRESSURE: 88 MMHG | SYSTOLIC BLOOD PRESSURE: 128 MMHG | WEIGHT: 197 LBS | BODY MASS INDEX: 41.35 KG/M2 | HEART RATE: 92 BPM | HEIGHT: 58 IN

## 2023-04-14 DIAGNOSIS — I50.9 HEART FAILURE, UNSPECIFIED HF CHRONICITY, UNSPECIFIED HEART FAILURE TYPE: ICD-10-CM

## 2023-04-14 DIAGNOSIS — F34.1 PERSISTENT DEPRESSIVE DISORDER: ICD-10-CM

## 2023-04-14 DIAGNOSIS — K21.9 GASTRO-ESOPHAGEAL REFLUX DISEASE WITHOUT ESOPHAGITIS: ICD-10-CM

## 2023-04-14 DIAGNOSIS — I27.20 PULMONARY HTN: ICD-10-CM

## 2023-04-14 DIAGNOSIS — M51.9 LUMBAR DISC DISEASE: Primary | ICD-10-CM

## 2023-04-14 DIAGNOSIS — N18.31 STAGE 3A CHRONIC KIDNEY DISEASE: ICD-10-CM

## 2023-04-14 DIAGNOSIS — I48.0 PAROXYSMAL ATRIAL FIBRILLATION: ICD-10-CM

## 2023-04-14 DIAGNOSIS — G47.33 OSA ON CPAP: ICD-10-CM

## 2023-04-14 DIAGNOSIS — I10 ESSENTIAL (PRIMARY) HYPERTENSION: ICD-10-CM

## 2023-04-14 DIAGNOSIS — F32.1 CURRENT MODERATE EPISODE OF MAJOR DEPRESSIVE DISORDER WITHOUT PRIOR EPISODE: ICD-10-CM

## 2023-04-14 DIAGNOSIS — K22.2 SCHATZKI'S RING: ICD-10-CM

## 2023-04-14 DIAGNOSIS — E66.9 OBESITY, UNSPECIFIED CLASSIFICATION, UNSPECIFIED OBESITY TYPE, UNSPECIFIED WHETHER SERIOUS COMORBIDITY PRESENT: ICD-10-CM

## 2023-04-14 DIAGNOSIS — F41.1 GENERALIZED ANXIETY DISORDER: ICD-10-CM

## 2023-04-14 DIAGNOSIS — R13.10 DYSPHAGIA, UNSPECIFIED TYPE: ICD-10-CM

## 2023-04-14 DIAGNOSIS — E03.9 ACQUIRED HYPOTHYROIDISM: ICD-10-CM

## 2023-04-14 DIAGNOSIS — M81.0 AGE-RELATED OSTEOPOROSIS WITHOUT CURRENT PATHOLOGICAL FRACTURE: ICD-10-CM

## 2023-04-14 DIAGNOSIS — J44.9 CHRONIC OBSTRUCTIVE PULMONARY DISEASE, UNSPECIFIED COPD TYPE: ICD-10-CM

## 2023-04-14 DIAGNOSIS — I50.32 CHRONIC DIASTOLIC HEART FAILURE: ICD-10-CM

## 2023-04-14 DIAGNOSIS — J43.1 PANLOBULAR EMPHYSEMA: ICD-10-CM

## 2023-04-14 PROCEDURE — 99214 OFFICE O/P EST MOD 30 MIN: CPT | Mod: S$GLB,,, | Performed by: FAMILY MEDICINE

## 2023-04-14 PROCEDURE — 99214 PR OFFICE/OUTPT VISIT, EST, LEVL IV, 30-39 MIN: ICD-10-PCS | Mod: S$GLB,,, | Performed by: FAMILY MEDICINE

## 2023-04-14 RX ORDER — FUROSEMIDE 20 MG/1
20 TABLET ORAL DAILY PRN
Qty: 90 TABLET | Refills: 1 | Status: SHIPPED | OUTPATIENT
Start: 2023-04-14

## 2023-04-14 RX ORDER — LEVOTHYROXINE SODIUM 25 UG/1
25 TABLET ORAL
Qty: 90 TABLET | Refills: 3 | Status: SHIPPED | OUTPATIENT
Start: 2023-04-14 | End: 2023-04-26 | Stop reason: SDUPTHER

## 2023-04-14 RX ORDER — ESOMEPRAZOLE MAGNESIUM 40 MG/1
40 CAPSULE, DELAYED RELEASE ORAL
Qty: 90 CAPSULE | Refills: 3 | Status: SHIPPED | OUTPATIENT
Start: 2023-04-14 | End: 2023-11-14

## 2023-04-14 RX ORDER — METOPROLOL SUCCINATE 100 MG/1
100 TABLET, EXTENDED RELEASE ORAL DAILY
Qty: 90 TABLET | Refills: 3 | Status: SHIPPED | OUTPATIENT
Start: 2023-04-14

## 2023-04-14 NOTE — PROGRESS NOTES
SUBJECTIVE:    Patient ID: Danielle Martinez is a 77 y.o. female.    Chief Complaint: Depression (With anxiety, no bottles, mary loss, multiple joins pain, low energy, dizziness, need refills,abc )    77-year-old female still upset as she lost her sister to liver cancer recently.  Patient finds that she is having decreased mood and some social withdrawals lately.    Hearing loss-sees Dr. Alejandra and is planning cochlear implants in July of 2023    Schatzki's ring and esophageal stricture, in 2022 had EGD with dilation with Dr. Crowe, on Nexium and Pepcid  , 2019-colonoscopy with Dr. Crowe RTC 5 years    Chronic cough, takes Mucinex daily, patient is a nonsmoker.    She no longer gets mammograms.    Using Trelegy inhaler daily    BRITTANIE-on CPAP nightly      No visits with results within 6 Month(s) from this visit.   Latest known visit with results is:   Office Visit on 09/14/2022   Component Date Value Ref Range Status    Hemoglobin A1C, POC 09/14/2022 6.0  % Final    WBC 09/14/2022 7.3  3.8 - 10.8 Thousand/uL Final    RBC 09/14/2022 4.80  3.80 - 5.10 Million/uL Final    Hemoglobin 09/14/2022 13.6  11.7 - 15.5 g/dL Final    Hematocrit 09/14/2022 42.6  35.0 - 45.0 % Final    MCV 09/14/2022 88.8  80.0 - 100.0 fL Final    MCH 09/14/2022 28.3  27.0 - 33.0 pg Final    MCHC 09/14/2022 31.9 (L)  32.0 - 36.0 g/dL Final    RDW 09/14/2022 13.6  11.0 - 15.0 % Final    Platelets 09/14/2022 285  140 - 400 Thousand/uL Final    MPV 09/14/2022 10.0  7.5 - 12.5 fL Final    Neutrophils, Abs 09/14/2022 5,008  1,500 - 7,800 cells/uL Final    Lymph # 09/14/2022 1,518  850 - 3,900 cells/uL Final    Mono # 09/14/2022 569  200 - 950 cells/uL Final    Eos # 09/14/2022 153  15 - 500 cells/uL Final    Baso # 09/14/2022 51  0 - 200 cells/uL Final    Neutrophils Relative 09/14/2022 68.6  % Final    Lymph % 09/14/2022 20.8  % Final    Mono % 09/14/2022 7.8  % Final    Eosinophil % 09/14/2022 2.1  % Final    Basophil % 09/14/2022 0.7  % Final     Glucose 09/14/2022 111 (H)  65 - 99 mg/dL Final    BUN 09/14/2022 19  7 - 25 mg/dL Final    Creatinine 09/14/2022 0.96  0.60 - 1.00 mg/dL Final    eGFR 09/14/2022 61  > OR = 60 mL/min/1.73m2 Final    BUN/Creatinine Ratio 09/14/2022 NOT APPLICABLE  6 - 22 (calc) Final    Sodium 09/14/2022 141  135 - 146 mmol/L Final    Potassium 09/14/2022 4.1  3.5 - 5.3 mmol/L Final    Chloride 09/14/2022 103  98 - 110 mmol/L Final    CO2 09/14/2022 30  20 - 32 mmol/L Final    Calcium 09/14/2022 9.8  8.6 - 10.4 mg/dL Final    Total Protein 09/14/2022 7.0  6.1 - 8.1 g/dL Final    Albumin 09/14/2022 4.4  3.6 - 5.1 g/dL Final    Globulin, Total 09/14/2022 2.6  1.9 - 3.7 g/dL (calc) Final    Albumin/Globulin Ratio 09/14/2022 1.7  1.0 - 2.5 (calc) Final    Total Bilirubin 09/14/2022 1.2  0.2 - 1.2 mg/dL Final    Alkaline Phosphatase 09/14/2022 65  37 - 153 U/L Final    AST 09/14/2022 17  10 - 35 U/L Final    ALT 09/14/2022 10  6 - 29 U/L Final    Cholesterol 09/14/2022 170  <200 mg/dL Final    HDL 09/14/2022 46 (L)  > OR = 50 mg/dL Final    Triglycerides 09/14/2022 98  <150 mg/dL Final    LDL Cholesterol 09/14/2022 105 (H)  mg/dL (calc) Final    HDL/Cholesterol Ratio 09/14/2022 3.7  <5.0 (calc) Final    Non HDL Chol. (LDL+VLDL) 09/14/2022 124  <130 mg/dL (calc) Final    TSH w/reflex to FT4 09/14/2022 2.82  0.40 - 4.50 mIU/L Final       Past Medical History:   Diagnosis Date    HAILY (acute kidney injury)     Anxiety     Arthritis     Juvenile diagnosed age 17    Atrial fibrillation     COPD (chronic obstructive pulmonary disease)     Depression     Diverticulitis     Emphysema of lung     GERD (gastroesophageal reflux disease)     Hx of hiatal hernia     Hypertension     Osteoporosis     Pneumonia     Sleep apnea      Social History     Socioeconomic History    Marital status:    Tobacco Use    Smoking status: Never    Smokeless tobacco: Never   Substance and Sexual Activity    Alcohol use: Never    Drug use: Never     Past  Surgical History:   Procedure Laterality Date    BREAST LUMPECTOMY Left     in 40's    COLON SURGERY      COLONOSCOPY  2016    Dr. Crowe-RTC 5 years    COLONOSCOPY  2019    Dr. Crowe-RTC 5 years    HERNIA REPAIR      HIATAL HERNIA REPAIR  2005    HYSTERECTOMY      SHOULDER ARTHROSCOPY  2015     Family History   Problem Relation Age of Onset    Hyperlipidemia Mother        Review of patient's allergies indicates:   Allergen Reactions    Promethazine Anaphylaxis    Clove flavoring [flavoring agent]     Codeine Itching    Latex, natural rubber     Lorazepam Other (See Comments)    Lovastatin Other (See Comments)    Meclizine Other (See Comments)    Morphine Itching    Simvastatin Other (See Comments)    Tramadol-acetaminophen Itching       Current Outpatient Medications:     albuterol (VENTOLIN HFA) 90 mcg/actuation inhaler, Inhale 2 puffs into the lungs every 4 (four) hours as needed for Wheezing. Rescue, Disp: 18 g, Rfl: 2    ALPRAZolam (XANAX) 1 MG tablet, Take 1 tablet (1 mg total) by mouth 2 (two) times daily., Disp: 60 tablet, Rfl: 2    ascorbic acid, vitamin C, (VITAMIN C) 100 MG tablet, Take 100 mg by mouth once daily., Disp: , Rfl:     ELIQUIS 5 mg Tab, Take 1 tablet (5 mg total) by mouth 2 (two) times daily., Disp: 60 tablet, Rfl: 5    fluticasone propionate (FLONASE) 50 mcg/actuation nasal spray, 1 spray (50 mcg total) by Each Nostril route daily as needed for Allergies., Disp: 16 g, Rfl: 2    fluticasone-umeclidin-vilanter (TRELEGY ELLIPTA) 100-62.5-25 mcg DsDv, Inhale 1 puff into the lungs daily as needed., Disp: 60 each, Rfl: 2    mv-min/folic/vit K/lut/[id number] (ALIVE WOMEN'S 50 PLUS, BLEND, ORAL), Take 1 tablet by mouth daily as needed., Disp: , Rfl:     nitroGLYCERIN (NITROSTAT) 0.4 MG SL tablet, Place 1 tablet (0.4 mg total) under the tongue every 5 (five) minutes as needed., Disp: 25 tablet, Rfl: 3    potassium chloride (KLOR-CON) 10 MEQ TbSR, Take 1 tablet (10 mEq total) by mouth 2 (two) times  daily., Disp: 180 tablet, Rfl: 1    PROCTO-MED HC 2.5 % rectal cream, , Disp: , Rfl:     buPROPion (WELLBUTRIN SR) 150 MG TBSR 12 hr tablet, Take 1 tablet (150 mg total) by mouth 2 (two) times daily. (Patient not taking: Reported on 2023), Disp: 60 tablet, Rfl: 5    ciprofloxacin HCl (CIPRO) 500 MG tablet, Take 500 mg by mouth every 12 (twelve) hours. , Disp: , Rfl:     citalopram (CELEXA) 20 MG tablet, Take 1 tablet (20 mg total) by mouth once daily. (Patient not taking: Reported on 2022), Disp: 30 tablet, Rfl: 1    doxycycline (MONODOX) 100 MG capsule, Take 1 capsule (100 mg total) by mouth 2 (two) times daily. (Patient not taking: Reported on 2023), Disp: 20 capsule, Rfl: 0    esomeprazole (NEXIUM) 40 MG capsule, Take 1 capsule (40 mg total) by mouth before breakfast., Disp: 90 capsule, Rfl: 3    furosemide (LASIX) 20 MG tablet, Take 1 tablet (20 mg total) by mouth daily as needed., Disp: 90 tablet, Rfl: 1    levothyroxine (SYNTHROID) 25 MCG tablet, Take 1 tablet (25 mcg total) by mouth before breakfast. Name brand only.  RIRI, Disp: 90 tablet, Rfl: 3    metoprolol succinate (TOPROL-XL) 100 MG 24 hr tablet, Take 1 tablet (100 mg total) by mouth once daily. For 30 days, Disp: 90 tablet, Rfl: 3    metroNIDAZOLE (FLAGYL) 500 MG tablet, Take 500 mg by mouth every 12 (twelve) hours. , Disp: , Rfl:     predniSONE (DELTASONE) 10 MG tablet, SMARTSI Tablet(s) By Mouth Every Morning, Disp: , Rfl:     Review of Systems   Constitutional:  Negative for appetite change, chills, fatigue, fever and unexpected weight change.   HENT:  Positive for hearing loss. Negative for ear discharge and ear pain.    Eyes:  Negative for pain, discharge and visual disturbance.   Respiratory:  Positive for cough and shortness of breath. Negative for apnea and wheezing.    Cardiovascular:  Negative for chest pain, palpitations and leg swelling.   Gastrointestinal:  Negative for abdominal pain, blood in stool, constipation,  "diarrhea, nausea, vomiting and reflux.        GERD   Endocrine: Negative for cold intolerance, heat intolerance and polydipsia.   Genitourinary:  Negative for bladder incontinence, dysuria, hematuria, nocturia and urgency.   Musculoskeletal:  Positive for arthralgias (rt knee pain). Negative for gait problem, joint swelling and myalgias.   Neurological:  Negative for dizziness, seizures and numbness.   Psychiatric/Behavioral:  Negative for behavioral problems, hallucinations and sleep disturbance. The patient is not nervous/anxious.          Objective:      Vitals:    04/14/23 1033   BP: 128/88   Pulse: 92   Weight: 89.4 kg (197 lb)   Height: 4' 9.5" (1.461 m)     Physical Exam  Vitals and nursing note reviewed.   Constitutional:       General: She is not in acute distress.     Appearance: She is well-developed. She is obese. She is not toxic-appearing.   HENT:      Head: Normocephalic and atraumatic.      Right Ear: Tympanic membrane and external ear normal.      Left Ear: Tympanic membrane and external ear normal.      Nose: Nose normal.      Mouth/Throat:      Pharynx: Oropharynx is clear.   Eyes:      Pupils: Pupils are equal, round, and reactive to light.   Neck:      Thyroid: No thyromegaly.      Vascular: No carotid bruit.   Cardiovascular:      Rate and Rhythm: Normal rate and regular rhythm.      Heart sounds: Normal heart sounds. No murmur heard.  Pulmonary:      Effort: Pulmonary effort is normal.      Breath sounds: Normal breath sounds. No wheezing or rales.   Abdominal:      General: Bowel sounds are normal. There is no distension.      Palpations: Abdomen is soft.      Tenderness: There is no abdominal tenderness.   Musculoskeletal:         General: No tenderness or deformity. Normal range of motion.      Cervical back: Normal range of motion and neck supple.      Lumbar back: Normal. No spasms.      Comments: Bends 90 degrees at  waist, shoulders  and knees  good  rom , no pitting edema to lower " extremities   Lymphadenopathy:      Cervical: No cervical adenopathy.   Skin:     General: Skin is warm and dry.      Findings: No rash.   Neurological:      Mental Status: She is alert and oriented to person, place, and time. Mental status is at baseline.      Cranial Nerves: No cranial nerve deficit.      Motor: No weakness.      Coordination: Coordination normal.      Gait: Gait normal.   Psychiatric:         Mood and Affect: Mood normal.         Behavior: Behavior normal.         Thought Content: Thought content normal.         Judgment: Judgment normal.         Assessment:       1. Lumbar disc disease    2. Gastro-esophageal reflux disease without esophagitis    3. Paroxysmal atrial fibrillation    4. Acquired hypothyroidism    5. Persistent depressive disorder    6. Current moderate episode of major depressive disorder without prior episode    7. Chronic obstructive pulmonary disease, unspecified COPD type    8. Panlobular emphysema    9. Essential (primary) hypertension    10. Chronic diastolic heart failure    11. Heart failure, unspecified HF chronicity, unspecified heart failure type    12. Obesity, unspecified classification, unspecified obesity type, unspecified whether serious comorbidity present    13. Schatzki's ring    14. Dysphagia, unspecified type    15. Age-related osteoporosis without current pathological fracture    16. BRITTANIE on CPAP    17. Generalized anxiety disorder    18. Pulmonary HTN    19. Stage 3a chronic kidney disease         Plan:       Lumbar disc disease  Patient has back pain but does not take any  pain pills.  Gastro-esophageal reflux disease without esophagitis  -     esomeprazole (NEXIUM) 40 MG capsule; Take 1 capsule (40 mg total) by mouth before breakfast.  Dispense: 90 capsule; Refill: 3  Continue Nexium and Pepcid daily  Paroxysmal atrial fibrillation  -     furosemide (LASIX) 20 MG tablet; Take 1 tablet (20 mg total) by mouth daily as needed.  Dispense: 90 tablet; Refill:  1  -     metoprolol succinate (TOPROL-XL) 100 MG 24 hr tablet; Take 1 tablet (100 mg total) by mouth once daily. For 30 days  Dispense: 90 tablet; Refill: 3  Sinus rhythm today  Acquired hypothyroidism  -     levothyroxine (SYNTHROID) 25 MCG tablet; Take 1 tablet (25 mcg total) by mouth before breakfast. Name brand only.  RIRI  Dispense: 90 tablet; Refill: 3    Persistent depressive disorder    Current moderate episode of major depressive disorder without prior episode    Chronic obstructive pulmonary disease, unspecified COPD type  Continue Trelegy  Panlobular emphysema    Essential (primary) hypertension  -     CBC Auto Differential; Future; Expected date: 04/14/2023  -     Comprehensive Metabolic Panel; Future; Expected date: 04/14/2023  -     Lipid Panel; Future; Expected date: 04/14/2023  -     Microalbumin/Creatinine Ratio, Urine; Future; Expected date: 04/14/2023  -     Urinalysis, Reflex to Urine Culture Urine, Clean Catch; Future; Expected date: 04/14/2023  -     TSH w/reflex to FT4; Future; Expected date: 04/14/2023  Lab work ordered for next week  Chronic diastolic heart failure  Compensated well  Heart failure, unspecified HF chronicity, unspecified heart failure type    Obesity, unspecified classification, unspecified obesity type, unspecified whether serious comorbidity present    Schatzki's ring  Last dilation 2022 Dr. Crowe  Dysphagia, unspecified type    Age-related osteoporosis without current pathological fracture    BRITTANIE on CPAP  Continue CPAP at night  Generalized anxiety disorder    Pulmonary HTN    Stage 3a chronic kidney disease  Labs due next week    Follow up in about 6 months (around 10/14/2023), or copd , hld.        4/15/2023 Antony Lopez

## 2023-04-26 DIAGNOSIS — E03.9 ACQUIRED HYPOTHYROIDISM: ICD-10-CM

## 2023-04-26 RX ORDER — LEVOTHYROXINE SODIUM 25 UG/1
25 TABLET ORAL
Qty: 30 TABLET | Refills: 11 | Status: SHIPPED | OUTPATIENT
Start: 2023-04-26

## 2023-04-26 NOTE — TELEPHONE ENCOUNTER
----- Message from Nubia Lopez sent at 4/26/2023 12:20 PM CDT -----  Pt called and stated that her insurance will not pay for a 90 day supply they only pay for 30 days. Please resend her levothyroxine for a 30 day supply. [facility] [address] [facility], [phone]

## 2023-05-12 ENCOUNTER — TELEPHONE (OUTPATIENT)
Dept: FAMILY MEDICINE | Facility: CLINIC | Age: 78
End: 2023-05-12

## 2023-05-12 LAB
ALBUMIN SERPL-MCNC: 4.3 G/DL (ref 3.6–5.1)
ALBUMIN/CREAT UR: 10 MCG/MG CREAT
ALBUMIN/GLOB SERPL: 1.6 (CALC) (ref 1–2.5)
ALP SERPL-CCNC: 74 U/L (ref 37–153)
ALT SERPL-CCNC: 10 U/L (ref 6–29)
APPEARANCE UR: CLEAR
AST SERPL-CCNC: 16 U/L (ref 10–35)
BACTERIA #/AREA URNS HPF: ABNORMAL /HPF
BACTERIA UR CULT: ABNORMAL
BACTERIA UR CULT: ABNORMAL
BASOPHILS # BLD AUTO: 60 CELLS/UL (ref 0–200)
BASOPHILS NFR BLD AUTO: 0.7 %
BILIRUB SERPL-MCNC: 1.1 MG/DL (ref 0.2–1.2)
BILIRUB UR QL STRIP: NEGATIVE
BUN SERPL-MCNC: 16 MG/DL (ref 7–25)
BUN/CREAT SERPL: ABNORMAL (CALC) (ref 6–22)
CALCIUM SERPL-MCNC: 10.1 MG/DL (ref 8.6–10.4)
CHLORIDE SERPL-SCNC: 103 MMOL/L (ref 98–110)
CHOLEST SERPL-MCNC: 178 MG/DL
CHOLEST/HDLC SERPL: 4.1 (CALC)
CO2 SERPL-SCNC: 29 MMOL/L (ref 20–32)
COLOR UR: ABNORMAL
CREAT SERPL-MCNC: 0.88 MG/DL (ref 0.6–1)
CREAT UR-MCNC: 217 MG/DL (ref 20–275)
EGFR: 68 ML/MIN/1.73M2
EOSINOPHIL # BLD AUTO: 198 CELLS/UL (ref 15–500)
EOSINOPHIL NFR BLD AUTO: 2.3 %
ERYTHROCYTE [DISTWIDTH] IN BLOOD BY AUTOMATED COUNT: 14 % (ref 11–15)
GLOBULIN SER CALC-MCNC: 2.7 G/DL (CALC) (ref 1.9–3.7)
GLUCOSE SERPL-MCNC: 101 MG/DL (ref 65–99)
GLUCOSE UR QL STRIP: NEGATIVE
HCT VFR BLD AUTO: 40.8 % (ref 35–45)
HDLC SERPL-MCNC: 43 MG/DL
HGB BLD-MCNC: 13 G/DL (ref 11.7–15.5)
HGB UR QL STRIP: NEGATIVE
HYALINE CASTS #/AREA URNS LPF: ABNORMAL /LPF
KETONES UR QL STRIP: NEGATIVE
LDLC SERPL CALC-MCNC: 116 MG/DL (CALC)
LEUKOCYTE ESTERASE UR QL STRIP: ABNORMAL
LYMPHOCYTES # BLD AUTO: 1918 CELLS/UL (ref 850–3900)
LYMPHOCYTES NFR BLD AUTO: 22.3 %
MCH RBC QN AUTO: 28.8 PG (ref 27–33)
MCHC RBC AUTO-ENTMCNC: 31.9 G/DL (ref 32–36)
MCV RBC AUTO: 90.5 FL (ref 80–100)
MICROALBUMIN UR-MCNC: 2.1 MG/DL
MONOCYTES # BLD AUTO: 705 CELLS/UL (ref 200–950)
MONOCYTES NFR BLD AUTO: 8.2 %
NEUTROPHILS # BLD AUTO: 5719 CELLS/UL (ref 1500–7800)
NEUTROPHILS NFR BLD AUTO: 66.5 %
NITRITE UR QL STRIP: POSITIVE
NONHDLC SERPL-MCNC: 135 MG/DL (CALC)
PH UR STRIP: ABNORMAL [PH] (ref 5–8)
PLATELET # BLD AUTO: 304 THOUSAND/UL (ref 140–400)
PMV BLD REES-ECKER: 10.4 FL (ref 7.5–12.5)
POTASSIUM SERPL-SCNC: 4 MMOL/L (ref 3.5–5.3)
PROT SERPL-MCNC: 7 G/DL (ref 6.1–8.1)
PROT UR QL STRIP: ABNORMAL
RBC # BLD AUTO: 4.51 MILLION/UL (ref 3.8–5.1)
RBC #/AREA URNS HPF: ABNORMAL /HPF
SERVICE CMNT-IMP: ABNORMAL
SODIUM SERPL-SCNC: 142 MMOL/L (ref 135–146)
SP GR UR STRIP: 1.02 (ref 1–1.03)
SQUAMOUS #/AREA URNS HPF: ABNORMAL /HPF
TRIGL SERPL-MCNC: 93 MG/DL
TSH SERPL-ACNC: 2.35 MIU/L (ref 0.4–4.5)
WBC # BLD AUTO: 8.6 THOUSAND/UL (ref 3.8–10.8)
WBC #/AREA URNS HPF: ABNORMAL /HPF

## 2023-05-12 RX ORDER — NITROFURANTOIN 25; 75 MG/1; MG/1
100 CAPSULE ORAL 2 TIMES DAILY
Qty: 20 CAPSULE | Refills: 0 | Status: SHIPPED | OUTPATIENT
Start: 2023-05-12 | End: 2023-11-14

## 2023-05-15 ENCOUNTER — TELEPHONE (OUTPATIENT)
Dept: FAMILY MEDICINE | Facility: CLINIC | Age: 78
End: 2023-05-15

## 2023-05-15 NOTE — PROGRESS NOTES
Call patient.  CBC shows no anemia.  Sugar , thyroid, kidneys and liver are normal.  Urinalysis does shows E coli in the urine.  If she has any UTI symptoms we would prescribe Macrobid 100 mg b.i.d. x7 days.  Cholesterol excellent at 178, continue current medications

## 2023-05-15 NOTE — TELEPHONE ENCOUNTER
----- Message from Cathryn Parra MA sent at 5/15/2023 11:55 AM CDT -----  Regarding: call back  Pt returning veronica's call

## 2023-05-15 NOTE — TELEPHONE ENCOUNTER
This is still in my open encounters. Looks like Macrobid was sent in 5/12. Can message be signed off?

## 2023-05-15 NOTE — TELEPHONE ENCOUNTER
----- Message from Antony Lopez MD sent at 5/14/2023  8:40 PM CDT -----  Call patient.  CBC shows no anemia.  Sugar , thyroid, kidneys and liver are normal.  Urinalysis does shows E coli in the urine.  If she has any UTI symptoms we would prescribe Macrobid 100 mg b.i.d. x7 days.  Cholesterol excellent at 178, continue current medications

## 2023-05-15 NOTE — TELEPHONE ENCOUNTER
Left mess to call me back and it was not urgent.. Looks like Macrobid was already sent in last week.

## 2023-05-15 NOTE — TELEPHONE ENCOUNTER
Spoke to patient with results verbatim per Dr. Lopez. Verbalized understanding on all.  Said she did  abx for UTI and is taking them.

## 2023-05-19 DIAGNOSIS — R06.02 SHORTNESS OF BREATH: Primary | ICD-10-CM

## 2023-06-06 ENCOUNTER — TELEPHONE (OUTPATIENT)
Dept: FAMILY MEDICINE | Facility: CLINIC | Age: 78
End: 2023-06-06

## 2023-07-12 ENCOUNTER — HOSPITAL ENCOUNTER (OUTPATIENT)
Dept: PULMONOLOGY | Facility: HOSPITAL | Age: 78
Discharge: HOME | End: 2023-07-12
Attending: INTERNAL MEDICINE
Payer: MEDICARE

## 2023-07-12 DIAGNOSIS — D83.9 COMMON VARIABLE IMMUNODEFICIENCY: ICD-10-CM

## 2023-07-12 DIAGNOSIS — J47.9 BRONCHIECTASIS WITHOUT ACUTE EXACERBATION: Primary | ICD-10-CM

## 2023-07-12 DIAGNOSIS — R06.02 SHORTNESS OF BREATH: ICD-10-CM

## 2023-07-12 DIAGNOSIS — D64.9 ANEMIA, UNSPECIFIED: ICD-10-CM

## 2023-07-12 DIAGNOSIS — Z20.1 CONTACT WITH TUBERCULOSIS: ICD-10-CM

## 2023-07-12 DIAGNOSIS — E46 UNSPECIFIED PROTEIN-CALORIE MALNUTRITION: ICD-10-CM

## 2023-07-12 DIAGNOSIS — I50.1 LEFT HEART FAILURE: ICD-10-CM

## 2023-07-12 LAB — BR6MWT: NORMAL

## 2023-07-12 PROCEDURE — 94618 PULMONARY STRESS TESTING: CPT

## 2023-07-12 PROCEDURE — 94618 PULMONARY STRESS TESTING: CPT | Mod: 26,,, | Performed by: INTERNAL MEDICINE

## 2023-07-12 PROCEDURE — 94618 PULMONARY STRESS TESTING: ICD-10-PCS | Mod: 26,,, | Performed by: INTERNAL MEDICINE

## 2023-07-26 ENCOUNTER — HOSPITAL ENCOUNTER (OUTPATIENT)
Dept: RADIOLOGY | Facility: HOSPITAL | Age: 78
Discharge: HOME | End: 2023-07-26
Attending: INTERNAL MEDICINE
Payer: MEDICARE

## 2023-07-26 DIAGNOSIS — D83.9 COMMON VARIABLE IMMUNODEFICIENCY: ICD-10-CM

## 2023-07-26 DIAGNOSIS — Z20.1 CONTACT WITH TUBERCULOSIS: ICD-10-CM

## 2023-07-26 DIAGNOSIS — I50.1 LEFT HEART FAILURE: ICD-10-CM

## 2023-07-26 DIAGNOSIS — E46 UNSPECIFIED PROTEIN-CALORIE MALNUTRITION: ICD-10-CM

## 2023-07-26 DIAGNOSIS — J47.9 BRONCHIECTASIS WITHOUT ACUTE EXACERBATION: ICD-10-CM

## 2023-07-26 DIAGNOSIS — D64.9 ANEMIA, UNSPECIFIED: ICD-10-CM

## 2023-07-26 PROCEDURE — 71250 CT THORAX DX C-: CPT | Mod: TC,PO

## 2023-07-27 ENCOUNTER — TELEPHONE (OUTPATIENT)
Dept: FAMILY MEDICINE | Facility: CLINIC | Age: 78
End: 2023-07-27

## 2023-07-27 NOTE — TELEPHONE ENCOUNTER
----- Message from Lizzette Williamson sent at 7/27/2023 10:46 AM CDT -----  The patient has an appointment in October with Dr. Lopez. She wants to get in sooner.  She is having some anxiety. She lost a family member. She is up set.  Pt's # [phone] GH

## 2023-07-31 ENCOUNTER — TELEPHONE (OUTPATIENT)
Dept: FAMILY MEDICINE | Facility: CLINIC | Age: 78
End: 2023-07-31

## 2023-07-31 NOTE — TELEPHONE ENCOUNTER
----- Message from Jerri Titus sent at 7/31/2023  9:20 AM CDT -----  Pt is having some problems with her medications. She wants them all due at the same time. Please advise. Pt #[phone]

## 2023-07-31 NOTE — TELEPHONE ENCOUNTER
"Spoke with patient who states she is having a lot of trouble with her medications, they need to be filled on different days and she has a lot of stress with death in her family and issues with her brother. She has a lot going on and she can't keep up with her medications. I let her know the medications get filled on different days and she can try to get them lined up with the pharmacy but she refused. States she just wanted Dr. Lopez to know that things are filled on different days and she "isn't worried." Dr. Lopez notified.   "

## 2023-08-07 ENCOUNTER — TELEPHONE (OUTPATIENT)
Dept: FAMILY MEDICINE | Facility: CLINIC | Age: 78
End: 2023-08-07

## 2023-08-07 NOTE — TELEPHONE ENCOUNTER
----- Message from Tate Mclean sent at 8/7/2023  9:51 AM CDT -----  Pt states that she needs to be seen asap. Christian breaux are waking pt up during the night. [place] advise      [phone]

## 2023-08-07 NOTE — TELEPHONE ENCOUNTER
Spoke with patient who states she has been having trouble with charley horses, she requested ot be seen.

## 2023-08-09 ENCOUNTER — OFFICE VISIT (OUTPATIENT)
Dept: FAMILY MEDICINE | Facility: CLINIC | Age: 78
End: 2023-08-09
Attending: FAMILY MEDICINE
Payer: MEDICARE

## 2023-08-09 ENCOUNTER — TELEPHONE (OUTPATIENT)
Dept: FAMILY MEDICINE | Facility: CLINIC | Age: 78
End: 2023-08-09

## 2023-08-09 VITALS
HEIGHT: 58 IN | BODY MASS INDEX: 41.98 KG/M2 | HEART RATE: 72 BPM | SYSTOLIC BLOOD PRESSURE: 138 MMHG | WEIGHT: 200 LBS | DIASTOLIC BLOOD PRESSURE: 82 MMHG

## 2023-08-09 DIAGNOSIS — E66.9 OBESITY, UNSPECIFIED CLASSIFICATION, UNSPECIFIED OBESITY TYPE, UNSPECIFIED WHETHER SERIOUS COMORBIDITY PRESENT: ICD-10-CM

## 2023-08-09 DIAGNOSIS — G47.33 OSA ON CPAP: ICD-10-CM

## 2023-08-09 DIAGNOSIS — F51.01 PRIMARY INSOMNIA: ICD-10-CM

## 2023-08-09 DIAGNOSIS — I10 ESSENTIAL (PRIMARY) HYPERTENSION: ICD-10-CM

## 2023-08-09 DIAGNOSIS — J44.9 CHRONIC OBSTRUCTIVE PULMONARY DISEASE, UNSPECIFIED COPD TYPE: ICD-10-CM

## 2023-08-09 DIAGNOSIS — F41.1 GENERALIZED ANXIETY DISORDER: ICD-10-CM

## 2023-08-09 DIAGNOSIS — E03.9 ACQUIRED HYPOTHYROIDISM: ICD-10-CM

## 2023-08-09 DIAGNOSIS — M51.9 LUMBAR DISC DISEASE: ICD-10-CM

## 2023-08-09 DIAGNOSIS — I50.32 CHRONIC DIASTOLIC HEART FAILURE: ICD-10-CM

## 2023-08-09 DIAGNOSIS — K21.9 GASTRO-ESOPHAGEAL REFLUX DISEASE WITHOUT ESOPHAGITIS: ICD-10-CM

## 2023-08-09 DIAGNOSIS — I48.0 PAROXYSMAL ATRIAL FIBRILLATION: ICD-10-CM

## 2023-08-09 DIAGNOSIS — K21.9 GASTROESOPHAGEAL REFLUX DISEASE WITHOUT ESOPHAGITIS: ICD-10-CM

## 2023-08-09 DIAGNOSIS — F41.9 ANXIETY: ICD-10-CM

## 2023-08-09 DIAGNOSIS — I27.20 PULMONARY HTN: ICD-10-CM

## 2023-08-09 DIAGNOSIS — F32.1 CURRENT MODERATE EPISODE OF MAJOR DEPRESSIVE DISORDER WITHOUT PRIOR EPISODE: ICD-10-CM

## 2023-08-09 DIAGNOSIS — F32.9 REACTIVE DEPRESSION: Primary | ICD-10-CM

## 2023-08-09 DIAGNOSIS — J43.1 PANLOBULAR EMPHYSEMA: ICD-10-CM

## 2023-08-09 PROCEDURE — 99214 PR OFFICE/OUTPT VISIT, EST, LEVL IV, 30-39 MIN: ICD-10-PCS | Mod: S$GLB,,, | Performed by: FAMILY MEDICINE

## 2023-08-09 PROCEDURE — 99214 OFFICE O/P EST MOD 30 MIN: CPT | Mod: S$GLB,,, | Performed by: FAMILY MEDICINE

## 2023-08-09 RX ORDER — TRAZODONE HYDROCHLORIDE 50 MG/1
50 TABLET ORAL NIGHTLY
Qty: 30 TABLET | Refills: 2 | Status: SHIPPED | OUTPATIENT
Start: 2023-08-09 | End: 2024-08-08

## 2023-08-09 RX ORDER — FAMOTIDINE 40 MG/1
40 TABLET, FILM COATED ORAL NIGHTLY PRN
Qty: 90 TABLET | Refills: 1 | Status: SHIPPED | OUTPATIENT
Start: 2023-08-09 | End: 2024-08-08

## 2023-08-09 RX ORDER — ALPRAZOLAM 1 MG/1
1 TABLET ORAL 2 TIMES DAILY
Qty: 60 TABLET | Refills: 2 | Status: SHIPPED | OUTPATIENT
Start: 2023-08-09 | End: 2023-12-11 | Stop reason: SDUPTHER

## 2023-08-09 NOTE — TELEPHONE ENCOUNTER
----- Message from Raul Cr MA sent at 8/9/2023 10:28 AM CDT -----  [phone]  patient needs a RTC  visit for 3 Month f/u  with Bimal Rossi

## 2023-08-10 NOTE — PROGRESS NOTES
SUBJECTIVE:    Patient ID: Danielle Martinez is a 78 y.o. female.    Chief Complaint: Leg Pain (Bilateral lower extremities cramps, and pain for one month, no bottles, pt use O2, Depression,  right ear hearing loss, abc )    78-year-old female with advanced COPD.  She has a light-weight portable oxygen concentrator in a back pack.  She has it on 4 L of O2 nasal cannula. BRITTANIE-compliant with CPAP at night provided by Dr. Conroy pulmonology.  She did a 6 minute walking test and she became quite hypoxic.  She has a home O2 concentrator as well, uses Trelegy inhaler daily.    She has been feeling depressed and despondent as she has lost several family members lately.  She no longer takes Wellbutrin.  She is having trouble sleeping at night.    Osteoarthritis-uses a walking stick/cane for balance, right knee has arthritis pains.    Anxiety takes alprazolam 1 mg to 2 mg at night    Paroxysmal atrial fibrillation, maintained on metoprolol and Eliquis.        Hospital Outpatient Visit on 07/12/2023   Component Date Value Ref Range Status    Interpretation 07/12/2023    Final                    Value:6MWT was performed. Total distance achieved was 85m which is 25% of predicted distance.  Supplemental oxygen is required with exertion.  Starting oxygen saturation was 95% on room air at rest.  With exertion sat drops as low as 81% with improvement to 94% on 6 L nasal cannula.     Office Visit on 04/14/2023   Component Date Value Ref Range Status    WBC 05/09/2023 8.6  3.8 - 10.8 Thousand/uL Final    RBC 05/09/2023 4.51  3.80 - 5.10 Million/uL Final    Hemoglobin 05/09/2023 13.0  11.7 - 15.5 g/dL Final    Hematocrit 05/09/2023 40.8  35.0 - 45.0 % Final    MCV 05/09/2023 90.5  80.0 - 100.0 fL Final    MCH 05/09/2023 28.8  27.0 - 33.0 pg Final    MCHC 05/09/2023 31.9 (L)  32.0 - 36.0 g/dL Final    RDW 05/09/2023 14.0  11.0 - 15.0 % Final    Platelets 05/09/2023 304  140 - 400 Thousand/uL Final    MPV 05/09/2023 10.4  7.5 - 12.5 fL  Final    Neutrophils, Abs 05/09/2023 5,719  1,500 - 7,800 cells/uL Final    Lymph # 05/09/2023 1,918  850 - 3,900 cells/uL Final    Mono # 05/09/2023 705  200 - 950 cells/uL Final    Eos # 05/09/2023 198  15 - 500 cells/uL Final    Baso # 05/09/2023 60  0 - 200 cells/uL Final    Neutrophils Relative 05/09/2023 66.5  % Final    Lymph % 05/09/2023 22.3  % Final    Mono % 05/09/2023 8.2  % Final    Eosinophil % 05/09/2023 2.3  % Final    Basophil % 05/09/2023 0.7  % Final    Glucose 05/09/2023 101 (H)  65 - 99 mg/dL Final    BUN 05/09/2023 16  7 - 25 mg/dL Final    Creatinine 05/09/2023 0.88  0.60 - 1.00 mg/dL Final    eGFR 05/09/2023 68  > OR = 60 mL/min/1.73m2 Final    BUN/Creatinine Ratio 05/09/2023 NOT APPLICABLE  6 - 22 (calc) Final    Sodium 05/09/2023 142  135 - 146 mmol/L Final    Potassium 05/09/2023 4.0  3.5 - 5.3 mmol/L Final    Chloride 05/09/2023 103  98 - 110 mmol/L Final    CO2 05/09/2023 29  20 - 32 mmol/L Final    Calcium 05/09/2023 10.1  8.6 - 10.4 mg/dL Final    Total Protein 05/09/2023 7.0  6.1 - 8.1 g/dL Final    Albumin 05/09/2023 4.3  3.6 - 5.1 g/dL Final    Globulin, Total 05/09/2023 2.7  1.9 - 3.7 g/dL (calc) Final    Albumin/Globulin Ratio 05/09/2023 1.6  1.0 - 2.5 (calc) Final    Total Bilirubin 05/09/2023 1.1  0.2 - 1.2 mg/dL Final    Alkaline Phosphatase 05/09/2023 74  37 - 153 U/L Final    AST 05/09/2023 16  10 - 35 U/L Final    ALT 05/09/2023 10  6 - 29 U/L Final    Cholesterol 05/09/2023 178  <200 mg/dL Final    HDL 05/09/2023 43 (L)  > OR = 50 mg/dL Final    Triglycerides 05/09/2023 93  <150 mg/dL Final    LDL Cholesterol 05/09/2023 116 (H)  mg/dL (calc) Final    HDL/Cholesterol Ratio 05/09/2023 4.1  <5.0 (calc) Final    Non HDL Chol. (LDL+VLDL) 05/09/2023 135 (H)  <130 mg/dL (calc) Final    Creatinine, Urine 05/09/2023 217  20 - 275 mg/dL Final    Microalb, Ur 05/09/2023 2.1  See Note: mg/dL Final    Microalb/Creat Ratio 05/09/2023 10  <30 mcg/mg creat Final    Color, UA 05/09/2023  DARK YELLOW  YELLOW Final    Appearance, UA 05/09/2023 CLEAR  CLEAR Final    Specific Gravity, UA 05/09/2023 1.023  1.001 - 1.035 Final    pH, UA 05/09/2023 < OR = 5.0  5.0 - 8.0 Final    Glucose, UA 05/09/2023 NEGATIVE  NEGATIVE Final    Bilirubin, UA 05/09/2023 NEGATIVE  NEGATIVE Final    Ketones, UA 05/09/2023 NEGATIVE  NEGATIVE Final    Occult Blood UA 05/09/2023 NEGATIVE  NEGATIVE Final    Protein, UA 05/09/2023 TRACE (A)  NEGATIVE Final    Nitrite, UA 05/09/2023 POSITIVE (A)  NEGATIVE Final    Leukocytes, UA 05/09/2023 2+ (A)  NEGATIVE Final    WBC Casts, UA 05/09/2023 6-10 (A)  < OR = 5 /HPF Final    RBC Casts, UA 05/09/2023 NONE SEEN  < OR = 2 /HPF Final    Squam Epithel, UA 05/09/2023 0-5  < OR = 5 /HPF Final    Bacteria, UA 05/09/2023 MANY (A)  NONE SEEN /HPF Final    Hyaline Casts, UA 05/09/2023 NONE SEEN  NONE SEEN /LPF Final    Service Cmt: 05/09/2023    Final    Reflexive Urine Culture 05/09/2023    Final    Urine Culture, Routine 05/09/2023  (A)   Final    TSH w/reflex to FT4 05/09/2023 2.35  0.40 - 4.50 mIU/L Final       Past Medical History:   Diagnosis Date    HAILY (acute kidney injury)     Anxiety     Arthritis     Juvenile diagnosed age 17    Atrial fibrillation     COPD (chronic obstructive pulmonary disease)     Depression     Diverticulitis     Emphysema of lung     GERD (gastroesophageal reflux disease)     Hx of hiatal hernia     Hypertension     Osteoporosis     Pneumonia     Sleep apnea      Social History     Socioeconomic History    Marital status:    Tobacco Use    Smoking status: Never    Smokeless tobacco: Never   Substance and Sexual Activity    Alcohol use: Never    Drug use: Never     Past Surgical History:   Procedure Laterality Date    BREAST LUMPECTOMY Left     in 40's    COLON SURGERY      COLONOSCOPY  2016    Dr. Crowe-JULISSA 5 years    COLONOSCOPY  2019    Dr. Reyes 5 years    HERNIA REPAIR      HIATAL HERNIA REPAIR  2005    HYSTERECTOMY      SHOULDER ARTHROSCOPY   2015     Family History   Problem Relation Age of Onset    Hyperlipidemia Mother        The CVD Risk score (BIANCA'Vinicius, et al., 2008) failed to calculate for the following reasons:    The 2008 CVD risk score is only valid for ages 30 to 74    The patient has a prior MI, stroke, CHF, or peripheral vascular disease diagnosis    All of your core healthy metrics are met.      Review of patient's allergies indicates:   Allergen Reactions    Promethazine Anaphylaxis    Clove flavoring [flavoring agent]     Codeine Itching    Latex, natural rubber     Lorazepam Other (See Comments)    Lovastatin Other (See Comments)    Meclizine Other (See Comments)    Morphine Itching    Simvastatin Other (See Comments)    Tramadol-acetaminophen Itching       Current Outpatient Medications:     albuterol (VENTOLIN HFA) 90 mcg/actuation inhaler, Inhale 2 puffs into the lungs every 4 (four) hours as needed for Wheezing. Rescue, Disp: 18 g, Rfl: 2    ascorbic acid, vitamin C, (VITAMIN C) 100 MG tablet, Take 100 mg by mouth once daily., Disp: , Rfl:     ELIQUIS 5 mg Tab, Take 1 tablet (5 mg total) by mouth 2 (two) times daily., Disp: 60 tablet, Rfl: 5    esomeprazole (NEXIUM) 40 MG capsule, Take 1 capsule (40 mg total) by mouth before breakfast., Disp: 90 capsule, Rfl: 3    fluticasone propionate (FLONASE) 50 mcg/actuation nasal spray, 1 spray (50 mcg total) by Each Nostril route daily as needed for Allergies., Disp: 16 g, Rfl: 2    fluticasone-umeclidin-vilanter (TRELEGY ELLIPTA) 100-62.5-25 mcg DsDv, Inhale 1 puff into the lungs daily as needed., Disp: 60 each, Rfl: 2    furosemide (LASIX) 20 MG tablet, Take 1 tablet (20 mg total) by mouth daily as needed., Disp: 90 tablet, Rfl: 1    levothyroxine (SYNTHROID) 25 MCG tablet, Take 1 tablet (25 mcg total) by mouth before breakfast. Name brand only.  RIRI, Disp: 30 tablet, Rfl: 11    metoprolol succinate (TOPROL-XL) 100 MG 24 hr tablet, Take 1 tablet (100 mg total) by mouth once daily. For 30  days, Disp: 90 tablet, Rfl: 3    mv-min/folic/vit K/lut/juvq092 (ALIVE WOMEN'S 50 PLUS, BLEND, ORAL), Take 1 tablet by mouth daily as needed., Disp: , Rfl:     nitroGLYCERIN (NITROSTAT) 0.4 MG SL tablet, Place 1 tablet (0.4 mg total) under the tongue every 5 (five) minutes as needed., Disp: 25 tablet, Rfl: 3    potassium chloride (KLOR-CON) 10 MEQ TbSR, Take 1 tablet (10 mEq total) by mouth 2 (two) times daily., Disp: 180 tablet, Rfl: 1    ALPRAZolam (XANAX) 1 MG tablet, Take 1 tablet (1 mg total) by mouth 2 (two) times daily., Disp: 60 tablet, Rfl: 2    buPROPion (WELLBUTRIN SR) 150 MG TBSR 12 hr tablet, Take 1 tablet (150 mg total) by mouth 2 (two) times daily. (Patient not taking: Reported on 2023), Disp: 60 tablet, Rfl: 5    ciprofloxacin HCl (CIPRO) 500 MG tablet, Take 500 mg by mouth every 12 (twelve) hours. , Disp: , Rfl:     citalopram (CELEXA) 20 MG tablet, Take 1 tablet (20 mg total) by mouth once daily. (Patient not taking: Reported on 2022), Disp: 30 tablet, Rfl: 1    doxycycline (MONODOX) 100 MG capsule, Take 1 capsule (100 mg total) by mouth 2 (two) times daily. (Patient not taking: Reported on 2023), Disp: 20 capsule, Rfl: 0    famotidine (PEPCID) 40 MG tablet, Take 1 tablet (40 mg total) by mouth nightly as needed for Heartburn., Disp: 90 tablet, Rfl: 1    metroNIDAZOLE (FLAGYL) 500 MG tablet, Take 500 mg by mouth every 12 (twelve) hours. , Disp: , Rfl:     nitrofurantoin, macrocrystal-monohydrate, (MACROBID) 100 MG capsule, Take 1 capsule (100 mg total) by mouth 2 (two) times daily. (Patient not taking: Reported on 2023), Disp: 20 capsule, Rfl: 0    predniSONE (DELTASONE) 10 MG tablet, SMARTSI Tablet(s) By Mouth Every Morning, Disp: , Rfl:     PROCTO-MED HC 2.5 % rectal cream, , Disp: , Rfl:     traZODone (DESYREL) 50 MG tablet, Take 1 tablet (50 mg total) by mouth every evening. For sleep, Disp: 30 tablet, Rfl: 2    Review of Systems   Constitutional:  Negative for appetite  "change, chills, fatigue, fever and unexpected weight change.   HENT:  Negative for ear discharge and ear pain.    Eyes:  Negative for pain, discharge and visual disturbance.   Respiratory:  Positive for apnea and shortness of breath (On 4 L O2 nasal cannula). Negative for cough and wheezing.    Cardiovascular:  Negative for chest pain, palpitations and leg swelling.   Gastrointestinal:  Negative for abdominal pain, blood in stool, constipation, diarrhea, nausea, vomiting and reflux.   Endocrine: Negative for cold intolerance, heat intolerance and polydipsia.   Genitourinary:  Negative for bladder incontinence, dysuria, hematuria, nocturia and urgency.   Musculoskeletal:  Positive for arthralgias. Negative for gait problem, joint swelling and myalgias.   Neurological:  Negative for dizziness, seizures and numbness.   Psychiatric/Behavioral:  Positive for dysphoric mood and sleep disturbance. Negative for behavioral problems and hallucinations. The patient is nervous/anxious.            Objective:      Vitals:    08/09/23 0937   BP: 138/82   Pulse: 72   Weight: 90.7 kg (200 lb)   Height: 4' 10" (1.473 m)     Physical Exam  Vitals and nursing note reviewed.   Constitutional:       General: She is not in acute distress.     Appearance: She is well-developed. She is obese. She is not toxic-appearing.   HENT:      Head: Normocephalic and atraumatic.      Right Ear: Tympanic membrane and external ear normal.      Left Ear: Tympanic membrane and external ear normal.      Nose: Nose normal.      Mouth/Throat:      Pharynx: Oropharynx is clear.   Eyes:      Pupils: Pupils are equal, round, and reactive to light.   Neck:      Thyroid: No thyromegaly.      Vascular: No carotid bruit.   Cardiovascular:      Rate and Rhythm: Normal rate and regular rhythm.      Heart sounds: Normal heart sounds. No murmur heard.  Pulmonary:      Effort: Pulmonary effort is normal.      Breath sounds: Normal breath sounds. No wheezing or rales. "   Abdominal:      General: Bowel sounds are normal. There is no distension.      Palpations: Abdomen is soft.      Tenderness: There is no abdominal tenderness.   Musculoskeletal:         General: No tenderness or deformity. Normal range of motion.      Cervical back: Normal range of motion and neck supple.      Lumbar back: Normal. No spasms.      Comments: Bends 90 degrees at  waist shoulders have good range of motion knees are crepitant bilaterally.  Trace ankle edema bilaterally.   Lymphadenopathy:      Cervical: No cervical adenopathy.   Skin:     General: Skin is warm and dry.      Findings: No rash.   Neurological:      Mental Status: She is alert and oriented to person, place, and time. Mental status is at baseline.      Cranial Nerves: No cranial nerve deficit.      Coordination: Coordination normal.      Gait: Gait abnormal (using a walking stick/cane).   Psychiatric:         Mood and Affect: Mood is depressed.         Behavior: Behavior normal.         Thought Content: Thought content normal.         Judgment: Judgment normal.           Assessment:       1. Reactive depression    2. Primary insomnia    3. Lumbar disc disease    4. Generalized anxiety disorder    5. Current moderate episode of major depressive disorder without prior episode    6. Panlobular emphysema    7. Chronic obstructive pulmonary disease, unspecified COPD type    8. Essential (primary) hypertension    9. Paroxysmal atrial fibrillation    10. Pulmonary HTN    11. Acquired hypothyroidism    12. BRITTANIE on CPAP    13. Gastro-esophageal reflux disease without esophagitis    14. Anxiety    15. Gastroesophageal reflux disease without esophagitis    16. Chronic diastolic heart failure    17. Obesity, unspecified classification, unspecified obesity type, unspecified whether serious comorbidity present         Plan:       Reactive depression  -     traZODone (DESYREL) 50 MG tablet; Take 1 tablet (50 mg total) by mouth every evening. For sleep   Dispense: 30 tablet; Refill: 2  Will treat depression and insomnia with trazodone 50 mg nightly.  Re-evaluate in 2-3 months  Primary insomnia  Trazodone may help the sleep, continue alprazolam  Lumbar disc disease    Generalized anxiety disorder  Alprazolam 1-2 mg HS  Current moderate episode of major depressive disorder without prior episode    Panlobular emphysema  Continue O2 supplements and Trelegy  Chronic obstructive pulmonary disease, unspecified COPD type    Essential (primary) hypertension    Paroxysmal atrial fibrillation  Sinus rhythm today continue Eliquis and metoprolol  Pulmonary HTN    Acquired hypothyroidism    BRITTANIE on CPAP  Compliant with CPAP at night  Gastro-esophageal reflux disease without esophagitis    Anxiety  -     ALPRAZolam (XANAX) 1 MG tablet; Take 1 tablet (1 mg total) by mouth 2 (two) times daily.  Dispense: 60 tablet; Refill: 2    Gastroesophageal reflux disease without esophagitis  -     famotidine (PEPCID) 40 MG tablet; Take 1 tablet (40 mg total) by mouth nightly as needed for Heartburn.  Dispense: 90 tablet; Refill: 1    Chronic diastolic heart failure    Obesity, unspecified classification, unspecified obesity type, unspecified whether serious comorbidity present      Follow up in about 3 months (around 11/9/2023), or Ryan - insomnia.        8/9/2023 Antony Lopez

## 2023-08-23 ENCOUNTER — LAB VISIT (OUTPATIENT)
Dept: LAB | Facility: HOSPITAL | Age: 78
End: 2023-08-23
Attending: INTERNAL MEDICINE
Payer: MEDICARE

## 2023-08-23 DIAGNOSIS — J44.1 OBSTRUCTIVE CHRONIC BRONCHITIS WITH EXACERBATION: Primary | ICD-10-CM

## 2023-08-23 DIAGNOSIS — J18.1 UNRESOLVED LOBAR PNEUMONIA: ICD-10-CM

## 2023-08-23 LAB
BASOPHILS # BLD AUTO: 0.05 K/UL (ref 0–0.2)
BASOPHILS NFR BLD: 0.6 % (ref 0–1.9)
DIFFERENTIAL METHOD: ABNORMAL
EOSINOPHIL # BLD AUTO: 0.2 K/UL (ref 0–0.5)
EOSINOPHIL NFR BLD: 1.9 % (ref 0–8)
ERYTHROCYTE [DISTWIDTH] IN BLOOD BY AUTOMATED COUNT: 15.2 % (ref 11.5–14.5)
ERYTHROCYTE [SEDIMENTATION RATE] IN BLOOD BY WESTERGREN METHOD: 30 MM/HR (ref 0–20)
HCT VFR BLD AUTO: 42.1 % (ref 37–48.5)
HGB BLD-MCNC: 13.5 G/DL (ref 12–16)
IMM GRANULOCYTES # BLD AUTO: 0.02 K/UL (ref 0–0.04)
IMM GRANULOCYTES NFR BLD AUTO: 0.2 % (ref 0–0.5)
LYMPHOCYTES # BLD AUTO: 1.7 K/UL (ref 1–4.8)
LYMPHOCYTES NFR BLD: 20.6 % (ref 18–48)
MCH RBC QN AUTO: 27.9 PG (ref 27–31)
MCHC RBC AUTO-ENTMCNC: 32.1 G/DL (ref 32–36)
MCV RBC AUTO: 87 FL (ref 82–98)
MONOCYTES # BLD AUTO: 0.7 K/UL (ref 0.3–1)
MONOCYTES NFR BLD: 8.3 % (ref 4–15)
NEUTROPHILS # BLD AUTO: 5.7 K/UL (ref 1.8–7.7)
NEUTROPHILS NFR BLD: 68.4 % (ref 38–73)
NRBC BLD-RTO: 0 /100 WBC
PLATELET # BLD AUTO: 379 K/UL (ref 150–450)
PMV BLD AUTO: 10.2 FL (ref 9.2–12.9)
RBC # BLD AUTO: 4.84 M/UL (ref 4–5.4)
WBC # BLD AUTO: 8.39 K/UL (ref 3.9–12.7)

## 2023-08-23 PROCEDURE — 36415 COLL VENOUS BLD VENIPUNCTURE: CPT | Performed by: INTERNAL MEDICINE

## 2023-08-23 PROCEDURE — 85025 COMPLETE CBC W/AUTO DIFF WBC: CPT | Performed by: INTERNAL MEDICINE

## 2023-08-23 PROCEDURE — 85651 RBC SED RATE NONAUTOMATED: CPT | Performed by: INTERNAL MEDICINE

## 2023-08-24 ENCOUNTER — LAB VISIT (OUTPATIENT)
Dept: LAB | Facility: HOSPITAL | Age: 78
End: 2023-08-24
Attending: INTERNAL MEDICINE
Payer: MEDICARE

## 2023-08-24 PROCEDURE — 87205 SMEAR GRAM STAIN: CPT | Performed by: INTERNAL MEDICINE

## 2023-08-24 PROCEDURE — 87070 CULTURE OTHR SPECIMN AEROBIC: CPT | Performed by: INTERNAL MEDICINE

## 2023-08-26 LAB
BACTERIA SPEC AEROBE CULT: NORMAL
GRAM STN SPEC: NORMAL

## 2023-09-15 ENCOUNTER — TELEPHONE (OUTPATIENT)
Dept: FAMILY MEDICINE | Facility: CLINIC | Age: 78
End: 2023-09-15

## 2023-09-15 NOTE — TELEPHONE ENCOUNTER
----- Message from Jessica Carranza sent at 9/15/2023 11:30 AM CDT -----  Pt got fed up with the drug store and she is not taking any of her medications   [phone]

## 2023-09-15 NOTE — TELEPHONE ENCOUNTER
LMOR for patient to call back.    Returned a call to Thea, no answer, her voicemail was full, could not leave her a message, the message she left me said she had some questions, I will be out next week, would you mind touching base with her to see if there is anything she needs while I'm out please? If there is anything I can do upon my return, please let me know  Thank you!

## 2023-09-25 ENCOUNTER — TELEPHONE (OUTPATIENT)
Dept: FAMILY MEDICINE | Facility: CLINIC | Age: 78
End: 2023-09-25

## 2023-09-25 NOTE — TELEPHONE ENCOUNTER
----- Message from Jessica Carranza sent at 9/25/2023 12:46 PM CDT -----  Pt needs a handicap license tag [place]  [phone] santos nunes

## 2023-11-02 DIAGNOSIS — I63.9 CVA (CEREBRAL VASCULAR ACCIDENT): Primary | ICD-10-CM

## 2023-11-14 ENCOUNTER — OFFICE VISIT (OUTPATIENT)
Dept: FAMILY MEDICINE | Facility: CLINIC | Age: 78
End: 2023-11-14
Payer: MEDICARE

## 2023-11-14 VITALS
DIASTOLIC BLOOD PRESSURE: 82 MMHG | HEIGHT: 58 IN | BODY MASS INDEX: 43.24 KG/M2 | HEART RATE: 55 BPM | SYSTOLIC BLOOD PRESSURE: 136 MMHG | OXYGEN SATURATION: 97 % | WEIGHT: 206 LBS

## 2023-11-14 DIAGNOSIS — M81.0 AGE-RELATED OSTEOPOROSIS WITHOUT CURRENT PATHOLOGICAL FRACTURE: ICD-10-CM

## 2023-11-14 DIAGNOSIS — I48.0 PAROXYSMAL ATRIAL FIBRILLATION: ICD-10-CM

## 2023-11-14 DIAGNOSIS — I10 ESSENTIAL (PRIMARY) HYPERTENSION: ICD-10-CM

## 2023-11-14 DIAGNOSIS — M51.9 LUMBAR DISC DISEASE: ICD-10-CM

## 2023-11-14 DIAGNOSIS — K21.9 GASTROESOPHAGEAL REFLUX DISEASE WITHOUT ESOPHAGITIS: ICD-10-CM

## 2023-11-14 DIAGNOSIS — F41.1 GENERALIZED ANXIETY DISORDER: Primary | ICD-10-CM

## 2023-11-14 DIAGNOSIS — R91.1 COIN LESION: Primary | ICD-10-CM

## 2023-11-14 DIAGNOSIS — F32.1 CURRENT MODERATE EPISODE OF MAJOR DEPRESSIVE DISORDER WITHOUT PRIOR EPISODE: ICD-10-CM

## 2023-11-14 PROCEDURE — 99214 OFFICE O/P EST MOD 30 MIN: CPT | Mod: S$GLB,,, | Performed by: PHYSICIAN ASSISTANT

## 2023-11-14 PROCEDURE — 99214 PR OFFICE/OUTPT VISIT, EST, LEVL IV, 30-39 MIN: ICD-10-PCS | Mod: S$GLB,,, | Performed by: PHYSICIAN ASSISTANT

## 2023-11-14 NOTE — PROGRESS NOTES
SUBJECTIVE:    Patient ID: Danielle Martinez is a 78 y.o. female.    Chief Complaint: Follow-up (No bottles, went over med, follow up, discuss powered vitamin, discuss PNA vaccine, Declined flu //BA )    Danielle Martinez is a 78 y.o. female presenting for her 6 month visit. She reports she has been suffering from diverticulitis over the past two months. She has an appointment with GI at the end of the month to discuss but has been trying to get something sooner. She is still experiencing anxiety, especially due to family problems. States she has been taking the Xanax, which does help. She only took the Trazodone a few times, but does not really want to take more medication.     Reports that she uses portable oxygen all the time. Compliant with her CPAP for BRITTANIE. Complains of a chronic intermittent cough since last year. States she thinks it may be allergies vs her Pulmonologist advised it may be due to COPD.    Complains of chronic knee pain. States that she is being worked up for possible knee surgery in the future.    Still taking metoprolol and eliquis for a-fib. Denies recent falls. Reports she has occasional palpitations.    Follow-up  Associated symptoms include abdominal pain, arthralgias and coughing. Pertinent negatives include no chest pain, chills, congestion, fever, sore throat or vomiting.       Lab Visit on 08/24/2023   Component Date Value Ref Range Status    Respiratory Culture 08/24/2023 Normal respiratory joseline   Final    Gram Stain (Respiratory) 08/24/2023 Moderate WBC's   Final    Gram Stain (Respiratory) 08/24/2023 <10 epithelial cells per low power field.   Final    Gram Stain (Respiratory) 08/24/2023 Few Gram positive cocci   Final   Lab Visit on 08/23/2023   Component Date Value Ref Range Status    WBC 08/23/2023 8.39  3.90 - 12.70 K/uL Final    RBC 08/23/2023 4.84  4.00 - 5.40 M/uL Final    Hemoglobin 08/23/2023 13.5  12.0 - 16.0 g/dL Final    Hematocrit 08/23/2023 42.1  37.0 - 48.5 % Final     MCV 08/23/2023 87  82 - 98 fL Final    MCH 08/23/2023 27.9  27.0 - 31.0 pg Final    MCHC 08/23/2023 32.1  32.0 - 36.0 g/dL Final    RDW 08/23/2023 15.2 (H)  11.5 - 14.5 % Final    Platelets 08/23/2023 379  150 - 450 K/uL Final    MPV 08/23/2023 10.2  9.2 - 12.9 fL Final    Immature Granulocytes 08/23/2023 0.2  0.0 - 0.5 % Final    Gran # (ANC) 08/23/2023 5.7  1.8 - 7.7 K/uL Final    Immature Grans (Abs) 08/23/2023 0.02  0.00 - 0.04 K/uL Final    Lymph # 08/23/2023 1.7  1.0 - 4.8 K/uL Final    Mono # 08/23/2023 0.7  0.3 - 1.0 K/uL Final    Eos # 08/23/2023 0.2  0.0 - 0.5 K/uL Final    Baso # 08/23/2023 0.05  0.00 - 0.20 K/uL Final    nRBC 08/23/2023 0  0 /100 WBC Final    Gran % 08/23/2023 68.4  38.0 - 73.0 % Final    Lymph % 08/23/2023 20.6  18.0 - 48.0 % Final    Mono % 08/23/2023 8.3  4.0 - 15.0 % Final    Eosinophil % 08/23/2023 1.9  0.0 - 8.0 % Final    Basophil % 08/23/2023 0.6  0.0 - 1.9 % Final    Differential Method 08/23/2023 Automated   Final    Sed Rate 08/23/2023 30 (H)  0 - 20 mm/Hr Final   Hospital Outpatient Visit on 07/12/2023   Component Date Value Ref Range Status    Interpretation 07/12/2023    Final                    Value:6MWT was performed. Total distance achieved was 85m which is 25% of predicted distance.  Supplemental oxygen is required with exertion.  Starting oxygen saturation was 95% on room air at rest.  With exertion sat drops as low as 81% with improvement to 94% on 6 L nasal cannula.         Past Medical History:   Diagnosis Date    HAILY (acute kidney injury)     Anxiety     Arthritis     Juvenile diagnosed age 17    Atrial fibrillation     COPD (chronic obstructive pulmonary disease)     Depression     Diverticulitis     Emphysema of lung     GERD (gastroesophageal reflux disease)     Hx of hiatal hernia     Hypertension     Osteoporosis     Pneumonia     Sleep apnea      Past Surgical History:   Procedure Laterality Date    BREAST LUMPECTOMY Left     in 40's    COLON SURGERY       COLONOSCOPY  2016    Dr. Reyes 5 years    COLONOSCOPY  2019    Dr. Reyes 5 years    HERNIA REPAIR      HIATAL HERNIA REPAIR  2005    HYSTERECTOMY      SHOULDER ARTHROSCOPY  2015     Family History   Problem Relation Age of Onset    Hyperlipidemia Mother        Marital Status:   Alcohol History:  reports no history of alcohol use.  Tobacco History:  reports that she has never smoked. She has never used smokeless tobacco.  Drug History:  reports no history of drug use.    Review of patient's allergies indicates:   Allergen Reactions    Promethazine Anaphylaxis    Clove flavoring [flavoring agent]     Codeine Itching    Latex, natural rubber     Lorazepam Other (See Comments)    Lovastatin Other (See Comments)    Meclizine Other (See Comments)    Morphine Itching    Simvastatin Other (See Comments)    Tramadol-acetaminophen Itching       Current Outpatient Medications:     ALPRAZolam (XANAX) 1 MG tablet, Take 1 tablet (1 mg total) by mouth 2 (two) times daily., Disp: 60 tablet, Rfl: 2    ascorbic acid, vitamin C, (VITAMIN C) 100 MG tablet, Take 100 mg by mouth once daily., Disp: , Rfl:     ELIQUIS 5 mg Tab, Take 1 tablet (5 mg total) by mouth 2 (two) times daily., Disp: 60 tablet, Rfl: 5    famotidine (PEPCID) 40 MG tablet, Take 1 tablet (40 mg total) by mouth nightly as needed for Heartburn., Disp: 90 tablet, Rfl: 1    fluticasone propionate (FLONASE) 50 mcg/actuation nasal spray, 1 spray (50 mcg total) by Each Nostril route daily as needed for Allergies., Disp: 16 g, Rfl: 2    fluticasone-umeclidin-vilanter (TRELEGY ELLIPTA) 100-62.5-25 mcg DsDv, Inhale 1 puff into the lungs daily as needed., Disp: 60 each, Rfl: 2    furosemide (LASIX) 20 MG tablet, Take 1 tablet (20 mg total) by mouth daily as needed., Disp: 90 tablet, Rfl: 1    levothyroxine (SYNTHROID) 25 MCG tablet, Take 1 tablet (25 mcg total) by mouth before breakfast. Name brand only.  RIRI, Disp: 30 tablet, Rfl: 11    metoprolol succinate  "(TOPROL-XL) 100 MG 24 hr tablet, Take 1 tablet (100 mg total) by mouth once daily. For 30 days, Disp: 90 tablet, Rfl: 3    mv-min/folic/vit K/lut/[id number] (ALIVE WOMEN'S 50 PLUS, BLEND, ORAL), Take 1 tablet by mouth daily as needed., Disp: , Rfl:     nitroGLYCERIN (NITROSTAT) 0.4 MG SL tablet, Place 1 tablet (0.4 mg total) under the tongue every 5 (five) minutes as needed., Disp: 25 tablet, Rfl: 3    potassium chloride (KLOR-CON) 10 MEQ TbSR, Take 1 tablet (10 mEq total) by mouth 2 (two) times daily., Disp: 180 tablet, Rfl: 1    predniSONE (DELTASONE) 10 MG tablet, SMARTSI Tablet(s) By Mouth Every Morning, Disp: , Rfl:     traZODone (DESYREL) 50 MG tablet, Take 1 tablet (50 mg total) by mouth every evening. For sleep, Disp: 30 tablet, Rfl: 2    PROCTO-MED HC 2.5 % rectal cream, , Disp: , Rfl:     Review of Systems   Constitutional:  Negative for chills and fever.   HENT:  Negative for congestion and sore throat.    Eyes:  Negative for discharge and redness.   Respiratory:  Positive for cough. Negative for shortness of breath.    Cardiovascular:  Positive for palpitations. Negative for chest pain and leg swelling.   Gastrointestinal:  Positive for abdominal pain and blood in stool. Negative for constipation and vomiting.   Genitourinary:  Negative for dysuria.   Musculoskeletal:  Positive for arthralgias.   Skin:  Negative for color change.   Neurological:  Negative for dizziness and light-headedness.   Psychiatric/Behavioral:  Negative for confusion.           Objective:      Vitals:    23 0955   BP: 136/82   Pulse: (!) 55   SpO2: 97%   Weight: 93.4 kg (206 lb)   Height: 4' 10" (1.473 m)     Physical Exam  Constitutional:       General: She is not in acute distress.     Appearance: She is not toxic-appearing.   HENT:      Head: Normocephalic and atraumatic.      Nose: Nose normal.      Mouth/Throat:      Mouth: Mucous membranes are moist.      Pharynx: Oropharynx is clear.   Eyes:      Extraocular Movements: " Extraocular movements intact.      Conjunctiva/sclera: Conjunctivae normal.      Pupils: Pupils are equal, round, and reactive to light.   Cardiovascular:      Rate and Rhythm: Normal rate. Rhythm irregular.      Pulses: Normal pulses.      Heart sounds: Normal heart sounds.   Pulmonary:      Effort: Pulmonary effort is normal. No respiratory distress.      Breath sounds: Normal breath sounds. No stridor. No wheezing, rhonchi or rales.   Abdominal:      General: There is no distension.      Palpations: Abdomen is soft.   Musculoskeletal:         General: Normal range of motion.      Cervical back: Normal range of motion and neck supple.      Right lower leg: No edema.      Left lower leg: No edema.   Skin:     General: Skin is warm and dry.      Coloration: Skin is not pale.   Neurological:      Mental Status: She is alert.           Assessment:       1. Generalized anxiety disorder    2. Gastroesophageal reflux disease without esophagitis    3. Essential (primary) hypertension    4. Paroxysmal atrial fibrillation    5. Age-related osteoporosis without current pathological fracture    6. Current moderate episode of major depressive disorder without prior episode    7. Lumbar disc disease         Plan:       Generalized anxiety disorder  Comments:  Continue as is.    Gastroesophageal reflux disease without esophagitis  Comments:  Stable. Continue as is    Essential (primary) hypertension  Comments:  Stable. Continue as is    Paroxysmal atrial fibrillation  Comments:  Dr. Kate follows. on eliquis BID. rate controlled with metoprolol. maintain as is.    Age-related osteoporosis without current pathological fracture    Current moderate episode of major depressive disorder without prior episode  Comments:  Mood and anxiety seems stable at this time. continue as is.    Lumbar disc disease  Comments:  planning for rhzotomy with Dr. Lee. First of the year      Follow up in about 6 months (around 5/14/2024) for   John sanchez.        11/14/2023 Bimal Rossi PA-C

## 2023-11-30 DIAGNOSIS — R10.32 LLQ PAIN: Primary | ICD-10-CM

## 2023-12-08 ENCOUNTER — HOSPITAL ENCOUNTER (OUTPATIENT)
Dept: CARDIOLOGY | Facility: HOSPITAL | Age: 78
Discharge: HOME | End: 2023-12-08
Attending: NURSE PRACTITIONER
Payer: MEDICARE

## 2023-12-08 VITALS — WEIGHT: 206 LBS | BODY MASS INDEX: 43.24 KG/M2 | HEIGHT: 58 IN

## 2023-12-08 DIAGNOSIS — I63.9 CVA (CEREBRAL VASCULAR ACCIDENT): ICD-10-CM

## 2023-12-08 PROCEDURE — 93306 ECHO (CUPID ONLY): ICD-10-PCS | Mod: 26,,, | Performed by: INTERNAL MEDICINE

## 2023-12-08 PROCEDURE — 93306 TTE W/DOPPLER COMPLETE: CPT | Mod: 26,,, | Performed by: INTERNAL MEDICINE

## 2023-12-08 PROCEDURE — 93306 TTE W/DOPPLER COMPLETE: CPT

## 2023-12-09 LAB
AORTIC ROOT ANNULUS: 2.9 CM
AORTIC VALVE CUSP SEPERATION: 1.6 CM
AV INDEX (PROSTH): 0.8
AV MEAN GRADIENT: 5 MMHG
AV PEAK GRADIENT: 8 MMHG
AV REGURGITATION PRESSURE HALF TIME: 514 MS
AV VALVE AREA BY VELOCITY RATIO: 2.09 CM²
AV VALVE AREA: 2.53 CM²
AV VELOCITY RATIO: 0.67
BSA FOR ECHO PROCEDURE: 1.96 M2
CV ECHO LV RWT: 0.58 CM
DOP CALC AO PEAK VEL: 1.44 M/S
DOP CALC AO VTI: 24.1 CM
DOP CALC LVOT AREA: 3.1 CM2
DOP CALC LVOT DIAMETER: 2 CM
DOP CALC LVOT PEAK VEL: 0.96 M/S
DOP CALC LVOT STROKE VOLUME: 60.92 CM3
DOP CALCLVOT PEAK VEL VTI: 19.4 CM
E WAVE DECELERATION TIME: 156 MSEC
E/A RATIO: 4.2
E/E' RATIO: 10.5 M/S
ECHO LV POSTERIOR WALL: 1.21 CM (ref 0.6–1.1)
FRACTIONAL SHORTENING: 29 % (ref 28–44)
INTERVENTRICULAR SEPTUM: 1.15 CM (ref 0.6–1.1)
IVRT: 63 MSEC
LEFT ATRIUM SIZE: 5.2 CM
LEFT INTERNAL DIMENSION IN SYSTOLE: 2.92 CM (ref 2.1–4)
LEFT VENTRICLE DIASTOLIC VOLUME INDEX: 41.25 ML/M2
LEFT VENTRICLE DIASTOLIC VOLUME: 75.9 ML
LEFT VENTRICLE MASS INDEX: 92 G/M2
LEFT VENTRICLE SYSTOLIC VOLUME INDEX: 17.8 ML/M2
LEFT VENTRICLE SYSTOLIC VOLUME: 32.8 ML
LEFT VENTRICULAR INTERNAL DIMENSION IN DIASTOLE: 4.14 CM (ref 3.5–6)
LEFT VENTRICULAR MASS: 170.05 G
LV LATERAL E/E' RATIO: 9 M/S
LV SEPTAL E/E' RATIO: 12.6 M/S
LVOT MG: 2 MMHG
LVOT MV: 0.61 CM/S
MV PEAK A VEL: 0.3 M/S
MV PEAK E VEL: 1.26 M/S
MV STENOSIS PRESSURE HALF TIME: 64 MS
MV VALVE AREA P 1/2 METHOD: 3.44 CM2
PISA AR MAX VEL: 2.61 M/S
PISA TR MAX VEL: 3.53 M/S
RA PRESSURE ESTIMATED: 15 MMHG
RIGHT VENTRICULAR END-DIASTOLIC DIMENSION: 2.49 CM
RV TB RVSP: 19 MMHG
TDI LATERAL: 0.14 M/S
TDI SEPTAL: 0.1 M/S
TDI: 0.12 M/S
TR MAX PG: 50 MMHG
TV REST PULMONARY ARTERY PRESSURE: 65 MMHG
Z-SCORE OF LEFT VENTRICULAR DIMENSION IN END DIASTOLE: -2.09
Z-SCORE OF LEFT VENTRICULAR DIMENSION IN END SYSTOLE: -0.6

## 2023-12-11 DIAGNOSIS — F41.9 ANXIETY: ICD-10-CM

## 2023-12-11 RX ORDER — ALPRAZOLAM 1 MG/1
1 TABLET ORAL 2 TIMES DAILY
Qty: 60 TABLET | Refills: 2 | Status: SHIPPED | OUTPATIENT
Start: 2023-12-11

## 2023-12-11 NOTE — TELEPHONE ENCOUNTER
----- Message from Lizzette Williamson sent at 12/11/2023  8:22 AM CST -----  Refill Alprazolam [facility] # [phone] pt's # [phone] GH

## 2023-12-12 DIAGNOSIS — I63.9 CEREBROVASCULAR ACCIDENT (STROKE): Primary | ICD-10-CM

## 2023-12-12 DIAGNOSIS — R10.32 ABDOMINAL PAIN, LEFT LOWER QUADRANT: Primary | ICD-10-CM

## 2023-12-13 ENCOUNTER — HOSPITAL ENCOUNTER (OUTPATIENT)
Dept: RADIOLOGY | Facility: HOSPITAL | Age: 78
Discharge: HOME | End: 2023-12-13
Attending: INTERNAL MEDICINE
Payer: MEDICARE

## 2023-12-13 DIAGNOSIS — R10.32 LLQ PAIN: ICD-10-CM

## 2023-12-13 PROCEDURE — 74178 CT ABD&PLV WO CNTR FLWD CNTR: CPT | Mod: TC

## 2023-12-13 PROCEDURE — 25500020 PHARM REV CODE 255: Performed by: INTERNAL MEDICINE

## 2023-12-13 RX ADMIN — IOHEXOL 100 ML: 350 INJECTION, SOLUTION INTRAVENOUS at 03:12

## 2023-12-21 ENCOUNTER — TELEPHONE (OUTPATIENT)
Dept: CARDIOLOGY | Facility: HOSPITAL | Age: 78
End: 2023-12-21

## 2023-12-22 ENCOUNTER — CLINICAL SUPPORT (OUTPATIENT)
Dept: CARDIOLOGY | Facility: HOSPITAL | Age: 78
End: 2023-12-22
Attending: NURSE PRACTITIONER
Payer: MEDICARE

## 2023-12-22 VITALS — DIASTOLIC BLOOD PRESSURE: 82 MMHG | SYSTOLIC BLOOD PRESSURE: 136 MMHG

## 2023-12-22 DIAGNOSIS — I63.9 CEREBROVASCULAR ACCIDENT (STROKE): ICD-10-CM

## 2023-12-22 PROCEDURE — 93308 TTE F-UP OR LMTD: CPT

## 2023-12-22 PROCEDURE — 93308 ECHO (CUPID ONLY): ICD-10-PCS | Mod: 26,,, | Performed by: INTERNAL MEDICINE

## 2023-12-22 PROCEDURE — 93308 TTE F-UP OR LMTD: CPT | Mod: 26,,, | Performed by: INTERNAL MEDICINE

## 2024-01-11 DIAGNOSIS — Z00.00 ENCOUNTER FOR MEDICARE ANNUAL WELLNESS EXAM: ICD-10-CM

## 2024-03-21 ENCOUNTER — TELEPHONE (OUTPATIENT)
Dept: FAMILY MEDICINE | Facility: CLINIC | Age: 79
End: 2024-03-21
Payer: MEDICARE

## 2024-03-21 NOTE — TELEPHONE ENCOUNTER
----- Message from Anjali Acosta LPN sent at 3/21/2024 12:19 PM CDT -----    ----- Message -----  From: Lizzette Williamson  Sent: 3/21/2024  12:17 PM CDT  To: Antony Lopez Staff    She wants to be seen today. The patient just got back from [place]. She had to go to an Urgent Care. She still has a bad cough and feels very bad. Pt's # [phone] GH

## 2024-03-25 DIAGNOSIS — D64.9 ANEMIA, UNSPECIFIED: ICD-10-CM

## 2024-03-25 DIAGNOSIS — E46 UNSPECIFIED PROTEIN-CALORIE MALNUTRITION: Primary | ICD-10-CM

## 2024-03-25 DIAGNOSIS — J18.1 UNRESOLVED LOBAR PNEUMONIA: ICD-10-CM

## 2024-03-26 ENCOUNTER — HOSPITAL ENCOUNTER (OUTPATIENT)
Dept: RADIOLOGY | Facility: HOSPITAL | Age: 79
Discharge: HOME | End: 2024-03-26
Attending: INTERNAL MEDICINE
Payer: MEDICARE

## 2024-03-26 ENCOUNTER — OFFICE VISIT (OUTPATIENT)
Dept: FAMILY MEDICINE | Facility: CLINIC | Age: 79
End: 2024-03-26
Payer: MEDICARE

## 2024-03-26 VITALS — OXYGEN SATURATION: 98 % | BODY MASS INDEX: 41.56 KG/M2 | HEART RATE: 89 BPM | HEIGHT: 58 IN | WEIGHT: 198 LBS

## 2024-03-26 DIAGNOSIS — J18.1 UNRESOLVED LOBAR PNEUMONIA: ICD-10-CM

## 2024-03-26 DIAGNOSIS — M81.0 OSTEOPOROSIS, UNSPECIFIED OSTEOPOROSIS TYPE, UNSPECIFIED PATHOLOGICAL FRACTURE PRESENCE: Primary | ICD-10-CM

## 2024-03-26 DIAGNOSIS — D64.9 ANEMIA, UNSPECIFIED: ICD-10-CM

## 2024-03-26 DIAGNOSIS — E46 UNSPECIFIED PROTEIN-CALORIE MALNUTRITION: Primary | ICD-10-CM

## 2024-03-26 DIAGNOSIS — F41.1 GENERALIZED ANXIETY DISORDER: ICD-10-CM

## 2024-03-26 DIAGNOSIS — E46 UNSPECIFIED PROTEIN-CALORIE MALNUTRITION: ICD-10-CM

## 2024-03-26 DIAGNOSIS — J43.1 PANLOBULAR EMPHYSEMA: ICD-10-CM

## 2024-03-26 DIAGNOSIS — I48.0 PAROXYSMAL ATRIAL FIBRILLATION: ICD-10-CM

## 2024-03-26 PROCEDURE — 71046 X-RAY EXAM CHEST 2 VIEWS: CPT | Mod: TC,PO

## 2024-03-26 PROCEDURE — 99214 OFFICE O/P EST MOD 30 MIN: CPT | Mod: S$GLB,,, | Performed by: PHYSICIAN ASSISTANT

## 2024-03-26 NOTE — PROGRESS NOTES
SUBJECTIVE:    Patient ID: Danielle Martinez is a 78 y.o. female.    Chief Complaint: Cough (Pt was seen at a urgent care in [place] .  Pt was given antibiotics, cough medication) and Bone Density order (Pt is due for a Bone density scan.)    This is a 78-year-old female who presents today for urgent evaluation of persistent cough.  Reports being seen at an urgent care in [place] and was given antibiotics and cough medication. She has end stage COPD and on RTC O2. Dr. Conroy follows for pulmonary. He has ordered a CXR for her to complete. She's not quite due for her followup screening CT. Her pulse ox and VS are stable today. She is due for DEXA scan to be updated.     Cough  Pertinent negatives include no chest pain, chills, eye redness, fever, sore throat or shortness of breath.       Lab Visit on 12/13/2023   Component Date Value Ref Range Status    BUN 12/13/2023 17  8 - 23 mg/dL Final    Creatinine 12/13/2023 0.8  0.5 - 1.4 mg/dL Final    eGFR 12/13/2023 >60.0  >60 mL/min/1.73 m^2 Final   Hospital Outpatient Visit on 12/08/2023   Component Date Value Ref Range Status    BSA 12/08/2023 1.96  m2 Final    LVOT stroke volume 12/08/2023 60.92  cm3 Final    LVIDd 12/08/2023 4.14  3.5 - 6.0 cm Final    LV Systolic Volume 12/08/2023 32.80  mL Final    LV Systolic Volume Index 12/08/2023 17.8  mL/m2 Final    LVIDs 12/08/2023 2.92  2.1 - 4.0 cm Final    LV Diastolic Volume 12/08/2023 75.90  mL Final    LV Diastolic Volume Index 12/08/2023 41.25  mL/m2 Final    IVS 12/08/2023 1.15 (A)  0.6 - 1.1 cm Final    LVOT diameter 12/08/2023 2.00  cm Final    LVOT area 12/08/2023 3.1  cm2 Final    FS 12/08/2023 29  28 - 44 % Final    Left Ventricle Relative Wall Thick* 12/08/2023 0.58  cm Final    Posterior Wall 12/08/2023 1.21 (A)  0.6 - 1.1 cm Final    LV mass 12/08/2023 170.05  g Final    LV Mass Index 12/08/2023 92  g/m2 Final    MV Peak E Ricardo 12/08/2023 1.26  m/s Final    TDI LATERAL 12/08/2023 0.14  m/s Final    TDI  SEPTAL 12/08/2023 0.10  m/s Final    E/E' ratio 12/08/2023 10.50  m/s Final    MV Peak A Ricardo 12/08/2023 0.30  m/s Final    TR Max Ricardo 12/08/2023 3.53  m/s Final    E/A ratio 12/08/2023 4.20   Final    IVRT 12/08/2023 63.00  msec Final    E wave deceleration time 12/08/2023 156.00  msec Final    LV SEPTAL E/E' RATIO 12/08/2023 12.60  m/s Final    LV LATERAL E/E' RATIO 12/08/2023 9.00  m/s Final    LVOT peak ricardo 12/08/2023 0.96  m/s Final    Left Ventricular Outflow Tract Otilia* 12/08/2023 0.61  cm/s Final    Left Ventricular Outflow Tract Otilia* 12/08/2023 2.00  mmHg Final    RVDD 12/08/2023 2.49  cm Final    LA size 12/08/2023 5.20  cm Final    AV regurgitation pressure 1/2 time 12/08/2023 514  ms Final    AR Max Ricardo 12/08/2023 2.61  m/s Final    AV mean gradient 12/08/2023 5  mmHg Final    AV peak gradient 12/08/2023 8  mmHg Final    Ao peak ricardo 12/08/2023 1.44  m/s Final    Ao VTI 12/08/2023 24.10  cm Final    LVOT peak VTI 12/08/2023 19.40  cm Final    AV valve area 12/08/2023 2.53  cm² Final    AV Velocity Ratio 12/08/2023 0.67   Final    AV index (prosthetic) 12/08/2023 0.80   Final    CRISTI by Velocity Ratio 12/08/2023 2.09  cm² Final    MV stenosis pressure 1/2 time 12/08/2023 64.00  ms Final    MV valve area p 1/2 method 12/08/2023 3.44  cm2 Final    Triscuspid Valve Regurgitation Pea* 12/08/2023 50  mmHg Final    Ao root annulus 12/08/2023 2.90  cm Final    Mean e' 12/08/2023 0.12  m/s Final    ZLVIDS 12/08/2023 -0.60   Final    ZLVIDD 12/08/2023 -2.09   Final    AORTIC VALVE CUSP SEPERATION 12/08/2023 1.60  cm Final    TV resting pulmonary artery pressu* 12/08/2023 65  mmHg Final    RV TB RVSP 12/08/2023 19  mmHg Final    Est. RA pres 12/08/2023 15  mmHg Final       Past Medical History:   Diagnosis Date    HAILY (acute kidney injury)     Anxiety     Arthritis     Juvenile diagnosed age 17    Atrial fibrillation     COPD (chronic obstructive pulmonary disease)     Depression     Diverticulitis     Emphysema of  lung     GERD (gastroesophageal reflux disease)     Hx of hiatal hernia     Hypertension     Osteoporosis     Pneumonia     Sleep apnea      Past Surgical History:   Procedure Laterality Date    BREAST LUMPECTOMY Left     in 40's    COLON SURGERY      COLONOSCOPY  2016    Dr. Crowe-JULISSA 5 years    COLONOSCOPY  2019    Dr. Reyes 5 years    HERNIA REPAIR      HIATAL HERNIA REPAIR  2005    HYSTERECTOMY      SHOULDER ARTHROSCOPY  2015     Family History   Problem Relation Age of Onset    Hyperlipidemia Mother        Marital Status:   Alcohol History:  reports no history of alcohol use.  Tobacco History:  reports that she has never smoked. She has never used smokeless tobacco.  Drug History:  reports no history of drug use.    Review of patient's allergies indicates:   Allergen Reactions    Promethazine Anaphylaxis    Clove flavoring [flavoring agent]     Codeine Itching    Latex, natural rubber     Lorazepam Other (See Comments)    Lovastatin Other (See Comments)    Meclizine Other (See Comments)    Morphine Itching    Simvastatin Other (See Comments)    Tramadol-acetaminophen Itching       Current Outpatient Medications:     ALPRAZolam (XANAX) 1 MG tablet, Take 1 tablet (1 mg total) by mouth 2 (two) times daily., Disp: 60 tablet, Rfl: 2    ascorbic acid, vitamin C, (VITAMIN C) 100 MG tablet, Take 100 mg by mouth once daily., Disp: , Rfl:     aspirin-calcium carbonate 81 mg-300 mg calcium(777 mg) Tab, Take 75 mg by mouth., Disp: , Rfl:     clotrimazole-betamethasone 1-0.05% (LOTRISONE) cream, Apply twice a day to affected skin, Disp: 45 g, Rfl: 1    ELIQUIS 5 mg Tab, Take 1 tablet (5 mg total) by mouth 2 (two) times daily., Disp: 60 tablet, Rfl: 5    esomeprazole (NEXIUM) 40 MG capsule, Take 40 mg by mouth before breakfast., Disp: , Rfl:     famotidine (PEPCID) 40 MG tablet, Take 1 tablet (40 mg total) by mouth nightly as needed for Heartburn., Disp: 90 tablet, Rfl: 1    fluticasone-umeclidin-vilanter  (TRELEGY ELLIPTA) 100-62.5-25 mcg DsDv, Inhale 1 puff into the lungs daily as needed., Disp: 60 each, Rfl: 2    furosemide (LASIX) 20 MG tablet, Take 1 tablet (20 mg total) by mouth daily as needed., Disp: 90 tablet, Rfl: 1    levothyroxine (SYNTHROID) 25 MCG tablet, Take 1 tablet (25 mcg total) by mouth before breakfast. Name brand only.  RIRI, Disp: 30 tablet, Rfl: 11    metoprolol succinate (TOPROL-XL) 100 MG 24 hr tablet, Take 1 tablet (100 mg total) by mouth once daily. For 30 days, Disp: 90 tablet, Rfl: 3    mv-min/folic/vit K/lut/[id number] (ALIVE WOMEN'S 50 PLUS, BLEND, ORAL), Take 1 tablet by mouth daily as needed., Disp: , Rfl:     nitroGLYCERIN (NITROSTAT) 0.4 MG SL tablet, Place 1 tablet (0.4 mg total) under the tongue every 5 (five) minutes as needed., Disp: 25 tablet, Rfl: 3    potassium chloride (KLOR-CON) 10 MEQ TbSR, Take 1 tablet (10 mEq total) by mouth 2 (two) times daily., Disp: 180 tablet, Rfl: 1    PROCTO-MED HC 2.5 % rectal cream, , Disp: , Rfl:     albuterol (PROVENTIL) 2.5 mg /3 mL (0.083 %) nebulizer solution, USE 1 VIAL VIA NEBULIZER EVERY 4 TO 6 HOURS AS NEEDED FOR WHEEZING, Disp: , Rfl:     fluticasone propionate (FLONASE) 50 mcg/actuation nasal spray, 1 spray (50 mcg total) by Each Nostril route daily as needed for Allergies. (Patient not taking: Reported on 3/26/2024), Disp: 16 g, Rfl: 2    predniSONE (DELTASONE) 10 MG tablet, SMARTSI Tablet(s) By Mouth Every Morning, Disp: , Rfl:     traZODone (DESYREL) 50 MG tablet, Take 1 tablet (50 mg total) by mouth every evening. For sleep (Patient not taking: Reported on 3/26/2024), Disp: 30 tablet, Rfl: 2    Review of Systems   Constitutional:  Negative for chills and fever.   HENT:  Negative for congestion and sore throat.    Eyes:  Negative for discharge and redness.   Respiratory:  Positive for cough. Negative for shortness of breath.    Cardiovascular:  Positive for palpitations. Negative for chest pain and leg swelling.   Gastrointestinal:   "Positive for abdominal pain and blood in stool. Negative for constipation and vomiting.   Genitourinary:  Negative for dysuria.   Musculoskeletal:  Positive for arthralgias.   Skin:  Negative for color change.   Neurological:  Negative for dizziness and light-headedness.   Psychiatric/Behavioral:  Negative for confusion.           Objective:      Vitals:    03/26/24 1150   BP: (P) 132/82   Pulse: 89   SpO2: 98%   Weight: 89.8 kg (198 lb)   Height: 4' 10" (1.473 m)     Physical Exam  Constitutional:       General: She is not in acute distress.     Appearance: She is not toxic-appearing.   HENT:      Head: Normocephalic and atraumatic.      Nose: Nose normal.      Mouth/Throat:      Mouth: Mucous membranes are moist.      Pharynx: Oropharynx is clear.   Eyes:      Extraocular Movements: Extraocular movements intact.      Conjunctiva/sclera: Conjunctivae normal.      Pupils: Pupils are equal, round, and reactive to light.   Cardiovascular:      Rate and Rhythm: Normal rate. Rhythm irregular.      Pulses: Normal pulses.      Heart sounds: Normal heart sounds.   Pulmonary:      Effort: Pulmonary effort is normal. No respiratory distress.      Breath sounds: Normal breath sounds. No stridor. No wheezing, rhonchi or rales.      Comments: Portable O2 at 4L  Abdominal:      General: There is no distension.      Palpations: Abdomen is soft.   Musculoskeletal:         General: Normal range of motion.      Cervical back: Normal range of motion and neck supple.      Right lower leg: No edema.      Left lower leg: No edema.   Skin:     General: Skin is warm and dry.      Coloration: Skin is not pale.   Neurological:      Mental Status: She is alert.           Assessment:       1. Osteoporosis, unspecified osteoporosis type, unspecified pathological fracture presence    2. Panlobular emphysema    3. Paroxysmal atrial fibrillation    4. Generalized anxiety disorder         Plan:       Osteoporosis, unspecified osteoporosis type, " unspecified pathological fracture presence  -     DXA Bone Density Axial Skeleton 1 or more sites; Future; Expected date: 03/26/2024    Panlobular emphysema  Comments:  end stage. RTC O2 in place. has orders from Dr. Conroy to get labs and CXR. refuses abx now. will follow.    Paroxysmal atrial fibrillation  Comments:  stable, followed by DR. Kate.    Generalized anxiety disorder  Comments:  mood and anxiety appears to be stable at this time. continue as is.      No follow-ups on file.        3/26/2024 Bimal Rossi PA-C

## 2024-04-05 ENCOUNTER — LAB VISIT (OUTPATIENT)
Dept: LAB | Facility: HOSPITAL | Age: 79
End: 2024-04-05
Attending: NURSE PRACTITIONER
Payer: MEDICARE

## 2024-04-05 DIAGNOSIS — I10 ESSENTIAL HYPERTENSION, MALIGNANT: Primary | ICD-10-CM

## 2024-04-05 LAB
ANION GAP SERPL CALC-SCNC: 7 MMOL/L (ref 8–16)
BUN SERPL-MCNC: 13 MG/DL (ref 8–23)
CALCIUM SERPL-MCNC: 9.7 MG/DL (ref 8.7–10.5)
CHLORIDE SERPL-SCNC: 102 MMOL/L (ref 95–110)
CO2 SERPL-SCNC: 32 MMOL/L (ref 23–29)
CREAT SERPL-MCNC: 0.7 MG/DL (ref 0.5–1.4)
EST. GFR  (NO RACE VARIABLE): >60 ML/MIN/1.73 M^2
GLUCOSE SERPL-MCNC: 139 MG/DL (ref 70–110)
MAGNESIUM SERPL-MCNC: 1.7 MG/DL (ref 1.6–2.6)
POTASSIUM SERPL-SCNC: 3.5 MMOL/L (ref 3.5–5.1)
SODIUM SERPL-SCNC: 141 MMOL/L (ref 136–145)

## 2024-04-05 PROCEDURE — 80048 BASIC METABOLIC PNL TOTAL CA: CPT | Performed by: NURSE PRACTITIONER

## 2024-04-05 PROCEDURE — 36415 COLL VENOUS BLD VENIPUNCTURE: CPT | Performed by: NURSE PRACTITIONER

## 2024-04-05 PROCEDURE — 83735 ASSAY OF MAGNESIUM: CPT | Performed by: NURSE PRACTITIONER

## 2024-04-09 ENCOUNTER — HOSPITAL ENCOUNTER (OUTPATIENT)
Dept: RADIOLOGY | Facility: HOSPITAL | Age: 79
Discharge: HOME | End: 2024-04-09
Attending: INTERNAL MEDICINE
Payer: MEDICARE

## 2024-04-09 DIAGNOSIS — R06.02 SHORTNESS OF BREATH: ICD-10-CM

## 2024-04-09 DIAGNOSIS — R06.02 SHORTNESS OF BREATH: Primary | ICD-10-CM

## 2024-04-09 DIAGNOSIS — E46 UNSPECIFIED PROTEIN-CALORIE MALNUTRITION: ICD-10-CM

## 2024-04-09 DIAGNOSIS — D64.9 ANEMIA, UNSPECIFIED: ICD-10-CM

## 2024-04-09 DIAGNOSIS — I50.1 LEFT HEART FAILURE: ICD-10-CM

## 2024-04-09 PROCEDURE — 71046 X-RAY EXAM CHEST 2 VIEWS: CPT | Mod: 26,,, | Performed by: RADIOLOGY

## 2024-04-09 PROCEDURE — 71046 X-RAY EXAM CHEST 2 VIEWS: CPT | Mod: TC,PO

## 2024-04-16 ENCOUNTER — TELEPHONE (OUTPATIENT)
Dept: FAMILY MEDICINE | Facility: CLINIC | Age: 79
End: 2024-04-16
Payer: MEDICARE

## 2024-04-16 NOTE — TELEPHONE ENCOUNTER
----- Message from Anjali Acosta LPN sent at 4/16/2024 10:31 AM CDT -----  Melida does not have anything this week  ----- Message -----  From: Marylu Caldwell  Sent: 4/16/2024  10:29 AM CDT  To: Melida Martino Staff    Pt states she has been very sick and confused with a lot of appointments. Pt states Bimal stated for her to see Melida martino. Please call patient if there is an earlier appointment that 5/29.   [phone]

## 2024-04-22 ENCOUNTER — PATIENT OUTREACH (OUTPATIENT)
Dept: ADMINISTRATIVE | Facility: HOSPITAL | Age: 79
End: 2024-04-22
Payer: MEDICARE

## 2024-04-23 ENCOUNTER — HOSPITAL ENCOUNTER (OUTPATIENT)
Dept: RADIOLOGY | Facility: HOSPITAL | Age: 79
Discharge: HOME | End: 2024-04-23
Attending: PHYSICIAN ASSISTANT
Payer: MEDICARE

## 2024-04-23 DIAGNOSIS — M81.0 OSTEOPOROSIS, UNSPECIFIED OSTEOPOROSIS TYPE, UNSPECIFIED PATHOLOGICAL FRACTURE PRESENCE: ICD-10-CM

## 2024-04-23 PROCEDURE — 77080 DXA BONE DENSITY AXIAL: CPT | Mod: TC,PO

## 2024-04-23 PROCEDURE — 77080 DXA BONE DENSITY AXIAL: CPT | Mod: 26,,, | Performed by: RADIOLOGY

## 2024-05-08 ENCOUNTER — TELEPHONE (OUTPATIENT)
Dept: FAMILY MEDICINE | Facility: CLINIC | Age: 79
End: 2024-05-08
Payer: MEDICARE

## 2024-05-08 DIAGNOSIS — I10 ESSENTIAL (PRIMARY) HYPERTENSION: ICD-10-CM

## 2024-05-08 DIAGNOSIS — E03.9 ACQUIRED HYPOTHYROIDISM: ICD-10-CM

## 2024-05-08 DIAGNOSIS — I27.20 PULMONARY HTN: ICD-10-CM

## 2024-05-08 DIAGNOSIS — Z79.899 ENCOUNTER FOR LONG-TERM (CURRENT) USE OF OTHER MEDICATIONS: Primary | ICD-10-CM

## 2024-05-08 DIAGNOSIS — R73.03 PRE-DIABETES: ICD-10-CM

## 2024-05-08 DIAGNOSIS — N18.31 STAGE 3A CHRONIC KIDNEY DISEASE: ICD-10-CM

## 2024-05-29 ENCOUNTER — TELEPHONE (OUTPATIENT)
Dept: FAMILY MEDICINE | Facility: CLINIC | Age: 79
End: 2024-05-29

## 2024-05-29 NOTE — TELEPHONE ENCOUNTER
----- Message from Marylu Caldwell sent at 5/29/2024  7:53 AM CDT -----   Shimon asking Is there any way that her appointment could be pushed back to later on today? Pt is stuck in the bathroom will not be able to make 8:40 appt.   [phone]

## 2024-06-10 DIAGNOSIS — F41.9 ANXIETY: ICD-10-CM

## 2024-06-10 DIAGNOSIS — I48.0 PAROXYSMAL ATRIAL FIBRILLATION: ICD-10-CM

## 2024-06-10 RX ORDER — METOPROLOL SUCCINATE 100 MG/1
100 TABLET, EXTENDED RELEASE ORAL DAILY
Qty: 90 TABLET | Refills: 3 | Status: SHIPPED | OUTPATIENT
Start: 2024-06-10

## 2024-06-10 RX ORDER — ALPRAZOLAM 1 MG/1
1 TABLET ORAL 2 TIMES DAILY
Qty: 60 TABLET | Refills: 2 | Status: SHIPPED | OUTPATIENT
Start: 2024-06-10

## 2024-06-10 NOTE — TELEPHONE ENCOUNTER
----- Message from Jessica Carranza sent at 6/10/2024  8:47 AM CDT -----  - 8:08-des graham is calling for refill on metoprolol and alprazolam   [phone]

## 2024-07-01 ENCOUNTER — HOSPITAL ENCOUNTER (OUTPATIENT)
Dept: RADIOLOGY | Facility: HOSPITAL | Age: 79
Discharge: HOME | End: 2024-07-01
Attending: INTERNAL MEDICINE
Payer: MEDICARE

## 2024-07-01 DIAGNOSIS — R91.1 COIN LESION: ICD-10-CM

## 2024-07-01 PROCEDURE — 71250 CT THORAX DX C-: CPT | Mod: 26,,, | Performed by: RADIOLOGY

## 2024-07-01 PROCEDURE — 71250 CT THORAX DX C-: CPT | Mod: TC,PO

## 2024-07-25 ENCOUNTER — TELEPHONE (OUTPATIENT)
Dept: FAMILY MEDICINE | Facility: CLINIC | Age: 79
End: 2024-07-25
Payer: MEDICARE

## 2024-07-25 NOTE — TELEPHONE ENCOUNTER
----- Message from Chelsey Benitez sent at 7/25/2024  9:12 AM CDT -----  Pt is upset about her appointment being in August she believes that we are changing her appointment without her permission.     [phone]

## 2024-07-25 NOTE — TELEPHONE ENCOUNTER
"Spoke with patient and let her know that we rescheduled her appointment with her  back on 5/29 because she was not able to make that appointment. She states the knows it was cancelled and knows that it was rescheduled - that isn't her issue - states she knows that we scheduled her initially for July 29th because she wrote it on her calendar and now she is being told she does not have an appointment that date. I let her know Dr. Lopez is not here that day. Patient states "I'm not going to get loud, and I'm not going to argue" she is adamant that she was scheduled for July 29th - I let her know she is scheduled for August 2st with Dr. Lopez - she states her  will be here soon to get a physical card with that date and time on it because she still believes we are cancelling her appointments without her permission.   "

## 2024-08-08 ENCOUNTER — TELEPHONE (OUTPATIENT)
Dept: FAMILY MEDICINE | Facility: CLINIC | Age: 79
End: 2024-08-08
Payer: MEDICARE

## 2024-08-21 ENCOUNTER — OFFICE VISIT (OUTPATIENT)
Dept: FAMILY MEDICINE | Facility: CLINIC | Age: 79
End: 2024-08-21
Payer: MEDICARE

## 2024-08-21 VITALS
WEIGHT: 187 LBS | BODY MASS INDEX: 39.25 KG/M2 | SYSTOLIC BLOOD PRESSURE: 138 MMHG | HEART RATE: 68 BPM | HEIGHT: 58 IN | DIASTOLIC BLOOD PRESSURE: 76 MMHG

## 2024-08-21 DIAGNOSIS — F51.01 PRIMARY INSOMNIA: ICD-10-CM

## 2024-08-21 DIAGNOSIS — N18.31 STAGE 3A CHRONIC KIDNEY DISEASE: ICD-10-CM

## 2024-08-21 DIAGNOSIS — I48.0 PAROXYSMAL ATRIAL FIBRILLATION: ICD-10-CM

## 2024-08-21 DIAGNOSIS — K21.9 GASTRO-ESOPHAGEAL REFLUX DISEASE WITHOUT ESOPHAGITIS: ICD-10-CM

## 2024-08-21 DIAGNOSIS — R73.03 PRE-DIABETES: ICD-10-CM

## 2024-08-21 DIAGNOSIS — M08.90 JUVENILE ARTHRITIS, UNSPECIFIED, UNSPECIFIED SITE: ICD-10-CM

## 2024-08-21 DIAGNOSIS — I50.32 CHRONIC DIASTOLIC HEART FAILURE: ICD-10-CM

## 2024-08-21 DIAGNOSIS — F32.9 REACTIVE DEPRESSION: ICD-10-CM

## 2024-08-21 DIAGNOSIS — J43.1 PANLOBULAR EMPHYSEMA: ICD-10-CM

## 2024-08-21 DIAGNOSIS — M81.0 AGE-RELATED OSTEOPOROSIS WITHOUT CURRENT PATHOLOGICAL FRACTURE: ICD-10-CM

## 2024-08-21 DIAGNOSIS — D62 ANEMIA DUE TO ACUTE BLOOD LOSS: ICD-10-CM

## 2024-08-21 DIAGNOSIS — E66.01 SEVERE OBESITY (BMI 35.0-39.9) WITH COMORBIDITY: ICD-10-CM

## 2024-08-21 DIAGNOSIS — J30.1 SEASONAL ALLERGIC RHINITIS DUE TO POLLEN: ICD-10-CM

## 2024-08-21 DIAGNOSIS — K21.9 GASTROESOPHAGEAL REFLUX DISEASE WITHOUT ESOPHAGITIS: ICD-10-CM

## 2024-08-21 DIAGNOSIS — J44.9 CHRONIC OBSTRUCTIVE PULMONARY DISEASE, UNSPECIFIED COPD TYPE: Primary | ICD-10-CM

## 2024-08-21 DIAGNOSIS — F41.1 GENERALIZED ANXIETY DISORDER: ICD-10-CM

## 2024-08-21 DIAGNOSIS — E03.9 ACQUIRED HYPOTHYROIDISM: ICD-10-CM

## 2024-08-21 DIAGNOSIS — I70.0 AORTIC ATHEROSCLEROSIS: ICD-10-CM

## 2024-08-21 DIAGNOSIS — I10 ESSENTIAL (PRIMARY) HYPERTENSION: ICD-10-CM

## 2024-08-21 DIAGNOSIS — M51.9 LUMBAR DISC DISEASE: ICD-10-CM

## 2024-08-21 DIAGNOSIS — F41.9 ANXIETY: ICD-10-CM

## 2024-08-21 DIAGNOSIS — G47.33 OSA ON CPAP: ICD-10-CM

## 2024-08-21 PROCEDURE — 99214 OFFICE O/P EST MOD 30 MIN: CPT | Mod: S$GLB,,, | Performed by: FAMILY MEDICINE

## 2024-08-21 RX ORDER — ALBUTEROL SULFATE 0.83 MG/ML
2.5 SOLUTION RESPIRATORY (INHALATION) EVERY 6 HOURS PRN
Qty: 300 ML | Refills: 1 | Status: SHIPPED | OUTPATIENT
Start: 2024-08-21

## 2024-08-21 RX ORDER — ESOMEPRAZOLE MAGNESIUM 40 MG/1
40 CAPSULE, DELAYED RELEASE ORAL
Qty: 90 CAPSULE | Refills: 1 | Status: SHIPPED | OUTPATIENT
Start: 2024-08-21

## 2024-08-21 RX ORDER — METOPROLOL SUCCINATE 100 MG/1
100 TABLET, EXTENDED RELEASE ORAL DAILY
Qty: 90 TABLET | Refills: 3 | Status: SHIPPED | OUTPATIENT
Start: 2024-08-21

## 2024-08-21 RX ORDER — FLUTICASONE PROPIONATE 50 MCG
1 SPRAY, SUSPENSION (ML) NASAL DAILY PRN
Qty: 16 G | Refills: 2 | Status: SHIPPED | OUTPATIENT
Start: 2024-08-21

## 2024-08-21 RX ORDER — TRAZODONE HYDROCHLORIDE 50 MG/1
50 TABLET ORAL NIGHTLY
Qty: 30 TABLET | Refills: 2 | Status: SHIPPED | OUTPATIENT
Start: 2024-08-21 | End: 2025-08-21

## 2024-08-21 RX ORDER — ALPRAZOLAM 1 MG/1
1 TABLET ORAL 2 TIMES DAILY
Qty: 60 TABLET | Refills: 2 | Status: SHIPPED | OUTPATIENT
Start: 2024-08-21

## 2024-08-21 NOTE — PROGRESS NOTES
SUBJECTIVE:    Patient ID: Danielle Martinez is a 79 y.o. female.    Chief Complaint: Leg Swelling (Bilateral lower extremities swelling,discuss about multiple issues, no bottles, pt is fasting for blood work, abc  )    This 79-year-old female has lost 12 lb recently.  She states she is eating and using smaller portions.  She also had a bout of gastroenteritis and had to see urgent care for a visit.    COPD-now dependent on portable oxygen at 4 liters/minute, uses Trelegy daily.  Has a nebulizer machine at home for wheezing.    BRITTANIE-compliant with CPAP at night she uses a facemask.    She is planning on going on a cruise to the [facility] in November    Paroxysmal atrial fibrillation-maintained on Eliquis 5 mg b.i.d.    2019-colonoscopy with Dr. Crowe-[facility] 5 years    Patient has not had a mammogram in years.    Anxiety-  Xanax 1 mg b.i.d.          Telephone on 05/08/2024   Component Date Value Ref Range Status    Hemoglobin A1C 08/21/2024 6.3 (H)  <5.7 % of total Hgb Final    Cholesterol 08/21/2024 144  <200 mg/dL Final    HDL 08/21/2024 48 (L)  > OR = 50 mg/dL Final    Triglycerides 08/21/2024 73  <150 mg/dL Final    LDL Cholesterol 08/21/2024 81  mg/dL (calc) Final    HDL/Cholesterol Ratio 08/21/2024 3.0  <5.0 (calc) Final    Non HDL Chol. (LDL+VLDL) 08/21/2024 96  <130 mg/dL (calc) Final    WBC 08/21/2024 8.3  3.8 - 10.8 Thousand/uL Final    RBC 08/21/2024 4.46  3.80 - 5.10 Million/uL Final    Hemoglobin 08/21/2024 13.2  11.7 - 15.5 g/dL Final    Hematocrit 08/21/2024 40.7  35.0 - 45.0 % Final    MCV 08/21/2024 91.3  80.0 - 100.0 fL Final    MCH 08/21/2024 29.6  27.0 - 33.0 pg Final    MCHC 08/21/2024 32.4  32.0 - 36.0 g/dL Final    RDW 08/21/2024 14.7  11.0 - 15.0 % Final    Platelets 08/21/2024 278  140 - 400 Thousand/uL Final    MPV 08/21/2024 10.9  7.5 - 12.5 fL Final    Neutrophils, Abs 08/21/2024 6,125  1,500 - 7,800 cells/uL Final    Lymph # 08/21/2024 1,378  850 - 3,900 cells/uL Final    Mono # 08/21/2024  589  200 - 950 cells/uL Final    Eos # 08/21/2024 149  15 - 500 cells/uL Final    Baso # 08/21/2024 58  0 - 200 cells/uL Final    Neutrophils Relative 08/21/2024 73.8  % Final    Lymph % 08/21/2024 16.6  % Final    Mono % 08/21/2024 7.1  % Final    Eosinophil % 08/21/2024 1.8  % Final    Basophil % 08/21/2024 0.7  % Final    TSH w/reflex to FT4 08/21/2024 3.08  0.40 - 4.50 mIU/L Final    Glucose 08/21/2024 124 (H)  65 - 99 mg/dL Final    BUN 08/21/2024 22  7 - 25 mg/dL Final    Creatinine 08/21/2024 0.89  0.60 - 1.00 mg/dL Final    eGFR 08/21/2024 66  > OR = 60 mL/min/1.73m2 Final    BUN/Creatinine Ratio 08/21/2024 SEE NOTE:  6 - 22 (calc) Final    Sodium 08/21/2024 141  135 - 146 mmol/L Final    Potassium 08/21/2024 4.1  3.5 - 5.3 mmol/L Final    Chloride 08/21/2024 101  98 - 110 mmol/L Final    CO2 08/21/2024 32  20 - 32 mmol/L Final    Calcium 08/21/2024 9.9  8.6 - 10.4 mg/dL Final    Total Protein 08/21/2024 7.4  6.1 - 8.1 g/dL Final    Albumin 08/21/2024 4.7  3.6 - 5.1 g/dL Final    Globulin, Total 08/21/2024 2.7  1.9 - 3.7 g/dL (calc) Final    Albumin/Globulin Ratio 08/21/2024 1.7  1.0 - 2.5 (calc) Final    Total Bilirubin 08/21/2024 1.8 (H)  0.2 - 1.2 mg/dL Final    Alkaline Phosphatase 08/21/2024 79  37 - 153 U/L Final    AST 08/21/2024 18  10 - 35 U/L Final    ALT 08/21/2024 9  6 - 29 U/L Final   Lab Visit on 04/09/2024   Component Date Value Ref Range Status    Sodium 04/09/2024 141  136 - 145 mmol/L Final    Potassium 04/09/2024 3.3 (L)  3.5 - 5.1 mmol/L Final    Chloride 04/09/2024 103  95 - 110 mmol/L Final    CO2 04/09/2024 27  23 - 29 mmol/L Final    Glucose 04/09/2024 150 (H)  70 - 110 mg/dL Final    BUN 04/09/2024 13  8 - 23 mg/dL Final    Creatinine 04/09/2024 0.7  0.5 - 1.4 mg/dL Final    Calcium 04/09/2024 9.4  8.7 - 10.5 mg/dL Final    Anion Gap 04/09/2024 11  8 - 16 mmol/L Final    eGFR 04/09/2024 >60.0  >60 mL/min/1.73 m^2 Final    BNP 04/09/2024 447 (H)  0 - 99 pg/mL Final   Lab Visit on  04/05/2024   Component Date Value Ref Range Status    Sodium 04/05/2024 141  136 - 145 mmol/L Final    Potassium 04/05/2024 3.5  3.5 - 5.1 mmol/L Final    Chloride 04/05/2024 102  95 - 110 mmol/L Final    CO2 04/05/2024 32 (H)  23 - 29 mmol/L Final    Glucose 04/05/2024 139 (H)  70 - 110 mg/dL Final    BUN 04/05/2024 13  8 - 23 mg/dL Final    Creatinine 04/05/2024 0.7  0.5 - 1.4 mg/dL Final    Calcium 04/05/2024 9.7  8.7 - 10.5 mg/dL Final    Anion Gap 04/05/2024 7 (L)  8 - 16 mmol/L Final    eGFR 04/05/2024 >60.0  >60 mL/min/1.73 m^2 Final    Magnesium 04/05/2024 1.7  1.6 - 2.6 mg/dL Final   Lab Visit on 03/26/2024   Component Date Value Ref Range Status    Sodium 03/26/2024 141  136 - 145 mmol/L Final    Potassium 03/26/2024 3.1 (L)  3.5 - 5.1 mmol/L Final    Chloride 03/26/2024 101  95 - 110 mmol/L Final    CO2 03/26/2024 33 (H)  23 - 29 mmol/L Final    Glucose 03/26/2024 128 (H)  70 - 110 mg/dL Final    BUN 03/26/2024 20  8 - 23 mg/dL Final    Creatinine 03/26/2024 0.7  0.5 - 1.4 mg/dL Final    Calcium 03/26/2024 9.6  8.7 - 10.5 mg/dL Final    Total Protein 03/26/2024 6.9  6.0 - 8.4 g/dL Final    Albumin 03/26/2024 4.2  3.5 - 5.2 g/dL Final    Total Bilirubin 03/26/2024 1.9 (H)  0.1 - 1.0 mg/dL Final    Alkaline Phosphatase 03/26/2024 64  55 - 135 U/L Final    AST 03/26/2024 17  10 - 40 U/L Final    ALT 03/26/2024 7 (L)  10 - 44 U/L Final    eGFR 03/26/2024 >60.0  >60 mL/min/1.73 m^2 Final    Anion Gap 03/26/2024 7 (L)  8 - 16 mmol/L Final    WBC 03/26/2024 9.04  3.90 - 12.70 K/uL Final    RBC 03/26/2024 4.82  4.00 - 5.40 M/uL Final    Hemoglobin 03/26/2024 12.7  12.0 - 16.0 g/dL Final    Hematocrit 03/26/2024 42.2  37.0 - 48.5 % Final    MCV 03/26/2024 88  82 - 98 fL Final    MCH 03/26/2024 26.3 (L)  27.0 - 31.0 pg Final    MCHC 03/26/2024 30.1 (L)  32.0 - 36.0 g/dL Final    RDW 03/26/2024 17.9 (H)  11.5 - 14.5 % Final    Platelets 03/26/2024 342  150 - 450 K/uL Final    MPV 03/26/2024 11.0  9.2 - 12.9 fL Final     Immature Granulocytes 03/26/2024 0.3  0.0 - 0.5 % Final    Gran # (ANC) 03/26/2024 6.1  1.8 - 7.7 K/uL Final    Immature Grans (Abs) 03/26/2024 0.03  0.00 - 0.04 K/uL Final    Lymph # 03/26/2024 1.9  1.0 - 4.8 K/uL Final    Mono # 03/26/2024 0.7  0.3 - 1.0 K/uL Final    Eos # 03/26/2024 0.3  0.0 - 0.5 K/uL Final    Baso # 03/26/2024 0.05  0.00 - 0.20 K/uL Final    nRBC 03/26/2024 0  0 /100 WBC Final    Gran % 03/26/2024 67.5  38.0 - 73.0 % Final    Lymph % 03/26/2024 21.2  18.0 - 48.0 % Final    Mono % 03/26/2024 7.5  4.0 - 15.0 % Final    Eosinophil % 03/26/2024 2.9  0.0 - 8.0 % Final    Basophil % 03/26/2024 0.6  0.0 - 1.9 % Final    Differential Method 03/26/2024 Automated   Final    Sed Rate 03/26/2024 6  0 - 20 mm/Hr Final       Past Medical History:   Diagnosis Date    HAILY (acute kidney injury)     Anxiety     Arthritis     Juvenile diagnosed age 17    Atrial fibrillation     COPD (chronic obstructive pulmonary disease)     Depression     Diverticulitis     Emphysema of lung     GERD (gastroesophageal reflux disease)     Hx of hiatal hernia     Hypertension     Osteoporosis     Pneumonia     Sleep apnea      Social History     Socioeconomic History    Marital status:    Tobacco Use    Smoking status: Never    Smokeless tobacco: Never   Substance and Sexual Activity    Alcohol use: Never    Drug use: Never     Past Surgical History:   Procedure Laterality Date    BREAST LUMPECTOMY Left     in 40's    COLON SURGERY      COLONOSCOPY  2016    Dr. Crowe-[facility] 5 years    COLONOSCOPY  2019    Dr. Reyes 5 years    HERNIA REPAIR      HIATAL HERNIA REPAIR  2005    HYSTERECTOMY      SHOULDER ARTHROSCOPY  2015     Family History   Problem Relation Name Age of Onset    Hyperlipidemia Mother         The CVD Risk score (Keyon et al., 2008) failed to calculate for the following reasons:    The 2008 CVD risk score is only valid for ages 30 to 74    The patient has a prior MI, stroke, CHF, or peripheral  vascular disease diagnosis    All of your core healthy metrics are met.      Review of patient's allergies indicates:   Allergen Reactions    Promethazine Anaphylaxis    Clove flavoring [flavoring agent]     Codeine Itching    Latex, natural rubber     Lorazepam Other (See Comments)    Lovastatin Other (See Comments)    Meclizine Other (See Comments)    Morphine Itching    Simvastatin Other (See Comments)    Tramadol-acetaminophen Itching       Current Outpatient Medications:     ascorbic acid, vitamin C, (VITAMIN C) 100 MG tablet, Take 100 mg by mouth once daily., Disp: , Rfl:     aspirin-calcium carbonate 81 mg-300 mg calcium(777 mg) Tab, Take 75 mg by mouth., Disp: , Rfl:     clotrimazole-betamethasone 1-0.05% (LOTRISONE) cream, Apply twice a day to affected skin, Disp: 45 g, Rfl: 1    ELIQUIS 5 mg Tab, Take 1 tablet (5 mg total) by mouth 2 (two) times daily., Disp: 60 tablet, Rfl: 5    furosemide (LASIX) 20 MG tablet, Take 1 tablet (20 mg total) by mouth daily as needed., Disp: 90 tablet, Rfl: 1    levothyroxine (SYNTHROID) 25 MCG tablet, Take 1 tablet (25 mcg total) by mouth before breakfast. Name brand only.  RIRI, Disp: 30 tablet, Rfl: 11    mv-min/folic/vit K/lut/lxzb805 (ALIVE WOMEN'S 50 PLUS, BLEND, ORAL), Take 1 tablet by mouth daily as needed., Disp: , Rfl:     nitroGLYCERIN (NITROSTAT) 0.4 MG SL tablet, Place 1 tablet (0.4 mg total) under the tongue every 5 (five) minutes as needed., Disp: 25 tablet, Rfl: 3    potassium chloride (KLOR-CON) 10 MEQ TbSR, Take 1 tablet (10 mEq total) by mouth 2 (two) times daily., Disp: 180 tablet, Rfl: 1    predniSONE (DELTASONE) 10 MG tablet, SMARTSI Tablet(s) By Mouth Every Morning, Disp: , Rfl:     PROCTO-MED HC 2.5 % rectal cream, , Disp: , Rfl:     albuterol (PROVENTIL) 2.5 mg /3 mL (0.083 %) nebulizer solution, Take 3 mLs (2.5 mg total) by nebulization every 6 (six) hours as needed for Wheezing. Rescue, Disp: 300 mL, Rfl: 1    ALPRAZolam (XANAX) 1 MG tablet, Take  1 tablet (1 mg total) by mouth 2 (two) times daily., Disp: 60 tablet, Rfl: 2    esomeprazole (NEXIUM) 40 MG capsule, Take 1 capsule (40 mg total) by mouth before breakfast., Disp: 90 capsule, Rfl: 1    famotidine (PEPCID) 40 MG tablet, Take 1 tablet (40 mg total) by mouth nightly as needed for Heartburn., Disp: 90 tablet, Rfl: 1    fluticasone propionate (FLONASE) 50 mcg/actuation nasal spray, 1 spray (50 mcg total) by Each Nostril route daily as needed for Allergies., Disp: 16 g, Rfl: 2    fluticasone-umeclidin-vilanter (TRELEGY ELLIPTA) 100-62.5-25 mcg DsDv, Inhale 1 puff into the lungs daily as needed., Disp: 60 each, Rfl: 2    metoprolol succinate (TOPROL-XL) 100 MG 24 hr tablet, Take 1 tablet (100 mg total) by mouth once daily. For 30 days, Disp: 90 tablet, Rfl: 3    traZODone (DESYREL) 50 MG tablet, Take 1 tablet (50 mg total) by mouth every evening. For sleep, Disp: 30 tablet, Rfl: 2    Review of Systems   Constitutional:  Negative for appetite change, chills, fatigue, fever and unexpected weight change.   HENT:  Negative for ear discharge and ear pain.    Eyes:  Negative for pain, discharge and visual disturbance.   Respiratory:  Positive for shortness of breath and wheezing. Negative for apnea and cough.    Cardiovascular:  Negative for chest pain, palpitations and leg swelling.   Gastrointestinal:  Negative for abdominal pain, blood in stool, constipation, diarrhea, nausea, vomiting and reflux.   Endocrine: Negative for cold intolerance, heat intolerance and polydipsia.   Genitourinary:  Negative for bladder incontinence, dysuria, hematuria, nocturia and urgency.   Musculoskeletal:  Negative for gait problem, joint swelling and myalgias.   Neurological:  Negative for dizziness, seizures and numbness.   Psychiatric/Behavioral:  Negative for behavioral problems and hallucinations. The patient is not nervous/anxious.            Objective:      Vitals:    08/21/24 1444   BP: 138/76   Pulse: 68   Weight: 84.8  "kg (187 lb)   Height: 4' 10" (1.473 m)     Physical Exam  Vitals and nursing note reviewed.   Constitutional:       General: She is not in acute distress.     Appearance: She is well-developed. She is obese. She is not toxic-appearing.   HENT:      Head: Normocephalic and atraumatic.      Right Ear: Tympanic membrane and external ear normal.      Left Ear: Tympanic membrane and external ear normal.      Nose: Nose normal.      Mouth/Throat:      Pharynx: Oropharynx is clear.   Eyes:      Pupils: Pupils are equal, round, and reactive to light.   Neck:      Thyroid: No thyromegaly.      Vascular: No carotid bruit.   Cardiovascular:      Rate and Rhythm: Normal rate and regular rhythm.      Heart sounds: Normal heart sounds. No murmur heard.  Pulmonary:      Effort: Pulmonary effort is normal.      Breath sounds: Rales (Right basilar lung crackles noted) present. No wheezing.   Abdominal:      General: Bowel sounds are normal. There is no distension.      Palpations: Abdomen is soft.      Tenderness: There is no abdominal tenderness.   Musculoskeletal:         General: No tenderness or deformity. Normal range of motion.      Cervical back: Normal range of motion and neck supple.      Lumbar back: Normal. No spasms.      Comments: Bends 90 degrees at  waist shoulders and knees good range of motion, 1+ edema to both lower legs.   Lymphadenopathy:      Cervical: No cervical adenopathy.   Skin:     General: Skin is warm and dry.      Findings: No rash.   Neurological:      Mental Status: She is alert and oriented to person, place, and time.      Cranial Nerves: No cranial nerve deficit.      Motor: No weakness.      Coordination: Coordination normal.      Gait: Gait normal.   Psychiatric:         Mood and Affect: Mood normal.         Behavior: Behavior normal.         Thought Content: Thought content normal.         Judgment: Judgment normal.           Assessment:       1. Chronic obstructive pulmonary disease, unspecified " COPD type    2. Reactive depression    3. Anxiety    4. Seasonal allergic rhinitis due to pollen    5. Paroxysmal atrial fibrillation    6. Gastroesophageal reflux disease without esophagitis    7. Lumbar disc disease    8. Generalized anxiety disorder    9. Panlobular emphysema    10. Essential (primary) hypertension    11. Chronic diastolic heart failure    12. Stage 3a chronic kidney disease    13. Anemia due to acute blood loss    14. Pre-diabetes    15. Age-related osteoporosis without current pathological fracture    16. Acquired hypothyroidism    17. Gastro-esophageal reflux disease without esophagitis    18. BRITTANIE on CPAP    19. Primary insomnia    20. Aortic atherosclerosis    21. Juvenile arthritis, unspecified, unspecified site    22. Severe obesity (BMI 35.0-39.9) with comorbidity         Plan:       Chronic obstructive pulmonary disease, unspecified COPD type  -     albuterol (PROVENTIL) 2.5 mg /3 mL (0.083 %) nebulizer solution; Take 3 mLs (2.5 mg total) by nebulization every 6 (six) hours as needed for Wheezing. Rescue  Dispense: 300 mL; Refill: 1  -     fluticasone-umeclidin-vilanter (TRELEGY ELLIPTA) 100-62.5-25 mcg DsDv; Inhale 1 puff into the lungs daily as needed.  Dispense: 60 each; Refill: 2  Stable on 4 L O2.  Continue Trelegy and albuterol nebulizer solution as a rescue inhaler  Reactive depression  -     traZODone (DESYREL) 50 MG tablet; Take 1 tablet (50 mg total) by mouth every evening. For sleep  Dispense: 30 tablet; Refill: 2  Continue trazodone  Anxiety  -     ALPRAZolam (XANAX) 1 MG tablet; Take 1 tablet (1 mg total) by mouth 2 (two) times daily.  Dispense: 60 tablet; Refill: 2  Xanax working well  Seasonal allergic rhinitis due to pollen  -     fluticasone propionate (FLONASE) 50 mcg/actuation nasal spray; 1 spray (50 mcg total) by Each Nostril route daily as needed for Allergies.  Dispense: 16 g; Refill: 2    Paroxysmal atrial fibrillation  -     metoprolol succinate (TOPROL-XL) 100 MG  24 hr tablet; Take 1 tablet (100 mg total) by mouth once daily. For 30 days  Dispense: 90 tablet; Refill: 3  Continue metoprolol and Eliquis  Gastroesophageal reflux disease without esophagitis  -     esomeprazole (NEXIUM) 40 MG capsule; Take 1 capsule (40 mg total) by mouth before breakfast.  Dispense: 90 capsule; Refill: 1  Stable on Nexium  Lumbar disc disease    Generalized anxiety disorder    Panlobular emphysema    Essential (primary) hypertension  Blood pressure well controlled   Chronic diastolic heart failure  Compensated well with current medications  Stage 3a chronic kidney disease    Anemia due to acute blood loss    Pre-diabetes  A1c stable at 6.3  Age-related osteoporosis without current pathological fracture    Acquired hypothyroidism    Gastro-esophageal reflux disease without esophagitis    BRITTANIE on CPAP  Compliant with CPAP at night  Primary insomnia  Continue trazodone at night  Aortic atherosclerosis  Cholesterol 144 HDL 48 TG 73 LDL 81, excellent lipid panel  Juvenile arthritis, unspecified, unspecified site    Severe obesity (BMI 35.0-39.9) with comorbidity  BMI is 39  Needs colonoscopy with Dr. Crowe this year  Follow up in about 6 months (around 2/21/2025).        8/23/2024 Antony Lopez

## 2024-08-23 PROBLEM — I70.0 AORTIC ATHEROSCLEROSIS: Status: ACTIVE | Noted: 2024-08-23

## 2024-08-23 PROBLEM — E66.01 SEVERE OBESITY (BMI 35.0-39.9) WITH COMORBIDITY: Status: ACTIVE | Noted: 2021-06-05

## 2024-08-28 ENCOUNTER — TELEPHONE (OUTPATIENT)
Dept: FAMILY MEDICINE | Facility: CLINIC | Age: 79
End: 2024-08-28
Payer: MEDICARE

## 2024-08-28 NOTE — TELEPHONE ENCOUNTER
----- Message from Lizzette Williamson sent at 8/28/2024  8:50 AM CDT -----  The patient said she did her labs already. She needs to do a u/a. Its raining and she can not go down her steps when its raining. Can her   come in and get a urine cup? Pt's # [phone] GH

## 2024-09-03 ENCOUNTER — TELEPHONE (OUTPATIENT)
Dept: FAMILY MEDICINE | Facility: CLINIC | Age: 79
End: 2024-09-03
Payer: MEDICARE

## 2024-09-03 DIAGNOSIS — E03.9 ACQUIRED HYPOTHYROIDISM: Primary | ICD-10-CM

## 2024-09-03 NOTE — TELEPHONE ENCOUNTER
----- Message from Antony Lopez MD sent at 9/1/2024  4:22 PM CDT -----  Call patient.  Urinalysis is clear and shows no sign of infection or blood.  No protein in the urine noted.  Continue current medications   1/10/2020       RE: Angella St  C/o Geryson Sherman  [address]  [facility] [address]     Dear Colleague,    Thank you for referring your patient, Angella St, to the [facility] ENDOCRINOLOGY at [facility]. Please see a copy of my visit note below.    [facility]  Endocrinology  Amee Zee MD  01/10/2020      Chief Complaint:   Follow up nodules     History of Present Illness:   Angella St is a 57 year old female who presents for follow up of thyroid nodules.    #1 Thyroid nodules status post biopsy in October 2019 of the dominant nodule on the right side and 4 nodules on the left side all of which were benign  She was discovered to have thyroid goiter back in 2015 during her cervical cancer evaluation. She underwent PET/CT scan which showed heterogenous thyroid nodules with increased FDG uptake. The patient had a following thyroid US and US guided biopsy from the right and left dominant thyroid nodules in 11/2015. Cytology was benign. Repeated FNA biopsy was obtained from the left dominant thyroid nodule which again turned back to be benign.  Repeat thyroid ultrasound in 4/2018 which showed some changes in the right thyroid nodule, and mild enlargement in some of the nodules on the left.  Floor ultrasound June 2019 showed enlargement of nodules in both the right and left thyroid.  Biopsy could not be done at the time as she only had emergency insurance.      Interval history:  She was able to undergo ultrasound guided FNA of her right dominant nodule (#3) and all 4 nodules on the left side on 10/2019.  These were all benign.  She denies any issues with swallowing or breathing and she has no pressure in her neck.  November 2019 TSH was normal.       Review of Systems:   Pertinent items are noted in HPI, remainder of complete ROS is negative.      Active Medications:      Acetaminophen (TYLENOL PO), Take 500 mg by mouth every 6 hours as needed , Disp: , Rfl:       "amLODIPine (NORVASC) 5 MG tablet, Take 1 tablet (5 mg) by mouth daily, Disp: 100 tablet, Rfl: 3     estradiol (ESTRACE) 0.1 MG/GM vaginal cream, Place 2 g vaginally three times a week Place small amount on fingertip and apply to urethra. (Patient not taking: Reported on 1/10/2020), Disp: 42.5 g, Rfl: 3     senna-docusate (SENOKOT-S;PERICOLACE) 8.6-50 MG per tablet, Take 1 tablet by mouth daily as needed for constipation, Disp: , Rfl:       Allergies:   No known drug allergies.       Past Medical History:  Nontoxic multinodular goiter  Cervical cancer   Chronic kidney disease  Recurrent pyelonephritis  Atrophic kidney   Hydronephrosis   Hypertension  Obesity  Anemia   Peripheral neuropathy     Past Surgical History:  Percutaneous nephrostomy tube change 6/28/17, 10/30/17, 1/29/19  Nephrectomy, left 5/3/18  Cystoscopy, ureteral stent placement 6/13/16  Hysterectomy 2011  Vascular port placement     Family History:   Hypertension - brother       Social History:   Presents to clinic alone.  Tobacco Use: No previous or current tobacco use.   Alcohol Use: No alcohol use.   PCP: Shamir Pugh      Physical Exam:   BP (!) 151/85   Pulse 85   Ht 1.651 m (5' 5\")   Wt 98.9 kg (218 lb)   BMI 36.28 kg/m        GENERAL APPEARANCE: Alert and no distress  NECK: No lymphadenopathy appreciated  Thyroid: Very prominent enlarged left thyroid, easily 80 g in size, firm  Mood: Normal   Lymph: neg in neck and supraclavicular area      Imaging:  Thyroid ultrasound FNA biopsy 10/28/2019:  FINDINGS:   After describing the risks, benefits, and alternatives to ultrasound guided multilocular thyroid nodule fine needle aspiration, the patient elected to proceed and written and an informed consent was obtained.     The patient was then placed supine and preliminary grayscale images demonstrated numerous thyroid nodules similar to the previous exam from 4/20/2018. Specifically:  Right nodule #1: Spongiform, 7 x 5 x 8 mm compared to 4 " x 4 x 7 mm previously.  Right nodule #2: Spongiform, 4 x 3 x 4 mm compared to 4 x 2 x 4 mm previously.  Right nodule #3: Solid, 1.1 x 1.1 x 2.1 cm, compared to 1.1 x 1.0 x 2.0 cm previously.     Left nodule #1: Solid, 2.9 x 2.2 x 2.1 cm compared to 2.6 x 2.2 x 2.0 cm previously.  Left nodule #2: Solid/cystic, 3.5 x 2.8 x 2.9 cm compared to 3.4 x 2.7 x 2.8 cm previously.  Left nodule #3: Solid/cystic, 3.4 x 2.3 x 3.4 cm compared to 2.9 x 1.9 x 3.2 cm previously.  Left nodule #4: Solid, 5.3 x 4.3 x 4.1 cm compared to 4.0 x 3.6 x 3.1 cm previously.     The patient was then prepped and draped in a sterile fashion. Local anesthesia was achieved using a total of 7 cc of 1% lidocaine. Under direct sonographic guidance, four passes of the right nodule #3 were performed using 25-gauge needles. This process was repeated exactly for nodules 1-4 on the left.     Preliminary interpretation from pathology suggests adequate cellularity for diagnosis. There were no immediate complications.     IMPRESSION:   1. Technically successful ultrasound-guided bilateral thyroid nodule fine-needle aspiration.  2. Compared to 4/4/2018, solid nodules on the left (#1, #3, #4) have increased in size. A subcentimeter benign-appearing right nodule is also mildly increased in size (nodule #1).    Assessment and Plan:  #1 Thyroid nodules status post biopsy in October 2019 of the dominant nodule on the right side and 4 nodules on the left side all of which were benign  Reviewed her ultrasound and biopsy results from October 2019.  She has substantial nodules on her left side.  Several nodules on her left side (#1, #3, #4) has increased compared with the 2018 exam.  All biopsies have been benign.    Discussed that her nodules may continue to grow over time and begin to cause issues.  In addition, any nodule greater than 4 cm is at risk for sampling issues and resection could be considered.  Given the size of the nodules on her left side, the only real  treatment option is surgical excision.  As she is currently asymptomatic, she is not interested in surgery.  Will plan to repeat a thyroid ultrasound in 1 year and revisit this issue.  If she does develop symptoms in the interim, she should contact us and we would be happy to reassess.    - US Thyroid     Follow-up: Return in about 1 year (around 1/10/2021).      Scribe Disclosure:  I, Dolores Benson, am serving as a scribe to document services personally performed by Amee Zee MD at this visit, based upon the provider's statements to me. All documentation has been reviewed by the aforementioned provider prior to being entered into the official medical record.     Portions of this medical record were completed by a scribe. UPON MY REVIEW AND AUTHENTICATION BY ELECTRONIC SIGNATURE, this confirms (a) I performed the applicable clinical services, and (b) the record is accurate.       Again, thank you for allowing me to participate in the care of your patient.      Sincerely,    Amee Zee MD

## 2024-09-03 NOTE — TELEPHONE ENCOUNTER
Spoke with pt in regards to recent urinalysis results. Verbalized verbatim per Dr. Lopez. Pt acknowledged understanding.

## 2024-09-03 NOTE — TELEPHONE ENCOUNTER
Pt would like to know if you are increasing her dosage of Levothyroxine.  Pt stated that if you are not going to  increase that dosage then she does not want to take the levothyroxine. Pt states that it such a low dose that it is not helping her, plus she has noticed that it is causing her to losing/thinning her hair.       Printed for Dr. Lopez.

## 2024-09-04 RX ORDER — LEVOTHYROXINE SODIUM 50 UG/1
50 TABLET ORAL
Qty: 90 TABLET | Refills: 1 | Status: SHIPPED | OUTPATIENT
Start: 2024-09-04 | End: 2025-09-04

## 2024-09-06 ENCOUNTER — HOSPITAL ENCOUNTER (EMERGENCY)
Facility: HOSPITAL | Age: 79
Discharge: HOME | End: 2024-09-06
Attending: EMERGENCY MEDICINE
Payer: MEDICARE

## 2024-09-06 VITALS
SYSTOLIC BLOOD PRESSURE: 151 MMHG | OXYGEN SATURATION: 96 % | TEMPERATURE: 98 F | HEIGHT: 58 IN | BODY MASS INDEX: 38.2 KG/M2 | RESPIRATION RATE: 19 BRPM | DIASTOLIC BLOOD PRESSURE: 67 MMHG | WEIGHT: 182 LBS | HEART RATE: 83 BPM

## 2024-09-06 DIAGNOSIS — K57.92 DIVERTICULITIS: Primary | ICD-10-CM

## 2024-09-06 LAB
ALBUMIN SERPL BCP-MCNC: 4.5 G/DL (ref 3.5–5.2)
ALP SERPL-CCNC: 72 U/L (ref 55–135)
ALT SERPL W/O P-5'-P-CCNC: 9 U/L (ref 10–44)
ANION GAP SERPL CALC-SCNC: 8 MMOL/L (ref 8–16)
AST SERPL-CCNC: 16 U/L (ref 10–40)
BASOPHILS # BLD AUTO: 0.06 K/UL (ref 0–0.2)
BASOPHILS NFR BLD: 0.5 % (ref 0–1.9)
BILIRUB SERPL-MCNC: 1.2 MG/DL (ref 0.1–1)
BILIRUB UR QL STRIP: NEGATIVE
BUN SERPL-MCNC: 22 MG/DL (ref 8–23)
CALCIUM SERPL-MCNC: 9.4 MG/DL (ref 8.7–10.5)
CHLORIDE SERPL-SCNC: 102 MMOL/L (ref 95–110)
CLARITY UR: CLEAR
CO2 SERPL-SCNC: 29 MMOL/L (ref 23–29)
COLOR UR: YELLOW
CREAT SERPL-MCNC: 0.9 MG/DL (ref 0.5–1.4)
CREAT SERPL-MCNC: 0.9 MG/DL (ref 0.5–1.4)
DIFFERENTIAL METHOD BLD: ABNORMAL
EOSINOPHIL # BLD AUTO: 0.1 K/UL (ref 0–0.5)
EOSINOPHIL NFR BLD: 1.3 % (ref 0–8)
ERYTHROCYTE [DISTWIDTH] IN BLOOD BY AUTOMATED COUNT: 16.1 % (ref 11.5–14.5)
EST. GFR  (NO RACE VARIABLE): >60 ML/MIN/1.73 M^2
GLUCOSE SERPL-MCNC: 140 MG/DL (ref 70–110)
GLUCOSE UR QL STRIP: NEGATIVE
HCT VFR BLD AUTO: 40.9 % (ref 37–48.5)
HGB BLD-MCNC: 12.7 G/DL (ref 12–16)
HGB UR QL STRIP: NEGATIVE
IMM GRANULOCYTES # BLD AUTO: 0.04 K/UL (ref 0–0.04)
IMM GRANULOCYTES NFR BLD AUTO: 0.4 % (ref 0–0.5)
KETONES UR QL STRIP: NEGATIVE
LEUKOCYTE ESTERASE UR QL STRIP: NEGATIVE
LYMPHOCYTES # BLD AUTO: 1.3 K/UL (ref 1–4.8)
LYMPHOCYTES NFR BLD: 11.4 % (ref 18–48)
MCH RBC QN AUTO: 28.3 PG (ref 27–31)
MCHC RBC AUTO-ENTMCNC: 31.1 G/DL (ref 32–36)
MCV RBC AUTO: 91 FL (ref 82–98)
MONOCYTES # BLD AUTO: 0.8 K/UL (ref 0.3–1)
MONOCYTES NFR BLD: 6.8 % (ref 4–15)
NEUTROPHILS # BLD AUTO: 8.8 K/UL (ref 1.8–7.7)
NEUTROPHILS NFR BLD: 79.6 % (ref 38–73)
NITRITE UR QL STRIP: NEGATIVE
NRBC BLD-RTO: 0 /100 WBC
PH UR STRIP: 7 [PH] (ref 5–8)
PLATELET # BLD AUTO: 264 K/UL (ref 150–450)
PMV BLD AUTO: 10.8 FL (ref 9.2–12.9)
POTASSIUM SERPL-SCNC: 4.3 MMOL/L (ref 3.5–5.1)
PROT SERPL-MCNC: 7.5 G/DL (ref 6–8.4)
PROT UR QL STRIP: NEGATIVE
RBC # BLD AUTO: 4.48 M/UL (ref 4–5.4)
SAMPLE: NORMAL
SODIUM SERPL-SCNC: 139 MMOL/L (ref 136–145)
SP GR UR STRIP: 1.01 (ref 1–1.03)
URN SPEC COLLECT METH UR: NORMAL
UROBILINOGEN UR STRIP-ACNC: NEGATIVE EU/DL
WBC # BLD AUTO: 11.08 K/UL (ref 3.9–12.7)

## 2024-09-06 PROCEDURE — 80053 COMPREHEN METABOLIC PANEL: CPT | Performed by: NURSE PRACTITIONER

## 2024-09-06 PROCEDURE — 63600175 PHARM REV CODE 636 W HCPCS: Mod: JZ,JG | Performed by: NURSE PRACTITIONER

## 2024-09-06 PROCEDURE — 82565 ASSAY OF CREATININE: CPT

## 2024-09-06 PROCEDURE — 25000003 PHARM REV CODE 250: Performed by: NURSE PRACTITIONER

## 2024-09-06 PROCEDURE — 96365 THER/PROPH/DIAG IV INF INIT: CPT

## 2024-09-06 PROCEDURE — 81003 URINALYSIS AUTO W/O SCOPE: CPT | Performed by: NURSE PRACTITIONER

## 2024-09-06 PROCEDURE — 25500020 PHARM REV CODE 255: Performed by: NURSE PRACTITIONER

## 2024-09-06 PROCEDURE — 99285 EMERGENCY DEPT VISIT HI MDM: CPT | Mod: 25

## 2024-09-06 PROCEDURE — 85025 COMPLETE CBC W/AUTO DIFF WBC: CPT | Performed by: NURSE PRACTITIONER

## 2024-09-06 RX ORDER — HYDROCODONE BITARTRATE AND ACETAMINOPHEN 5; 325 MG/1; MG/1
1 TABLET ORAL
Status: COMPLETED | OUTPATIENT
Start: 2024-09-06 | End: 2024-09-06

## 2024-09-06 RX ORDER — CIPROFLOXACIN 500 MG/1
500 TABLET ORAL
Status: COMPLETED | OUTPATIENT
Start: 2024-09-06 | End: 2024-09-06

## 2024-09-06 RX ORDER — METRONIDAZOLE 500 MG/100ML
500 INJECTION, SOLUTION INTRAVENOUS
Status: COMPLETED | OUTPATIENT
Start: 2024-09-06 | End: 2024-09-06

## 2024-09-06 RX ORDER — CIPROFLOXACIN 500 MG/1
500 TABLET ORAL 2 TIMES DAILY
Qty: 20 TABLET | Refills: 0 | Status: SHIPPED | OUTPATIENT
Start: 2024-09-06 | End: 2024-09-16

## 2024-09-06 RX ORDER — METRONIDAZOLE 500 MG/1
500 TABLET ORAL 3 TIMES DAILY
Qty: 21 TABLET | Refills: 0 | Status: SHIPPED | OUTPATIENT
Start: 2024-09-06 | End: 2024-09-13

## 2024-09-06 RX ADMIN — HYDROCODONE BITARTRATE AND ACETAMINOPHEN 1 TABLET: 5; 325 TABLET ORAL at 02:09

## 2024-09-06 RX ADMIN — CIPROFLOXACIN 500 MG: 500 TABLET ORAL at 04:09

## 2024-09-06 RX ADMIN — IOHEXOL 100 ML: 350 INJECTION, SOLUTION INTRAVENOUS at 03:09

## 2024-09-06 RX ADMIN — METRONIDAZOLE 500 MG: 5 INJECTION, SOLUTION INTRAVENOUS at 04:09

## 2024-09-06 NOTE — DISCHARGE INSTRUCTIONS
Please stay on and diet until you are seen by Dr. Crowe.  Taking medication as prescribed.  I have given you an ambulatory referral to Dr. Crowe someone will call you with the time and date for an appointment.  Return to the ED for any worsening of symptoms or any other concerns.

## 2024-09-06 NOTE — FIRST PROVIDER EVALUATION
Emergency Department TeleTriage Encounter Note      CHIEF COMPLAINT    Chief Complaint   Patient presents with    Abdominal Pain    Nausea     Pt has hx of diverticulitis and ate cashews and now has abd pain        VITAL SIGNS   Initial Vitals [09/06/24 1222]   BP Pulse Resp Temp SpO2   (!) 191/126 91 20 98.4 °F (36.9 °C) 96 %      MAP       --            ALLERGIES    Review of patient's allergies indicates:   Allergen Reactions    Promethazine Anaphylaxis    Clove flavoring [flavoring agent]     Codeine Itching    Latex, natural rubber     Lorazepam Other (See Comments)    Lovastatin Other (See Comments)    Meclizine Other (See Comments)    Morphine Itching    Simvastatin Other (See Comments)    Tramadol-acetaminophen Itching       PROVIDER TRIAGE NOTE  Verbal consent for the teletriage evaluation was given by the patient at the start of the evaluation.  All efforts will be made to maintain patient's privacy during the evaluation.      This is a teletriage evaluation of a 79 y.o. female presenting to the ED with c/o lower abdominal pain and nausea that started this AM.  PMH Diverticulitis. Limited physical exam via telehealth: The patient is awake, alert, answering questions appropriately and is not in respiratory distress.  As the Teletriage provider, I performed an initial assessment and ordered appropriate labs and imaging studies, if any, to facilitate the patient's care once placed in the ED. Once a room is available, care and a full evaluation will be completed by an alternate ED provider.  Any additional orders and the final disposition will be determined by that provider.  All imaging and labs will not be followed-up by the Teletriage Team, including myself.          ORDERS  Labs Reviewed - No data to display    ED Orders (720h ago, onward)      Start Ordered     Status Ordering Provider    09/06/24 1237 09/06/24 1236  Vital signs  Every 2 hours         Ordered REMBERTO ORR    09/06/24 1237 09/06/24 1236   Diet NPO  Diet effective now         Ordered REMBERTO ORR    09/06/24 1237 09/06/24 1236  Insert peripheral IV  Once         Ordered REMBERTO ORR    09/06/24 1237 09/06/24 1236  CBC W/ AUTO DIFFERENTIAL  STAT         Ordered REMBERTO ORR    09/06/24 1237 09/06/24 1236  Comp. Metabolic Panel  STAT         Ordered REMBERTO ORR    09/06/24 1237 09/06/24 1236  Urinalysis, Reflex to Urine Culture Urine, Clean Catch  STAT         Ordered REMBERTO ORR              Virtual Visit Note: The provider triage portion of this emergency department evaluation and documentation was performed via [employer], a HIPAA-compliant telemedicine application, in concert with a tele-presenter in the room. A face to face patient evaluation with one of my colleagues will occur once the patient is placed in an emergency department room.      DISCLAIMER: This note was prepared with [employer] voice recognition transcription software. Garbled syntax, mangled pronouns, and other bizarre constructions may be attributed to that software system.

## 2024-09-06 NOTE — ED PROVIDER NOTES
Encounter Date: 9/6/2024       History     Chief Complaint   Patient presents with    Abdominal Pain    Nausea     Pt has hx of diverticulitis and ate cashews and now has abd pain      Presents with left lower quadrant abdominal pain with nausea.  Patient reports she has a history of diverticulitis.  She states that she ate cashews  last night.  She woke this morning at a proximally 3:00 a.m. with the abdominal pain.  She reports nausea without vomiting.  Denies fever denies diarrhea.  She is a patient of Dr. Crowe's.      Review of patient's allergies indicates:   Allergen Reactions    Promethazine Anaphylaxis    Clove flavoring [flavoring agent]     Codeine Itching    Latex, natural rubber     Lorazepam Other (See Comments)    Lovastatin Other (See Comments)    Meclizine Other (See Comments)    Morphine Itching    Simvastatin Other (See Comments)    Tramadol-acetaminophen Itching     Past Medical History:   Diagnosis Date    HAILY (acute kidney injury)     Anxiety     Arthritis     Juvenile diagnosed age 17    Atrial fibrillation     COPD (chronic obstructive pulmonary disease)     Depression     Diverticulitis     Emphysema of lung     GERD (gastroesophageal reflux disease)     Hx of hiatal hernia     Hypertension     Osteoporosis     Pneumonia     Sleep apnea      Past Surgical History:   Procedure Laterality Date    BREAST LUMPECTOMY Left     in 40's    COLON SURGERY      COLONOSCOPY  2016    Dr. Crowe-[facility] 5 years    COLONOSCOPY  2019    Dr. Crowe-JULIS[facility] 5 years    HERNIA REPAIR      HIATAL HERNIA REPAIR  2005    HYSTERECTOMY      SHOULDER ARTHROSCOPY  2015     Family History   Problem Relation Name Age of Onset    Hyperlipidemia Mother       Social History     Tobacco Use    Smoking status: Never    Smokeless tobacco: Never   Substance Use Topics    Alcohol use: Never    Drug use: Never     Review of Systems   Constitutional:  Negative for fever.   HENT:  Negative for trouble swallowing.    Respiratory:   Negative for cough, shortness of breath and wheezing.    Cardiovascular:  Negative for chest pain, palpitations and leg swelling.   Gastrointestinal:  Positive for abdominal pain. Negative for diarrhea, nausea and vomiting.   Genitourinary:  Negative for decreased urine volume, difficulty urinating, dysuria and frequency.   Musculoskeletal:  Negative for back pain, gait problem and neck pain.   Skin:  Negative for rash.   Neurological:  Negative for weakness.       Physical Exam     Initial Vitals [09/06/24 1222]   BP Pulse Resp Temp SpO2   (!) 191/126 91 20 98.4 °F (36.9 °C) 96 %      MAP       --         Physical Exam    Constitutional: She appears well-developed and well-nourished.   HENT:   Head: Normocephalic.   Mouth/Throat: Oropharynx is clear and moist.   Eyes: Conjunctivae are normal.   Neck: Neck supple.   Normal range of motion.  Cardiovascular:  Normal rate, regular rhythm and normal heart sounds.           Pulmonary/Chest: Breath sounds normal. No respiratory distress. She has no wheezes. She has no rhonchi.   Abdominal: Abdomen is soft. Bowel sounds are normal. She exhibits no distension. There is abdominal tenderness.   Tenderness in the suprapubic region as well as left lower quadrant.  Negative for rebound or guarding.  Bowel sounds are positive all 4 quadrants.  Abdomen is soft nondistended. There is no rebound and no guarding.   Musculoskeletal:         General: Normal range of motion.      Cervical back: Normal range of motion and neck supple.      Comments: Patient was ambulatory per self her gait is steady.     Neurological: She is alert and oriented to person, place, and time. No sensory deficit. GCS score is 15. GCS eye subscore is 4. GCS verbal subscore is 5. GCS motor subscore is 6.   Skin: Skin is warm and dry. Capillary refill takes less than 2 seconds.   Psychiatric: She has a normal mood and affect. Thought content normal.         ED Course   Procedures  Labs Reviewed   CBC W/ AUTO  DIFFERENTIAL - Abnormal       Result Value    WBC 11.08      RBC 4.48      Hemoglobin 12.7      Hematocrit 40.9      MCV 91      MCH 28.3      MCHC 31.1 (*)     RDW 16.1 (*)     Platelets 264      MPV 10.8      Immature Granulocytes 0.4      Gran # (ANC) 8.8 (*)     Immature Grans (Abs) 0.04      Lymph # 1.3      Mono # 0.8      Eos # 0.1      Baso # 0.06      nRBC 0      Gran % 79.6 (*)     Lymph % 11.4 (*)     Mono % 6.8      Eosinophil % 1.3      Basophil % 0.5      Differential Method Automated     COMPREHENSIVE METABOLIC PANEL - Abnormal    Sodium 139      Potassium 4.3      Chloride 102      CO2 29      Glucose 140 (*)     BUN 22      Creatinine 0.9      Calcium 9.4      Total Protein 7.5      Albumin 4.5      Total Bilirubin 1.2 (*)     Alkaline Phosphatase 72      AST 16      ALT 9 (*)     eGFR >60.0      Anion Gap 8     URINALYSIS, REFLEX TO URINE CULTURE    Specimen UA Urine, Clean Catch      Color, UA Yellow      Appearance, UA Clear      pH, UA 7.0      Specific Gravity, UA 1.015      Protein, UA Negative      Glucose, UA Negative      Ketones, UA Negative      Bilirubin (UA) Negative      Occult Blood UA Negative      Nitrite, UA Negative      Urobilinogen, UA Negative      Leukocytes, UA Negative      Narrative:     Specimen Source->Urine   ISTAT CREATININE    POC Creatinine 0.9      Sample VENOUS            Imaging Results              CT Abdomen Pelvis With IV Contrast NO Oral Contrast (Final result)  Result time 09/06/24 15:30:20      Final result by Mai Carbajal MD (09/06/24 15:30:20)                   Impression:      Diverticulitis of the sigmoid colon without abscess or perforation    Fatty infiltration of the liver    Small to moderate size hiatal hernia    Atelectasis in the lung bases    Anasarca within the right abdominal wall      Electronically signed by: Mai Carbajal  Date:    09/06/2024  Time:    15:30               Narrative:      CMS MANDATED QUALITY DATA - CT RADIATION -  436    All CT scans at this facility utilize dose modulation, iterative reconstruction, and/or weight based dosing when appropriate to reduce radiation dose to as low as reasonably achievable.    CLINICAL HISTORY:  ([id number])80 y/o  (1945) F    LLQ abdominal pain;    TECHNIQUE:  (ANA#[id number], exam time 9/6/2024 15:22)    CT ABDOMEN PELVIS WITH IV CONTRAST [id number]    Axial CT images of the abdomen and pelvis were obtained from the dome of the diaphragm to the proximal thighs.    100cc omnipague 350 IV contrast was given    COMPARISON:  12/13/2023    FINDINGS:  Lower Thorax:    Atelectasis in lung bases.  There is a small hiatal hernia.    CT Abdomen:    Liver: The liver is normal in size with diffuse low attenuation compatible with fatty infiltration.  There is no hepatic mass    Gallbladder: The gallbladder is within normal limits.    Biliary Tree: No intra or extrahepatic ductal dilation.    Spleen: Normal.    Pancreas: The pancreas is normal.    Adrenal Glands: Normal    Kidneys: The kidneys are normal in imaging appearance without hydronephrosis or hydroureter.    Vasculature: The aorta is normal in caliber with diffuse vascular calcification.    Lymph nodes: No abdominal lymphadenopathy is seen.    Intraperitoneal structures: There is no ascites.    Bowel: The stomach and small bowel are normal are normal.    There is mild diverticulitis of the sigmoid colon with wall thickening and stranding in the adjacent fat.  There is no abscess or perforation.    Abdominal wall: Prior abdominal hernia repair without evidence of recurrent hernia.  The musculature is normal.    Musculoskeletal: No acute osseous abnormality is identified    CT Pelvis:    Bladder: Normal.    Reproductive Organs: The uterus is not visualized/surgically absent.    Pelvic Lymph nodes: No pelvic lymphadenopathy or pelvic mass is identified.                                       Medications   HYDROcodone-acetaminophen 5-325 mg per tablet 1  tablet (1 tablet Oral Given 9/6/24 1450)   iohexoL (OMNIPAQUE 350) injection 100 mL (100 mLs Intravenous Given 9/6/24 1512)   ciprofloxacin HCl tablet 500 mg (500 mg Oral Given 9/6/24 1639)   metronidazole IVPB 500 mg (0 mg Intravenous Stopped 9/6/24 1739)     Medical Decision Making  Presents with complaint of nausea and left lower quadrant abdominal pain.  Onset 3:00 a.m. this morning.  Patient has a history of diverticulitis.  She states that last night she ate cashews.  She denies fever vomiting or diarrhea.  Patient was afebrile here.  Her blood pressure is elevated.  Her blood pressure was elevated at the onset.  Is now 148/72.    Amount and/or Complexity of Data Reviewed  External Data Reviewed:      Details: Her labs are within normal limits with for this patient.  Her nonfasting glucose is 140.  Her bilirubin is 1.2.  She normally has an elevated bilirubin.  Radiology: ordered.     Details: CT of the abdomen with contrast reports diverticulitis of the small sigmoid colon without abscess or perforation.  She has a fatty infiltrative her liver.  Small hiatal her anasarca within the right abdominal wall.  Discussion of management or test interpretation with external provider(s): Patient was given hydrocodone in the CC region.  Her blood pressure was 210/91 when she came in.  It current is 148/72.  I have given her Cipro 500 mg p.o. along with Flagyl 5 mg IV.  She has been instructed to follow up with Dr. Crowe on Monday.  I have given her an ambulatory referral to Dr. Crowe.  I have written a prescription for both Flagyl as well as Cipro for home use.  She has been given strict return precautions.  At no time while in the ED did she ever appear to be in any distress.  She has been resting quietly in the bed.    Risk  Prescription drug management.              Attending Attestation:     Physician Attestation Statement for NP/PA:   I personally made/approved the management plan and take responsibility for  the patient management.    Other NP/PA Attestation Additions:    History of Present Illness: 79-year-old female presents with left lower quadrant abdominal pain.    Medical Decision Making: Initial differential diagnosis included but not limited to diverticulitis, colitis, and enteritis.  The patient's labs showed no acute abnormalities.  The patient's CT scan was significant for diverticulitis.  I did not examine this patient, but was available for consultation.  I am in agreement with the nurse practitioner's assessment, treatment, and plan of care.                                    Clinical Impression:  Final diagnoses:  [K57.92] Diverticulitis (Primary)          ED Disposition Condition    Discharge Stable          ED Prescriptions       Medication Sig Dispense Start Date End Date Auth. Provider    ciprofloxacin HCl (CIPRO) 500 MG tablet Take 1 tablet (500 mg total) by mouth 2 (two) times daily. for 10 days 20 tablet 9/6/2024 9/16/2024 Jazmin Edwards NP    metroNIDAZOLE (FLAGYL) 500 MG tablet Take 1 tablet (500 mg total) by mouth 3 (three) times daily. for 7 days 21 tablet 9/6/2024 9/13/2024 Jazmin Edwards NP          Follow-up Information       Follow up With Specialties Details Why Contact Info    Tacho Crowe MD Gastroenterology In 3 days  [address]  [address]  [facility] [address]  [phone]               Jazmin Edwards NP  09/06/24 [id number]       Alexx Verduzco MD  09/07/24 0628

## 2024-09-10 ENCOUNTER — PATIENT OUTREACH (OUTPATIENT)
Dept: EMERGENCY MEDICINE | Facility: HOSPITAL | Age: 79
End: 2024-09-10

## 2024-09-10 DIAGNOSIS — J44.9 CHRONIC OBSTRUCTIVE PULMONARY DISEASE, UNSPECIFIED COPD TYPE: ICD-10-CM

## 2024-09-10 RX ORDER — ALBUTEROL SULFATE 0.83 MG/ML
2.5 SOLUTION RESPIRATORY (INHALATION) EVERY 6 HOURS PRN
Qty: 300 ML | Refills: 1 | Status: SHIPPED | OUTPATIENT
Start: 2024-09-10

## 2024-09-10 NOTE — TELEPHONE ENCOUNTER
----- Message from Marylu Caldwell sent at 9/10/2024  4:55 PM CDT -----  Refill on albuterol   [facility] in [place]   [phone]  [facility]@ 4:24pm

## 2024-10-30 ENCOUNTER — TELEPHONE (OUTPATIENT)
Dept: FAMILY MEDICINE | Facility: CLINIC | Age: 79
End: 2024-10-30
Payer: MEDICARE

## 2024-11-11 ENCOUNTER — HOSPITAL ENCOUNTER (INPATIENT)
Facility: HOSPITAL | Age: 79
LOS: 2 days | Discharge: HOME HEALTH | DRG: 291 | End: 2024-11-14
Attending: EMERGENCY MEDICINE | Admitting: INTERNAL MEDICINE
Payer: MEDICARE

## 2024-11-11 DIAGNOSIS — R06.09 DYSPNEA ON EXERTION: ICD-10-CM

## 2024-11-11 DIAGNOSIS — I48.0 PAROXYSMAL ATRIAL FIBRILLATION: ICD-10-CM

## 2024-11-11 DIAGNOSIS — R07.9 CHEST PAIN, UNSPECIFIED TYPE: Primary | ICD-10-CM

## 2024-11-11 DIAGNOSIS — R60.0 PERIPHERAL EDEMA: ICD-10-CM

## 2024-11-11 DIAGNOSIS — R06.02 SHORTNESS OF BREATH: ICD-10-CM

## 2024-11-11 DIAGNOSIS — I50.31 ACUTE DIASTOLIC (CONGESTIVE) HEART FAILURE: ICD-10-CM

## 2024-11-11 PROBLEM — E80.6 HYPERBILIRUBINEMIA: Status: ACTIVE | Noted: 2024-11-11

## 2024-11-11 PROBLEM — I48.91 ATRIAL FIBRILLATION: Status: ACTIVE | Noted: 2024-11-11

## 2024-11-11 LAB
ALBUMIN SERPL BCP-MCNC: 4.6 G/DL (ref 3.5–5.2)
ALP SERPL-CCNC: 51 U/L (ref 55–135)
ALT SERPL W/O P-5'-P-CCNC: 7 U/L (ref 10–44)
AMPHET+METHAMPHET UR QL: NEGATIVE
ANION GAP SERPL CALC-SCNC: 8 MMOL/L (ref 8–16)
AST SERPL-CCNC: 14 U/L (ref 10–40)
BARBITURATES UR QL SCN>200 NG/ML: NEGATIVE
BASOPHILS # BLD AUTO: 0.04 K/UL (ref 0–0.2)
BASOPHILS NFR BLD: 0.6 % (ref 0–1.9)
BENZODIAZ UR QL SCN>200 NG/ML: ABNORMAL
BILIRUB SERPL-MCNC: 1.9 MG/DL (ref 0.1–1)
BILIRUB UR QL STRIP: NEGATIVE
BNP SERPL-MCNC: 351 PG/ML (ref 0–99)
BUN SERPL-MCNC: 17 MG/DL (ref 8–23)
BZE UR QL SCN: NEGATIVE
CALCIUM SERPL-MCNC: 10.1 MG/DL (ref 8.7–10.5)
CANNABINOIDS UR QL SCN: NEGATIVE
CHLORIDE SERPL-SCNC: 105 MMOL/L (ref 95–110)
CLARITY UR: CLEAR
CO2 SERPL-SCNC: 28 MMOL/L (ref 23–29)
COLOR UR: COLORLESS
CREAT SERPL-MCNC: 0.8 MG/DL (ref 0.5–1.4)
CREAT UR-MCNC: 15.9 MG/DL (ref 15–325)
DIFFERENTIAL METHOD BLD: ABNORMAL
EOSINOPHIL # BLD AUTO: 0.2 K/UL (ref 0–0.5)
EOSINOPHIL NFR BLD: 3 % (ref 0–8)
ERYTHROCYTE [DISTWIDTH] IN BLOOD BY AUTOMATED COUNT: 16.3 % (ref 11.5–14.5)
EST. GFR  (NO RACE VARIABLE): >60 ML/MIN/1.73 M^2
GLUCOSE SERPL-MCNC: 89 MG/DL (ref 70–110)
GLUCOSE UR QL STRIP: NEGATIVE
HCT VFR BLD AUTO: 37.4 % (ref 37–48.5)
HGB BLD-MCNC: 11.8 G/DL (ref 12–16)
HGB UR QL STRIP: NEGATIVE
IMM GRANULOCYTES # BLD AUTO: 0.03 K/UL (ref 0–0.04)
IMM GRANULOCYTES NFR BLD AUTO: 0.4 % (ref 0–0.5)
INR PPP: 1.2 (ref 0.8–1.2)
KETONES UR QL STRIP: NEGATIVE
LEUKOCYTE ESTERASE UR QL STRIP: NEGATIVE
LYMPHOCYTES # BLD AUTO: 1.3 K/UL (ref 1–4.8)
LYMPHOCYTES NFR BLD: 18.5 % (ref 18–48)
MAGNESIUM SERPL-MCNC: 1.8 MG/DL (ref 1.6–2.6)
MCH RBC QN AUTO: 28.4 PG (ref 27–31)
MCHC RBC AUTO-ENTMCNC: 31.6 G/DL (ref 32–36)
MCV RBC AUTO: 90 FL (ref 82–98)
MONOCYTES # BLD AUTO: 0.7 K/UL (ref 0.3–1)
MONOCYTES NFR BLD: 9.4 % (ref 4–15)
NEUTROPHILS # BLD AUTO: 4.7 K/UL (ref 1.8–7.7)
NEUTROPHILS NFR BLD: 68.1 % (ref 38–73)
NITRITE UR QL STRIP: NEGATIVE
NRBC BLD-RTO: 0 /100 WBC
OHS QRS DURATION: 84 MS
OHS QTC CALCULATION: 429 MS
OPIATES UR QL SCN: NEGATIVE
PCP UR QL SCN>25 NG/ML: NEGATIVE
PH UR STRIP: 6 [PH] (ref 5–8)
PLATELET # BLD AUTO: 233 K/UL (ref 150–450)
PMV BLD AUTO: 10.5 FL (ref 9.2–12.9)
POTASSIUM SERPL-SCNC: 4 MMOL/L (ref 3.5–5.1)
PROT SERPL-MCNC: 7.3 G/DL (ref 6–8.4)
PROT UR QL STRIP: NEGATIVE
PROTHROMBIN TIME: 12.8 SEC (ref 9–12.5)
RBC # BLD AUTO: 4.16 M/UL (ref 4–5.4)
SODIUM SERPL-SCNC: 141 MMOL/L (ref 136–145)
SP GR UR STRIP: 1.01 (ref 1–1.03)
TOXICOLOGY INFORMATION: ABNORMAL
TROPONIN I SERPL HS-MCNC: 11 PG/ML (ref 0–14.9)
URN SPEC COLLECT METH UR: ABNORMAL
UROBILINOGEN UR STRIP-ACNC: NEGATIVE EU/DL
WBC # BLD AUTO: 6.91 K/UL (ref 3.9–12.7)

## 2024-11-11 PROCEDURE — 85025 COMPLETE CBC W/AUTO DIFF WBC: CPT | Performed by: EMERGENCY MEDICINE

## 2024-11-11 PROCEDURE — 99285 EMERGENCY DEPT VISIT HI MDM: CPT | Mod: 25

## 2024-11-11 PROCEDURE — 63600175 PHARM REV CODE 636 W HCPCS

## 2024-11-11 PROCEDURE — G0378 HOSPITAL OBSERVATION PER HR: HCPCS

## 2024-11-11 PROCEDURE — 80053 COMPREHEN METABOLIC PANEL: CPT | Performed by: EMERGENCY MEDICINE

## 2024-11-11 PROCEDURE — 80307 DRUG TEST PRSMV CHEM ANLYZR: CPT

## 2024-11-11 PROCEDURE — 93005 ELECTROCARDIOGRAM TRACING: CPT | Performed by: GENERAL PRACTICE

## 2024-11-11 PROCEDURE — 84484 ASSAY OF TROPONIN QUANT: CPT | Performed by: EMERGENCY MEDICINE

## 2024-11-11 PROCEDURE — 93010 ELECTROCARDIOGRAM REPORT: CPT | Mod: ,,, | Performed by: GENERAL PRACTICE

## 2024-11-11 PROCEDURE — 96372 THER/PROPH/DIAG INJ SC/IM: CPT

## 2024-11-11 PROCEDURE — 96374 THER/PROPH/DIAG INJ IV PUSH: CPT

## 2024-11-11 PROCEDURE — 25000003 PHARM REV CODE 250

## 2024-11-11 PROCEDURE — 85610 PROTHROMBIN TIME: CPT

## 2024-11-11 PROCEDURE — 63600175 PHARM REV CODE 636 W HCPCS: Performed by: EMERGENCY MEDICINE

## 2024-11-11 PROCEDURE — 83735 ASSAY OF MAGNESIUM: CPT

## 2024-11-11 PROCEDURE — 83880 ASSAY OF NATRIURETIC PEPTIDE: CPT | Performed by: EMERGENCY MEDICINE

## 2024-11-11 PROCEDURE — 81003 URINALYSIS AUTO W/O SCOPE: CPT | Mod: 59

## 2024-11-11 RX ORDER — ASPIRIN 81 MG/1
81 TABLET ORAL DAILY
Status: DISCONTINUED | OUTPATIENT
Start: 2024-11-11 | End: 2024-11-14 | Stop reason: HOSPADM

## 2024-11-11 RX ORDER — FUROSEMIDE 10 MG/ML
20 INJECTION INTRAMUSCULAR; INTRAVENOUS
Status: COMPLETED | OUTPATIENT
Start: 2024-11-11 | End: 2024-11-11

## 2024-11-11 RX ORDER — SODIUM,POTASSIUM PHOSPHATES 280-250MG
2 POWDER IN PACKET (EA) ORAL
Status: DISCONTINUED | OUTPATIENT
Start: 2024-11-11 | End: 2024-11-14 | Stop reason: HOSPADM

## 2024-11-11 RX ORDER — ENOXAPARIN SODIUM 100 MG/ML
1 INJECTION SUBCUTANEOUS EVERY 12 HOURS
Status: DISCONTINUED | OUTPATIENT
Start: 2024-11-11 | End: 2024-11-14 | Stop reason: HOSPADM

## 2024-11-11 RX ORDER — LANOLIN ALCOHOL/MO/W.PET/CERES
800 CREAM (GRAM) TOPICAL
Status: DISCONTINUED | OUTPATIENT
Start: 2024-11-11 | End: 2024-11-14 | Stop reason: HOSPADM

## 2024-11-11 RX ORDER — IPRATROPIUM BROMIDE 0.5 MG/2.5ML
0.5 SOLUTION RESPIRATORY (INHALATION) EVERY 6 HOURS
Status: DISCONTINUED | OUTPATIENT
Start: 2024-11-12 | End: 2024-11-14 | Stop reason: HOSPADM

## 2024-11-11 RX ORDER — FUROSEMIDE 20 MG/1
20 TABLET ORAL DAILY PRN
Qty: 90 TABLET | Refills: 1 | Status: ON HOLD | OUTPATIENT
Start: 2024-11-11 | End: 2024-11-14 | Stop reason: HOSPADM

## 2024-11-11 RX ORDER — CLOTRIMAZOLE AND BETAMETHASONE DIPROPIONATE 10; .64 MG/G; MG/G
1 CREAM TOPICAL 2 TIMES DAILY
Status: DISCONTINUED | OUTPATIENT
Start: 2024-11-11 | End: 2024-11-14 | Stop reason: HOSPADM

## 2024-11-11 RX ORDER — KETOROLAC TROMETHAMINE 30 MG/ML
15 INJECTION, SOLUTION INTRAMUSCULAR; INTRAVENOUS EVERY 6 HOURS PRN
Status: DISCONTINUED | OUTPATIENT
Start: 2024-11-11 | End: 2024-11-11

## 2024-11-11 RX ORDER — FUROSEMIDE 10 MG/ML
40 INJECTION INTRAMUSCULAR; INTRAVENOUS EVERY 12 HOURS
Status: DISCONTINUED | OUTPATIENT
Start: 2024-11-12 | End: 2024-11-14 | Stop reason: HOSPADM

## 2024-11-11 RX ORDER — ARFORMOTEROL TARTRATE 15 UG/2ML
15 SOLUTION RESPIRATORY (INHALATION) 2 TIMES DAILY
Status: DISCONTINUED | OUTPATIENT
Start: 2024-11-11 | End: 2024-11-14 | Stop reason: HOSPADM

## 2024-11-11 RX ORDER — METOPROLOL SUCCINATE 50 MG/1
100 TABLET, EXTENDED RELEASE ORAL DAILY
Status: DISCONTINUED | OUTPATIENT
Start: 2024-11-12 | End: 2024-11-14 | Stop reason: HOSPADM

## 2024-11-11 RX ORDER — TRAZODONE HYDROCHLORIDE 50 MG/1
50 TABLET ORAL NIGHTLY
Status: DISCONTINUED | OUTPATIENT
Start: 2024-11-11 | End: 2024-11-14 | Stop reason: HOSPADM

## 2024-11-11 RX ORDER — LEVALBUTEROL INHALATION SOLUTION 1.25 MG/3ML
1.25 SOLUTION RESPIRATORY (INHALATION) EVERY 8 HOURS
Status: DISCONTINUED | OUTPATIENT
Start: 2024-11-12 | End: 2024-11-13

## 2024-11-11 RX ORDER — PANTOPRAZOLE SODIUM 40 MG/1
40 TABLET, DELAYED RELEASE ORAL DAILY
Status: DISCONTINUED | OUTPATIENT
Start: 2024-11-12 | End: 2024-11-14 | Stop reason: HOSPADM

## 2024-11-11 RX ORDER — FUROSEMIDE 10 MG/ML
20 INJECTION INTRAMUSCULAR; INTRAVENOUS EVERY 12 HOURS
Status: DISCONTINUED | OUTPATIENT
Start: 2024-11-11 | End: 2024-11-11

## 2024-11-11 RX ORDER — LEVALBUTEROL INHALATION SOLUTION 1.25 MG/3ML
1.25 SOLUTION RESPIRATORY (INHALATION) EVERY 6 HOURS PRN
Status: DISCONTINUED | OUTPATIENT
Start: 2024-11-11 | End: 2024-11-14 | Stop reason: HOSPADM

## 2024-11-11 RX ORDER — ALPRAZOLAM 0.5 MG/1
1 TABLET ORAL 2 TIMES DAILY
Status: DISCONTINUED | OUTPATIENT
Start: 2024-11-11 | End: 2024-11-14 | Stop reason: HOSPADM

## 2024-11-11 RX ORDER — LEVOTHYROXINE SODIUM 25 UG/1
50 TABLET ORAL
Status: DISCONTINUED | OUTPATIENT
Start: 2024-11-12 | End: 2024-11-14 | Stop reason: HOSPADM

## 2024-11-11 RX ORDER — SODIUM CHLORIDE 0.9 % (FLUSH) 0.9 %
10 SYRINGE (ML) INJECTION
Status: DISCONTINUED | OUTPATIENT
Start: 2024-11-11 | End: 2024-11-14 | Stop reason: HOSPADM

## 2024-11-11 RX ORDER — BUDESONIDE 0.5 MG/2ML
0.5 INHALANT ORAL EVERY 12 HOURS
Status: DISCONTINUED | OUTPATIENT
Start: 2024-11-12 | End: 2024-11-14 | Stop reason: HOSPADM

## 2024-11-11 RX ADMIN — TRAZODONE HYDROCHLORIDE 50 MG: 50 TABLET ORAL at 09:11

## 2024-11-11 RX ADMIN — ALPRAZOLAM 1 MG: 0.5 TABLET ORAL at 09:11

## 2024-11-11 RX ADMIN — ASPIRIN 81 MG: 81 TABLET, COATED ORAL at 09:11

## 2024-11-11 RX ADMIN — FUROSEMIDE 20 MG: 10 INJECTION, SOLUTION INTRAMUSCULAR; INTRAVENOUS at 05:11

## 2024-11-11 RX ADMIN — ENOXAPARIN SODIUM 90 MG: 100 INJECTION SUBCUTANEOUS at 09:11

## 2024-11-11 NOTE — ED PROVIDER NOTES
Chief complaint:  Leg Swelling (Pt is having bilateral lower leg edema stating above the knee is swelling as well. Pt on home o2 using about 60% of her lungs. )      HPI:  Danielle Martinez is a 79 y.o. female with hx htn, GERD, COPD on home O2 4LNC, PAF on apixaban, BRITTANIE on CPAP qHS presenting with two weeks of increasing shortness of breath with exertion and at rest accompanied by intermittent left-sided chest pressure absent radiation, migration, relationship to exertion, emesis, or diaphoresis and finally accompanied by similar period of increasing bilateral lower extremity edema past the level of the knee. She reports previously taking prn furosemide that she has recently taking twice daily without resolution of issue.  She denies history of congestive heart failure.    ROS: As per HPI and below:  No blood in the stools, dark stools, oliguria, dysuria, fever, hemoptysis, pleuritic pain, confusion.  Positive for occasional cough productive of yellow sputum.    Review of patient's allergies indicates:   Allergen Reactions    Promethazine Anaphylaxis    Clove flavoring [flavoring agent]     Codeine Itching    Latex, natural rubber     Lorazepam Other (See Comments)    Lovastatin Other (See Comments)    Meclizine Other (See Comments)    Morphine Itching    Simvastatin Other (See Comments)    Tramadol-acetaminophen Itching       Current Discharge Medication List        START taking these medications    Details   furosemide (LASIX) 20 MG tablet Take 1 tablet (20 mg total) by mouth daily as needed.  Qty: 90 tablet, Refills: 1    Associated Diagnoses: Paroxysmal atrial fibrillation           CONTINUE these medications which have NOT CHANGED    Details   albuterol (PROVENTIL) 2.5 mg /3 mL (0.083 %) nebulizer solution Take 3 mLs (2.5 mg total) by nebulization every 6 (six) hours as needed for Wheezing. Rescue  Qty: 300 mL, Refills: 1    Associated Diagnoses: Chronic obstructive pulmonary disease, unspecified COPD type       ALPRAZolam (XANAX) 1 MG tablet Take 1 tablet (1 mg total) by mouth 2 (two) times daily.  Qty: 60 tablet, Refills: 2    Associated Diagnoses: Anxiety      ascorbic acid, vitamin C, (VITAMIN C) 100 MG tablet Take 100 mg by mouth once daily.      clotrimazole-betamethasone 1-0.05% (LOTRISONE) cream Apply twice a day to affected skin  Qty: 45 g, Refills: 1      ELIQUIS 5 mg Tab Take 1 tablet (5 mg total) by mouth 2 (two) times daily.  Qty: 60 tablet, Refills: 5    Associated Diagnoses: Paroxysmal atrial fibrillation      esomeprazole (NEXIUM) 40 MG capsule Take 1 capsule (40 mg total) by mouth before breakfast.  Qty: 90 capsule, Refills: 1    Associated Diagnoses: Gastroesophageal reflux disease without esophagitis      fluticasone propionate (FLONASE) 50 mcg/actuation nasal spray 1 spray (50 mcg total) by Each Nostril route daily as needed for Allergies.  Qty: 16 g, Refills: 2    Associated Diagnoses: Seasonal allergic rhinitis due to pollen      fluticasone-umeclidin-vilanter (TRELEGY ELLIPTA) 100-62.5-25 mcg DsDv Inhale 1 puff into the lungs daily as needed.  Qty: 60 each, Refills: 2    Associated Diagnoses: Chronic obstructive pulmonary disease, unspecified COPD type      levothyroxine (SYNTHROID) 50 MCG tablet Take 1 tablet (50 mcg total) by mouth before breakfast.  Qty: 90 tablet, Refills: 1    Associated Diagnoses: Acquired hypothyroidism      metoprolol succinate (TOPROL-XL) 100 MG 24 hr tablet Take 1 tablet (100 mg total) by mouth once daily. For 30 days  Qty: 90 tablet, Refills: 3    Comments: .  Associated Diagnoses: Paroxysmal atrial fibrillation      mv-min/folic/vit K/lut/[id number] (ALIVE WOMEN'S 50 PLUS, BLEND, ORAL) Take 1 tablet by mouth once daily.      potassium chloride (KLOR-CON) 10 MEQ TbSR Take 1 tablet (10 mEq total) by mouth 2 (two) times daily.  Qty: 180 tablet, Refills: 1    Associated Diagnoses: Pulmonary HTN      nitroGLYCERIN (NITROSTAT) 0.4 MG SL tablet Place 1 tablet (0.4 mg total) under  the tongue every 5 (five) minutes as needed.  Qty: 25 tablet, Refills: 3    Associated Diagnoses: Paroxysmal atrial fibrillation      traZODone (DESYREL) 50 MG tablet Take 1 tablet (50 mg total) by mouth every evening. For sleep  Qty: 30 tablet, Refills: 2    Associated Diagnoses: Reactive depression             PMH:  As per HPI and below:  Past Medical History:   Diagnosis Date    HAILY (acute kidney injury)     Anxiety     Arthritis     Juvenile diagnosed age 17    Atrial fibrillation     COPD (chronic obstructive pulmonary disease)     Depression     Diverticulitis     Emphysema of lung     GERD (gastroesophageal reflux disease)     Hx of hiatal hernia     Hypertension     Osteoporosis     Pneumonia     Sleep apnea      Past Surgical History:   Procedure Laterality Date    BREAST LUMPECTOMY Left     in 40's    COLON SURGERY      COLONOSCOPY  2016    Dr. Reyes 5 years    COLONOSCOPY  2019    Dr. Reyes 5 years    HERNIA REPAIR      HIATAL HERNIA REPAIR  2005    HYSTERECTOMY      SHOULDER ARTHROSCOPY  2015       Social History     Socioeconomic History    Marital status:    Tobacco Use    Smoking status: Never    Smokeless tobacco: Never   Substance and Sexual Activity    Alcohol use: Never    Drug use: Never     Social Drivers of Health     Financial Resource Strain: Low Risk  (9/10/2024)    Overall Financial Resource Strain (CARDIA)     Difficulty of Paying Living Expenses: Not very hard   Food Insecurity: No Food Insecurity (9/10/2024)    Hunger Vital Sign     Worried About Running Out of Food in the Last Year: Never true     Ran Out of Food in the Last Year: Never true   Transportation Needs: No Transportation Needs (9/10/2024)    TRANSPORTATION NEEDS     Transportation : No   Stress: No Stress Concern Present (9/10/2024)    [employer] - Occupational Stress Questionnaire     Feeling of Stress : Not at all   Housing Stability: Low Risk  (9/10/2024)    Housing  Stability Vital Sign     Unable to Pay for Housing in the Last Year: No     Homeless in the Last Year: No       Family History   Problem Relation Name Age of Onset    Hyperlipidemia Mother         Physical Exam:    Vitals:    11/11/24 1943   BP: (!) 175/88   Pulse: 85   Resp:    Temp:      GENERAL:  No apparent distress.  Alert.    HEENT:  Moist mucous membranes.  Normocephalic and atraumatic.  Nasal cannula in place.  NECK:  No swelling.  Midline trachea.   CARDIOVASCULAR:  Regular rate and rhythm.  2+ radial pulses.  No murmur auscultated.  PULMONARY:  Scant rales at both bases.  Occasional scattered rhonchi.  No tachypnea or accessory muscle use.  No wheezes.  Good air movement.  ABDOMEN:  Non-tender and non-distended.    EXTREMITIES:  Warm and well perfused.  Brisk capillary refill.  2+ pitting pedal edema with pitting edema past the level of the knees bilaterally.  Legs are symmetric and nontender to palpation.  NEUROLOGICAL:  Normal mental status.  Appropriate and conversant.    SKIN:  No rashes or ecchymoses.      Labs Reviewed   CBC W/ AUTO DIFFERENTIAL - Abnormal       Result Value    WBC 6.91      RBC 4.16      Hemoglobin 11.8 (*)     Hematocrit 37.4      MCV 90      MCH 28.4      MCHC 31.6 (*)     RDW 16.3 (*)     Platelets 233      MPV 10.5      Immature Granulocytes 0.4      Gran # (ANC) 4.7      Immature Grans (Abs) 0.03      Lymph # 1.3      Mono # 0.7      Eos # 0.2      Baso # 0.04      nRBC 0      Gran % 68.1      Lymph % 18.5      Mono % 9.4      Eosinophil % 3.0      Basophil % 0.6      Differential Method Automated     COMPREHENSIVE METABOLIC PANEL - Abnormal    Sodium 141      Potassium 4.0      Chloride 105      CO2 28      Glucose 89      BUN 17      Creatinine 0.8      Calcium 10.1      Total Protein 7.3      Albumin 4.6      Total Bilirubin 1.9 (*)     Alkaline Phosphatase 51 (*)     AST 14      ALT 7 (*)     eGFR >60.0      Anion Gap 8     B-TYPE NATRIURETIC PEPTIDE - Abnormal      (*)    PROTIME-INR - Abnormal    Prothrombin Time 12.8 (*)     INR 1.2     URINALYSIS - Abnormal    Specimen UA Urine, Clean Catch      Color, UA Colorless (*)     Appearance, UA Clear      pH, UA 6.0      Specific Gravity, UA 1.010      Protein, UA Negative      Glucose, UA Negative      Ketones, UA Negative      Bilirubin (UA) Negative      Occult Blood UA Negative      Nitrite, UA Negative      Urobilinogen, UA Negative      Leukocytes, UA Negative      Narrative:     Specimen Source->Urine  Collection Type->Urine, Clean Catch   TROPONIN I HIGH SENSITIVITY    Troponin I High Sensitivity 11.0     DRUG SCREEN PANEL, URINE EMERGENCY    Cocaine (Metab.) Negative      Opiate Scrn, Ur Negative      Barbiturate Screen, Ur Negative      Amphetamine Screen, Ur Negative      THC Negative      Phencyclidine Negative      Creatinine, Urine 15.9      Toxicology Information SEE COMMENT      Narrative:     Specimen Source->Urine  Collection Type->Urine, Clean Catch   MAGNESIUM    Magnesium 1.8         Current Discharge Medication List        START taking these medications    Details   furosemide (LASIX) 20 MG tablet Take 1 tablet (20 mg total) by mouth daily as needed.  Qty: 90 tablet, Refills: 1    Associated Diagnoses: Paroxysmal atrial fibrillation           CONTINUE these medications which have NOT CHANGED    Details   albuterol (PROVENTIL) 2.5 mg /3 mL (0.083 %) nebulizer solution Take 3 mLs (2.5 mg total) by nebulization every 6 (six) hours as needed for Wheezing. Rescue  Qty: 300 mL, Refills: 1    Associated Diagnoses: Chronic obstructive pulmonary disease, unspecified COPD type      ALPRAZolam (XANAX) 1 MG tablet Take 1 tablet (1 mg total) by mouth 2 (two) times daily.  Qty: 60 tablet, Refills: 2    Associated Diagnoses: Anxiety      ascorbic acid, vitamin C, (VITAMIN C) 100 MG tablet Take 100 mg by mouth once daily.      clotrimazole-betamethasone 1-0.05% (LOTRISONE) cream Apply twice a day to affected skin  Qty: 45 g,  Refills: 1      ELIQUIS 5 mg Tab Take 1 tablet (5 mg total) by mouth 2 (two) times daily.  Qty: 60 tablet, Refills: 5    Associated Diagnoses: Paroxysmal atrial fibrillation      esomeprazole (NEXIUM) 40 MG capsule Take 1 capsule (40 mg total) by mouth before breakfast.  Qty: 90 capsule, Refills: 1    Associated Diagnoses: Gastroesophageal reflux disease without esophagitis      fluticasone propionate (FLONASE) 50 mcg/actuation nasal spray 1 spray (50 mcg total) by Each Nostril route daily as needed for Allergies.  Qty: 16 g, Refills: 2    Associated Diagnoses: Seasonal allergic rhinitis due to pollen      fluticasone-umeclidin-vilanter (TRELEGY ELLIPTA) 100-62.5-25 mcg DsDv Inhale 1 puff into the lungs daily as needed.  Qty: 60 each, Refills: 2    Associated Diagnoses: Chronic obstructive pulmonary disease, unspecified COPD type      levothyroxine (SYNTHROID) 50 MCG tablet Take 1 tablet (50 mcg total) by mouth before breakfast.  Qty: 90 tablet, Refills: 1    Associated Diagnoses: Acquired hypothyroidism      metoprolol succinate (TOPROL-XL) 100 MG 24 hr tablet Take 1 tablet (100 mg total) by mouth once daily. For 30 days  Qty: 90 tablet, Refills: 3    Comments: .  Associated Diagnoses: Paroxysmal atrial fibrillation      mv-min/folic/vit K/lut/[id number] (ALIVE WOMEN'S 50 PLUS, BLEND, ORAL) Take 1 tablet by mouth once daily.      potassium chloride (KLOR-CON) 10 MEQ TbSR Take 1 tablet (10 mEq total) by mouth 2 (two) times daily.  Qty: 180 tablet, Refills: 1    Associated Diagnoses: Pulmonary HTN      nitroGLYCERIN (NITROSTAT) 0.4 MG SL tablet Place 1 tablet (0.4 mg total) under the tongue every 5 (five) minutes as needed.  Qty: 25 tablet, Refills: 3    Associated Diagnoses: Paroxysmal atrial fibrillation      traZODone (DESYREL) 50 MG tablet Take 1 tablet (50 mg total) by mouth every evening. For sleep  Qty: 30 tablet, Refills: 2    Associated Diagnoses: Reactive depression             Orders Placed This Encounter    Procedures    X-Ray Chest 1 View    CBC auto differential    Comprehensive metabolic panel    Troponin I High Sensitivity    Brain Natriuertic Peptide (BNP)    Hemoglobin A1c    Magnesium    TSH    T4, free    CBC Auto Differential    Comprehensive Metabolic Panel    Protime-INR    Urinalysis    Drug screen panel, in-house    Diet Low Sodium, 2gm Fluid - 1500mL    Vital signs    Cardiac Monitoring - Adult    Fluid restriction    Height and weight On admission. Standing Weight Method required.    Daily weights On presentation to floor. Standing Weight Method required.    Strict intake and output 1.5 Liters maximum per 24 hours.    Strict intake and output 1.5 Liters maximum per 24 hours.    Notify Physician    Notify Physician - Potential Need of Opioid Reversal    Place sequential compression device    Full code    Inpatient consult to Social Work/Case Management    Inpatient consult to Registered Dietitian/Nutritionist    Oxygen Continuous    Pulse Oximetry Q4H    Oxygen PRN    CPAP QHS    EKG 12-lead    Echo    Insert Saline lock IV    Possible Hospitalization    Place in Observation    Fall precautions       Imaging Results              X-Ray Chest 1 View (Final result)  Result time 11/11/24 15:39:10      Final result by Boris Yu MD (11/11/24 15:39:10)                   Impression:      No acute cardiac or pulmonary process.      Electronically signed by: Boris Yu  Date:    11/11/2024  Time:    15:39               Narrative:    CLINICAL HISTORY:  ([id number] y/o  (1945) F    SOB;    TECHNIQUE:  (A#[id number], exam time 11/11/2024 15:37)    XR CHEST 1 VIEW [id number]    COMPARISON:  CT from 07/01/2024.    FINDINGS:  Discoid airspace opacity in the infrahilar right lung zone, with perihilar pulmonary pedicular prominence on the right.  Minimal interstitial opacity seen in the left lung base, similar to the previous exam.  Costophrenic angles are seen without effusion. No pneumothorax is  identified. The heart is mildly enlarged. Atheromatous calcifications are seen at the aortic arch. Osseous structures show degenerative changes in the spine. The visualized upper abdomen is unremarkable.                                  (radiology reading, visualized by me)           MDM:    79 y.o. female with several weeks of increasing lower extremity edema, dyspnea, and intermittent chest pain.  Patient is in no respiratory distress.  She is not hypoxic and normal oxygen.  She does have symmetric lower extremity edema, although no prior diagnosis of congestive heart failure.  She does have COPD with good air movement and no wheezing and I doubt COPD exacerbation at this point.  Chest x-ray done to exclude pulmonary pathology such as pneumonia as well as to assess for edema.  Laboratories sent to exclude other end-organ dysfunction such as new anemia or renal dysfunction.  Cardiac biomarker sent with consideration of intermittent chest pain along with EKG to exclude ischemia or arrhythmia.  I doubt PE I do not think D-dimer or CT angiography of the chest are indicated.  Chest x-ray without acute findings other nonspecific based on single view.  Baseline constellation of symptoms, I do think she would be best served by observation with serial cardiac biomarker and diuresis.  Consider update echo although only mild elevation of BNP with consideration of CHF.  I have discussed with hospital medicine who will assume care.    Diagnoses:    1. SOB  2. CP  3. Lower extremity swelling       Bimal Neff MD  11/11/24 1945

## 2024-11-11 NOTE — TELEPHONE ENCOUNTER
----- Message from Jerri sent at 11/11/2024 11:52 AM CST -----  Contact: Shimon  Refill for lasix. [facility] in [place]. Shimon #[phone]

## 2024-11-11 NOTE — SUBJECTIVE & OBJECTIVE
Past Medical History:   Diagnosis Date    HAILY (acute kidney injury)     Anxiety     Arthritis     Juvenile diagnosed age 17    Atrial fibrillation     COPD (chronic obstructive pulmonary disease)     Depression     Diverticulitis     Emphysema of lung     GERD (gastroesophageal reflux disease)     Hx of hiatal hernia     Hypertension     Osteoporosis     Pneumonia     Sleep apnea        Past Surgical History:   Procedure Laterality Date    BREAST LUMPECTOMY Left     in 40's    COLON SURGERY      COLONOSCOPY  2016    Dr. Reyes 5 years    COLONOSCOPY  2019    Dr. Reyes 5 years    HERNIA REPAIR      HIATAL HERNIA REPAIR  2005    HYSTERECTOMY      SHOULDER ARTHROSCOPY  2015       Review of patient's allergies indicates:   Allergen Reactions    Promethazine Anaphylaxis    Clove flavoring [flavoring agent]     Codeine Itching    Latex, natural rubber     Lorazepam Other (See Comments)    Lovastatin Other (See Comments)    Meclizine Other (See Comments)    Morphine Itching    Simvastatin Other (See Comments)    Tramadol-acetaminophen Itching       No current facility-administered medications on file prior to encounter.     Current Outpatient Medications on File Prior to Encounter   Medication Sig    albuterol (PROVENTIL) 2.5 mg /3 mL (0.083 %) nebulizer solution Take 3 mLs (2.5 mg total) by nebulization every 6 (six) hours as needed for Wheezing. Rescue    ALPRAZolam (XANAX) 1 MG tablet Take 1 tablet (1 mg total) by mouth 2 (two) times daily.    ascorbic acid, vitamin C, (VITAMIN C) 100 MG tablet Take 100 mg by mouth once daily.    clotrimazole-betamethasone 1-0.05% (LOTRISONE) cream Apply twice a day to affected skin (Patient taking differently: Apply 1 g topically 2 (two) times daily. Apply twice a day to affected skin)    ELIQUIS 5 mg Tab Take 1 tablet (5 mg total) by mouth 2 (two) times daily.    esomeprazole (NEXIUM) 40 MG capsule Take 1 capsule (40 mg total) by mouth before breakfast.    fluticasone  propionate (FLONASE) 50 mcg/actuation nasal spray 1 spray (50 mcg total) by Each Nostril route daily as needed for Allergies.    fluticasone-umeclidin-vilanter (TRELEGY ELLIPTA) 100-62.5-25 mcg DsDv Inhale 1 puff into the lungs daily as needed.    levothyroxine (SYNTHROID) 50 MCG tablet Take 1 tablet (50 mcg total) by mouth before breakfast.    metoprolol succinate (TOPROL-XL) 100 MG 24 hr tablet Take 1 tablet (100 mg total) by mouth once daily. For 30 days    mv-min/folic/vit K/lut/[id number] (ALIVE WOMEN'S 50 PLUS, BLEND, ORAL) Take 1 tablet by mouth once daily.    potassium chloride (KLOR-CON) 10 MEQ TbSR Take 1 tablet (10 mEq total) by mouth 2 (two) times daily.    [DISCONTINUED] furosemide (LASIX) 20 MG tablet Take 1 tablet (20 mg total) by mouth daily as needed.    furosemide (LASIX) 20 MG tablet Take 1 tablet (20 mg total) by mouth daily as needed.    nitroGLYCERIN (NITROSTAT) 0.4 MG SL tablet Place 1 tablet (0.4 mg total) under the tongue every 5 (five) minutes as needed. (Patient taking differently: Place 0.4 mg under the tongue every 5 (five) minutes as needed for Chest pain.)    traZODone (DESYREL) 50 MG tablet Take 1 tablet (50 mg total) by mouth every evening. For sleep    [DISCONTINUED] aspirin-calcium carbonate 81 mg-300 mg calcium(777 mg) Tab Take 75 mg by mouth.    [DISCONTINUED] aspirin-calcium carbonate 81 mg-300 mg calcium(777 mg) Tab Take 75 mg by mouth once daily.    [DISCONTINUED] calcium carbonate (TUMS) 200 mg calcium (500 mg) chewable tablet Take 1 tablet by mouth once daily.    [DISCONTINUED] famotidine (PEPCID) 40 MG tablet Take 1 tablet (40 mg total) by mouth nightly as needed for Heartburn.    [DISCONTINUED] hyoscyamine (NULEV) 0.125 mg TbDL Take 0.125 mg by mouth.    [DISCONTINUED] pantoprazole (PROTONIX) 40 MG tablet Take 1 tablet (40 mg total) by mouth 2 (two) times daily.    [DISCONTINUED] predniSONE (DELTASONE) 10 MG tablet SMARTSI Tablet(s) By Mouth Every Morning    [DISCONTINUED]  PROCTO-MED HC 2.5 % rectal cream      Family History       Problem Relation (Age of Onset)    Hyperlipidemia Mother          Tobacco Use    Smoking status: Never    Smokeless tobacco: Never   Substance and Sexual Activity    Alcohol use: Never    Drug use: Never    Sexual activity: Not on file     Review of Systems   Constitutional:  Negative for chills and fever.   HENT:  Negative for congestion and sore throat.    Respiratory:  Positive for cough and shortness of breath.    Cardiovascular:  Positive for chest pain and leg swelling.   Gastrointestinal:  Negative for abdominal pain, constipation, diarrhea, nausea and vomiting.   Genitourinary:  Negative for flank pain.   Neurological:  Negative for syncope.   Psychiatric/Behavioral:  Negative for confusion.      Objective:     Vital Signs (Most Recent):  Temp: 98.1 °F (36.7 °C) (11/11/24 1357)  Pulse: 81 (11/11/24 1631)  Resp: (!) 26 (11/11/24 1631)  BP: (!) 185/85 (11/11/24 1631)  SpO2: 98 % (11/11/24 1631) Vital Signs (24h Range):  Temp:  [98.1 °F (36.7 °C)] 98.1 °F (36.7 °C)  Pulse:  [73-82] 81  Resp:  [18-26] 26  SpO2:  [95 %-98 %] 98 %  BP: (155-186)/(72-96) 185/85        There is no height or weight on file to calculate BMI.     Physical Exam  Vitals reviewed.   Constitutional:       General: She is not in acute distress.  HENT:      Head: Normocephalic and atraumatic.      Mouth/Throat:      Mouth: Mucous membranes are moist.   Eyes:      Conjunctiva/sclera: Conjunctivae normal.   Cardiovascular:      Rate and Rhythm: Normal rate. Rhythm irregular.   Pulmonary:      Effort: Pulmonary effort is normal. No respiratory distress.      Breath sounds: Rales present.   Abdominal:      General: Bowel sounds are normal.      Palpations: Abdomen is soft.      Tenderness: There is no abdominal tenderness.   Musculoskeletal:         General: Normal range of motion.      Cervical back: Normal range of motion.      Right lower leg: Edema present.      Left lower leg: Edema  "present.   Skin:     General: Skin is warm and dry.      Capillary Refill: Capillary refill takes less than 2 seconds.   Neurological:      Mental Status: She is alert and oriented to person, place, and time.   Psychiatric:         Mood and Affect: Mood normal.                Significant Labs: All pertinent labs within the past 24 hours have been reviewed.  Bilirubin:   Recent Labs   Lab 11/11/24  1522   BILITOT 1.9*     CBC:   Recent Labs   Lab 11/11/24  1522   WBC 6.91   HGB 11.8*   HCT 37.4        CMP:   Recent Labs   Lab 11/11/24  1522      K 4.0      CO2 28   GLU 89   BUN 17   CREATININE 0.8   CALCIUM 10.1   PROT 7.3   ALBUMIN 4.6   BILITOT 1.9*   ALKPHOS 51*   AST 14   ALT 7*   ANIONGAP 8     Cardiac Markers:   Recent Labs   Lab 11/11/24  1522   *     Magnesium: No results for input(s): "MG" in the last 48 hours.  Troponin:   Recent Labs   Lab 11/11/24  1522   TROPONINIHS 11.0     TSH: No results for input(s): "TSH" in the last 4320 hours.  Urine Studies: No results for input(s): "COLORU", "APPEARANCEUA", "PHUR", "SPECGRAV", "PROTEINUA", "GLUCUA", "KETONESU", "BILIRUBINUA", "OCCULTUA", "NITRITE", "UROBILINOGEN", "LEUKOCYTESUR", "RBCUA", "WBCUA", "BACTERIA", "SQUAMEPITHEL", "HYALINECASTS" in the last 48 hours.    Invalid input(s): "WRIGHTSUR"    Significant Imaging: I have reviewed all pertinent imaging results/findings within the past 24 hours.  "

## 2024-11-12 ENCOUNTER — CLINICAL SUPPORT (OUTPATIENT)
Dept: CARDIOLOGY | Facility: HOSPITAL | Age: 79
End: 2024-11-12
Attending: EMERGENCY MEDICINE
Payer: MEDICARE

## 2024-11-12 VITALS — HEIGHT: 58 IN | BODY MASS INDEX: 43.08 KG/M2 | WEIGHT: 205.25 LBS

## 2024-11-12 LAB
ALBUMIN SERPL BCP-MCNC: 4.7 G/DL (ref 3.5–5.2)
ALP SERPL-CCNC: 57 U/L (ref 55–135)
ALT SERPL W/O P-5'-P-CCNC: 7 U/L (ref 10–44)
ANION GAP SERPL CALC-SCNC: 12 MMOL/L (ref 8–16)
AORTIC ROOT ANNULUS: 3.1 CM
AORTIC VALVE CUSP SEPERATION: 1.8 CM
APICAL FOUR CHAMBER EJECTION FRACTION: 70 %
ASCENDING AORTA: 3.4 CM
AST SERPL-CCNC: 16 U/L (ref 10–40)
AV INDEX (PROSTH): 0.77
AV MEAN GRADIENT: 4 MMHG
AV PEAK GRADIENT: 7.8 MMHG
AV REGURGITATION PRESSURE HALF TIME: 392 MS
AV VALVE AREA BY VELOCITY RATIO: 2.2 CM²
AV VALVE AREA: 2.4 CM²
AV VELOCITY RATIO: 0.71
BASOPHILS # BLD AUTO: 0.05 K/UL (ref 0–0.2)
BASOPHILS NFR BLD: 0.8 % (ref 0–1.9)
BILIRUB SERPL-MCNC: 2.2 MG/DL (ref 0.1–1)
BSA FOR ECHO PROCEDURE: 1.95 M2
BUN SERPL-MCNC: 15 MG/DL (ref 8–23)
CALCIUM SERPL-MCNC: 10.6 MG/DL (ref 8.7–10.5)
CHLORIDE SERPL-SCNC: 99 MMOL/L (ref 95–110)
CO2 SERPL-SCNC: 31 MMOL/L (ref 23–29)
CREAT SERPL-MCNC: 0.9 MG/DL (ref 0.5–1.4)
CV ECHO LV RWT: 0.51 CM
DIFFERENTIAL METHOD BLD: ABNORMAL
DOP CALC AO PEAK VEL: 1.4 M/S
DOP CALC AO VTI: 23.5 CM
DOP CALC LVOT AREA: 3.1 CM2
DOP CALC LVOT DIAMETER: 2 CM
DOP CALC LVOT PEAK VEL: 1 M/S
DOP CALC LVOT STROKE VOLUME: 56.5 CM3
DOP CALC MV VTI: 27.1 CM
DOP CALCLVOT PEAK VEL VTI: 18 CM
E WAVE DECELERATION TIME: 165 MSEC
E/A RATIO: 3.79
E/E' RATIO: 16.13 M/S
ECHO LV POSTERIOR WALL: 1 CM (ref 0.6–1.1)
EOSINOPHIL # BLD AUTO: 0.2 K/UL (ref 0–0.5)
EOSINOPHIL NFR BLD: 2.5 % (ref 0–8)
ERYTHROCYTE [DISTWIDTH] IN BLOOD BY AUTOMATED COUNT: 16.2 % (ref 11.5–14.5)
EST. GFR  (NO RACE VARIABLE): >60 ML/MIN/1.73 M^2
ESTIMATED AVG GLUCOSE: 131 MG/DL (ref 68–131)
FRACTIONAL SHORTENING: 35.9 % (ref 28–44)
GLUCOSE SERPL-MCNC: 100 MG/DL (ref 70–110)
HBA1C MFR BLD: 6.2 % (ref 4.5–6.2)
HCT VFR BLD AUTO: 35.3 % (ref 37–48.5)
HGB BLD-MCNC: 11.2 G/DL (ref 12–16)
IMM GRANULOCYTES # BLD AUTO: 0.01 K/UL (ref 0–0.04)
IMM GRANULOCYTES NFR BLD AUTO: 0.2 % (ref 0–0.5)
INR PPP: 1.2 (ref 0.8–1.2)
INTERVENTRICULAR SEPTUM: 1.3 CM (ref 0.6–1.1)
IVC DIAMETER: 2.56 CM
LEFT ATRIUM SIZE: 3.9 CM
LEFT INTERNAL DIMENSION IN SYSTOLE: 2.5 CM (ref 2.1–4)
LEFT VENTRICLE DIASTOLIC VOLUME INDEX: 35.38 ML/M2
LEFT VENTRICLE DIASTOLIC VOLUME: 65.1 ML
LEFT VENTRICLE END DIASTOLIC VOLUME APICAL 4 CHAMBER: 37.5 ML
LEFT VENTRICLE MASS INDEX: 81.3 G/M2
LEFT VENTRICLE SYSTOLIC VOLUME INDEX: 12 ML/M2
LEFT VENTRICLE SYSTOLIC VOLUME: 22.1 ML
LEFT VENTRICULAR INTERNAL DIMENSION IN DIASTOLE: 3.9 CM (ref 3.5–6)
LEFT VENTRICULAR MASS: 149.5 G
LV LATERAL E/E' RATIO: 14.33 M/S
LV SEPTAL E/E' RATIO: 18.43 M/S
LVED V (TEICH): 65.1 ML
LVES V (TEICH): 22.1 ML
LVOT MG: 2 MMHG
LVOT MV: 0.64 CM/S
LYMPHOCYTES # BLD AUTO: 1.3 K/UL (ref 1–4.8)
LYMPHOCYTES NFR BLD: 20.1 % (ref 18–48)
MAGNESIUM SERPL-MCNC: 1.8 MG/DL (ref 1.6–2.6)
MCH RBC QN AUTO: 28.8 PG (ref 27–31)
MCHC RBC AUTO-ENTMCNC: 31.7 G/DL (ref 32–36)
MCV RBC AUTO: 91 FL (ref 82–98)
MONOCYTES # BLD AUTO: 0.5 K/UL (ref 0.3–1)
MONOCYTES NFR BLD: 8.3 % (ref 4–15)
MV MEAN GRADIENT: 3 MMHG
MV PEAK A VEL: 0.34 M/S
MV PEAK E VEL: 1.29 M/S
MV PEAK GRADIENT: 6 MMHG
MV STENOSIS PRESSURE HALF TIME: 60 MS
MV VALVE AREA BY CONTINUITY EQUATION: 2.09 CM2
MV VALVE AREA P 1/2 METHOD: 3.67 CM2
NEUTROPHILS # BLD AUTO: 4.4 K/UL (ref 1.8–7.7)
NEUTROPHILS NFR BLD: 68.1 % (ref 38–73)
NRBC BLD-RTO: 0 /100 WBC
OHS CV RV/LV RATIO: 1.26 CM
PISA AR MAX VEL: 4.08 M/S
PISA MRMAX VEL: 4.51 M/S
PISA TR MAX VEL: 4.19 M/S
PLATELET # BLD AUTO: 212 K/UL (ref 150–450)
PMV BLD AUTO: 10.6 FL (ref 9.2–12.9)
POTASSIUM SERPL-SCNC: 3.5 MMOL/L (ref 3.5–5.1)
PROT SERPL-MCNC: 7.6 G/DL (ref 6–8.4)
PROTHROMBIN TIME: 13.2 SEC (ref 9–12.5)
PV MV: 0.88 M/S
PV PEAK GRADIENT: 7 MMHG
PV PEAK VELOCITY: 1.34 M/S
RBC # BLD AUTO: 3.89 M/UL (ref 4–5.4)
RIGHT VENTRICLE DIASTOLIC BASEL DIMENSION: 4.9 CM
RIGHT VENTRICLE DIASTOLIC LENGTH: 7 CM
RIGHT VENTRICLE DIASTOLIC MID DIMENSION: 4.4 CM
RIGHT VENTRICULAR END-DIASTOLIC DIMENSION: 2.4 CM
RIGHT VENTRICULAR LENGTH IN DIASTOLE (APICAL 4-CHAMBER VIEW): 7.01 CM
RV MID DIAMA: 4.44 CM
RV TISSUE DOPPLER FREE WALL SYSTOLIC VELOCITY 1 (APICAL 4 CHAMBER VIEW): 14.5 CM/S
SODIUM SERPL-SCNC: 142 MMOL/L (ref 136–145)
T4 FREE SERPL-MCNC: 1.67 NG/DL (ref 0.71–1.51)
TDI LATERAL: 0.09 M/S
TDI SEPTAL: 0.07 M/S
TDI: 0.08 M/S
TR MAX PG: 70 MMHG
TRICUSPID ANNULAR PLANE SYSTOLIC EXCURSION: 1.83 CM
TSH SERPL DL<=0.005 MIU/L-ACNC: 3.65 UIU/ML (ref 0.34–5.6)
WBC # BLD AUTO: 6.38 K/UL (ref 3.9–12.7)
Z-SCORE OF LEFT VENTRICULAR DIMENSION IN END DIASTOLE: -2.69
Z-SCORE OF LEFT VENTRICULAR DIMENSION IN END SYSTOLE: -1.83

## 2024-11-12 PROCEDURE — 99900035 HC TECH TIME PER 15 MIN (STAT)

## 2024-11-12 PROCEDURE — 93306 TTE W/DOPPLER COMPLETE: CPT | Mod: 26,,, | Performed by: GENERAL PRACTICE

## 2024-11-12 PROCEDURE — 94799 UNLISTED PULMONARY SVC/PX: CPT

## 2024-11-12 PROCEDURE — 83735 ASSAY OF MAGNESIUM: CPT

## 2024-11-12 PROCEDURE — 25000242 PHARM REV CODE 250 ALT 637 W/ HCPCS: Performed by: INTERNAL MEDICINE

## 2024-11-12 PROCEDURE — 12000002 HC ACUTE/MED SURGE SEMI-PRIVATE ROOM

## 2024-11-12 PROCEDURE — 63600175 PHARM REV CODE 636 W HCPCS: Performed by: INTERNAL MEDICINE

## 2024-11-12 PROCEDURE — 83036 HEMOGLOBIN GLYCOSYLATED A1C: CPT

## 2024-11-12 PROCEDURE — 99900031 HC PATIENT EDUCATION (STAT)

## 2024-11-12 PROCEDURE — 99223 1ST HOSP IP/OBS HIGH 75: CPT | Mod: ,,, | Performed by: GENERAL PRACTICE

## 2024-11-12 PROCEDURE — 36415 COLL VENOUS BLD VENIPUNCTURE: CPT

## 2024-11-12 PROCEDURE — 27000221 HC OXYGEN, UP TO 24 HOURS

## 2024-11-12 PROCEDURE — 85025 COMPLETE CBC W/AUTO DIFF WBC: CPT

## 2024-11-12 PROCEDURE — 94640 AIRWAY INHALATION TREATMENT: CPT | Mod: XB

## 2024-11-12 PROCEDURE — 94761 N-INVAS EAR/PLS OXIMETRY MLT: CPT

## 2024-11-12 PROCEDURE — 96372 THER/PROPH/DIAG INJ SC/IM: CPT

## 2024-11-12 PROCEDURE — 25000242 PHARM REV CODE 250 ALT 637 W/ HCPCS: Performed by: NURSE PRACTITIONER

## 2024-11-12 PROCEDURE — 25000003 PHARM REV CODE 250

## 2024-11-12 PROCEDURE — 80053 COMPREHEN METABOLIC PANEL: CPT

## 2024-11-12 PROCEDURE — 93306 TTE W/DOPPLER COMPLETE: CPT

## 2024-11-12 PROCEDURE — 63600175 PHARM REV CODE 636 W HCPCS

## 2024-11-12 PROCEDURE — 85610 PROTHROMBIN TIME: CPT

## 2024-11-12 PROCEDURE — 84439 ASSAY OF FREE THYROXINE: CPT

## 2024-11-12 PROCEDURE — 84443 ASSAY THYROID STIM HORMONE: CPT

## 2024-11-12 PROCEDURE — 94640 AIRWAY INHALATION TREATMENT: CPT

## 2024-11-12 RX ADMIN — NITROGLYCERIN 0.5 INCH: 20 OINTMENT TOPICAL at 12:11

## 2024-11-12 RX ADMIN — TRAZODONE HYDROCHLORIDE 50 MG: 50 TABLET ORAL at 09:11

## 2024-11-12 RX ADMIN — IPRATROPIUM BROMIDE 0.5 MG: 0.5 SOLUTION RESPIRATORY (INHALATION) at 12:11

## 2024-11-12 RX ADMIN — ARFORMOTEROL TARTRATE 15 MCG: 15 SOLUTION RESPIRATORY (INHALATION) at 07:11

## 2024-11-12 RX ADMIN — LEVOTHYROXINE SODIUM 50 MCG: 0.03 TABLET ORAL at 06:11

## 2024-11-12 RX ADMIN — FUROSEMIDE 40 MG: 10 INJECTION, SOLUTION INTRAVENOUS at 06:11

## 2024-11-12 RX ADMIN — METOPROLOL SUCCINATE 100 MG: 50 TABLET, FILM COATED, EXTENDED RELEASE ORAL at 08:11

## 2024-11-12 RX ADMIN — LEVALBUTEROL HYDROCHLORIDE 1.25 MG: 1.25 SOLUTION RESPIRATORY (INHALATION) at 07:11

## 2024-11-12 RX ADMIN — ALPRAZOLAM 1 MG: 0.5 TABLET ORAL at 08:11

## 2024-11-12 RX ADMIN — LEVALBUTEROL HYDROCHLORIDE 1.25 MG: 1.25 SOLUTION RESPIRATORY (INHALATION) at 12:11

## 2024-11-12 RX ADMIN — NITROGLYCERIN 0.5 INCH: 20 OINTMENT TOPICAL at 05:11

## 2024-11-12 RX ADMIN — FUROSEMIDE 40 MG: 10 INJECTION, SOLUTION INTRAVENOUS at 05:11

## 2024-11-12 RX ADMIN — BUDESONIDE INHALATION 0.5 MG: 0.5 SUSPENSION RESPIRATORY (INHALATION) at 07:11

## 2024-11-12 RX ADMIN — ALPRAZOLAM 1 MG: 0.5 TABLET ORAL at 09:11

## 2024-11-12 RX ADMIN — IPRATROPIUM BROMIDE 0.5 MG: 0.5 SOLUTION RESPIRATORY (INHALATION) at 01:11

## 2024-11-12 RX ADMIN — ASPIRIN 81 MG: 81 TABLET, COATED ORAL at 08:11

## 2024-11-12 RX ADMIN — NITROGLYCERIN 0.5 INCH: 20 OINTMENT TOPICAL at 06:11

## 2024-11-12 RX ADMIN — PANTOPRAZOLE SODIUM 40 MG: 40 TABLET, DELAYED RELEASE ORAL at 06:11

## 2024-11-12 RX ADMIN — CLOTRIMAZOLE AND BETAMETHASONE DIPROPIONATE 1 G: 10; .5 CREAM TOPICAL at 09:11

## 2024-11-12 RX ADMIN — ENOXAPARIN SODIUM 90 MG: 100 INJECTION SUBCUTANEOUS at 09:11

## 2024-11-12 RX ADMIN — IPRATROPIUM BROMIDE 0.5 MG: 0.5 SOLUTION RESPIRATORY (INHALATION) at 07:11

## 2024-11-12 RX ADMIN — THERA TABS 1 TABLET: TAB at 08:11

## 2024-11-12 RX ADMIN — ENOXAPARIN SODIUM 90 MG: 100 INJECTION SUBCUTANEOUS at 08:11

## 2024-11-12 NOTE — ASSESSMENT & PLAN NOTE
Denies hx CHF  Supplemental O2 via nasal canula; titrate O2 saturation to >92%.  Serial Cardiac enzymes × 3.  Diuretic administration- intravenous Lasix 40 mg q.12h. Monitor electrolytes for hypokalemia and hypomagnesemia.  Cardiology Consultation.  Usually follows with Dr. Kate.  2-D Echocardiogram for evaluation of left ventricle systolic function or any valvular heart disease.  Daily weights.  Strict I/Os.  Fluid restriction.  Na restriction <2g/d.

## 2024-11-12 NOTE — H&P
[facility] - Emergency Dept  Hospital Medicine  History & Physical    Patient Name: Danielle Martinez  MRN: [id number]  Patient Class: OP- Observation  Admission Date: 11/11/2024  Attending Physician: Uvaldo Narvaez MD   Primary Care Provider: Antony Lopez MD         Patient information was obtained from patient, spouse/SO, past medical records, and ER records.     Subjective:     Principal Problem:Dyspnea on exertion    Chief Complaint:   Chief Complaint   Patient presents with    Leg Swelling     Pt is having bilateral lower leg edema stating above the knee is swelling as well. Pt on home o2 using about 60% of her lungs.         HPI: 79-year-old female presented to ED for eval of shortness of breath.  Patient reported over the last several weeks she increasing shortness of breath, with associated dyspnea on exertion, bilateral lower extremity edema, and left-sided chest pressure.  Patient reported she has p.r.n. Lasix at home and has been taking it lately twice a day without symptom improvement, denies history CHF.  Denies fever, chills.  Denies abdominal pain, nausea, vomiting, changes in bowel habits.  Denies syncope.  Patient does have history of COPD and is home O2 dependent on 4 L NC.  Patient also with history of PAF, on Eliquis.  She uses a CPAP HS for BRITTANIE.  Echo December 20, 2023 with EF 55-60%.  In ED today, initial troponin 11, repeat pending.  .  CXR impression without acute abnormality.  Admit to hospital medicine for further eval.    Past Medical History:   Diagnosis Date    HAILY (acute kidney injury)     Anxiety     Arthritis     Juvenile diagnosed age 17    Atrial fibrillation     COPD (chronic obstructive pulmonary disease)     Depression     Diverticulitis     Emphysema of lung     GERD (gastroesophageal reflux disease)     Hx of hiatal hernia     Hypertension     Osteoporosis     Pneumonia     Sleep apnea        Past Surgical History:   Procedure Laterality Date    BREAST  LUMPECTOMY Left     in 40's    COLON SURGERY      COLONOSCOPY  2016    Dr. Reyes 5 years    COLONOSCOPY  2019    Dr. Reyes 5 years    HERNIA REPAIR      HIATAL HERNIA REPAIR  2005    HYSTERECTOMY      SHOULDER ARTHROSCOPY  2015       Review of patient's allergies indicates:   Allergen Reactions    Promethazine Anaphylaxis    Clove flavoring [flavoring agent]     Codeine Itching    Latex, natural rubber     Lorazepam Other (See Comments)    Lovastatin Other (See Comments)    Meclizine Other (See Comments)    Morphine Itching    Simvastatin Other (See Comments)    Tramadol-acetaminophen Itching       No current facility-administered medications on file prior to encounter.     Current Outpatient Medications on File Prior to Encounter   Medication Sig    albuterol (PROVENTIL) 2.5 mg /3 mL (0.083 %) nebulizer solution Take 3 mLs (2.5 mg total) by nebulization every 6 (six) hours as needed for Wheezing. Rescue    ALPRAZolam (XANAX) 1 MG tablet Take 1 tablet (1 mg total) by mouth 2 (two) times daily.    ascorbic acid, vitamin C, (VITAMIN C) 100 MG tablet Take 100 mg by mouth once daily.    clotrimazole-betamethasone 1-0.05% (LOTRISONE) cream Apply twice a day to affected skin (Patient taking differently: Apply 1 g topically 2 (two) times daily. Apply twice a day to affected skin)    ELIQUIS 5 mg Tab Take 1 tablet (5 mg total) by mouth 2 (two) times daily.    esomeprazole (NEXIUM) 40 MG capsule Take 1 capsule (40 mg total) by mouth before breakfast.    fluticasone propionate (FLONASE) 50 mcg/actuation nasal spray 1 spray (50 mcg total) by Each Nostril route daily as needed for Allergies.    fluticasone-umeclidin-vilanter (TRELEGY ELLIPTA) 100-62.5-25 mcg DsDv Inhale 1 puff into the lungs daily as needed.    levothyroxine (SYNTHROID) 50 MCG tablet Take 1 tablet (50 mcg total) by mouth before breakfast.    metoprolol succinate (TOPROL-XL) 100 MG 24 hr tablet Take 1 tablet (100 mg total) by mouth once daily. For  30 days    mv-min/folic/vit K/lut/hpha488 (ALIVE WOMEN'S 50 PLUS, BLEND, ORAL) Take 1 tablet by mouth once daily.    potassium chloride (KLOR-CON) 10 MEQ TbSR Take 1 tablet (10 mEq total) by mouth 2 (two) times daily.    [DISCONTINUED] furosemide (LASIX) 20 MG tablet Take 1 tablet (20 mg total) by mouth daily as needed.    furosemide (LASIX) 20 MG tablet Take 1 tablet (20 mg total) by mouth daily as needed.    nitroGLYCERIN (NITROSTAT) 0.4 MG SL tablet Place 1 tablet (0.4 mg total) under the tongue every 5 (five) minutes as needed. (Patient taking differently: Place 0.4 mg under the tongue every 5 (five) minutes as needed for Chest pain.)    traZODone (DESYREL) 50 MG tablet Take 1 tablet (50 mg total) by mouth every evening. For sleep    [DISCONTINUED] aspirin-calcium carbonate 81 mg-300 mg calcium(777 mg) Tab Take 75 mg by mouth.    [DISCONTINUED] aspirin-calcium carbonate 81 mg-300 mg calcium(777 mg) Tab Take 75 mg by mouth once daily.    [DISCONTINUED] calcium carbonate (TUMS) 200 mg calcium (500 mg) chewable tablet Take 1 tablet by mouth once daily.    [DISCONTINUED] famotidine (PEPCID) 40 MG tablet Take 1 tablet (40 mg total) by mouth nightly as needed for Heartburn.    [DISCONTINUED] hyoscyamine (NULEV) 0.125 mg TbDL Take 0.125 mg by mouth.    [DISCONTINUED] pantoprazole (PROTONIX) 40 MG tablet Take 1 tablet (40 mg total) by mouth 2 (two) times daily.    [DISCONTINUED] predniSONE (DELTASONE) 10 MG tablet SMARTSI Tablet(s) By Mouth Every Morning    [DISCONTINUED] PROCTO-MED HC 2.5 % rectal cream      Family History       Problem Relation (Age of Onset)    Hyperlipidemia Mother          Tobacco Use    Smoking status: Never    Smokeless tobacco: Never   Substance and Sexual Activity    Alcohol use: Never    Drug use: Never    Sexual activity: Not on file     Review of Systems   Constitutional:  Negative for chills and fever.   HENT:  Negative for congestion and sore throat.    Respiratory:  Positive for cough  and shortness of breath.    Cardiovascular:  Positive for chest pain and leg swelling.   Gastrointestinal:  Negative for abdominal pain, constipation, diarrhea, nausea and vomiting.   Genitourinary:  Negative for flank pain.   Neurological:  Negative for syncope.   Psychiatric/Behavioral:  Negative for confusion.      Objective:     Vital Signs (Most Recent):  Temp: 98.1 °F (36.7 °C) (11/11/24 1357)  Pulse: 81 (11/11/24 1631)  Resp: (!) 26 (11/11/24 1631)  BP: (!) 185/85 (11/11/24 1631)  SpO2: 98 % (11/11/24 1631) Vital Signs (24h Range):  Temp:  [98.1 °F (36.7 °C)] 98.1 °F (36.7 °C)  Pulse:  [73-82] 81  Resp:  [18-26] 26  SpO2:  [95 %-98 %] 98 %  BP: (155-186)/(72-96) 185/85        There is no height or weight on file to calculate BMI.     Physical Exam  Vitals reviewed.   Constitutional:       General: She is not in acute distress.  HENT:      Head: Normocephalic and atraumatic.      Mouth/Throat:      Mouth: Mucous membranes are moist.   Eyes:      Conjunctiva/sclera: Conjunctivae normal.   Cardiovascular:      Rate and Rhythm: Normal rate. Rhythm irregular.   Pulmonary:      Effort: Pulmonary effort is normal. No respiratory distress.      Breath sounds: Rales present.   Abdominal:      General: Bowel sounds are normal.      Palpations: Abdomen is soft.      Tenderness: There is no abdominal tenderness.   Musculoskeletal:         General: Normal range of motion.      Cervical back: Normal range of motion.      Right lower leg: Edema present.      Left lower leg: Edema present.   Skin:     General: Skin is warm and dry.      Capillary Refill: Capillary refill takes less than 2 seconds.   Neurological:      Mental Status: She is alert and oriented to person, place, and time.   Psychiatric:         Mood and Affect: Mood normal.                Significant Labs: All pertinent labs within the past 24 hours have been reviewed.  Bilirubin:   Recent Labs   Lab 11/11/24  1522   BILITOT 1.9*     CBC:   Recent Labs   Lab  "11/11/24  1522   WBC 6.91   HGB 11.8*   HCT 37.4        CMP:   Recent Labs   Lab 11/11/24  1522      K 4.0      CO2 28   GLU 89   BUN 17   CREATININE 0.8   CALCIUM 10.1   PROT 7.3   ALBUMIN 4.6   BILITOT 1.9*   ALKPHOS 51*   AST 14   ALT 7*   ANIONGAP 8     Cardiac Markers:   Recent Labs   Lab 11/11/24  1522   *     Magnesium: No results for input(s): "MG" in the last 48 hours.  Troponin:   Recent Labs   Lab 11/11/24  1522   TROPONINIHS 11.0     TSH: No results for input(s): "TSH" in the last 4320 hours.  Urine Studies: No results for input(s): "COLORU", "APPEARANCEUA", "PHUR", "SPECGRAV", "PROTEINUA", "GLUCUA", "KETONESU", "BILIRUBINUA", "OCCULTUA", "NITRITE", "UROBILINOGEN", "LEUKOCYTESUR", "RBCUA", "WBCUA", "BACTERIA", "SQUAMEPITHEL", "HYALINECASTS" in the last 48 hours.    Invalid input(s): "WRIGHTSUR"    Significant Imaging: I have reviewed all pertinent imaging results/findings within the past 24 hours.  Assessment/Plan:     * Dyspnea on exertion  Denies hx CHF  Echo  Lasix  Strict I&O  Daily weight  PO fluid restriction   Tele monitoring    Chest pain  See above  Serial troponins x 3  EKG PRN  ASA  NTG paste      Hyperbilirubinemia  Avoid hepatotoxic meds  Echo  Daily CMP      Atrial fibrillation  Patient has paroxysmal (<7 days) atrial fibrillation. Patient is currently in atrial fibrillation. [id number] Score: 6. The patients heart rate in the last 24 hours is as follows:  Pulse  Min: 73  Max: 82     Antiarrhythmics  metoprolol succinate (TOPROL-XL) 24 hr tablet 100 mg, Daily, Oral    Anticoagulants  enoxaparin injection 1 mg/kg (Dosing Weight), Every 12 hours, Subcutaneous    Plan  - Replete lytes with a goal of K>4, Mg >2  - Patient is anticoagulated, see medications listed above.  - Patient's afib is currently controlled  - Echo        COPD (chronic obstructive pulmonary disease)  Home O2 dependent  Patient's COPD is controlled currently.  Patient is currently off COPD Pathway. " Continue scheduled inhalers Supplemental oxygen and monitor respiratory status closely.     BRITTANIE on CPAP  CPAP HS per RT  Monitor O2 sat        VTE Risk Mitigation (From admission, onward)           Ordered     IP VTE HIGH RISK PATIENT  Once         11/11/24 1812     Place sequential compression device  Until discontinued         11/11/24 1812                         On 11/11/2024, patient should be placed in hospital observation services under my care in collaboration with Dr. Narvaez.           Maria Ines Boswell, NP  Department of Hospital Medicine  [facility] - Emergency Dept

## 2024-11-12 NOTE — CARE UPDATE
11/12/24 0724   Patient Assessment/Suction   Level of Consciousness (AVPU) alert   Respiratory Effort Unlabored   Expansion/Accessory Muscles/Retractions no use of accessory muscles   All Lung Fields Breath Sounds diminished;crackles   Rhythm/Pattern, Respiratory depth regular;pattern regular   Cough Frequency no cough   PRE-TX-O2   Device (Oxygen Therapy) nasal cannula   $ Is the patient on Low Flow Oxygen? Yes   Flow (L/min) (Oxygen Therapy) 3   SpO2 (!) 94 %   Pulse Oximetry Type Intermittent   $ Pulse Oximetry - Multiple Charge Pulse Oximetry - Multiple   Pulse 82   Resp 16   Aerosol Therapy   $ Aerosol Therapy Charges Aerosol Treatment  (xop/ uda)   Daily Review of Necessity (SVN) completed   Respiratory Treatment Status (SVN) given   Treatment Route (SVN) mask   Patient Position semi-Tellez's   Post Treatment Assessment (SVN) increased aeration   Signs of Intolerance (SVN) none   Breath Sounds Post-Respiratory Treatment   Throughout All Fields Post-Treatment aeration increased   Post-treatment Heart Rate (beats/min) 80   Post-treatment Resp Rate (breaths/min) 15   Preset CPAP/BiPAP Settings   Mode Of Delivery   (pt will get family to bring home cpap)   Education   $ Education Bronchodilator;15 min   Respiratory Evaluation   $ Care Plan Tech Time 15 min

## 2024-11-12 NOTE — RESPIRATORY THERAPY
11/12/24 0042   Patient Assessment/Suction   Level of Consciousness (AVPU) alert   Respiratory Effort Normal;Unlabored   Expansion/Accessory Muscles/Retractions no use of accessory muscles   All Lung Fields Breath Sounds Anterior:;diminished   Rhythm/Pattern, Respiratory unlabored;depth regular;pattern regular;no shortness of breath reported   PRE-TX-O2   Device (Oxygen Therapy) nasal cannula   Flow (L/min) (Oxygen Therapy) 4   SpO2 96 %   Pulse Oximetry Type Intermittent   $ Pulse Oximetry - Multiple Charge Pulse Oximetry - Multiple   Pulse 82   Resp 18   Aerosol Therapy   $ Aerosol Therapy Charges Aerosol Treatment   Daily Review of Necessity (SVN) completed   Respiratory Treatment Status (SVN) given   Treatment Route (SVN) mask;oxygen   Patient Position HOB elevated   Post Treatment Assessment (SVN) increased aeration   Signs of Intolerance (SVN) none   Breath Sounds Post-Respiratory Treatment   Throughout All Fields Post-Treatment All Fields   Throughout All Fields Post-Treatment Anterior:;aeration increased   Post-treatment Heart Rate (beats/min) 82   Post-treatment Resp Rate (breaths/min) 18   Preset CPAP/BiPAP Settings   Mode Of Delivery CPAP   CPAP/BIPAP charged w/in last 24 h NO   $ Is patient using? No/refused   Reason patient is not wearing? Patient refused  (pt wears a home cpap, stated she did not bring her own and does not want one. she stated as long as she has her oxygen on she is fine)   Education   $ Education Bronchodilator;DME Nebulizer;DME Oxygen;15 min   Tobacco Cessation Intervention   Do you use any type of tobacco product? No   Respiratory Evaluation   $ Care Plan Tech Time 15 min   $ Respiratory Evaluation Complete   Evaluation For New Orders   Cardiac Diagnosis afib   Pulmonary Diagnosis copd gwyn   Home Oxygen   Has Home Oxygen? Yes   Liter Flow 4   Duration continuous   Route nasal cannula   Mode continuous   Home Aerosol, MDI, DPI, and Other Treatments/Therapies   Home Respiratory  Therapy Per Patient/Review of Chart Yes   Aerosol Home Meds/Freq albuterol Q6 PRN   Oxygen Care Plan   Oxygen Care Plan Per Protocol   SPO2 Goal (%) 92% non-cardiac   Rationale Maintain Home oxygen   Bronchodilator Care Plan   Bronchodilator Care Plan Aerosol   Aerosol Meds w/ frequency Ventolin/Proventil(Albuterol Sulfate) 2.5mg PRN   Rationale Maintain home respiratory medicine;Bronchitis/COPD   Atelectasis Care Plan   Rationale No Rational Found   Airway Clearance Care Plan   Rationale No rationale found

## 2024-11-12 NOTE — PROGRESS NOTES
Automatic Inhaler to Nebulizer Interchange    fluticasone/umeclidinium/vilanterol (Trelegy) 100 mcg/ 62.5 mcg/ 25 mcg changed to budesonide 0.5 mg twice daily AND ipratropium 0.5 mg every 6 hour scheduled AND arformoterol 15 mcg twice daily per [facility] Automatic Therapeutic Substitutions Protocol.    Please contact pharmacy at extension [phone] with any questions.     Thank you,   Monica Godfrey     Spouse/Patient

## 2024-11-12 NOTE — CONSULTS
Consult for education on fluid and salt restriction.    Per cariology note:  Fluid volume overload  Acute CHF    Patient sleeping soundly at visit. Family at bedside.  Education handout and brief low sodium, fluid restriction, heart failure diet education verbally to spouse. Daughter also present.   Contact information provided for patient should she have any further questions.

## 2024-11-12 NOTE — HOSPITAL COURSE
Patient admitted to Hospital Medicine on telemetry floor. Patient had serial cardiac enzymes. Patient was given IV lasix and intake and output closely monitored. Patient was evaluated by cardiologist and underwent ECHO. Patient's renal panel and electrolytes closely monitored. Patient had daily weights. Patient was educated on monitoring daily weight, following low salt diet and in case if gain more than 3 pounds in 24 hours, to call their doctor for further advice. Patient's symptoms resolved. Patient's routine supplemental oxygen maintained.  Patient utilize CPAP therapy during sleep.

## 2024-11-12 NOTE — PROGRESS NOTES
[facility] Medicine  Progress Note    Patient Name: Danielle Martinez  MRN: [id number]  Patient Class: OP- Observation   Admission Date: 11/11/2024  Length of Stay: 0 days  Attending Physician: Anshu Sharif MD  Primary Care Provider: Antony Lopez MD        Subjective:     Principal Problem:Dyspnea on exertion        HPI:  79-year-old female presented to ED for eval of shortness of breath.  Patient reported over the last several weeks she increasing shortness of breath, with associated dyspnea on exertion, bilateral lower extremity edema, and left-sided chest pressure.  Patient reported she has p.r.n. Lasix at home and has been taking it lately twice a day without symptom improvement, denies history CHF.  Denies fever, chills.  Denies abdominal pain, nausea, vomiting, changes in bowel habits.  Denies syncope.  Patient does have history of COPD and is home O2 dependent on 4 L NC.  Patient also with history of PAF, on Eliquis.  She uses a CPAP HS for BRITTANIE.  Echo December 20, 2023 with EF 55-60%.  In ED today, initial troponin 11, repeat pending.  .  CXR impression without acute abnormality.  Admit to hospital medicine for further eval.    Overview/Hospital Course:  No notes on file    Interval History:  Patient seen and examined.  Patient reports worsening shortness of breath, dyspnea on exertion and orthopnea with lower extremity swelling for few days.  Patient usually follows with Dr. Kate from Cardiology.  For the past day or so patient had been experiencing substernal chest discomfort.  At present denies any chest pain.  Nitro paste is on.  With diuresis symptoms are improving.  Patient uses 3-4 liters/minute via nasal cannula supplemental oxygen and CPAP therapy at home.  2D echocardiogram and cardiology evaluation pending.  Patient denies any cough or expectoration or fever.    Review of Systems   Constitutional:  Negative for chills and fever.   HENT:  Negative for congestion  and sore throat.    Respiratory:  Positive for cough and shortness of breath.    Cardiovascular:  Positive for chest pain and leg swelling.   Gastrointestinal:  Negative for abdominal pain, constipation, diarrhea, nausea and vomiting.   Genitourinary:  Negative for flank pain.   Neurological:  Negative for syncope.   Psychiatric/Behavioral:  Negative for confusion.      Objective:     Vital Signs (Most Recent):  Temp: 97.5 °F (36.4 °C) (11/12/24 0716)  Pulse: 82 (11/12/24 0724)  Resp: 16 (11/12/24 0724)  BP: (!) 148/94 (11/12/24 0716)  SpO2: (!) 94 % (11/12/24 0724) Vital Signs (24h Range):  Temp:  [97.5 °F (36.4 °C)-98.3 °F (36.8 °C)] 97.5 °F (36.4 °C)  Pulse:  [] 82  Resp:  [16-26] 16  SpO2:  [90 %-100 %] 94 %  BP: (134-187)/(65-96) 148/94     Weight: 93.1 kg (205 lb 4 oz)  Body mass index is 42.9 kg/m².    Intake/Output Summary (Last 24 hours) at 11/12/2024 0743  Last data filed at 11/12/2024 0626  Gross per 24 hour   Intake 420 ml   Output 2000 ml   Net -1580 ml         Physical Exam  Vitals reviewed.   Constitutional:       General: She is not in acute distress.  HENT:      Head: Normocephalic and atraumatic.      Mouth/Throat:      Mouth: Mucous membranes are moist.   Eyes:      Conjunctiva/sclera: Conjunctivae normal.   Cardiovascular:      Rate and Rhythm: Normal rate. Rhythm irregular.   Pulmonary:      Effort: Pulmonary effort is normal. No respiratory distress.      Breath sounds: Rales present.   Abdominal:      General: Bowel sounds are normal.      Palpations: Abdomen is soft.      Tenderness: There is no abdominal tenderness.   Musculoskeletal:         General: Normal range of motion.      Cervical back: Normal range of motion.      Right lower leg: Edema present.      Left lower leg: Edema present.   Skin:     General: Skin is warm and dry.      Capillary Refill: Capillary refill takes less than 2 seconds.   Neurological:      Mental Status: She is alert and oriented to person, place, and time.  "  Psychiatric:         Mood and Affect: Mood normal.             Significant Labs: All pertinent labs within the past 24 hours have been reviewed.  CBC:   Recent Labs   Lab 11/11/24  1522 11/12/24  0521   WBC 6.91 6.38   HGB 11.8* 11.2*   HCT 37.4 35.3*    212     CMP:   Recent Labs   Lab 11/11/24  1522      K 4.0      CO2 28   GLU 89   BUN 17   CREATININE 0.8   CALCIUM 10.1   PROT 7.3   ALBUMIN 4.6   BILITOT 1.9*   ALKPHOS 51*   AST 14   ALT 7*   ANIONGAP 8     Coagulation:   Recent Labs   Lab 11/12/24  0521   INR 1.2     Lipid Panel: No results for input(s): "CHOL", "HDL", "LDLCALC", "TRIG", "CHOLHDL" in the last 48 hours.  Troponin:   Recent Labs   Lab 11/11/24  1522   TROPONINIHS 11.0     TSH: No results for input(s): "TSH" in the last 4320 hours.  Urine Studies:   Recent Labs   Lab 11/11/24  1859   COLORU Colorless*   APPEARANCEUA Clear   PHUR 6.0   SPECGRAV 1.010   PROTEINUA Negative   GLUCUA Negative   KETONESU Negative   BILIRUBINUA Negative   OCCULTUA Negative   NITRITE Negative   UROBILINOGEN Negative   LEUKOCYTESUR Negative     Microbiology Results (last 7 days)       ** No results found for the last 168 hours. **          Significant Imaging:   ECHO: Pending.     CXR: No acute cardiac or pulmonary process.         Assessment/Plan:      * Dyspnea on exertion  Denies hx CHF  Supplemental O2 via nasal canula; titrate O2 saturation to >92%.  Serial Cardiac enzymes × 3.  Diuretic administration- intravenous Lasix 40 mg q.12h. Monitor electrolytes for hypokalemia and hypomagnesemia.  Cardiology Consultation.  Usually follows with Dr. Kate.  2-D Echocardiogram for evaluation of left ventricle systolic function or any valvular heart disease.  Daily weights.  Strict I/Os.  Fluid restriction.  Na restriction <2g/d.        Hyperbilirubinemia  Avoid hepatotoxic meds  Echo  Daily CMP      Atrial fibrillation  Patient has paroxysmal (<7 days) atrial fibrillation. Patient is currently in atrial " fibrillation. [id number] Score: 6. The patients heart rate in the last 24 hours is as follows:  Pulse  Min: 73  Max: 100     Antiarrhythmics  metoprolol succinate (TOPROL-XL) 24 hr tablet 100 mg, Daily, Oral    Anticoagulants  enoxaparin injection 90 mg, Every 12 hours, Subcutaneous    Plan  - Replete lytes with a goal of K>4, Mg >2  - Patient is anticoagulated, see medications listed above.  - Patient's afib is currently controlled  - Echo        Chest pain  See above  Serial troponins x 3.  Ruled out for ACS.  EKG PRN  ASA  Will discontinue nitro paste and closely monitor.      COPD (chronic obstructive pulmonary disease)  Home O2 dependent  Patient's COPD is controlled currently.  Patient is currently off COPD Pathway. Continue scheduled inhalers Supplemental oxygen and monitor respiratory status closely.     BRITTANIE on CPAP  CPAP HS per RT  Monitor O2 sat      Start PT and OT.  Discussed with  regarding discharge planning.  VTE Risk Mitigation (From admission, onward)           Ordered     enoxaparin injection 90 mg  Every 12 hours         11/11/24 1812     IP VTE HIGH RISK PATIENT  Once         11/11/24 1812     Place sequential compression device  Until discontinued         11/11/24 1812                    Discharge Planning   KATHRYN:   November 14, 2024    Code Status: Full Code   Is the patient medically ready for discharge?:     Reason for patient still in hospital (select all that apply): Patient trending condition and Consult recommendations                     Anshu Sharif MD  Department of Hospital Medicine   [facility]

## 2024-11-12 NOTE — PLAN OF CARE
Inpatient Upgrade Note     Danielle Martinez has warranted treatment spanning two or more midnights of hospital level care for the management of  chest pain, worsening shortness of breath, dyspnea on exertion and orthopnea with lower extremity swelling for few days, and hyperbilirubinemia . She continues to require IV diuresis, daily labs, supplemental oxygen, further testing/imaging, monitoring of vital signs, and further evaluation by consultants- cardiology. Her condition is also complicated by the following comorbidities:  COPD, on home oxygen 4l nc, PAF, on Eliquis, BRITTANIE- on CPAP q hs

## 2024-11-12 NOTE — ASSESSMENT & PLAN NOTE
Patient has paroxysmal (<7 days) atrial fibrillation. Patient is currently in atrial fibrillation. [id number] Score: 6. The patients heart rate in the last 24 hours is as follows:  Pulse  Min: 73  Max: 82     Antiarrhythmics  metoprolol succinate (TOPROL-XL) 24 hr tablet 100 mg, Daily, Oral    Anticoagulants  enoxaparin injection 1 mg/kg (Dosing Weight), Every 12 hours, Subcutaneous    Plan  - Replete lytes with a goal of K>4, Mg >2  - Patient is anticoagulated, see medications listed above.  - Patient's afib is currently controlled  - Echo

## 2024-11-12 NOTE — ASSESSMENT & PLAN NOTE
Home O2 dependent  Patient's COPD is controlled currently.  Patient is currently off COPD Pathway. Continue scheduled inhalers Supplemental oxygen and monitor respiratory status closely.

## 2024-11-12 NOTE — PLAN OF CARE
Problem: Adult Inpatient Plan of Care  Goal: Plan of Care Review  Outcome: Progressing  Goal: Patient-Specific Goal (Individualized)  Outcome: Progressing  Goal: Absence of Hospital-Acquired Illness or Injury  Outcome: Progressing  Goal: Optimal Comfort and Wellbeing  Outcome: Progressing  Goal: Readiness for Transition of Care  Outcome: Progressing     Problem: Acute Kidney Injury/Impairment  Goal: Fluid and Electrolyte Balance  Outcome: Progressing  Goal: Improved Oral Intake  Outcome: Progressing  Goal: Effective Renal Function  Outcome: Progressing     Problem: Pneumonia  Goal: Fluid Balance  Outcome: Progressing  Goal: Resolution of Infection Signs and Symptoms  Outcome: Progressing  Goal: Effective Oxygenation and Ventilation  Outcome: Progressing     Problem: Bariatric Environmental Safety  Goal: Safety Maintained with Care  Outcome: Progressing     Problem: Skin Injury Risk Increased  Goal: Skin Health and Integrity  Outcome: Progressing

## 2024-11-12 NOTE — ASSESSMENT & PLAN NOTE
See above  Serial troponins x 3.  Ruled out for ACS.  EKG PRN  ASA  Will discontinue nitro paste and closely monitor.

## 2024-11-12 NOTE — CONSULTS
[facility]  Department of Cardiology  Consult Note      PATIENT NAME: Danielle Martinez    MRN: [id number]  TODAY'S DATE: 11/12/2024  ADMIT DATE: 11/11/2024                          CONSULT REQUESTED BY: Anshu Sharif MD    SUBJECTIVE     PRINCIPAL PROBLEM: Dyspnea on exertion    HPI:    Patient is a 79-year-old female who presented to the emergency room with complaints of progressively worsening shortness of breath well as significant lower extremity up to thigh edema.  Patient has a known history of stroke as well as atrial fibrillation and takes anticoagulation at home.  She is followed by cardiology outpatient.  Apparently she has been experiencing shortness of breath and swelling over the past couple of weeks.  She does have history of COPD and uses home O2 at home but has had a notable change in her functional status due to shortness of breath and edema over the past couple of weeks according to her and her family.      REASON FOR CONSULT:  From Hospitalist H&P:    Leg Swelling       Pt is having bilateral lower leg edema stating above the knee is swelling as well. Pt on home o2 using about 60% of her lungs.          HPI: 79-year-old female presented to ED for eval of shortness of breath.  Patient reported over the last several weeks she increasing shortness of breath, with associated dyspnea on exertion, bilateral lower extremity edema, and left-sided chest pressure.  Patient reported she has p.r.n. Lasix at home and has been taking it lately twice a day without symptom improvement, denies history CHF.  Denies fever, chills.  Denies abdominal pain, nausea, vomiting, changes in bowel habits.  Denies syncope.  Patient does have history of COPD and is home O2 dependent on 4 L NC.  Patient also with history of PAF, on Eliquis.  She uses a CPAP HS for BRITTANIE.  Echo December 20, 2023 with EF 55-60%.  In ED today, initial troponin 11, repeat pending.  .  CXR impression without acute abnormality.  Admit  to hospital medicine for further eval.      Review of patient's allergies indicates:   Allergen Reactions    Promethazine Anaphylaxis    Clove flavoring [flavoring agent]     Codeine Itching    Latex, natural rubber     Lorazepam Other (See Comments)    Lovastatin Other (See Comments)    Meclizine Other (See Comments)    Morphine Itching    Simvastatin Other (See Comments)    Tramadol-acetaminophen Itching       Past Medical History:   Diagnosis Date    HAILY (acute kidney injury)     Anxiety     Arthritis     Juvenile diagnosed age 17    Atrial fibrillation     COPD (chronic obstructive pulmonary disease)     Depression     Diverticulitis     Emphysema of lung     GERD (gastroesophageal reflux disease)     Hx of hiatal hernia     Hypertension     Osteoporosis     Pneumonia     Sleep apnea      Past Surgical History:   Procedure Laterality Date    BREAST LUMPECTOMY Left     in 40's    COLON SURGERY      COLONOSCOPY  2016    Dr. Crowe-RTADOLPH 5 years    COLONOSCOPY  2019    Dr. Reyes 5 years    HERNIA REPAIR      HIATAL HERNIA REPAIR  2005    HYSTERECTOMY      SHOULDER ARTHROSCOPY  2015     Social History     Tobacco Use    Smoking status: Never    Smokeless tobacco: Never   Substance Use Topics    Alcohol use: Never    Drug use: Never        REVIEW OF SYSTEMS  Per HPI    OBJECTIVE     VITAL SIGNS (Most Recent)  Temp: 97.6 °F (36.4 °C) (11/12/24 1117)  Pulse: 76 (11/12/24 1327)  Resp: 20 (11/12/24 1327)  BP: 116/72 (11/12/24 1117)  SpO2: 95 % (11/12/24 1327)    VENTILATION STATUS  Resp: 20 (11/12/24 1327)  SpO2: 95 % (11/12/24 1327)           I & O (Last 24H):  Intake/Output Summary (Last 24 hours) at 11/12/2024 1400  Last data filed at 11/12/2024 1029  Gross per 24 hour   Intake 720 ml   Output 4300 ml   Net -3580 ml       WEIGHTS  Wt Readings from Last 1 Encounters:   11/11/24 1943 93.1 kg (205 lb 4 oz)       PHYSICAL EXAM  CONSTITUTIONAL: No fever, no chills  HEENT: Normocephalic, atraumatic,pupils reactive to  light                 NECK:  No JVD no carotid bruit  CVS: S1S2+, RRR  LUNGS: Clear  ABDOMEN: Soft, NT, BS+  EXTREMITIES: No cyanosis, BLE up to thigh area edema noted   : No de la vega catheter  NEURO: AAO X 3  PSY: Normal affect      HOME MEDICATIONS:  No current facility-administered medications on file prior to encounter.     Current Outpatient Medications on File Prior to Encounter   Medication Sig Dispense Refill    albuterol (PROVENTIL) 2.5 mg /3 mL (0.083 %) nebulizer solution Take 3 mLs (2.5 mg total) by nebulization every 6 (six) hours as needed for Wheezing. Rescue 300 mL 1    ALPRAZolam (XANAX) 1 MG tablet Take 1 tablet (1 mg total) by mouth 2 (two) times daily. 60 tablet 2    ascorbic acid, vitamin C, (VITAMIN C) 100 MG tablet Take 100 mg by mouth once daily.      clotrimazole-betamethasone 1-0.05% (LOTRISONE) cream Apply twice a day to affected skin (Patient taking differently: Apply 1 g topically 2 (two) times daily. Apply twice a day to affected skin) 45 g 1    ELIQUIS 5 mg Tab Take 1 tablet (5 mg total) by mouth 2 (two) times daily. 60 tablet 5    esomeprazole (NEXIUM) 40 MG capsule Take 1 capsule (40 mg total) by mouth before breakfast. 90 capsule 1    fluticasone propionate (FLONASE) 50 mcg/actuation nasal spray 1 spray (50 mcg total) by Each Nostril route daily as needed for Allergies. 16 g 2    fluticasone-umeclidin-vilanter (TRELEGY ELLIPTA) 100-62.5-25 mcg DsDv Inhale 1 puff into the lungs daily as needed. 60 each 2    levothyroxine (SYNTHROID) 50 MCG tablet Take 1 tablet (50 mcg total) by mouth before breakfast. 90 tablet 1    metoprolol succinate (TOPROL-XL) 100 MG 24 hr tablet Take 1 tablet (100 mg total) by mouth once daily. For 30 days 90 tablet 3    mv-min/folic/vit K/lut/wpex380 (ALIVE WOMEN'S 50 PLUS, BLEND, ORAL) Take 1 tablet by mouth once daily.      potassium chloride (KLOR-CON) 10 MEQ TbSR Take 1 tablet (10 mEq total) by mouth 2 (two) times daily. 180 tablet 1    furosemide (LASIX)  20 MG tablet Take 1 tablet (20 mg total) by mouth daily as needed. 90 tablet 1    nitroGLYCERIN (NITROSTAT) 0.4 MG SL tablet Place 1 tablet (0.4 mg total) under the tongue every 5 (five) minutes as needed. (Patient taking differently: Place 0.4 mg under the tongue every 5 (five) minutes as needed for Chest pain.) 25 tablet 3    traZODone (DESYREL) 50 MG tablet Take 1 tablet (50 mg total) by mouth every evening. For sleep 30 tablet 2    [DISCONTINUED] calcium carbonate (TUMS) 200 mg calcium (500 mg) chewable tablet Take 1 tablet by mouth once daily.      [DISCONTINUED] hyoscyamine (NULEV) 0.125 mg TbDL Take 0.125 mg by mouth.      [DISCONTINUED] pantoprazole (PROTONIX) 40 MG tablet Take 1 tablet (40 mg total) by mouth 2 (two) times daily. 60 tablet 5       SCHEDULED MEDS:   ALPRAZolam  1 mg Oral BID    arformoteroL  15 mcg Nebulization BID    aspirin  81 mg Oral Daily    budesonide  0.5 mg Nebulization Q12H    clotrimazole-betamethasone 1-0.05%  1 g Topical (Top) BID    enoxparin  1 mg/kg Subcutaneous Q12H (treatment, non-standard time)    furosemide (LASIX) injection  40 mg Intravenous Q12H    ipratropium  0.5 mg Nebulization Q6H    levalbuterol  1.25 mg Nebulization Q8H    levothyroxine  50 mcg Oral Before breakfast    metoprolol succinate  100 mg Oral Daily    multivitamin  1 tablet Oral Daily    nitroGLYCERIN 2% TD oint  0.5 inch Topical (Top) Q6H    pantoprazole  40 mg Oral Daily    traZODone  50 mg Oral QHS       CONTINUOUS INFUSIONS:    PRN MEDS:  Current Facility-Administered Medications:     levalbuterol, 1.25 mg, Nebulization, Q6H PRN    magnesium oxide, 800 mg, Oral, PRN    magnesium oxide, 800 mg, Oral, PRN    potassium bicarbonate, 35 mEq, Oral, PRN    potassium bicarbonate, 50 mEq, Oral, PRN    potassium bicarbonate, 60 mEq, Oral, PRN    potassium, sodium phosphates, 2 packet, Oral, PRN    potassium, sodium phosphates, 2 packet, Oral, PRN    potassium, sodium phosphates, 2 packet, Oral, PRN    sodium  "chloride 0.9%, 10 mL, Intravenous, PRN    LABS AND DIAGNOSTICS     CBC LAST 3 DAYS  Recent Labs   Lab 11/11/24  1522 11/12/24  0521   WBC 6.91 6.38   RBC 4.16 3.89*   HGB 11.8* 11.2*   HCT 37.4 35.3*   MCV 90 91   MCH 28.4 28.8   MCHC 31.6* 31.7*   RDW 16.3* 16.2*    212   MPV 10.5 10.6   GRAN 68.1  4.7 68.1  4.4   LYMPH 18.5  1.3 20.1  1.3   MONO 9.4  0.7 8.3  0.5   BASO 0.04 0.05   NRBC 0 0       COAGULATION LAST 3 DAYS  Recent Labs   Lab 11/11/24  1522 11/12/24  0521   INR 1.2 1.2       CHEMISTRY LAST 3 DAYS  Recent Labs   Lab 11/11/24  1522 11/12/24  0823    142   K 4.0 3.5    99   CO2 28 31*   ANIONGAP 8 12   BUN 17 15   CREATININE 0.8 0.9   GLU 89 100   CALCIUM 10.1 10.6*   MG 1.8 1.8   ALBUMIN 4.6 4.7   PROT 7.3 7.6   ALKPHOS 51* 57   ALT 7* 7*   AST 14 16   BILITOT 1.9* 2.2*       CARDIAC PROFILE LAST 3 DAYS  Recent Labs   Lab 11/11/24  1522   *   TROPONINIHS 11.0       ENDOCRINE LAST 3 DAYS  Recent Labs   Lab 11/12/24  0823   TSH 3.647       LAST ARTERIAL BLOOD GAS  ABG  No results for input(s): "PH", "PO2", "PCO2", "HCO3", "BE" in the last 168 hours.    LAST 7 DAYS MICROBIOLOGY   Microbiology Results (last 7 days)       ** No results found for the last 168 hours. **            MOST RECENT IMAGING  X-Ray Chest 1 View  Narrative: CLINICAL HISTORY:  ([id number] y/o  (1945) F    SOB;    TECHNIQUE:  (A#[id number], exam time 11/11/2024 15:37)    XR CHEST 1 VIEW [id number]    COMPARISON:  CT from 07/01/2024.    FINDINGS:  Discoid airspace opacity in the infrahilar right lung zone, with perihilar pulmonary pedicular prominence on the right.  Minimal interstitial opacity seen in the left lung base, similar to the previous exam.  Costophrenic angles are seen without effusion. No pneumothorax is identified. The heart is mildly enlarged. Atheromatous calcifications are seen at the aortic arch. Osseous structures show degenerative changes in the spine. The visualized upper abdomen is " unremarkable.  Impression: No acute cardiac or pulmonary process.    Electronically signed by: Boris Yu  Date:    11/11/2024  Time:    15:39      ECHOCARDIOGRAM RESULTS (last 5)  Results for orders placed in visit on 12/22/23    Echo Saline Bubble? Yes    Interpretation Summary    Left Atrium: Left atrium is mildly dilated.    Agitated saline study of the atrial septum is negative after vasalva maneuver, suggesting absence of intracardiac shunt at the atrial level. No patent foramen ovale confirmed by agitated saline contrast.    Bubble study only,      Results for orders placed during the hospital encounter of 12/08/23    Echo    Interpretation Summary    Left Ventricle: The left ventricle is normal in size. Mildly increased wall thickness. There is mild concentric hypertrophy. Normal wall motion. There is normal systolic function with a visually estimated ejection fraction of 55 - 60%. There is normal diastolic function.    Right Ventricle: Normal right ventricular cavity size. Wall thickness is normal. Right ventricle wall motion  is normal. Systolic function is normal.    Left Atrium: Left atrium is severely dilated.    Aortic Valve: There is mild aortic regurgitation with a centrally directed jet.    Tricuspid Valve: There is mild to moderate regurgitation with a centrally directed jet.    Pulmonary Artery: There is moderate to severe pulmonary hypertension. The estimated pulmonary artery systolic pressure is 65 mmHg.    IVC/SVC: Elevated venous pressure at 15 mmHg.      Results for orders placed during the hospital encounter of 05/22/20    Echo Color Flow Doppler? Yes    Interpretation Summary  · Normal left ventricular systolic function. The estimated ejection fraction is 60%.  · Grade III (severe) left ventricular diastolic dysfunction consistent with restrictive physiology.  · No wall motion abnormalities.  · Moderate right ventricular enlargement.  · Normal right ventricular systolic function.  ·  Moderate left atrial enlargement.  · Mild right atrial enlargement.  · Mild tricuspid regurgitation.  · Elevated central venous pressure (15 mmHg).  · The estimated PA systolic pressure is 104 mmHg.  · Severe pulmonary hypertension present.      CURRENT/PREVIOUS VISIT EKG  Results for orders placed or performed during the hospital encounter of 11/11/24   EKG 12-lead    Collection Time: 11/11/24  3:00 PM   Result Value Ref Range    QRS Duration 84 ms    OHS QTC Calculation 429 ms    Narrative    Test Reason : R06.02,    Vent. Rate : 070 BPM     Atrial Rate : 000 BPM     P-R Int : 000 ms          QRS Dur : 084 ms      QT Int : 398 ms       P-R-T Axes : 000 088 031 degrees     QTc Int : 429 ms    Atrial fibrillation  Cannot rule out Anterior infarct ,age undetermined  Abnormal ECG  When compared with ECG of 05-JUN-2021 12:11,  Minimal criteria for Anterior infarct are now Present  Nonspecific T wave abnormality, improved in Inferior leads  T wave inversion no longer evident in Anterior leads  Confirmed by Nupur MARY, Pieter MELO ([id number]) on 11/11/2024 10:57:19 PM    Referred By: [email or web address]   SELF           Confirmed By:Pieter Espitia MD           ASSESSMENT/PLAN:     Active Hospital Problems    Diagnosis    *Dyspnea on exertion    Chest pain    Atrial fibrillation    Hyperbilirubinemia    COPD (chronic obstructive pulmonary disease)    BRITTANIE on CPAP       ASSESSMENT & PLAN:     Fluid volume overload  Acute CHF  Permanent Afib on Eliquis   COPD on home O2  Obesity       RECOMMENDATIONS:    Continue to diurese with furosemide 40 mg IV q.12 hours.  Strict I&O.  Trend labs.  Echo pending.  Heart rate controlled on metoprolol succinate 100 mg p.o. daily.  Continue the same.  Would like her out of bed to chair and up with staff as able.        Fabi Alarcon NP  Date of Service: 11/12/2024  2:00 PM    I have personally interviewed and examined the patient, I have reviewed the Nurse Practitioner's history and physical, assessment,  and plan. I have personally evaluated the patient at bedside and agree with the findings and made appropriate changes as necessary in recommendations.      Patient seen and evaluated.  She is still short of breath feels weak complains of neuropathic pain down her right leg below the knee and swelling.  Her lungs revealed rales in the left base.  EKG shows AFib rate controlled she is on full-dose Lovenox  Continue IV Lasix.  Check lower extremity for DVT  Pieter Espitia MD  Department of Cardiology  [facility]  11/12/2024 2:05 PM

## 2024-11-12 NOTE — CARE UPDATE
11/12/24 0725   Aerosol Therapy   $ Aerosol Therapy Charges Aerosol Treatment  (pulmicort)   Education   $ Education Bronchodilator;15 min   Respiratory Evaluation   $ Care Plan Tech Time 15 min

## 2024-11-12 NOTE — ASSESSMENT & PLAN NOTE
Patient has paroxysmal (<7 days) atrial fibrillation. Patient is currently in atrial fibrillation. [id number] Score: 6. The patients heart rate in the last 24 hours is as follows:  Pulse  Min: 73  Max: 100     Antiarrhythmics  metoprolol succinate (TOPROL-XL) 24 hr tablet 100 mg, Daily, Oral    Anticoagulants  enoxaparin injection 90 mg, Every 12 hours, Subcutaneous    Plan  - Replete lytes with a goal of K>4, Mg >2  - Patient is anticoagulated, see medications listed above.  - Patient's afib is currently controlled  - Echo

## 2024-11-12 NOTE — PROGRESS NOTES
Automatic Inhaler to Nebulizer Interchange    fluticasone/umeclidinium/vilanterol (Trelegy) 100 mcg/ 62.5 mcg/ 25 mcg changed to budesonide 0.5 mg twice daily AND ipratropium 0.5 mg every 6 hour scheduled AND arformoterol 15 mcg twice daily per [facility] Automatic Therapeutic Substitutions Protocol.    Please contact pharmacy at extension [phone] with any questions.     Thank you,   Monica Godfrey

## 2024-11-12 NOTE — SUBJECTIVE & OBJECTIVE
Interval History:  Patient seen and examined.  Patient reports worsening shortness of breath, dyspnea on exertion and orthopnea with lower extremity swelling for few days.  Patient usually follows with Dr. Kate from Cardiology.  For the past day or so patient had been experiencing substernal chest discomfort.  At present denies any chest pain.  Nitro paste is on.  With diuresis symptoms are improving.  Patient uses 3-4 liters/minute via nasal cannula supplemental oxygen and CPAP therapy at home.  2D echocardiogram and cardiology evaluation pending.  Patient denies any cough or expectoration or fever.    Review of Systems   Constitutional:  Negative for chills and fever.   HENT:  Negative for congestion and sore throat.    Respiratory:  Positive for cough and shortness of breath.    Cardiovascular:  Positive for chest pain and leg swelling.   Gastrointestinal:  Negative for abdominal pain, constipation, diarrhea, nausea and vomiting.   Genitourinary:  Negative for flank pain.   Neurological:  Negative for syncope.   Psychiatric/Behavioral:  Negative for confusion.      Objective:     Vital Signs (Most Recent):  Temp: 97.5 °F (36.4 °C) (11/12/24 0716)  Pulse: 82 (11/12/24 0724)  Resp: 16 (11/12/24 0724)  BP: (!) 148/94 (11/12/24 0716)  SpO2: (!) 94 % (11/12/24 0724) Vital Signs (24h Range):  Temp:  [97.5 °F (36.4 °C)-98.3 °F (36.8 °C)] 97.5 °F (36.4 °C)  Pulse:  [] 82  Resp:  [16-26] 16  SpO2:  [90 %-100 %] 94 %  BP: (134-187)/(65-96) 148/94     Weight: 93.1 kg (205 lb 4 oz)  Body mass index is 42.9 kg/m².    Intake/Output Summary (Last 24 hours) at 11/12/2024 0778  Last data filed at 11/12/2024 0626  Gross per 24 hour   Intake 420 ml   Output 2000 ml   Net -1580 ml         Physical Exam  Vitals reviewed.   Constitutional:       General: She is not in acute distress.  HENT:      Head: Normocephalic and atraumatic.      Mouth/Throat:      Mouth: Mucous membranes are moist.   Eyes:      Conjunctiva/sclera:  "Conjunctivae normal.   Cardiovascular:      Rate and Rhythm: Normal rate. Rhythm irregular.   Pulmonary:      Effort: Pulmonary effort is normal. No respiratory distress.      Breath sounds: Rales present.   Abdominal:      General: Bowel sounds are normal.      Palpations: Abdomen is soft.      Tenderness: There is no abdominal tenderness.   Musculoskeletal:         General: Normal range of motion.      Cervical back: Normal range of motion.      Right lower leg: Edema present.      Left lower leg: Edema present.   Skin:     General: Skin is warm and dry.      Capillary Refill: Capillary refill takes less than 2 seconds.   Neurological:      Mental Status: She is alert and oriented to person, place, and time.   Psychiatric:         Mood and Affect: Mood normal.             Significant Labs: All pertinent labs within the past 24 hours have been reviewed.  CBC:   Recent Labs   Lab 11/11/24  1522 11/12/24  0521   WBC 6.91 6.38   HGB 11.8* 11.2*   HCT 37.4 35.3*    212     CMP:   Recent Labs   Lab 11/11/24  1522      K 4.0      CO2 28   GLU 89   BUN 17   CREATININE 0.8   CALCIUM 10.1   PROT 7.3   ALBUMIN 4.6   BILITOT 1.9*   ALKPHOS 51*   AST 14   ALT 7*   ANIONGAP 8     Coagulation:   Recent Labs   Lab 11/12/24  0521   INR 1.2     Lipid Panel: No results for input(s): "CHOL", "HDL", "LDLCALC", "TRIG", "CHOLHDL" in the last 48 hours.  Troponin:   Recent Labs   Lab 11/11/24  1522   TROPONINIHS 11.0     TSH: No results for input(s): "TSH" in the last 4320 hours.  Urine Studies:   Recent Labs   Lab 11/11/24  1859   COLORU Colorless*   APPEARANCEUA Clear   PHUR 6.0   SPECGRAV 1.010   PROTEINUA Negative   GLUCUA Negative   KETONESU Negative   BILIRUBINUA Negative   OCCULTUA Negative   NITRITE Negative   UROBILINOGEN Negative   LEUKOCYTESUR Negative     Microbiology Results (last 7 days)       ** No results found for the last 168 hours. **          Significant Imaging:   ECHO: Pending.     CXR: No acute " cardiac or pulmonary process.

## 2024-11-12 NOTE — PLAN OF CARE
[facility]  Initial Discharge Assessment       Primary Care Provider: Antony Lopez MD    Admission Diagnosis: Dyspnea on exertion [R06.09]  Chest pain, unspecified type [R07.9]    Admission Date: 11/11/2024  Expected Discharge Date: 11/14/2024    Assessment and facesheet verification done at bedside. All demographic information is correct in chart. Patient is mostly independent of all ADL's , Equipment used as needed for assistance. Current Home DME : Oxygen home concentrator / portable concentrator - supplied by [facility] D[facility] , wheeled walker , straight canes, pulse oximeter  and bp machine. DME needed at time of DC TBD . Patient resides with Saul Juan ( spouse ) [phone] , who will transport patient home from facility @ time of Discharge.       Transition of Care Barriers: None    Payor: MEDICARE / Plan: MEDICARE PART A & B / Product Type: Government /     Extended Emergency Contact Information  Primary Emergency Contact: SAUL CARRASCO  Address: [address] [address] [facility]  Home Phone: [phone]  Mobile Phone: [phone]  Relation: Spouse  Preferred language: English   needed? No    Discharge Plan A: Home with family  Discharge Plan B: Home with family      [facility]  [address]  [place] [address]  Phone: [phone] Fax: [phone]      Initial Assessment (most recent)       Adult Discharge Assessment - 11/12/24 1554          Discharge Assessment    Assessment Type Discharge Planning Assessment     Confirmed/corrected address, phone number and insurance Yes     Confirmed Demographics Correct on Facesheet     Source of Information patient     When was your last doctors appointment? 11/04/24     Communicated KATHRYN with patient/caregiver Yes     Reason For Admission dyspnea on exertion     People in Home spouse     Do you expect to return to your current living situation? Yes     Do you have help at  home or someone to help you manage your care at home? Yes     Who are your caregiver(s) and their phone number(s)? Shimon Martinez ( spouse ) [phone]     Prior to hospitilization cognitive status: Alert/Oriented     Current cognitive status: Alert/Oriented     Walking or Climbing Stairs Difficulty yes     Walking or Climbing Stairs ambulation difficulty, requires equipment     Dressing/Bathing Difficulty no     Home Layout Able to live on 1st floor     Equipment Currently Used at Home cane, straight;blood pressure machine;oxygen;walker, rolling   pulse oximeter , portable concentrator / home concentrator    Readmission within 30 days? No     Patient currently being followed by outpatient case management? No     Do you currently have service(s) that help you manage your care at home? No     Do you take prescription medications? Yes     Do you have prescription coverage? Yes     Coverage [employer]     Do you have any problems affording any of your prescribed medications? No     Is the patient taking medications as prescribed? yes     Who is going to help you get home at discharge? Shimon Martinez ( spouse ) [phone]     How do you get to doctors appointments? family or friend will provide     Are you on dialysis? No     Do you take coumadin? No     Discharge Plan A Home with family     Discharge Plan B Home with family     DME Needed Upon Discharge  --   TBD    Discharge Plan discussed with: Patient     Transition of Care Barriers None

## 2024-11-12 NOTE — PT/OT/SLP PROGRESS
Physical Therapy      Patient Name:  Danielle Martinez   MRN:  [id number]    Patient not seen today secondary to patient awaiting cardiology consult with PT orders to start 11/13/24 at 0000. Will follow-up 11/13/24.

## 2024-11-12 NOTE — HPI
79-year-old female presented to ED for eval of shortness of breath.  Patient reported over the last several weeks she increasing shortness of breath, with associated dyspnea on exertion, bilateral lower extremity edema, and left-sided chest pressure.  Patient reported she has p.r.n. Lasix at home and has been taking it lately twice a day without symptom improvement, denies history CHF.  Denies fever, chills.  Denies abdominal pain, nausea, vomiting, changes in bowel habits.  Denies syncope.  Patient does have history of COPD and is home O2 dependent on 4 L NC.  Patient also with history of PAF, on Eliquis.  She uses a CPAP HS for BRITTANIE.  Echo December 20, 2023 with EF 55-60%.  In ED today, initial troponin 11, repeat pending.  .  CXR impression without acute abnormality.  Admit to hospital medicine for further eval.

## 2024-11-13 LAB
ALBUMIN SERPL BCP-MCNC: 4.5 G/DL (ref 3.5–5.2)
ALP SERPL-CCNC: 57 U/L (ref 55–135)
ALT SERPL W/O P-5'-P-CCNC: 7 U/L (ref 10–44)
ANION GAP SERPL CALC-SCNC: 9 MMOL/L (ref 8–16)
AST SERPL-CCNC: 14 U/L (ref 10–40)
BASOPHILS # BLD AUTO: 0.06 K/UL (ref 0–0.2)
BASOPHILS NFR BLD: 0.6 % (ref 0–1.9)
BILIRUB SERPL-MCNC: 1.8 MG/DL (ref 0.1–1)
BUN SERPL-MCNC: 19 MG/DL (ref 8–23)
CALCIUM SERPL-MCNC: 10.1 MG/DL (ref 8.7–10.5)
CHLORIDE SERPL-SCNC: 99 MMOL/L (ref 95–110)
CO2 SERPL-SCNC: 32 MMOL/L (ref 23–29)
CREAT SERPL-MCNC: 0.9 MG/DL (ref 0.5–1.4)
DIFFERENTIAL METHOD BLD: ABNORMAL
EOSINOPHIL # BLD AUTO: 0.2 K/UL (ref 0–0.5)
EOSINOPHIL NFR BLD: 2.1 % (ref 0–8)
ERYTHROCYTE [DISTWIDTH] IN BLOOD BY AUTOMATED COUNT: 16.1 % (ref 11.5–14.5)
EST. GFR  (NO RACE VARIABLE): >60 ML/MIN/1.73 M^2
GLUCOSE SERPL-MCNC: 131 MG/DL (ref 70–110)
HCT VFR BLD AUTO: 39.2 % (ref 37–48.5)
HGB BLD-MCNC: 12.5 G/DL (ref 12–16)
IMM GRANULOCYTES # BLD AUTO: 0.04 K/UL (ref 0–0.04)
IMM GRANULOCYTES NFR BLD AUTO: 0.4 % (ref 0–0.5)
INR PPP: 1.1 (ref 0.8–1.2)
LYMPHOCYTES # BLD AUTO: 1.2 K/UL (ref 1–4.8)
LYMPHOCYTES NFR BLD: 11.9 % (ref 18–48)
MAGNESIUM SERPL-MCNC: 1.7 MG/DL (ref 1.6–2.6)
MCH RBC QN AUTO: 29.3 PG (ref 27–31)
MCHC RBC AUTO-ENTMCNC: 31.9 G/DL (ref 32–36)
MCV RBC AUTO: 92 FL (ref 82–98)
MONOCYTES # BLD AUTO: 0.8 K/UL (ref 0.3–1)
MONOCYTES NFR BLD: 7.9 % (ref 4–15)
NEUTROPHILS # BLD AUTO: 7.5 K/UL (ref 1.8–7.7)
NEUTROPHILS NFR BLD: 77.1 % (ref 38–73)
NRBC BLD-RTO: 0 /100 WBC
PLATELET # BLD AUTO: 254 K/UL (ref 150–450)
PMV BLD AUTO: 10.7 FL (ref 9.2–12.9)
POTASSIUM SERPL-SCNC: 3.4 MMOL/L (ref 3.5–5.1)
PROT SERPL-MCNC: 7.2 G/DL (ref 6–8.4)
PROTHROMBIN TIME: 12.7 SEC (ref 9–12.5)
RBC # BLD AUTO: 4.26 M/UL (ref 4–5.4)
SODIUM SERPL-SCNC: 140 MMOL/L (ref 136–145)
WBC # BLD AUTO: 9.71 K/UL (ref 3.9–12.7)

## 2024-11-13 PROCEDURE — 25000242 PHARM REV CODE 250 ALT 637 W/ HCPCS: Performed by: INTERNAL MEDICINE

## 2024-11-13 PROCEDURE — 25000003 PHARM REV CODE 250

## 2024-11-13 PROCEDURE — 83735 ASSAY OF MAGNESIUM: CPT

## 2024-11-13 PROCEDURE — 97530 THERAPEUTIC ACTIVITIES: CPT

## 2024-11-13 PROCEDURE — 94640 AIRWAY INHALATION TREATMENT: CPT

## 2024-11-13 PROCEDURE — 99900035 HC TECH TIME PER 15 MIN (STAT)

## 2024-11-13 PROCEDURE — 80053 COMPREHEN METABOLIC PANEL: CPT

## 2024-11-13 PROCEDURE — 63600175 PHARM REV CODE 636 W HCPCS

## 2024-11-13 PROCEDURE — 63600175 PHARM REV CODE 636 W HCPCS: Performed by: INTERNAL MEDICINE

## 2024-11-13 PROCEDURE — 36415 COLL VENOUS BLD VENIPUNCTURE: CPT

## 2024-11-13 PROCEDURE — 25000242 PHARM REV CODE 250 ALT 637 W/ HCPCS: Performed by: NURSE PRACTITIONER

## 2024-11-13 PROCEDURE — 99233 SBSQ HOSP IP/OBS HIGH 50: CPT | Mod: ,,, | Performed by: GENERAL PRACTICE

## 2024-11-13 PROCEDURE — 85610 PROTHROMBIN TIME: CPT

## 2024-11-13 PROCEDURE — 12000002 HC ACUTE/MED SURGE SEMI-PRIVATE ROOM

## 2024-11-13 PROCEDURE — 99900031 HC PATIENT EDUCATION (STAT)

## 2024-11-13 PROCEDURE — 25000003 PHARM REV CODE 250: Performed by: INTERNAL MEDICINE

## 2024-11-13 PROCEDURE — 94761 N-INVAS EAR/PLS OXIMETRY MLT: CPT

## 2024-11-13 PROCEDURE — 85025 COMPLETE CBC W/AUTO DIFF WBC: CPT

## 2024-11-13 PROCEDURE — 97165 OT EVAL LOW COMPLEX 30 MIN: CPT

## 2024-11-13 PROCEDURE — 27000221 HC OXYGEN, UP TO 24 HOURS

## 2024-11-13 RX ORDER — HYDROCODONE BITARTRATE AND ACETAMINOPHEN 5; 325 MG/1; MG/1
1 TABLET ORAL EVERY 6 HOURS PRN
Status: DISCONTINUED | OUTPATIENT
Start: 2024-11-13 | End: 2024-11-14 | Stop reason: HOSPADM

## 2024-11-13 RX ORDER — LEVALBUTEROL INHALATION SOLUTION 1.25 MG/3ML
1.25 SOLUTION RESPIRATORY (INHALATION) EVERY 6 HOURS
Status: DISCONTINUED | OUTPATIENT
Start: 2024-11-13 | End: 2024-11-14 | Stop reason: HOSPADM

## 2024-11-13 RX ADMIN — THERA TABS 1 TABLET: TAB at 08:11

## 2024-11-13 RX ADMIN — LEVALBUTEROL HYDROCHLORIDE 1.25 MG: 1.25 SOLUTION RESPIRATORY (INHALATION) at 12:11

## 2024-11-13 RX ADMIN — ENOXAPARIN SODIUM 90 MG: 100 INJECTION SUBCUTANEOUS at 09:11

## 2024-11-13 RX ADMIN — ENOXAPARIN SODIUM 90 MG: 100 INJECTION SUBCUTANEOUS at 08:11

## 2024-11-13 RX ADMIN — ARFORMOTEROL TARTRATE 15 MCG: 15 SOLUTION RESPIRATORY (INHALATION) at 07:11

## 2024-11-13 RX ADMIN — ALPRAZOLAM 1 MG: 0.5 TABLET ORAL at 09:11

## 2024-11-13 RX ADMIN — TRAZODONE HYDROCHLORIDE 50 MG: 50 TABLET ORAL at 09:11

## 2024-11-13 RX ADMIN — HYDROCODONE BITARTRATE AND ACETAMINOPHEN 1 TABLET: 5; 325 TABLET ORAL at 04:11

## 2024-11-13 RX ADMIN — IPRATROPIUM BROMIDE 0.5 MG: 0.5 SOLUTION RESPIRATORY (INHALATION) at 01:11

## 2024-11-13 RX ADMIN — ALPRAZOLAM 1 MG: 0.5 TABLET ORAL at 08:11

## 2024-11-13 RX ADMIN — LEVALBUTEROL HYDROCHLORIDE 1.25 MG: 1.25 SOLUTION RESPIRATORY (INHALATION) at 07:11

## 2024-11-13 RX ADMIN — FUROSEMIDE 40 MG: 10 INJECTION, SOLUTION INTRAVENOUS at 05:11

## 2024-11-13 RX ADMIN — ASPIRIN 81 MG: 81 TABLET, COATED ORAL at 08:11

## 2024-11-13 RX ADMIN — LEVALBUTEROL HYDROCHLORIDE 1.25 MG: 1.25 SOLUTION RESPIRATORY (INHALATION) at 01:11

## 2024-11-13 RX ADMIN — HYDROCODONE BITARTRATE AND ACETAMINOPHEN 1 TABLET: 5; 325 TABLET ORAL at 09:11

## 2024-11-13 RX ADMIN — IPRATROPIUM BROMIDE 0.5 MG: 0.5 SOLUTION RESPIRATORY (INHALATION) at 07:11

## 2024-11-13 RX ADMIN — CLOTRIMAZOLE AND BETAMETHASONE DIPROPIONATE 1 G: 10; .5 CREAM TOPICAL at 08:11

## 2024-11-13 RX ADMIN — METOPROLOL SUCCINATE 100 MG: 50 TABLET, FILM COATED, EXTENDED RELEASE ORAL at 08:11

## 2024-11-13 RX ADMIN — IPRATROPIUM BROMIDE 0.5 MG: 0.5 SOLUTION RESPIRATORY (INHALATION) at 12:11

## 2024-11-13 RX ADMIN — LEVOTHYROXINE SODIUM 50 MCG: 0.03 TABLET ORAL at 05:11

## 2024-11-13 RX ADMIN — FUROSEMIDE 40 MG: 10 INJECTION, SOLUTION INTRAVENOUS at 08:11

## 2024-11-13 RX ADMIN — PANTOPRAZOLE SODIUM 40 MG: 40 TABLET, DELAYED RELEASE ORAL at 05:11

## 2024-11-13 RX ADMIN — BUDESONIDE INHALATION 0.5 MG: 0.5 SUSPENSION RESPIRATORY (INHALATION) at 07:11

## 2024-11-13 NOTE — PT/OT/SLP PROGRESS
Physical Therapy      Patient Name:  Danielle Martinez   MRN:  [id number]    Patient not seen today secondary to first attempt patient sitting up in chair and reports fatigue declining participation with PT evaluation. Second attempt patient back in bed declining participation secondary to back pain from excessive coughing. Will follow-up 11/14/24.

## 2024-11-13 NOTE — CARE UPDATE
11/13/24 0711   Patient Assessment/Suction   Level of Consciousness (AVPU) alert   Respiratory Effort Normal;Unlabored   All Lung Fields Breath Sounds Anterior:;Lateral:;clear;diminished   Rhythm/Pattern, Respiratory unlabored;pattern regular;depth regular   Cough Frequency no cough   Skin Integrity   $ Wound Care Tech Time 15 min   Area Observed Left;Right;Behind ear;Nares   Skin Appearance without discoloration   PRE-TX-O2   Device (Oxygen Therapy) nasal cannula   $ Is the patient on Low Flow Oxygen? Yes   Flow (L/min) (Oxygen Therapy) 4   SpO2 96 %   Pulse Oximetry Type Intermittent   $ Pulse Oximetry - Multiple Charge Pulse Oximetry - Multiple   Pulse 88   Resp 17   Aerosol Therapy   $ Aerosol Therapy Charges Aerosol Treatment   Daily Review of Necessity (SVN) completed   Respiratory Treatment Status (SVN) given   Treatment Route (SVN) mask;oxygen   Patient Position HOB elevated   Post Treatment Assessment (SVN) breath sounds improved   Signs of Intolerance (SVN) none   Breath Sounds Post-Respiratory Treatment   Throughout All Fields Post-Treatment All Fields   Throughout All Fields Post-Treatment aeration increased   Post-treatment Heart Rate (beats/min) 89   Post-treatment Resp Rate (breaths/min) 18   Education   $ Education Bronchodilator;15 min

## 2024-11-13 NOTE — PT/OT/SLP EVAL
Occupational Therapy   Evaluation    Name: Danielle Martinez  MRN: [id number]  Admitting Diagnosis: Dyspnea on exertion  Recent Surgery: * No surgery found *      Recommendations:     Discharge Recommendations: Low Intensity Therapy  Discharge Equipment Recommendations:  none  Barriers to discharge:   (Increased assistane with ADLs, fall risk)    Assessment:     Danielle Martinez is a 79 y.o. female with a medical diagnosis of Dyspnea on exertion.  Performance deficits affecting function: weakness, impaired endurance, impaired self care skills, impaired functional mobility, gait instability, decreased upper extremity function, decreased lower extremity function, pain, impaired cardiopulmonary response to activity.      Rehab Prognosis: Good; patient would benefit from acute skilled OT services to address these deficits and reach maximum level of function.       Plan:     Patient to be seen 5 x/week to address the above listed problems via self-care/home management, therapeutic exercises, therapeutic activities  Plan of Care Expires: 12/13/24  Plan of Care Reviewed with:      Subjective     Chief Complaint: pain in back  Patient/Family Comments/goals: to return home with her .    Occupational Profile:  Living Environment: She lives with her  in an elevated [facility] with 35 steps to the home, no elevator.  Previous level of function: Independent with self care, no longer driving.  Able to do some light cooking and light housework   Roles and Routines: sedentary, ill  Equipment Used at Home: shower chair, oxygen, rollator, walker, rolling  Assistance upon Discharge:     Pain/Comfort:  Pain Rating 1: 5/10  Location - Side 1: Bilateral  Location - Orientation 1: generalized  Location 1: back  Pain Addressed 1: Pre-medicate for activity, Distraction, Reposition    Patients cultural, spiritual, Anglican conflicts given the current situation: no    Objective:     Communicated with: nurse prior to session.  Patient  found supine with telemetry, bed alarm, peripheral IV, PureWick upon OT entry to room.    General Precautions: Standard, fall  Orthopedic Precautions: N/A  Braces: N/A  Respiratory Status: Nasal cannula, flow 4 L/min    Occupational Performance:    Bed Mobility:    Patient completed Rolling/Turning to Left with  contact guard assistance  Patient completed Supine to Sit with contact guard assistance    Functional Mobility/Transfers:  Patient completed Sit <> Stand Transfer with contact guard assistance  with  no assistive device   Patient completed Bed <> Chair Transfer using Step Transfer technique with contact guard assistance with hand-held assist  Functional Mobility: She ambulated from the bed to the    Activities of Daily Living:  Toileting: maximal assistance for brief change due to [place] not functioning.  Rolled right and left for brief change with CGA for rolling.   Cognitive/Visual Perceptual:  Cognitive/Psychosocial Skills:     -       Oriented to: Person, Place, Time, and Situation   -       Follows Commands/attention:Follows one-step commands  -       Communication: clear/fluent  -       Memory: No Deficits noted  -       Safety awareness/insight to disability: intact   -       Mood/Affect/Coping skills/emotional control: Appropriate to situation    Physical Exam:  Balance:    -       static sitting balance- good  Dominant hand:    -       right  Upper Extremity Range of Motion:     -       Right Upper Extremity: WFL  -       Left Upper Extremity: WFL  Upper Extremity Strength:    -       Right Upper Extremity: WFL  -       Left Upper Extremity: WFL   Strength:    -       Right Upper Extremity: WFL  -       Left Upper Extremity: WFL  Fine Motor Coordination:    -       Intact  Gross motor coordination:   WFL    AMPAC 6 Click ADL:  AMPAC Total Score: 15    Treatment & Education:  She was educated on Role of Occupational Therapy, goals and plan of care.  Discussed importance of OOB activity and  functional mobility to negate the negative effects of prolonged bedrest during this hospitalization, safe transfers and d/c planning recommendations. She performed bed mobility and functional transfers during session.  Therapist provided facilitation and instruction of proper body mechanics and fall prevention strategies during tasks listed above.      Patient left up in chair with all lines intact and call button in reach    GOALS:   Multidisciplinary Problems       Occupational Therapy Goals          Problem: Occupational Therapy    Goal Priority Disciplines Outcome Interventions   Occupational Therapy Goal     OT, PT/OT     Description: Goals to be met by: 12/13/2024     Patient will increase functional independence with ADLs by performing:    UE Dressing with Supervision.  LE Dressing with Supervision.  Grooming while standing at sink with Supervision.  Toileting from toilet with Supervision for hygiene and clothing management.   Supine to sit with Supervision.  Toilet transfer to toilet with Supervision.                         History:     Past Medical History:   Diagnosis Date    HAILY (acute kidney injury)     Anxiety     Arthritis     Juvenile diagnosed age 17    Atrial fibrillation     COPD (chronic obstructive pulmonary disease)     Depression     Diverticulitis     Emphysema of lung     GERD (gastroesophageal reflux disease)     Hx of hiatal hernia     Hypertension     Osteoporosis     Pneumonia     Sleep apnea          Past Surgical History:   Procedure Laterality Date    BREAST LUMPECTOMY Left     in 40's    COLON SURGERY      COLONOSCOPY  2016    Dr. Crowe-JULISSA 5 years    COLONOSCOPY  2019    Dr. Reyes 5 years    HERNIA REPAIR      HIATAL HERNIA REPAIR  2005    HYSTERECTOMY      SHOULDER ARTHROSCOPY  2015       Time Tracking:     OT Date of Treatment: 11/13/24  OT Start Time: 1027  OT Stop Time: 1111  OT Total Time (min): 44 min    Billable Minutes:Evaluation 10  Therapeutic Activity  34    11/13/2024

## 2024-11-13 NOTE — CARE UPDATE
11/12/24 1954   Patient Assessment/Suction   Level of Consciousness (AVPU) alert   Respiratory Effort Unlabored   Expansion/Accessory Muscles/Retractions no retractions;no use of accessory muscles   All Lung Fields Breath Sounds Anterior:;Lateral:;clear   Rhythm/Pattern, Respiratory no shortness of breath reported;unlabored   Skin Integrity   $ Wound Care Tech Time 15 min   Area Observed Left;Right;Behind ear;Nares   Skin Appearance without discoloration   PRE-TX-O2   Device (Oxygen Therapy) nasal cannula   Flow (L/min) (Oxygen Therapy) 4   SpO2 98 %   Pulse Oximetry Type Intermittent   $ Pulse Oximetry - Multiple Charge Pulse Oximetry - Multiple   Pulse 86   Resp 18   Aerosol Therapy   $ Aerosol Therapy Charges Aerosol Treatment   Daily Review of Necessity (SVN) completed   Respiratory Treatment Status (SVN) given   Treatment Route (SVN) mask   Patient Position HOB elevated   Post Treatment Assessment (SVN) breath sounds unchanged   Signs of Intolerance (SVN) none   Breath Sounds Post-Respiratory Treatment   Throughout All Fields Post-Treatment All Fields   Throughout All Fields Post-Treatment aeration increased   Post-treatment Heart Rate (beats/min) 88   Post-treatment Resp Rate (breaths/min) 18   Preset CPAP/BiPAP Settings   Mode Of Delivery   (pt would like family to bring her home unit)   Education   $ Education Bronchodilator;Oxygen;15 min

## 2024-11-13 NOTE — ASSESSMENT & PLAN NOTE
Acute Diastolic CHF exacerbation  Supplemental O2 via nasal canula; titrate O2 saturation to >92%.  Ruled out for ACS.  Diuretic administration- intravenous Lasix 40 mg q.12h. Monitor electrolytes for hypokalemia and hypomagnesemia.  Cardiology Consultation.  Usually follows with Dr. Kate.  2-D Echocardiogram results reviewed.  Daily weights.  Strict I/Os.  Fluid restriction.  Na restriction <2g/d.

## 2024-11-13 NOTE — SUBJECTIVE & OBJECTIVE
Interval History:  Patient seen and examined.  Patient reports worsening shortness of breath, dyspnea on exertion and orthopnea with lower extremity swelling for few days.  Family members are at bedside. Patient usually follows with Dr. Kate from Cardiology.  For the past day or so patient had been experiencing substernal chest discomfort.  At present denies any chest pain.  Nitro paste is on.  With diuresis symptoms are improving.  Patient uses 3-4 liters/minute via nasal cannula supplemental oxygen and CPAP therapy at home. Patient denies any cough or expectoration or fever.    Review of Systems   Constitutional:  Negative for chills and fever.   HENT:  Negative for congestion and sore throat.    Respiratory:  Positive for cough and shortness of breath.    Cardiovascular:  Positive for chest pain and leg swelling.   Gastrointestinal:  Negative for abdominal pain, constipation, diarrhea, nausea and vomiting.   Genitourinary:  Negative for flank pain.   Neurological:  Negative for syncope.   Psychiatric/Behavioral:  Negative for confusion.      Objective:     Vital Signs (Most Recent):  Temp: 98 °F (36.7 °C) (11/13/24 0750)  Pulse: 89 (11/13/24 0750)  Resp: 17 (11/13/24 0711)  BP: 112/76 (11/13/24 0750)  SpO2: 96 % (11/13/24 0750) Vital Signs (24h Range):  Temp:  [97.6 °F (36.4 °C)-98 °F (36.7 °C)] 98 °F (36.7 °C)  Pulse:  [69-89] 89  Resp:  [17-20] 17  SpO2:  [95 %-100 %] 96 %  BP: ()/(65-81) 112/76     Weight: 86 kg (189 lb 9.5 oz)  Body mass index is 39.63 kg/m².    Intake/Output Summary (Last 24 hours) at 11/13/2024 0858  Last data filed at 11/13/2024 0431  Gross per 24 hour   Intake 780 ml   Output 4000 ml   Net -3220 ml         Physical Exam  Vitals reviewed.   Constitutional:       General: She is not in acute distress.  HENT:      Head: Normocephalic and atraumatic.      Mouth/Throat:      Mouth: Mucous membranes are moist.   Eyes:      Conjunctiva/sclera: Conjunctivae normal.   Cardiovascular:       "Rate and Rhythm: Normal rate. Rhythm irregular.   Pulmonary:      Effort: Pulmonary effort is normal. No respiratory distress.      Breath sounds: Rales present.   Abdominal:      General: Bowel sounds are normal.      Palpations: Abdomen is soft.      Tenderness: There is no abdominal tenderness.   Musculoskeletal:         General: Normal range of motion.      Cervical back: Normal range of motion.      Right lower leg: Edema present.      Left lower leg: Edema present.   Skin:     General: Skin is warm and dry.      Capillary Refill: Capillary refill takes less than 2 seconds.   Neurological:      Mental Status: She is alert and oriented to person, place, and time.   Psychiatric:         Mood and Affect: Mood normal.             Significant Labs: All pertinent labs within the past 24 hours have been reviewed.  CBC:   Recent Labs   Lab 11/11/24  1522 11/12/24  0521 11/13/24  0433   WBC 6.91 6.38 9.71   HGB 11.8* 11.2* 12.5   HCT 37.4 35.3* 39.2    212 254     CMP:   Recent Labs   Lab 11/11/24  1522 11/12/24  0823 11/13/24  0433    142 140   K 4.0 3.5 3.4*    99 99   CO2 28 31* 32*   GLU 89 100 131*   BUN 17 15 19   CREATININE 0.8 0.9 0.9   CALCIUM 10.1 10.6* 10.1   PROT 7.3 7.6 7.2   ALBUMIN 4.6 4.7 4.5   BILITOT 1.9* 2.2* 1.8*   ALKPHOS 51* 57 57   AST 14 16 14   ALT 7* 7* 7*   ANIONGAP 8 12 9     Coagulation:   Recent Labs   Lab 11/13/24  0433   INR 1.1     Lipid Panel: No results for input(s): "CHOL", "HDL", "LDLCALC", "TRIG", "CHOLHDL" in the last 48 hours.  Troponin:   Recent Labs   Lab 11/11/24  1522   TROPONINIHS 11.0     TSH:   Recent Labs   Lab 11/12/24  0823   TSH 3.647     Urine Studies:   Recent Labs   Lab 11/11/24  1859   COLORU Colorless*   APPEARANCEUA Clear   PHUR 6.0   SPECGRAV 1.010   PROTEINUA Negative   GLUCUA Negative   KETONESU Negative   BILIRUBINUA Negative   OCCULTUA Negative   NITRITE Negative   UROBILINOGEN Negative   LEUKOCYTESUR Negative     Microbiology Results (last " 7 days)       ** No results found for the last 168 hours. **          Significant Imaging:   ECHO:     Left Ventricle: The left ventricle is normal in size. Increased wall thickness. There is concentric remodeling. There is normal systolic function with a visually estimated ejection fraction of 55 - 60%. Unable to assess diastolic function due to atrial fibrillation.    Right Ventricle: Severe right ventricular enlargement. Systolic function is moderately reduced. TAPSE is 1.83 cm.    Right Atrium: Right atrium is dilated.    Aortic Valve: There is moderate aortic valve sclerosis. Mildly calcified left cusp. There is mild aortic regurgitation.    Mitral Valve: There is mild to moderate regurgitation.    Tricuspid Valve: There is moderate to severe regurgitation. There is moderate pulmonary hypertension. The estimated PA systolic pressure is at least 70 mmHg.    Pulmonic Valve: There is mild regurgitation.    CXR: No acute cardiac or pulmonary process.

## 2024-11-13 NOTE — PROGRESS NOTES
[facility]  Department of Cardiology  Progress Note      PATIENT NAME: Danielle Martinez    MRN: [id number]  TODAY'S DATE: 11/13/2024  ADMIT DATE: 11/11/2024                          CONSULT REQUESTED BY: Anshu Sharif MD    SUBJECTIVE     PRINCIPAL PROBLEM: Dyspnea on exertion    11/13/2024  Patient seen resting in bed with family member at bedside.  She is having a productive cough today and is currently receiving breathing treatments.    HPI:    Patient is a 79-year-old female who presented to the emergency room with complaints of progressively worsening shortness of breath well as significant lower extremity up to thigh edema.  Patient has a known history of stroke as well as atrial fibrillation and takes anticoagulation at home.  She is followed by cardiology outpatient.  Apparently she has been experiencing shortness of breath and swelling over the past couple of weeks.  She does have history of COPD and uses home O2 at home but has had a notable change in her functional status due to shortness of breath and edema over the past couple of weeks according to her and her family.      REASON FOR CONSULT:  From Hospitalist H&P:    Leg Swelling       Pt is having bilateral lower leg edema stating above the knee is swelling as well. Pt on home o2 using about 60% of her lungs.          HPI: 79-year-old female presented to ED for eval of shortness of breath.  Patient reported over the last several weeks she increasing shortness of breath, with associated dyspnea on exertion, bilateral lower extremity edema, and left-sided chest pressure.  Patient reported she has p.r.n. Lasix at home and has been taking it lately twice a day without symptom improvement, denies history CHF.  Denies fever, chills.  Denies abdominal pain, nausea, vomiting, changes in bowel habits.  Denies syncope.  Patient does have history of COPD and is home O2 dependent on 4 L NC.  Patient also with history of PAF, on Eliquis.  She uses a CPAP  HS for BRITTANIE.  Echo December 20, 2023 with EF 55-60%.  In ED today, initial troponin 11, repeat pending.  .  CXR impression without acute abnormality.  Admit to hospital medicine for further eval.      Review of patient's allergies indicates:   Allergen Reactions    Promethazine Anaphylaxis    Clove flavoring [flavoring agent]     Codeine Itching    Latex, natural rubber     Lorazepam Other (See Comments)    Lovastatin Other (See Comments)    Meclizine Other (See Comments)    Morphine Itching    Simvastatin Other (See Comments)    Tramadol-acetaminophen Itching       Past Medical History:   Diagnosis Date    HAILY (acute kidney injury)     Anxiety     Arthritis     Juvenile diagnosed age 17    Atrial fibrillation     COPD (chronic obstructive pulmonary disease)     Depression     Diverticulitis     Emphysema of lung     GERD (gastroesophageal reflux disease)     Hx of hiatal hernia     Hypertension     Osteoporosis     Pneumonia     Sleep apnea      Past Surgical History:   Procedure Laterality Date    BREAST LUMPECTOMY Left     in 40's    COLON SURGERY      COLONOSCOPY  2016    Dr. Crowe-JULISSA 5 years    COLONOSCOPY  2019    Dr. Reyes 5 years    HERNIA REPAIR      HIATAL HERNIA REPAIR  2005    HYSTERECTOMY      SHOULDER ARTHROSCOPY  2015     Social History     Tobacco Use    Smoking status: Never    Smokeless tobacco: Never   Substance Use Topics    Alcohol use: Never    Drug use: Never        REVIEW OF SYSTEMS  Per HPI    OBJECTIVE     VITAL SIGNS (Most Recent)  Temp: 98 °F (36.7 °C) (11/13/24 0750)  Pulse: 84 (11/13/24 1315)  Resp: 17 (11/13/24 1315)  BP: 112/76 (11/13/24 0750)  SpO2: 96 % (11/13/24 1315)    VENTILATION STATUS  Resp: 17 (11/13/24 1315)  SpO2: 96 % (11/13/24 1315)           I & O (Last 24H):  Intake/Output Summary (Last 24 hours) at 11/13/2024 1556  Last data filed at 11/13/2024 0916  Gross per 24 hour   Intake 780 ml   Output 1800 ml   Net -1020 ml       WEIGHTS  Wt Readings from Last  1 Encounters:   11/13/24 0530 86 kg (189 lb 9.5 oz)   11/11/24 1943 93.1 kg (205 lb 4 oz)       PHYSICAL EXAM  CONSTITUTIONAL: No fever, no chills  HEENT: Normocephalic, atraumatic,pupils reactive to light                 NECK:  No JVD no carotid bruit  CVS: S1S2+, RRR  LUNGS: Clear  ABDOMEN: Soft, NT, BS+  EXTREMITIES: No cyanosis, BLE up to thigh area edema noted   : No de la vega catheter  NEURO: AAO X 3  PSY: Normal affect      HOME MEDICATIONS:  No current facility-administered medications on file prior to encounter.     Current Outpatient Medications on File Prior to Encounter   Medication Sig Dispense Refill    albuterol (PROVENTIL) 2.5 mg /3 mL (0.083 %) nebulizer solution Take 3 mLs (2.5 mg total) by nebulization every 6 (six) hours as needed for Wheezing. Rescue 300 mL 1    ALPRAZolam (XANAX) 1 MG tablet Take 1 tablet (1 mg total) by mouth 2 (two) times daily. 60 tablet 2    ascorbic acid, vitamin C, (VITAMIN C) 100 MG tablet Take 100 mg by mouth once daily.      clotrimazole-betamethasone 1-0.05% (LOTRISONE) cream Apply twice a day to affected skin (Patient taking differently: Apply 1 g topically 2 (two) times daily. Apply twice a day to affected skin) 45 g 1    ELIQUIS 5 mg Tab Take 1 tablet (5 mg total) by mouth 2 (two) times daily. 60 tablet 5    esomeprazole (NEXIUM) 40 MG capsule Take 1 capsule (40 mg total) by mouth before breakfast. 90 capsule 1    fluticasone propionate (FLONASE) 50 mcg/actuation nasal spray 1 spray (50 mcg total) by Each Nostril route daily as needed for Allergies. 16 g 2    fluticasone-umeclidin-vilanter (TRELEGY ELLIPTA) 100-62.5-25 mcg DsDv Inhale 1 puff into the lungs daily as needed. 60 each 2    levothyroxine (SYNTHROID) 50 MCG tablet Take 1 tablet (50 mcg total) by mouth before breakfast. 90 tablet 1    metoprolol succinate (TOPROL-XL) 100 MG 24 hr tablet Take 1 tablet (100 mg total) by mouth once daily. For 30 days 90 tablet 3    mv-min/folic/vit K/lut/yhsy794 (ALIVE WOMEN'S  50 PLUS, BLEND, ORAL) Take 1 tablet by mouth once daily.      potassium chloride (KLOR-CON) 10 MEQ TbSR Take 1 tablet (10 mEq total) by mouth 2 (two) times daily. 180 tablet 1    furosemide (LASIX) 20 MG tablet Take 1 tablet (20 mg total) by mouth daily as needed. 90 tablet 1    nitroGLYCERIN (NITROSTAT) 0.4 MG SL tablet Place 1 tablet (0.4 mg total) under the tongue every 5 (five) minutes as needed. (Patient taking differently: Place 0.4 mg under the tongue every 5 (five) minutes as needed for Chest pain.) 25 tablet 3    traZODone (DESYREL) 50 MG tablet Take 1 tablet (50 mg total) by mouth every evening. For sleep 30 tablet 2    [DISCONTINUED] calcium carbonate (TUMS) 200 mg calcium (500 mg) chewable tablet Take 1 tablet by mouth once daily.      [DISCONTINUED] hyoscyamine (NULEV) 0.125 mg TbDL Take 0.125 mg by mouth.      [DISCONTINUED] pantoprazole (PROTONIX) 40 MG tablet Take 1 tablet (40 mg total) by mouth 2 (two) times daily. 60 tablet 5       SCHEDULED MEDS:   ALPRAZolam  1 mg Oral BID    arformoteroL  15 mcg Nebulization BID    aspirin  81 mg Oral Daily    budesonide  0.5 mg Nebulization Q12H    clotrimazole-betamethasone 1-0.05%  1 g Topical (Top) BID    enoxparin  1 mg/kg Subcutaneous Q12H (treatment, non-standard time)    furosemide (LASIX) injection  40 mg Intravenous Q12H    ipratropium  0.5 mg Nebulization Q6H    levalbuterol  1.25 mg Nebulization Q6H    levothyroxine  50 mcg Oral Before breakfast    metoprolol succinate  100 mg Oral Daily    multivitamin  1 tablet Oral Daily    pantoprazole  40 mg Oral Daily    traZODone  50 mg Oral QHS       CONTINUOUS INFUSIONS:    PRN MEDS:  Current Facility-Administered Medications:     HYDROcodone-acetaminophen, 1 tablet, Oral, Q6H PRN    levalbuterol, 1.25 mg, Nebulization, Q6H PRN    magnesium oxide, 800 mg, Oral, PRN    magnesium oxide, 800 mg, Oral, PRN    potassium bicarbonate, 35 mEq, Oral, PRN    potassium bicarbonate, 50 mEq, Oral, PRN    potassium  "bicarbonate, 60 mEq, Oral, PRN    potassium, sodium phosphates, 2 packet, Oral, PRN    potassium, sodium phosphates, 2 packet, Oral, PRN    potassium, sodium phosphates, 2 packet, Oral, PRN    sodium chloride 0.9%, 10 mL, Intravenous, PRN    LABS AND DIAGNOSTICS     CBC LAST 3 DAYS  Recent Labs   Lab 11/11/24  1522 11/12/24  0521 11/13/24  0433   WBC 6.91 6.38 9.71   RBC 4.16 3.89* 4.26   HGB 11.8* 11.2* 12.5   HCT 37.4 35.3* 39.2   MCV 90 91 92   MCH 28.4 28.8 29.3   MCHC 31.6* 31.7* 31.9*   RDW 16.3* 16.2* 16.1*    212 254   MPV 10.5 10.6 10.7   GRAN 68.1  4.7 68.1  4.4 77.1*  7.5   LYMPH 18.5  1.3 20.1  1.3 11.9*  1.2   MONO 9.4  0.7 8.3  0.5 7.9  0.8   BASO 0.04 0.05 0.06   NRBC 0 0 0       COAGULATION LAST 3 DAYS  Recent Labs   Lab 11/11/24  1522 11/12/24  0521 11/13/24  0433   INR 1.2 1.2 1.1       CHEMISTRY LAST 3 DAYS  Recent Labs   Lab 11/11/24  1522 11/12/24  0823 11/13/24  0433    142 140   K 4.0 3.5 3.4*    99 99   CO2 28 31* 32*   ANIONGAP 8 12 9   BUN 17 15 19   CREATININE 0.8 0.9 0.9   GLU 89 100 131*   CALCIUM 10.1 10.6* 10.1   MG 1.8 1.8 1.7   ALBUMIN 4.6 4.7 4.5   PROT 7.3 7.6 7.2   ALKPHOS 51* 57 57   ALT 7* 7* 7*   AST 14 16 14   BILITOT 1.9* 2.2* 1.8*       CARDIAC PROFILE LAST 3 DAYS  Recent Labs   Lab 11/11/24  1522   *   TROPONINIHS 11.0       ENDOCRINE LAST 3 DAYS  Recent Labs   Lab 11/12/24  0823   TSH 3.647       LAST ARTERIAL BLOOD GAS  ABG  No results for input(s): "PH", "PO2", "PCO2", "HCO3", "BE" in the last 168 hours.    LAST 7 DAYS MICROBIOLOGY   Microbiology Results (last 7 days)       ** No results found for the last 168 hours. **            MOST RECENT IMAGING  US Lower Extremity Veins Right  Narrative: EXAMINATION:  US LOWER EXTREMITY VEINS RIGHT    CLINICAL HISTORY:  Rule out DVT; Localized edema    COMPARISON:  None.    FINDINGS:  Real-time, duplex and color Doppler interrogation of the right lower extremity deep venous system is performed. " This demonstrates patency of the common and superficial femoral veins, greater saphenous vein, popliteal vein and major calf veins. There are no findings of deep vein thrombosis.  Impression: No findings of deep venous thrombosis involving the right lower extremity.    Electronically signed by: Viki Lnagston  Date:    11/12/2024  Time:    17:19  Echo    Left Ventricle: The left ventricle is normal in size. Increased wall   thickness. There is concentric remodeling. There is normal systolic   function with a visually estimated ejection fraction of 55 - 60%. Unable   to assess diastolic function due to atrial fibrillation.    Right Ventricle: Severe right ventricular enlargement. Systolic   function is moderately reduced. TAPSE is 1.83 cm.    Right Atrium: Right atrium is dilated.    Aortic Valve: There is moderate aortic valve sclerosis. Mildly   calcified left cusp. There is mild aortic regurgitation.    Mitral Valve: There is mild to moderate regurgitation.    Tricuspid Valve: There is moderate to severe regurgitation. There is   moderate pulmonary hypertension. The estimated PA systolic pressure is at   least 70 mmHg.    Pulmonic Valve: There is mild regurgitation.      ECHOCARDIOGRAM RESULTS (last 5)  Results for orders placed in visit on 12/22/23    Echo Saline Bubble? Yes    Interpretation Summary    Left Atrium: Left atrium is mildly dilated.    Agitated saline study of the atrial septum is negative after vasalva maneuver, suggesting absence of intracardiac shunt at the atrial level. No patent foramen ovale confirmed by agitated saline contrast.    Bubble study only,      Results for orders placed during the hospital encounter of 12/08/23    Echo    Interpretation Summary    Left Ventricle: The left ventricle is normal in size. Mildly increased wall thickness. There is mild concentric hypertrophy. Normal wall motion. There is normal systolic function with a visually estimated ejection fraction of 55 - 60%. There  is normal diastolic function.    Right Ventricle: Normal right ventricular cavity size. Wall thickness is normal. Right ventricle wall motion  is normal. Systolic function is normal.    Left Atrium: Left atrium is severely dilated.    Aortic Valve: There is mild aortic regurgitation with a centrally directed jet.    Tricuspid Valve: There is mild to moderate regurgitation with a centrally directed jet.    Pulmonary Artery: There is moderate to severe pulmonary hypertension. The estimated pulmonary artery systolic pressure is 65 mmHg.    IVC/SVC: Elevated venous pressure at 15 mmHg.      Results for orders placed during the hospital encounter of 05/22/20    Echo Color Flow Doppler? Yes    Interpretation Summary  · Normal left ventricular systolic function. The estimated ejection fraction is 60%.  · Grade III (severe) left ventricular diastolic dysfunction consistent with restrictive physiology.  · No wall motion abnormalities.  · Moderate right ventricular enlargement.  · Normal right ventricular systolic function.  · Moderate left atrial enlargement.  · Mild right atrial enlargement.  · Mild tricuspid regurgitation.  · Elevated central venous pressure (15 mmHg).  · The estimated PA systolic pressure is 104 mmHg.  · Severe pulmonary hypertension present.      CURRENT/PREVIOUS VISIT EKG  Results for orders placed or performed during the hospital encounter of 11/11/24   EKG 12-lead    Collection Time: 11/11/24  3:00 PM   Result Value Ref Range    QRS Duration 84 ms    [facility] QTC Calculation 429 ms    Narrative    Test Reason : R06.02,    Vent. Rate : 070 BPM     Atrial Rate : 000 BPM     P-R Int : 000 ms          QRS Dur : 084 ms      QT Int : 398 ms       P-R-T Axes : 000 088 031 degrees     QTc Int : 429 ms    Atrial fibrillation  Cannot rule out Anterior infarct ,age undetermined  Abnormal ECG  When compared with ECG of 05-JUN-2021 12:11,  Minimal criteria for Anterior infarct are now Present  Nonspecific T wave  abnormality, improved in Inferior leads  T wave inversion no longer evident in Anterior leads  Confirmed by Nupur MARY, Pieter MELO ([id number]) on 11/11/2024 10:57:19 PM    Referred By: CORRINE   SELF           Confirmed By:Pieter Esptiia MD           ASSESSMENT/PLAN:     Active Hospital Problems    Diagnosis    *Dyspnea on exertion    Chest pain    Atrial fibrillation    Hyperbilirubinemia    COPD (chronic obstructive pulmonary disease)    BRITTANIE on CPAP       ASSESSMENT & PLAN:     Fluid volume overload  Acute CHF  Permanent Afib on Eliquis   COPD on home O2  Obesity       RECOMMENDATIONS:    Continue to diurese with furosemide 40 mg IV q.12 hours.  Strict I&O.  Trend labs. Edema improved.   Echo shows preserved EF with some mild to moderate MR and moderate to severe TR. Moderate PH noted also.   Switch to oral diuresis once optimized.         Fabi Alarcon NP  Date of Service: 11/13/2024  2:00 PM    I have personally interviewed and examined the patient, I have reviewed the Nurse Practitioner's history and physical, assessment, and plan. I have personally evaluated the patient at bedside and agree with the findings and made appropriate changes as necessary in recommendations.    Patient is diuresing well her peripheral edema is going down.  She is still has wheezing in her lungs and productive cough.  Her AFib rate is controlled.  Continue IV diuresis    Pieter Espitia MD  Department of Cardiology  [facility]  11/13/2024 2:05 PM

## 2024-11-13 NOTE — PROGRESS NOTES
[facility] Medicine  Progress Note    Patient Name: Danielle Martinez  MRN: [id number]  Patient Class: IP- Inpatient   Admission Date: 11/11/2024  Length of Stay: 1 days  Attending Physician: Anshu Sharif MD  Primary Care Provider: Antony Lopez MD        Subjective:     Principal Problem:Dyspnea on exertion        HPI:  79-year-old female presented to ED for eval of shortness of breath.  Patient reported over the last several weeks she increasing shortness of breath, with associated dyspnea on exertion, bilateral lower extremity edema, and left-sided chest pressure.  Patient reported she has p.r.n. Lasix at home and has been taking it lately twice a day without symptom improvement, denies history CHF.  Denies fever, chills.  Denies abdominal pain, nausea, vomiting, changes in bowel habits.  Denies syncope.  Patient does have history of COPD and is home O2 dependent on 4 L NC.  Patient also with history of PAF, on Eliquis.  She uses a CPAP HS for BRITTANIE.  Echo December 20, 2023 with EF 55-60%.  In ED today, initial troponin 11, repeat pending.  .  CXR impression without acute abnormality.  Admit to hospital medicine for further eval.    Overview/Hospital Course:  Patient admitted to Hospital Medicine on telemetry floor. Patient had serial cardiac enzymes. Patient was given IV lasix and intake and output closely monitored. Patient was evaluated by cardiologist and underwent ECHO. Patient's renal panel and electrolytes closely monitored. Patient had daily weights. Patient was educated on monitoring daily weight, following low salt diet and in case if gain more than 3 pounds in 24 hours, to call their doctor for further advice. Patient's symptoms resolved. Patient's routine supplemental oxygen maintained.  Patient utilize CPAP therapy during sleep.       Interval History:  Patient seen and examined.  Patient reports worsening shortness of breath, dyspnea on exertion and orthopnea with lower  extremity swelling for few days.  Family members are at bedside. Patient usually follows with Dr. Kate from Cardiology.  For the past day or so patient had been experiencing substernal chest discomfort.  At present denies any chest pain.  Nitro paste is on.  With diuresis symptoms are improving.  Patient uses 3-4 liters/minute via nasal cannula supplemental oxygen and CPAP therapy at home. Patient denies any cough or expectoration or fever.    Review of Systems   Constitutional:  Negative for chills and fever.   HENT:  Negative for congestion and sore throat.    Respiratory:  Positive for cough and shortness of breath.    Cardiovascular:  Positive for chest pain and leg swelling.   Gastrointestinal:  Negative for abdominal pain, constipation, diarrhea, nausea and vomiting.   Genitourinary:  Negative for flank pain.   Neurological:  Negative for syncope.   Psychiatric/Behavioral:  Negative for confusion.      Objective:     Vital Signs (Most Recent):  Temp: 98 °F (36.7 °C) (11/13/24 0750)  Pulse: 89 (11/13/24 0750)  Resp: 17 (11/13/24 0711)  BP: 112/76 (11/13/24 0750)  SpO2: 96 % (11/13/24 0750) Vital Signs (24h Range):  Temp:  [97.6 °F (36.4 °C)-98 °F (36.7 °C)] 98 °F (36.7 °C)  Pulse:  [69-89] 89  Resp:  [17-20] 17  SpO2:  [95 %-100 %] 96 %  BP: ()/(65-81) 112/76     Weight: 86 kg (189 lb 9.5 oz)  Body mass index is 39.63 kg/m².    Intake/Output Summary (Last 24 hours) at 11/13/2024 0858  Last data filed at 11/13/2024 0431  Gross per 24 hour   Intake 780 ml   Output 4000 ml   Net -3220 ml         Physical Exam  Vitals reviewed.   Constitutional:       General: She is not in acute distress.  HENT:      Head: Normocephalic and atraumatic.      Mouth/Throat:      Mouth: Mucous membranes are moist.   Eyes:      Conjunctiva/sclera: Conjunctivae normal.   Cardiovascular:      Rate and Rhythm: Normal rate. Rhythm irregular.   Pulmonary:      Effort: Pulmonary effort is normal. No respiratory distress.      Breath  "sounds: Rales present.   Abdominal:      General: Bowel sounds are normal.      Palpations: Abdomen is soft.      Tenderness: There is no abdominal tenderness.   Musculoskeletal:         General: Normal range of motion.      Cervical back: Normal range of motion.      Right lower leg: Edema present.      Left lower leg: Edema present.   Skin:     General: Skin is warm and dry.      Capillary Refill: Capillary refill takes less than 2 seconds.   Neurological:      Mental Status: She is alert and oriented to person, place, and time.   Psychiatric:         Mood and Affect: Mood normal.             Significant Labs: All pertinent labs within the past 24 hours have been reviewed.  CBC:   Recent Labs   Lab 11/11/24  1522 11/12/24  0521 11/13/24  0433   WBC 6.91 6.38 9.71   HGB 11.8* 11.2* 12.5   HCT 37.4 35.3* 39.2    212 254     CMP:   Recent Labs   Lab 11/11/24  1522 11/12/24  0823 11/13/24  0433    142 140   K 4.0 3.5 3.4*    99 99   CO2 28 31* 32*   GLU 89 100 131*   BUN 17 15 19   CREATININE 0.8 0.9 0.9   CALCIUM 10.1 10.6* 10.1   PROT 7.3 7.6 7.2   ALBUMIN 4.6 4.7 4.5   BILITOT 1.9* 2.2* 1.8*   ALKPHOS 51* 57 57   AST 14 16 14   ALT 7* 7* 7*   ANIONGAP 8 12 9     Coagulation:   Recent Labs   Lab 11/13/24  0433   INR 1.1     Lipid Panel: No results for input(s): "CHOL", "HDL", "LDLCALC", "TRIG", "CHOLHDL" in the last 48 hours.  Troponin:   Recent Labs   Lab 11/11/24  1522   TROPONINIHS 11.0     TSH:   Recent Labs   Lab 11/12/24  0823   TSH 3.647     Urine Studies:   Recent Labs   Lab 11/11/24  1859   COLORU Colorless*   APPEARANCEUA Clear   PHUR 6.0   SPECGRAV 1.010   PROTEINUA Negative   GLUCUA Negative   KETONESU Negative   BILIRUBINUA Negative   OCCULTUA Negative   NITRITE Negative   UROBILINOGEN Negative   LEUKOCYTESUR Negative     Microbiology Results (last 7 days)       ** No results found for the last 168 hours. **          Significant Imaging:   ECHO:     Left Ventricle: The left ventricle " is normal in size. Increased wall thickness. There is concentric remodeling. There is normal systolic function with a visually estimated ejection fraction of 55 - 60%. Unable to assess diastolic function due to atrial fibrillation.    Right Ventricle: Severe right ventricular enlargement. Systolic function is moderately reduced. TAPSE is 1.83 cm.    Right Atrium: Right atrium is dilated.    Aortic Valve: There is moderate aortic valve sclerosis. Mildly calcified left cusp. There is mild aortic regurgitation.    Mitral Valve: There is mild to moderate regurgitation.    Tricuspid Valve: There is moderate to severe regurgitation. There is moderate pulmonary hypertension. The estimated PA systolic pressure is at least 70 mmHg.    Pulmonic Valve: There is mild regurgitation.    CXR: No acute cardiac or pulmonary process.         Assessment/Plan:      * Dyspnea on exertion  Acute Diastolic CHF exacerbation  Supplemental O2 via nasal canula; titrate O2 saturation to >92%.  Ruled out for ACS.  Diuretic administration- intravenous Lasix 40 mg q.12h. Monitor electrolytes for hypokalemia and hypomagnesemia.  Cardiology Consultation.  Usually follows with Dr. Kate.  2-D Echocardiogram results reviewed.  Daily weights.  Strict I/Os.  Fluid restriction.  Na restriction <2g/d.        Hyperbilirubinemia  Avoid hepatotoxic meds  Echo  Daily CMP      Atrial fibrillation  Patient has paroxysmal (<7 days) atrial fibrillation. Patient is currently in atrial fibrillation. [id number] Score: 6. The patients heart rate in the last 24 hours is as follows:  Pulse  Min: 73  Max: 100     Antiarrhythmics  metoprolol succinate (TOPROL-XL) 24 hr tablet 100 mg, Daily, Oral    Anticoagulants  enoxaparin injection 90 mg, Every 12 hours, Subcutaneous    Plan  - Replete lytes with a goal of K>4, Mg >2  - Patient is anticoagulated, see medications listed above.  - Patient's afib is currently controlled  - Echo        Chest pain  See above  Serial  troponins x 3.  Ruled out for ACS.  EKG PRN  ASA  Will discontinue nitro paste and closely monitor.      COPD (chronic obstructive pulmonary disease)  Home O2 dependent  Patient's COPD is controlled currently.  Patient is currently off COPD Pathway. Continue scheduled inhalers Supplemental oxygen and monitor respiratory status closely.     BRITTANIE on CPAP  CPAP HS per RT  Monitor O2 sat      D/W CM and family with DC plan.   VTE Risk Mitigation (From admission, onward)           Ordered     enoxaparin injection 90 mg  Every 12 hours         11/11/24 1812     IP VTE HIGH RISK PATIENT  Once         11/11/24 1812     Place sequential compression device  Until discontinued         11/11/24 1812                    Discharge Planning   KATHRYN: 11/14/2024     Code Status: Full Code   Is the patient medically ready for discharge?:     Reason for patient still in hospital (select all that apply): Patient trending condition and Consult recommendations  Discharge Plan A: Home with family                  Anshu Sharif MD  Department of Hospital Medicine   [facility]

## 2024-11-14 VITALS
DIASTOLIC BLOOD PRESSURE: 65 MMHG | TEMPERATURE: 97 F | SYSTOLIC BLOOD PRESSURE: 125 MMHG | HEIGHT: 58 IN | OXYGEN SATURATION: 95 % | WEIGHT: 188.25 LBS | BODY MASS INDEX: 39.52 KG/M2 | RESPIRATION RATE: 18 BRPM | HEART RATE: 88 BPM

## 2024-11-14 LAB
ALBUMIN SERPL BCP-MCNC: 4.2 G/DL (ref 3.5–5.2)
ALP SERPL-CCNC: 47 U/L (ref 55–135)
ALT SERPL W/O P-5'-P-CCNC: 3 U/L (ref 10–44)
ANION GAP SERPL CALC-SCNC: 9 MMOL/L (ref 8–16)
AST SERPL-CCNC: 14 U/L (ref 10–40)
BASOPHILS NFR BLD: 0 % (ref 0–1.9)
BILIRUB SERPL-MCNC: 2.1 MG/DL (ref 0.1–1)
BUN SERPL-MCNC: 23 MG/DL (ref 8–23)
CALCIUM SERPL-MCNC: 9.5 MG/DL (ref 8.7–10.5)
CHLORIDE SERPL-SCNC: 95 MMOL/L (ref 95–110)
CO2 SERPL-SCNC: 34 MMOL/L (ref 23–29)
CREAT SERPL-MCNC: 1 MG/DL (ref 0.5–1.4)
DIFFERENTIAL METHOD BLD: ABNORMAL
EOSINOPHIL NFR BLD: 1 % (ref 0–8)
ERYTHROCYTE [DISTWIDTH] IN BLOOD BY AUTOMATED COUNT: 16 % (ref 11.5–14.5)
EST. GFR  (NO RACE VARIABLE): 57.7 ML/MIN/1.73 M^2
GLUCOSE SERPL-MCNC: 102 MG/DL (ref 70–110)
HCT VFR BLD AUTO: 40.4 % (ref 37–48.5)
HGB BLD-MCNC: 12.7 G/DL (ref 12–16)
IMM GRANULOCYTES # BLD AUTO: ABNORMAL 10*3/UL
IMM GRANULOCYTES NFR BLD AUTO: ABNORMAL %
INR PPP: 1.2 (ref 0.8–1.2)
LYMPHOCYTES NFR BLD: 20 % (ref 18–48)
MAGNESIUM SERPL-MCNC: 1.8 MG/DL (ref 1.6–2.6)
MCH RBC QN AUTO: 29 PG (ref 27–31)
MCHC RBC AUTO-ENTMCNC: 31.4 G/DL (ref 32–36)
MCV RBC AUTO: 92 FL (ref 82–98)
METAMYELOCYTES NFR BLD MANUAL: 2 %
MONOCYTES NFR BLD: 9 % (ref 4–15)
NEUTROPHILS NFR BLD: 68 % (ref 38–73)
NRBC BLD-RTO: 0 /100 WBC
PLATELET # BLD AUTO: 167 K/UL (ref 150–450)
PLATELET BLD QL SMEAR: ABNORMAL
PMV BLD AUTO: 11.7 FL (ref 9.2–12.9)
POTASSIUM SERPL-SCNC: 3.5 MMOL/L (ref 3.5–5.1)
PROT SERPL-MCNC: 6.6 G/DL (ref 6–8.4)
PROTHROMBIN TIME: 13 SEC (ref 9–12.5)
RBC # BLD AUTO: 4.38 M/UL (ref 4–5.4)
SODIUM SERPL-SCNC: 138 MMOL/L (ref 136–145)
WBC # BLD AUTO: 7.28 K/UL (ref 3.9–12.7)

## 2024-11-14 PROCEDURE — 25000003 PHARM REV CODE 250

## 2024-11-14 PROCEDURE — 63600175 PHARM REV CODE 636 W HCPCS: Performed by: INTERNAL MEDICINE

## 2024-11-14 PROCEDURE — 25000003 PHARM REV CODE 250: Performed by: INTERNAL MEDICINE

## 2024-11-14 PROCEDURE — 97161 PT EVAL LOW COMPLEX 20 MIN: CPT

## 2024-11-14 PROCEDURE — 94761 N-INVAS EAR/PLS OXIMETRY MLT: CPT

## 2024-11-14 PROCEDURE — 94640 AIRWAY INHALATION TREATMENT: CPT

## 2024-11-14 PROCEDURE — 27000221 HC OXYGEN, UP TO 24 HOURS

## 2024-11-14 PROCEDURE — 80053 COMPREHEN METABOLIC PANEL: CPT

## 2024-11-14 PROCEDURE — 99233 SBSQ HOSP IP/OBS HIGH 50: CPT | Mod: ,,, | Performed by: GENERAL PRACTICE

## 2024-11-14 PROCEDURE — 85027 COMPLETE CBC AUTOMATED: CPT

## 2024-11-14 PROCEDURE — 85007 BL SMEAR W/DIFF WBC COUNT: CPT

## 2024-11-14 PROCEDURE — 85610 PROTHROMBIN TIME: CPT

## 2024-11-14 PROCEDURE — 99900035 HC TECH TIME PER 15 MIN (STAT)

## 2024-11-14 PROCEDURE — 25000242 PHARM REV CODE 250 ALT 637 W/ HCPCS: Performed by: NURSE PRACTITIONER

## 2024-11-14 PROCEDURE — 83735 ASSAY OF MAGNESIUM: CPT

## 2024-11-14 PROCEDURE — 63600175 PHARM REV CODE 636 W HCPCS

## 2024-11-14 PROCEDURE — 99900031 HC PATIENT EDUCATION (STAT)

## 2024-11-14 PROCEDURE — 25000242 PHARM REV CODE 250 ALT 637 W/ HCPCS: Performed by: INTERNAL MEDICINE

## 2024-11-14 PROCEDURE — 36415 COLL VENOUS BLD VENIPUNCTURE: CPT

## 2024-11-14 PROCEDURE — 97116 GAIT TRAINING THERAPY: CPT

## 2024-11-14 RX ORDER — GUAIFENESIN AND DEXTROMETHORPHAN HYDROBROMIDE 10; 100 MG/5ML; MG/5ML
10 SYRUP ORAL EVERY 6 HOURS PRN
Qty: 355 ML | Refills: 0 | Status: SHIPPED | OUTPATIENT
Start: 2024-11-14 | End: 2024-11-24

## 2024-11-14 RX ORDER — ASPIRIN 81 MG/1
81 TABLET ORAL DAILY
Start: 2024-11-14 | End: 2025-11-14

## 2024-11-14 RX ORDER — FUROSEMIDE 40 MG/1
40 TABLET ORAL DAILY
Qty: 30 TABLET | Refills: 0 | Status: SHIPPED | OUTPATIENT
Start: 2024-11-14 | End: 2025-11-14

## 2024-11-14 RX ADMIN — IPRATROPIUM BROMIDE 0.5 MG: 0.5 SOLUTION RESPIRATORY (INHALATION) at 12:11

## 2024-11-14 RX ADMIN — POTASSIUM BICARBONATE 35 MEQ: 391 TABLET, EFFERVESCENT ORAL at 08:11

## 2024-11-14 RX ADMIN — THERA TABS 1 TABLET: TAB at 08:11

## 2024-11-14 RX ADMIN — FUROSEMIDE 40 MG: 10 INJECTION, SOLUTION INTRAVENOUS at 05:11

## 2024-11-14 RX ADMIN — HYDROCODONE BITARTRATE AND ACETAMINOPHEN 1 TABLET: 5; 325 TABLET ORAL at 03:11

## 2024-11-14 RX ADMIN — ARFORMOTEROL TARTRATE 15 MCG: 15 SOLUTION RESPIRATORY (INHALATION) at 07:11

## 2024-11-14 RX ADMIN — ALPRAZOLAM 1 MG: 0.5 TABLET ORAL at 08:11

## 2024-11-14 RX ADMIN — METOPROLOL SUCCINATE 100 MG: 50 TABLET, FILM COATED, EXTENDED RELEASE ORAL at 08:11

## 2024-11-14 RX ADMIN — LEVALBUTEROL HYDROCHLORIDE 1.25 MG: 1.25 SOLUTION RESPIRATORY (INHALATION) at 12:11

## 2024-11-14 RX ADMIN — ASPIRIN 81 MG: 81 TABLET, COATED ORAL at 08:11

## 2024-11-14 RX ADMIN — IPRATROPIUM BROMIDE 0.5 MG: 0.5 SOLUTION RESPIRATORY (INHALATION) at 07:11

## 2024-11-14 RX ADMIN — POTASSIUM BICARBONATE 35 MEQ: 391 TABLET, EFFERVESCENT ORAL at 12:11

## 2024-11-14 RX ADMIN — Medication 800 MG: at 08:11

## 2024-11-14 RX ADMIN — LEVOTHYROXINE SODIUM 50 MCG: 0.03 TABLET ORAL at 05:11

## 2024-11-14 RX ADMIN — PANTOPRAZOLE SODIUM 40 MG: 40 TABLET, DELAYED RELEASE ORAL at 05:11

## 2024-11-14 RX ADMIN — LEVALBUTEROL HYDROCHLORIDE 1.25 MG: 1.25 SOLUTION RESPIRATORY (INHALATION) at 07:11

## 2024-11-14 RX ADMIN — BUDESONIDE INHALATION 0.5 MG: 0.5 SUSPENSION RESPIRATORY (INHALATION) at 07:11

## 2024-11-14 RX ADMIN — Medication 800 MG: at 12:11

## 2024-11-14 RX ADMIN — ENOXAPARIN SODIUM 90 MG: 100 INJECTION SUBCUTANEOUS at 08:11

## 2024-11-14 NOTE — PT/OT/SLP EVAL
Physical Therapy Evaluation    Patient Name:  Danielle Martinez   MRN:  [id number]    Recommendations:     Discharge Recommendations: Low Intensity Therapy   Discharge Equipment Recommendations: none   Barriers to discharge: None    Assessment:     Danielle Martinez is a 79 y.o. female admitted with a medical diagnosis of Dyspnea on exertion.  She presents with the following impairments/functional limitations: weakness, impaired endurance, impaired self care skills, impaired functional mobility, gait instability, impaired balance, decreased lower extremity function, pain, impaired cardiopulmonary response to activity. Patient is agreeable to participation with PT evaluation. She lives with spouse in a raised home with 24 stairs to enter, but she plans to stay in a camper when discharged with 3 stairs to enter. She uses a cane or rollator for ambulation at baseline. She reports 6/10 back pain from coughing so much. She requires SBA for supine to sit and CGA for sit to stand with RW. Purewick had malfunctioned with brief saturated and it was removed by PT. She ambulated 100' with RW, CGA, and 4L. She is agreeable to sit up in chair for lunch with all needs met.     Rehab Prognosis: Good; patient would benefit from acute skilled PT services to address these deficits and reach maximum level of function.    Recent Surgery: * No surgery found *      Plan:     During this hospitalization, patient to be seen 5 x/week to address the identified rehab impairments via gait training, therapeutic activities, therapeutic exercises and progress toward the following goals:    Plan of Care Expires:  12/14/24    Subjective     Chief Complaint: back pain from coughing (states she has been coughing up blood)   Patient/Family Comments/goals: take a shower before discharge - PCT notified   Pain/Comfort:  Pain Rating 1: 6/10  Location 1: back  Pain Addressed 1: Reposition, Distraction    Patients cultural, spiritual, Mormon conflicts given the  current situation:      Living Environment:  Patient lives with spouse in a H with 24 KRISTI and B rails. However, she will stay in a camper upon D/C with 3 KRISTI   Prior to admission, patients level of function was Cristin with cane or rollator. She denies any recent PT.  Equipment used at home: shower chair, oxygen, rollator, walker, rolling, cane, straight.  DME owned (not currently used): none.  Upon discharge, patient will have assistance from family and HHPT.    Objective:     Communicated with ALICIA Powell prior to session.  Patient found HOB elevated with bed alarm, oxygen, PureWick, peripheral IV, telemetry  upon PT entry to room.    General Precautions: Standard, fall, respiratory  Orthopedic Precautions:N/A   Braces: N/A  Respiratory Status: Nasal cannula, flow 4 L/min    Exams:  RLE Strength: WFL  LLE Strength: WFL    Functional Mobility:  Bed Mobility:     Supine to Sit: stand by assistance  Transfers:     Sit to Stand:  contact guard assistance with rolling walker  Gait: 100' with RW, CGA, and 4L      AM-PAC 6 CLICK MOBILITY  Total Score:23       Treatment & Education:  Patient was educated on the importance of OOB activity and functional mobility to negate negative effects of prolonged bed rest during hospitalization, safe transfers and ambulation, and D/C planning     Patient left up in chair with all lines intact, call button in reach, and spouse and daughter present.    GOALS:   Multidisciplinary Problems       Physical Therapy Goals          Problem: Physical Therapy    Goal Priority Disciplines Outcome Interventions   Physical Therapy Goal     PT, PT/OT     Description: Goals to be met by: 24    Patient will increase functional independence with mobility by performin. Supine to sit with Supervision  2. Sit to stand transfer with Supervision  3. Bed to chair transfer with Supervision using Rollator  4. Gait  x 250 feet with Supervision using Rollator  5. Ascend/descend 3 stairs with  Supervision using no AD  6. Lower extremity exercise program x20 reps per handout, with supervision                       History:     Past Medical History:   Diagnosis Date    HAILY (acute kidney injury)     Anxiety     Arthritis     Juvenile diagnosed age 17    Atrial fibrillation     COPD (chronic obstructive pulmonary disease)     Depression     Diverticulitis     Emphysema of lung     GERD (gastroesophageal reflux disease)     Hx of hiatal hernia     Hypertension     Osteoporosis     Pneumonia     Sleep apnea        Past Surgical History:   Procedure Laterality Date    BREAST LUMPECTOMY Left     in 40's    COLON SURGERY      COLONOSCOPY  2016    Dr. Reyes 5 years    COLONOSCOPY  2019    Dr. Reyes 5 years    HERNIA REPAIR      HIATAL HERNIA REPAIR  2005    HYSTERECTOMY      SHOULDER ARTHROSCOPY  2015       Time Tracking:     PT Received On: 11/14/24  PT Start Time: 1139     PT Stop Time: 1157  PT Total Time (min): 18 min     Billable Minutes: Evaluation 8 and Gait Training 10      11/14/2024

## 2024-11-14 NOTE — PLAN OF CARE
SW met with pt at bedside to discuss HH choices.  Pt is agreeable to HH. Pt shared that [facility] is [facility] her home.  [facility] [place]   Verbal consent given  SW sent referrals via [employer].       11/14/24 1127   Post-Acute Status   Post-Acute Authorization Home Health   Home Health Status Referrals Sent   Patient choice form signed by patient/caregiver List with quality metrics by geographic area provided   Discharge Delays None known at this time   Discharge Plan   Discharge Plan A Home Health   Discharge Plan B Home Health

## 2024-11-14 NOTE — PT/OT/SLP PROGRESS
Occupational Therapy      Patient Name:  Danielle Martinez   MRN:  [id number]    Patient not seen today secondary to Other (Comment) (1st attempt, working with PT; pending d/c.). Will follow-up next service date if d/c falls through.    11/14/2024

## 2024-11-14 NOTE — PROGRESS NOTES
[facility]  Department of Cardiology  Progress Note      PATIENT NAME: Danielle Martinez    MRN: [id number]  TODAY'S DATE: 11/14/2024  ADMIT DATE: 11/11/2024                          CONSULT REQUESTED BY: Anshu Sharif MD    SUBJECTIVE     PRINCIPAL PROBLEM: Dyspnea on exertion    11/14/2024  Patient has diuresed very well.  She is negative about 6 L since admit and is down about 17 lb.  She was seen sitting up in chair with no acute distress noted and feeling much better.  She has significantly improved.    11/13/24  Patient seen resting in bed with family member at bedside.  She is having a productive cough today and is currently receiving breathing treatments.    HPI:    Patient is a 79-year-old female who presented to the emergency room with complaints of progressively worsening shortness of breath well as significant lower extremity up to thigh edema.  Patient has a known history of stroke as well as atrial fibrillation and takes anticoagulation at home.  She is followed by cardiology outpatient.  Apparently she has been experiencing shortness of breath and swelling over the past couple of weeks.  She does have history of COPD and uses home O2 at home but has had a notable change in her functional status due to shortness of breath and edema over the past couple of weeks according to her and her family.      REASON FOR CONSULT:  From Hospitalist H&P:    Leg Swelling       Pt is having bilateral lower leg edema stating above the knee is swelling as well. Pt on home o2 using about 60% of her lungs.          HPI: 79-year-old female presented to ED for eval of shortness of breath.  Patient reported over the last several weeks she increasing shortness of breath, with associated dyspnea on exertion, bilateral lower extremity edema, and left-sided chest pressure.  Patient reported she has p.r.n. Lasix at home and has been taking it lately twice a day without symptom improvement, denies history CHF.  Denies  fever, chills.  Denies abdominal pain, nausea, vomiting, changes in bowel habits.  Denies syncope.  Patient does have history of COPD and is home O2 dependent on 4 L NC.  Patient also with history of PAF, on Eliquis.  She uses a CPAP HS for BRITTANIE.  Echo December 20, 2023 with EF 55-60%.  In ED today, initial troponin 11, repeat pending.  .  CXR impression without acute abnormality.  Admit to hospital medicine for further eval.      Review of patient's allergies indicates:   Allergen Reactions    Promethazine Anaphylaxis    Clove flavoring [flavoring agent]     Codeine Itching    Latex, natural rubber     Lorazepam Other (See Comments)    Lovastatin Other (See Comments)    Meclizine Other (See Comments)    Morphine Itching    Simvastatin Other (See Comments)    Tramadol-acetaminophen Itching       Past Medical History:   Diagnosis Date    HAILY (acute kidney injury)     Anxiety     Arthritis     Juvenile diagnosed age 17    Atrial fibrillation     COPD (chronic obstructive pulmonary disease)     Depression     Diverticulitis     Emphysema of lung     GERD (gastroesophageal reflux disease)     Hx of hiatal hernia     Hypertension     Osteoporosis     Pneumonia     Sleep apnea      Past Surgical History:   Procedure Laterality Date    BREAST LUMPECTOMY Left     in 40's    COLON SURGERY      COLONOSCOPY  2016    Dr. Reyes 5 years    COLONOSCOPY  2019    Dr. Reyes 5 years    HERNIA REPAIR      HIATAL HERNIA REPAIR  2005    HYSTERECTOMY      SHOULDER ARTHROSCOPY  2015     Social History     Tobacco Use    Smoking status: Never    Smokeless tobacco: Never   Substance Use Topics    Alcohol use: Never    Drug use: Never        REVIEW OF SYSTEMS  Per HPI    OBJECTIVE     VITAL SIGNS (Most Recent)  Temp: 97.2 °F (36.2 °C) (11/14/24 0845)  Pulse: 80 (11/14/24 0845)  Resp: 18 (11/14/24 0845)  BP: 122/75 (11/14/24 0845)  SpO2: 99 % (11/14/24 0845)    VENTILATION STATUS  Resp: 18 (11/14/24 0845)  SpO2: 99 %  (11/14/24 0845)           I & O (Last 24H):  Intake/Output Summary (Last 24 hours) at 11/14/2024 0931  Last data filed at 11/14/2024 0920  Gross per 24 hour   Intake 780 ml   Output 1300 ml   Net -520 ml       WEIGHTS  Wt Readings from Last 1 Encounters:   11/14/24 0400 85.4 kg (188 lb 4.4 oz)   11/13/24 0530 86 kg (189 lb 9.5 oz)   11/11/24 1943 93.1 kg (205 lb 4 oz)       PHYSICAL EXAM  CONSTITUTIONAL: No fever, no chills  HEENT: Normocephalic, atraumatic,pupils reactive to light                 NECK:  No JVD no carotid bruit  CVS: S1S2+, iRRR  LUNGS: Clear  ABDOMEN: Soft, NT, BS+  EXTREMITIES: No cyanosis, BLE up to thigh area edema noted: resolved, mild pedal edema persists  : No de la vega catheter  NEURO: AAO X 3  PSY: Normal affect      HOME MEDICATIONS:  No current facility-administered medications on file prior to encounter.     Current Outpatient Medications on File Prior to Encounter   Medication Sig Dispense Refill    albuterol (PROVENTIL) 2.5 mg /3 mL (0.083 %) nebulizer solution Take 3 mLs (2.5 mg total) by nebulization every 6 (six) hours as needed for Wheezing. Rescue 300 mL 1    ALPRAZolam (XANAX) 1 MG tablet Take 1 tablet (1 mg total) by mouth 2 (two) times daily. 60 tablet 2    ascorbic acid, vitamin C, (VITAMIN C) 100 MG tablet Take 100 mg by mouth once daily.      clotrimazole-betamethasone 1-0.05% (LOTRISONE) cream Apply twice a day to affected skin (Patient taking differently: Apply 1 g topically 2 (two) times daily. Apply twice a day to affected skin) 45 g 1    ELIQUIS 5 mg Tab Take 1 tablet (5 mg total) by mouth 2 (two) times daily. 60 tablet 5    esomeprazole (NEXIUM) 40 MG capsule Take 1 capsule (40 mg total) by mouth before breakfast. 90 capsule 1    fluticasone propionate (FLONASE) 50 mcg/actuation nasal spray 1 spray (50 mcg total) by Each Nostril route daily as needed for Allergies. 16 g 2    fluticasone-umeclidin-vilanter (TRELEGY ELLIPTA) 100-62.5-25 mcg DsDv Inhale 1 puff into the  lungs daily as needed. 60 each 2    levothyroxine (SYNTHROID) 50 MCG tablet Take 1 tablet (50 mcg total) by mouth before breakfast. 90 tablet 1    metoprolol succinate (TOPROL-XL) 100 MG 24 hr tablet Take 1 tablet (100 mg total) by mouth once daily. For 30 days 90 tablet 3    mv-min/folic/vit K/lut/[id number] (ALIVE WOMEN'S 50 PLUS, BLEND, ORAL) Take 1 tablet by mouth once daily.      potassium chloride (KLOR-CON) 10 MEQ TbSR Take 1 tablet (10 mEq total) by mouth 2 (two) times daily. 180 tablet 1    nitroGLYCERIN (NITROSTAT) 0.4 MG SL tablet Place 1 tablet (0.4 mg total) under the tongue every 5 (five) minutes as needed. (Patient taking differently: Place 0.4 mg under the tongue every 5 (five) minutes as needed for Chest pain.) 25 tablet 3    traZODone (DESYREL) 50 MG tablet Take 1 tablet (50 mg total) by mouth every evening. For sleep 30 tablet 2    [DISCONTINUED] calcium carbonate (TUMS) 200 mg calcium (500 mg) chewable tablet Take 1 tablet by mouth once daily.      [DISCONTINUED] furosemide (LASIX) 20 MG tablet Take 1 tablet (20 mg total) by mouth daily as needed. 90 tablet 1    [DISCONTINUED] hyoscyamine (NULEV) 0.125 mg TbDL Take 0.125 mg by mouth.      [DISCONTINUED] pantoprazole (PROTONIX) 40 MG tablet Take 1 tablet (40 mg total) by mouth 2 (two) times daily. 60 tablet 5       SCHEDULED MEDS:   ALPRAZolam  1 mg Oral BID    arformoteroL  15 mcg Nebulization BID    aspirin  81 mg Oral Daily    budesonide  0.5 mg Nebulization Q12H    clotrimazole-betamethasone 1-0.05%  1 g Topical (Top) BID    enoxparin  1 mg/kg Subcutaneous Q12H (treatment, non-standard time)    furosemide (LASIX) injection  40 mg Intravenous Q12H    ipratropium  0.5 mg Nebulization Q6H    levalbuterol  1.25 mg Nebulization Q6H    levothyroxine  50 mcg Oral Before breakfast    metoprolol succinate  100 mg Oral Daily    multivitamin  1 tablet Oral Daily    pantoprazole  40 mg Oral Daily    traZODone  50 mg Oral QHS       CONTINUOUS  "INFUSIONS:    PRN MEDS:  Current Facility-Administered Medications:     HYDROcodone-acetaminophen, 1 tablet, Oral, Q6H PRN    levalbuterol, 1.25 mg, Nebulization, Q6H PRN    magnesium oxide, 800 mg, Oral, PRN    magnesium oxide, 800 mg, Oral, PRN    potassium bicarbonate, 35 mEq, Oral, PRN    potassium bicarbonate, 50 mEq, Oral, PRN    potassium bicarbonate, 60 mEq, Oral, PRN    potassium, sodium phosphates, 2 packet, Oral, PRN    potassium, sodium phosphates, 2 packet, Oral, PRN    potassium, sodium phosphates, 2 packet, Oral, PRN    sodium chloride 0.9%, 10 mL, Intravenous, PRN    LABS AND DIAGNOSTICS     CBC LAST 3 DAYS  Recent Labs   Lab 11/11/24  1522 11/12/24  0521 11/13/24  0433 11/14/24  0744   WBC 6.91 6.38 9.71 7.28   RBC 4.16 3.89* 4.26 4.38   HGB 11.8* 11.2* 12.5 12.7   HCT 37.4 35.3* 39.2 40.4   MCV 90 91 92 92   MCH 28.4 28.8 29.3 29.0   MCHC 31.6* 31.7* 31.9* 31.4*   RDW 16.3* 16.2* 16.1* 16.0*    212 254 167   MPV 10.5 10.6 10.7 11.7   GRAN 68.1  4.7 68.1  4.4 77.1*  7.5  --    LYMPH 18.5  1.3 20.1  1.3 11.9*  1.2  --    MONO 9.4  0.7 8.3  0.5 7.9  0.8  --    BASO 0.04 0.05 0.06  --    NRBC 0 0 0  --        COAGULATION LAST 3 DAYS  Recent Labs   Lab 11/12/24  0521 11/13/24  0433 11/14/24  0744   INR 1.2 1.1 1.2       CHEMISTRY LAST 3 DAYS  Recent Labs   Lab 11/12/24  0823 11/13/24  0433 11/14/24  0744    140 138   K 3.5 3.4* 3.5   CL 99 99 95   CO2 31* 32* 34*   ANIONGAP 12 9 9   BUN 15 19 23   CREATININE 0.9 0.9 1.0    131* 102   CALCIUM 10.6* 10.1 9.5   MG 1.8 1.7 1.8   ALBUMIN 4.7 4.5 4.2   PROT 7.6 7.2 6.6   ALKPHOS 57 57 47*   ALT 7* 7* 3*   AST 16 14 14   BILITOT 2.2* 1.8* 2.1*       CARDIAC PROFILE LAST 3 DAYS  Recent Labs   Lab 11/11/24  1522   *   TROPONINIHS 11.0       ENDOCRINE LAST 3 DAYS  Recent Labs   Lab 11/12/24  0823   TSH 3.647       LAST ARTERIAL BLOOD GAS  ABG  No results for input(s): "PH", "PO2", "PCO2", "HCO3", "BE" in the last 168 " hours.    LAST 7 DAYS MICROBIOLOGY   Microbiology Results (last 7 days)       ** No results found for the last 168 hours. **            MOST RECENT IMAGING  US Lower Extremity Veins Right  Narrative: EXAMINATION:  US LOWER EXTREMITY VEINS RIGHT    CLINICAL HISTORY:  Rule out DVT; Localized edema    COMPARISON:  None.    FINDINGS:  Real-time, duplex and color Doppler interrogation of the right lower extremity deep venous system is performed. This demonstrates patency of the common and superficial femoral veins, greater saphenous vein, popliteal vein and major calf veins. There are no findings of deep vein thrombosis.  Impression: No findings of deep venous thrombosis involving the right lower extremity.    Electronically signed by: Viki Langston  Date:    11/12/2024  Time:    17:19  Echo    Left Ventricle: The left ventricle is normal in size. Increased wall   thickness. There is concentric remodeling. There is normal systolic   function with a visually estimated ejection fraction of 55 - 60%. Unable   to assess diastolic function due to atrial fibrillation.    Right Ventricle: Severe right ventricular enlargement. Systolic   function is moderately reduced. TAPSE is 1.83 cm.    Right Atrium: Right atrium is dilated.    Aortic Valve: There is moderate aortic valve sclerosis. Mildly   calcified left cusp. There is mild aortic regurgitation.    Mitral Valve: There is mild to moderate regurgitation.    Tricuspid Valve: There is moderate to severe regurgitation. There is   moderate pulmonary hypertension. The estimated PA systolic pressure is at   least 70 mmHg.    Pulmonic Valve: There is mild regurgitation.      ECHOCARDIOGRAM RESULTS (last 5)  Results for orders placed in visit on 12/22/23    Echo Saline Bubble? Yes    Interpretation Summary    Left Atrium: Left atrium is mildly dilated.    Agitated saline study of the atrial septum is negative after vasalva maneuver, suggesting absence of intracardiac shunt at the  atrial level. No patent foramen ovale confirmed by agitated saline contrast.    Bubble study only,      Results for orders placed during the hospital encounter of 12/08/23    Echo    Interpretation Summary    Left Ventricle: The left ventricle is normal in size. Mildly increased wall thickness. There is mild concentric hypertrophy. Normal wall motion. There is normal systolic function with a visually estimated ejection fraction of 55 - 60%. There is normal diastolic function.    Right Ventricle: Normal right ventricular cavity size. Wall thickness is normal. Right ventricle wall motion  is normal. Systolic function is normal.    Left Atrium: Left atrium is severely dilated.    Aortic Valve: There is mild aortic regurgitation with a centrally directed jet.    Tricuspid Valve: There is mild to moderate regurgitation with a centrally directed jet.    Pulmonary Artery: There is moderate to severe pulmonary hypertension. The estimated pulmonary artery systolic pressure is 65 mmHg.    IVC/SVC: Elevated venous pressure at 15 mmHg.      Results for orders placed during the hospital encounter of 05/22/20    Echo Color Flow Doppler? Yes    Interpretation Summary  · Normal left ventricular systolic function. The estimated ejection fraction is 60%.  · Grade III (severe) left ventricular diastolic dysfunction consistent with restrictive physiology.  · No wall motion abnormalities.  · Moderate right ventricular enlargement.  · Normal right ventricular systolic function.  · Moderate left atrial enlargement.  · Mild right atrial enlargement.  · Mild tricuspid regurgitation.  · Elevated central venous pressure (15 mmHg).  · The estimated PA systolic pressure is 104 mmHg.  · Severe pulmonary hypertension present.      CURRENT/PREVIOUS VISIT EKG  Results for orders placed or performed during the hospital encounter of 11/11/24   EKG 12-lead    Collection Time: 11/11/24  3:00 PM   Result Value Ref Range    QRS Duration 84 ms    OHS QTC  Calculation 429 ms    Narrative    Test Reason : R06.02,    Vent. Rate : 070 BPM     Atrial Rate : 000 BPM     P-R Int : 000 ms          QRS Dur : 084 ms      QT Int : 398 ms       P-R-T Axes : 000 088 031 degrees     QTc Int : 429 ms    Atrial fibrillation  Cannot rule out Anterior infarct ,age undetermined  Abnormal ECG  When compared with ECG of 05-JUN-2021 12:11,  Minimal criteria for Anterior infarct are now Present  Nonspecific T wave abnormality, improved in Inferior leads  T wave inversion no longer evident in Anterior leads  Confirmed by Nupur MARY, Pieter MELO ([id number]) on 11/11/2024 10:57:19 PM    Referred By: CORRINE   SELF           Confirmed By:Pieter Espitia MD           ASSESSMENT/PLAN:     Active Hospital Problems    Diagnosis    *Dyspnea on exertion    Chest pain    Atrial fibrillation    Hyperbilirubinemia    COPD (chronic obstructive pulmonary disease)    BRITTANIE on CPAP       ASSESSMENT & PLAN:     Fluid volume overload  Acute CHF  Permanent Afib on Eliquis   COPD on home O2  Obesity       RECOMMENDATIONS:    Can change to oral diuresis and discharge home per hospitalist discretion.  Recommend low-sodium diet.  Recommend BMP and BNP in 1 week.  Cardiology will be available PRN. Please reconsult for any cardiac specific needs that have not been addressed.         Fabi Alarcon NP  Date of Service: 11/14/2024  2:00 PM    I have personally interviewed and examined the patient, I have reviewed the Nurse Practitioner's history and physical, assessment, and plan. I have personally evaluated the patient at bedside and agree with the findings and made appropriate changes as necessary in recommendations.      Patient's fluid status is much improved.  Her lungs are clear her edema is mostly resolved and lower extremities.  Echocardiogram reveals severe pulmonary hypertension with normal left ventricular ejection fraction and diastolic dysfunction  Recommend repeat echo for pulmonary pressures and consider  evaluation for pulmonary hypertension.  She has COPD on home oxygen  She can follow-up with her cardiologist as an outpatient for further evaluation  Pieter Espitia MD  Department of Cardiology  [facility]  11/14/2024 2:05 PM

## 2024-11-14 NOTE — NURSING
Patient discharged home with home health. Spouse and daughter at bedside. IV (x1) and telemetry removed. Patient tolerated well. Discharge instructions and education provided to patient by Esther Woods RN (VN). Patient and family denies additional questions. Patient denies chest pain or SOB at this time. All patient belongings packed in bags to be taken home with patient. Prescriptions delivered to bedside. Patient escorted off unit to car via wheelchair for discharge.

## 2024-11-14 NOTE — CARE UPDATE
11/14/24 0710   Patient Assessment/Suction   Level of Consciousness (AVPU) alert   Respiratory Effort Normal;Unlabored   All Lung Fields Breath Sounds Anterior:;Lateral:;coarse   Rhythm/Pattern, Respiratory unlabored;pattern regular;depth regular   Cough Frequency frequent   Cough Type no productive sputum   Skin Integrity   $ Wound Care Tech Time 15 min   Area Observed Left;Right;Behind ear;Nares   Skin Appearance without discoloration   PRE-TX-O2   Device (Oxygen Therapy) nasal cannula with humidification   $ Is the patient on Low Flow Oxygen? Yes   Flow (L/min) (Oxygen Therapy) 4   SpO2 98 %   Pulse Oximetry Type Intermittent   $ Pulse Oximetry - Multiple Charge Pulse Oximetry - Multiple   Pulse 80   Resp 17   Aerosol Therapy   $ Aerosol Therapy Charges Aerosol Treatment   Daily Review of Necessity (SVN) completed   Respiratory Treatment Status (SVN) given   Treatment Route (SVN) mask;oxygen   Patient Position HOB elevated   Post Treatment Assessment (SVN) breath sounds improved   Signs of Intolerance (SVN) none   Breath Sounds Post-Respiratory Treatment   Throughout All Fields Post-Treatment All Fields   Throughout All Fields Post-Treatment aeration increased   Post-treatment Heart Rate (beats/min) 84   Post-treatment Resp Rate (breaths/min) 18   Education   $ Education Bronchodilator;15 min

## 2024-11-14 NOTE — CARE UPDATE
11/13/24 1950   Patient Assessment/Suction   Level of Consciousness (AVPU) alert   Respiratory Effort Normal;Unlabored   Expansion/Accessory Muscles/Retractions no use of accessory muscles;expansion symmetric   All Lung Fields Breath Sounds Anterior:;coarse   Rhythm/Pattern, Respiratory unlabored;pattern regular;depth regular   Cough Type no productive sputum   PRE-TX-O2   Device (Oxygen Therapy) nasal cannula   $ Is the patient on Low Flow Oxygen? Yes   Flow (L/min) (Oxygen Therapy) 4   SpO2 95 %   Pulse Oximetry Type Intermittent   $ Pulse Oximetry - Multiple Charge Pulse Oximetry - Multiple   Pulse 72   Aerosol Therapy   $ Aerosol Therapy Charges Refused   Education   $ Education 15 min;Bronchodilator;Oxygen

## 2024-11-14 NOTE — PLAN OF CARE
Hospital follow up appts scheduled and added to AVS for PCP .     In Basket request sent to Cardiology for hospital follow up appt , staff will contact patient to schedule . Added contact information to AVS .     Outpatient CM referral ordered and added to AVS .     [facility] and staff will contact patient to schedule home visit . Added information to AVS .     Pharmacy notified to deliver meds to bedside prior to patient leaving facility .        11/14/24 1300   Post-Acute Status   Post-Acute Authorization Medications;Home Health   Home Health Status Set-up Complete/Auth obtained   Medication Status Pending bedside delivery   Hospital Resources/Appts/Education Provided Community resources provided;Appointments scheduled and added to AVS   Discharge Plan   Discharge Plan A Home Health

## 2024-11-14 NOTE — PLAN OF CARE
[facility] accepted.  SOC 11/15/24       11/14/24 1142   Post-Acute Status   Post-Acute Authorization Home Health   Home Health Status Set-up Complete/Auth obtained   Discharge Delays None known at this time   Discharge Plan   Discharge Plan A Home Health   Discharge Plan B Home Health

## 2024-11-14 NOTE — PLAN OF CARE
Pt clear for DC from case management standpoint. Discharging to Home with [facility] . Patient's spouse will transport patient home from facility and is bringing portable concentrator .       Patient clear for discharge after Medications are delivered @ Bedside .        11/14/24 1302   Final Note   Assessment Type Final Discharge Note   Anticipated Discharge Disposition Home-Health   Post-Acute Status   Medication Status Pending bedside delivery

## 2024-11-14 NOTE — DISCHARGE SUMMARY
[facility]  Discharge Summary      Patient Name: Danielle Martinez  MRN: [id number]  BARRON: [id number]  Patient Class: IP- Inpatient  Admission Date: 11/11/2024  Hospital Length of Stay: 2 days  Discharge Date and Time:  11/14/2024 8:47 AM  Attending Physician: Anshu Sharif MD   Discharging Provider: Anshu Sharif MD  Primary Care Provider: Antony Lopez MD    Primary Care Team: Networked reference to record PCT     HPI:   79-year-old female presented to ED for eval of shortness of breath.  Patient reported over the last several weeks she increasing shortness of breath, with associated dyspnea on exertion, bilateral lower extremity edema, and left-sided chest pressure.  Patient reported she has p.r.n. Lasix at home and has been taking it lately twice a day without symptom improvement, denies history CHF.  Denies fever, chills.  Denies abdominal pain, nausea, vomiting, changes in bowel habits.  Denies syncope.  Patient does have history of COPD and is home O2 dependent on 4 L NC.  Patient also with history of PAF, on Eliquis.  She uses a CPAP HS for BRITTANIE.  Echo December 20, 2023 with EF 55-60%.  In ED today, initial troponin 11, repeat pending.  .  CXR impression without acute abnormality.  Admit to hospital medicine for further eval.    * No surgery found *      Hospital Course:   Patient admitted to Hospital Medicine on telemetry floor. Patient had serial cardiac enzymes. Patient was given IV lasix and intake and output closely monitored. Patient was evaluated by cardiologist and underwent ECHO which was significant for elevated right-sided pressures with moderate to severe tricuspid regurgitation and mild-to-moderate mitral regurgitation. Patient's renal panel and electrolytes closely monitored. Patient had daily weights. Patient was educated on monitoring daily weight, following low salt diet and in case if gain more than 3 pounds in 24 hours, to call their doctor for further  advice. Patient's symptoms resolved. Patient's routine supplemental oxygen maintained.  Patient utilize CPAP therapy during sleep.  Medication compliance discussed with the patient.  Patient to continue close follow-up with primary care physician and her cardiologist upon discharge.  Patient to continue using supplemental oxygen and CPAP as before.  Patient feels ready to go home.        Goals of Care Treatment Preferences:  Code Status: Full Code      SDOH Screening:  The patient was screened for utility difficulties, food insecurity, transport difficulties, housing insecurity, and interpersonal safety and there were no concerns identified this admission.     Consults:   Consults (From admission, onward)          Status Ordering Provider     Inpatient consult to   Once        Provider:  (Not yet assigned)    Acknowledged MONALISA LEAHY     Inpatient consult to Cardiology  Once        Provider:  Dajuan Kate MD    Completed MONALISA LEAHY     Inpatient consult to Social Work/Case Management  Once        Provider:  (Not yet assigned)    Acknowledged LUCY DOLAN     Inpatient consult to Social Work/Case Management  Once        Provider:  (Not yet assigned)    Acknowledged RAFAEL LI     Inpatient consult to Registered Dietitian/Nutritionist  Once        Provider:  (Not yet assigned)    Completed RAFAEL LI          Microbiology Results (last 7 days)       ** No results found for the last 168 hours. **          ECHO:     Left Ventricle: The left ventricle is normal in size. Increased wall thickness. There is concentric remodeling. There is normal systolic function with a visually estimated ejection fraction of 55 - 60%. Unable to assess diastolic function due to atrial fibrillation.    Right Ventricle: Severe right ventricular enlargement. Systolic function is moderately reduced. TAPSE is 1.83 cm.    Right Atrium: Right atrium is dilated.    Aortic Valve: There is moderate aortic valve  sclerosis. Mildly calcified left cusp. There is mild aortic regurgitation.    Mitral Valve: There is mild to moderate regurgitation.    Tricuspid Valve: There is moderate to severe regurgitation. There is moderate pulmonary hypertension. The estimated PA systolic pressure is at least 70 mmHg.    Pulmonic Valve: There is mild regurgitation.     CXR: No acute cardiac or pulmonary process.     Final Active Diagnoses:    Diagnosis Date Noted POA    PRINCIPAL PROBLEM:    Acute on Chronic respiratory failure with hypoxia and hypercapnia   Dyspnea on exertion [R06.09] 11/11/2024 Yes    Chest pain [R07.9] 11/11/2024 Yes    Atrial fibrillation [I48.91]- Paroxysmal atrial fibrillation  11/11/2024 Yes    Hyperbilirubinemia [E80.6] 11/11/2024 Yes    COPD (chronic obstructive pulmonary disease) [J44.9] 06/05/2021 Yes    BRITTANIE on CPAP [G47.33] 11/04/2015 Yes      Problems Resolved During this Admission:       Discharged Condition: good    Disposition: Home or Self Care    Follow Up:   Follow-up Information       Antony Lopez MD Follow up in 1 week(s).    Specialties: Family Medicine, Home Health Services, Hospice Services  Contact information:  [address]  [address]  [place] [address]  [phone]               Pieter Espitia MD Follow up in 2 week(s).    Specialties: Cardiovascular Disease, Cardiology  Contact information:  [address]  [address]  [place] [address]  [phone]                           Patient Instructions:      Renal function panel   Standing Status: Future Standing Exp. Date: 02/12/26     Order Specific Question Answer Comments   Send normal result to authorizing provider's In Basket if patient is active on MyChart: Yes      Ambulatory referral/consult to Outpatient Case Management   Referral Priority: Routine Referral Type: Consultation   Referral Reason: Specialty Services Required   Number of Visits Requested: 1     Ambulatory referral/consult to Home Health   Standing Status: Future    Referral Priority: Routine Referral Type: Home Health   Referral Reason: Specialty Services Required   Requested Specialty: Home Health Services   Number of Visits Requested: 1     Diet Cardiac   Order Comments: Patient to monitor daily weight, follow low salt diet and in case if gain more than 3 pounds in 24 hours, please call your doctor for further advice.     Call MD for:  temperature >100.4     Call MD for:  persistent nausea and vomiting or diarrhea     Call MD for:  severe uncontrolled pain     Call MD for:  redness, tenderness, or signs of infection (pain, swelling, redness, odor or green/yellow discharge around incision site)     Call MD for:  difficulty breathing or increased cough     Call MD for:  severe persistent headache     Call MD for:  worsening rash     Call MD for:  persistent dizziness, light-headedness, or visual disturbances     Call MD for:  increased confusion or weakness       Significant Diagnostic Studies: Labs: CMP   Recent Labs   Lab 11/13/24 0433      K 3.4*   CL 99   CO2 32*   *   BUN 19   CREATININE 0.9   CALCIUM 10.1   PROT 7.2   ALBUMIN 4.5   BILITOT 1.8*   ALKPHOS 57   AST 14   ALT 7*   ANIONGAP 9   , CBC   Recent Labs   Lab 11/13/24 0433   WBC 9.71   HGB 12.5   HCT 39.2      , INR   Lab Results   Component Value Date    INR 1.2 11/14/2024    INR 1.1 11/13/2024    INR 1.2 11/12/2024   , and Lipid Panel   Lab Results   Component Value Date    CHOL 144 08/21/2024    HDL 48 (L) 08/21/2024    LDLCALC 81 08/21/2024    TRIG 73 08/21/2024    CHOLHDL 3.0 08/21/2024       Pending Diagnostic Studies:       Procedure Component Value Units Date/Time    CBC Auto Differential [[id number]] Collected: 11/14/24 0744    Order Status: Sent Lab Status: In process Updated: 11/14/24 0821    Specimen: Blood     Narrative:      Collection has been rescheduled by [facility] at 11/14/2024 03:54 Reason:   Unable to collect. Stuck twice    Comprehensive Metabolic Panel [[id number]]  Collected: 11/14/24 0744    Order Status: Sent Lab Status: In process Updated: 11/14/24 0821    Specimen: Blood     Narrative:      Collection has been rescheduled by [facility] at 11/14/2024 03:54 Reason:   Unable to collect. Stuck twice    Magnesium [[id number]] Collected: 11/14/24 0744    Order Status: Sent Lab Status: In process Updated: 11/14/24 0821    Specimen: Blood     Narrative:      Collection has been rescheduled by [facility] at 11/14/2024 03:54 Reason:   Unable to collect. Stuck twice           Medications:  Reconciled Home Medications:      Medication List        START taking these medications      aspirin 81 MG EC tablet  Commonly known as: ECOTRIN  Take 1 tablet (81 mg total) by mouth once daily.            CHANGE how you take these medications      clotrimazole-betamethasone 1-0.05% cream  Commonly known as: LOTRISONE  Apply twice a day to affected skin  What changed:   how much to take  how to take this  when to take this     furosemide 40 MG tablet  Commonly known as: LASIX  Take 1 tablet (40 mg total) by mouth once daily.  What changed:   medication strength  how much to take  when to take this  reasons to take this            CONTINUE taking these medications      albuterol 2.5 mg /3 mL (0.083 %) nebulizer solution  Commonly known as: PROVENTIL  Take 3 mLs (2.5 mg total) by nebulization every 6 (six) hours as needed for Wheezing. Rescue     ALIVE WOMEN'S 50 PLUS (BLEND) ORAL  Take 1 tablet by mouth once daily.     ALPRAZolam 1 MG tablet  Commonly known as: XANAX  Take 1 tablet (1 mg total) by mouth 2 (two) times daily.     ascorbic acid (vitamin C) 100 MG tablet  Commonly known as: VITAMIN C  Take 100 mg by mouth once daily.     ELIQUIS 5 mg Tab  Generic drug: apixaban  Take 1 tablet (5 mg total) by mouth 2 (two) times daily.     esomeprazole 40 MG capsule  Commonly known as: NEXIUM  Take 1 capsule (40 mg total) by mouth before breakfast.     fluticasone propionate 50 mcg/actuation nasal spray  Commonly  known as: FLONASE  1 spray (50 mcg total) by Each Nostril route daily as needed for Allergies.     fluticasone-umeclidin-vilanter 100-62.5-25 mcg Dsdv  Commonly known as: TRELEGY ELLIPTA  Inhale 1 puff into the lungs daily as needed.     levothyroxine 50 MCG tablet  Commonly known as: SYNTHROID  Take 1 tablet (50 mcg total) by mouth before breakfast.     metoprolol succinate 100 MG 24 hr tablet  Commonly known as: TOPROL-XL  Take 1 tablet (100 mg total) by mouth once daily. For 30 days     nitroGLYCERIN 0.4 MG SL tablet  Commonly known as: NITROSTAT  Place 1 tablet (0.4 mg total) under the tongue every 5 (five) minutes as needed.     potassium chloride 10 MEQ Tbsr  Commonly known as: KLOR-CON  Take 1 tablet (10 mEq total) by mouth 2 (two) times daily.     traZODone 50 MG tablet  Commonly known as: DESYREL  Take 1 tablet (50 mg total) by mouth every evening. For sleep              Indwelling Lines/Drains at time of discharge:   Lines/Drains/Airways       Drain  Duration             Female External Urinary Catheter w/ Suction 11/11/24 1759 2 days                    Time spent on the discharge of patient: 33 minutes         Anshu Sharif MD  Department of Hospital Medicine  [facility]

## 2024-11-14 NOTE — DISCHARGE INSTRUCTIONS
Continue use of supplemental oxygen 3-4 liter/minute via nasal cannula to maintain pulse ox above 92%.    Continue use of CPAP as before.    Avoid any use of nonsteroidal anti-inflammatory medications such as ibuprofen, Motrin, Aleve or Naprosyn etc.    Our goal at [facility] is to always give you outstanding care and exceptional service. You may receive a survey by mail, text or e-mail in the next 7-10 days from Leda Peterson and our leadership team asking about the care you received with us. The survey should only take 5-10 minutes to complete and is very important to us.     Your feedback provides us with a way to recognize our staff who work tirelessly to provide the best care! Also, your responses help us learn how to improve when your experience was below our aspiration of excellence. We WILL use your feedback to continue making improvements to help us provide the highest quality care. We keep your personal information and feedback confidential. We appreciate your time completing this survey and can't wait to hear from you!!!     We want you to leave us today feeling like you can DEFINITELY recommend us to others! We look forward to your continued care with us! Thanks so much for choosing [facility] for your healthcare needs!

## 2024-11-14 NOTE — SUBJECTIVE & OBJECTIVE
Interval History:  Patient seen and examined.  Patient reports worsening shortness of breath, dyspnea on exertion and orthopnea with lower extremity swelling for few days.  Family members are at bedside. Patient usually follows with Dr. Kate from Cardiology.  For the past day or so patient had been experiencing substernal chest discomfort.  At present denies any chest pain.  Nitro paste is on.  With diuresis symptoms are improving.  Patient uses 3-4 liters/minute via nasal cannula supplemental oxygen and CPAP therapy at home. Patient denies any cough or expectoration or fever.    Review of Systems   Constitutional:  Negative for chills and fever.   HENT:  Negative for congestion and sore throat.    Respiratory:  Positive for cough and shortness of breath.    Cardiovascular:  Positive for chest pain and leg swelling.   Gastrointestinal:  Negative for abdominal pain, constipation, diarrhea, nausea and vomiting.   Genitourinary:  Negative for flank pain.   Neurological:  Negative for syncope.   Psychiatric/Behavioral:  Negative for confusion.      Objective:     Vital Signs (Most Recent):  Temp: 97.4 °F (36.3 °C) (11/14/24 0554)  Pulse: 80 (11/14/24 0710)  Resp: 17 (11/14/24 0710)  BP: 119/74 (11/14/24 0554)  SpO2: 98 % (11/14/24 0710) Vital Signs (24h Range):  Temp:  [97.4 °F (36.3 °C)-98.8 °F (37.1 °C)] 97.4 °F (36.3 °C)  Pulse:  [72-89] 80  Resp:  [17-20] 17  SpO2:  [95 %-98 %] 98 %  BP: (103-119)/(67-89) 119/74     Weight: 85.4 kg (188 lb 4.4 oz)  Body mass index is 39.35 kg/m².    Intake/Output Summary (Last 24 hours) at 11/14/2024 0756  Last data filed at 11/14/2024 0657  Gross per 24 hour   Intake 960 ml   Output 1100 ml   Net -140 ml         Physical Exam  Vitals reviewed.   Constitutional:       General: She is not in acute distress.  HENT:      Head: Normocephalic and atraumatic.      Mouth/Throat:      Mouth: Mucous membranes are moist.   Eyes:      Conjunctiva/sclera: Conjunctivae normal.  "  Cardiovascular:      Rate and Rhythm: Normal rate. Rhythm irregular.   Pulmonary:      Effort: Pulmonary effort is normal. No respiratory distress.      Breath sounds: Rales present.   Abdominal:      General: Bowel sounds are normal.      Palpations: Abdomen is soft.      Tenderness: There is no abdominal tenderness.   Musculoskeletal:         General: Normal range of motion.      Cervical back: Normal range of motion.      Right lower leg: Edema present.      Left lower leg: Edema present.   Skin:     General: Skin is warm and dry.      Capillary Refill: Capillary refill takes less than 2 seconds.   Neurological:      Mental Status: She is alert and oriented to person, place, and time.   Psychiatric:         Mood and Affect: Mood normal.             Significant Labs: All pertinent labs within the past 24 hours have been reviewed.  CBC:   Recent Labs   Lab 11/13/24 0433   WBC 9.71   HGB 12.5   HCT 39.2        CMP:   Recent Labs   Lab 11/12/24 0823 11/13/24 0433    140   K 3.5 3.4*   CL 99 99   CO2 31* 32*    131*   BUN 15 19   CREATININE 0.9 0.9   CALCIUM 10.6* 10.1   PROT 7.6 7.2   ALBUMIN 4.7 4.5   BILITOT 2.2* 1.8*   ALKPHOS 57 57   AST 16 14   ALT 7* 7*   ANIONGAP 12 9     Coagulation:   Recent Labs   Lab 11/13/24 0433   INR 1.1     Lipid Panel: No results for input(s): "CHOL", "HDL", "LDLCALC", "TRIG", "CHOLHDL" in the last 48 hours.  Troponin:   No results for input(s): "TROPONINI", "TROPONINIHS" in the last 48 hours.    TSH:   Recent Labs   Lab 11/12/24 0823   TSH 3.647     Urine Studies:   No results for input(s): "COLORU", "APPEARANCEUA", "PHUR", "SPECGRAV", "PROTEINUA", "GLUCUA", "KETONESU", "BILIRUBINUA", "OCCULTUA", "NITRITE", "UROBILINOGEN", "LEUKOCYTESUR", "RBCUA", "WBCUA", "BACTERIA", "SQUAMEPITHEL", "HYALINECASTS" in the last 48 hours.    Invalid input(s): "WRIGHTSUR"    Microbiology Results (last 7 days)       ** No results found for the last 168 hours. **      "     Significant Imaging:   ECHO:     Left Ventricle: The left ventricle is normal in size. Increased wall thickness. There is concentric remodeling. There is normal systolic function with a visually estimated ejection fraction of 55 - 60%. Unable to assess diastolic function due to atrial fibrillation.    Right Ventricle: Severe right ventricular enlargement. Systolic function is moderately reduced. TAPSE is 1.83 cm.    Right Atrium: Right atrium is dilated.    Aortic Valve: There is moderate aortic valve sclerosis. Mildly calcified left cusp. There is mild aortic regurgitation.    Mitral Valve: There is mild to moderate regurgitation.    Tricuspid Valve: There is moderate to severe regurgitation. There is moderate pulmonary hypertension. The estimated PA systolic pressure is at least 70 mmHg.    Pulmonic Valve: There is mild regurgitation.    CXR: No acute cardiac or pulmonary process.

## 2024-11-14 NOTE — PLAN OF CARE
Adequate for Discharge      Problem: Adult Inpatient Plan of Care  Goal: Plan of Care Review  Outcome: Met  Goal: Patient-Specific Goal (Individualized)  Outcome: Met  Goal: Absence of Hospital-Acquired Illness or Injury  Outcome: Met  Goal: Optimal Comfort and Wellbeing  Outcome: Met  Goal: Readiness for Transition of Care  Outcome: Met     Problem: Acute Kidney Injury/Impairment  Goal: Fluid and Electrolyte Balance  Outcome: Met  Goal: Improved Oral Intake  Outcome: Met  Goal: Effective Renal Function  Outcome: Met     Problem: Pneumonia  Goal: Fluid Balance  Outcome: Met  Goal: Resolution of Infection Signs and Symptoms  Outcome: Met  Goal: Effective Oxygenation and Ventilation  Outcome: Met     Problem: Bariatric Environmental Safety  Goal: Safety Maintained with Care  Outcome: Met     Problem: Skin Injury Risk Increased  Goal: Skin Health and Integrity  Outcome: Met

## 2024-11-14 NOTE — CARE UPDATE
11/13/24 1950   Patient Assessment/Suction   Level of Consciousness (AVPU) alert   Respiratory Effort Normal;Unlabored   Expansion/Accessory Muscles/Retractions no use of accessory muscles;expansion symmetric   All Lung Fields Breath Sounds Anterior:;coarse   Rhythm/Pattern, Respiratory unlabored;pattern regular;depth regular   Cough Type no productive sputum   PRE-TX-O2   Device (Oxygen Therapy) nasal cannula   $ Is the patient on Low Flow Oxygen? Yes   Flow (L/min) (Oxygen Therapy) 4   SpO2 95 %   Pulse Oximetry Type Intermittent   $ Pulse Oximetry - Multiple Charge Pulse Oximetry - Multiple   Pulse 72   Aerosol Therapy   $ Aerosol Therapy Charges Refused   Preset CPAP/BiPAP Settings   Mode Of Delivery Standby;other (see comments)  (Pt refused hospital cpap, states her family will bring her home unit.)   CPAP/BIPAP charged w/in last 24 h NO   Education   $ Education 15 min;Bronchodilator;Oxygen

## 2024-11-15 ENCOUNTER — TELEPHONE (OUTPATIENT)
Dept: FAMILY MEDICINE | Facility: CLINIC | Age: 79
End: 2024-11-15
Payer: MEDICARE

## 2024-11-15 ENCOUNTER — PATIENT OUTREACH (OUTPATIENT)
Dept: FAMILY MEDICINE | Facility: CLINIC | Age: 79
End: 2024-11-15
Payer: MEDICARE

## 2024-11-15 NOTE — TELEPHONE ENCOUNTER
----- Message from Nurse Pino sent at 11/15/2024  8:37 AM CST -----  Call patient - needs post-hospital phone call within 2 business days and hospital follow up visit scheduled within 7-14 days.

## 2024-11-15 NOTE — PROGRESS NOTES
"  Discharge Information     Discharge Date:   11/14/24    Primary Discharge Diagnosis:  chest pain      Discharge Summary:  Reviewed      Medication & Order Review     Were medication changes made or new medications added?   Yes    If so, has the patient filled the prescriptions?  Yes     Was Home Health ordered? Yes    If so, has Home Health contacted patient and/or initiated services?  No    Name of Home Health Agency? N/A    Durable Medical Equipment ordered?  No     If so, has the DME provider contacted patient and delivered equipment?  N/A    Follow Up               Any problems since discharge? No    How is the patient feeling since returning home?      Have you set up recommended follow up appointments?  (cardiology, surgery, etc.)    Schedule Hospital Follow-up appointment within 7-14 days (preferably 7).      Notes:     Spoke with patient who states she is still sore and it's a little hard to walk. States her cough has "slowed down" but not completely gone. States someone is suppose to be coming out to her house, PT, she does not know the name of the company. She is scheduled with the cardiologist on 11/26. States she is having a nose bleed today, but states it "changes color" Reddish orange. Not able to describe how much. Will continue monitoring this, blood counts were normal on d/c. She was not aware she was scheduled with Dr. Lopez but agrees to come in on 11/27 - will call if she needs anything before OV.      Shannan Carr       "

## 2024-11-15 NOTE — TELEPHONE ENCOUNTER
"Spoke with patient who states she is still sore and it's a little hard to walk. States her cough has "slowed down" but not completely gone. States someone is suppose to be coming out to her house, PT, she does not know the name of the company. She is scheduled with the cardiologist on 11/26. States she is having a nose bleed today, but states it "changes color" Reddish orange. Not able to describe how much. Will continue monitoring this, blood counts were normal on d/c. She was not aware she was scheduled with Dr. Lopez but agrees to come in on 11/27 - will call if she needs anything before OV.    FYI to Ryan about nosebleed.   "

## 2024-11-18 ENCOUNTER — PATIENT OUTREACH (OUTPATIENT)
Dept: ADMINISTRATIVE | Facility: CLINIC | Age: 79
End: 2024-11-18
Payer: MEDICARE

## 2024-11-18 ENCOUNTER — TELEPHONE (OUTPATIENT)
Dept: FAMILY MEDICINE | Facility: CLINIC | Age: 79
End: 2024-11-18
Payer: MEDICARE

## 2024-11-18 NOTE — TELEPHONE ENCOUNTER
Spoke with patients , states they will call when they are back in town - states the won't be back until later in December.

## 2024-11-18 NOTE — TELEPHONE ENCOUNTER
----- Message from Lizzette sent at 11/18/2024  8:52 AM CST -----  Shimon the patients  called to reschedule the patients HFU. Shimon's # [phone] GH

## 2024-11-18 NOTE — PROGRESS NOTES
C3 nurse spoke with Danielle CARRASQUILLO Juan & spouse for a TCC post hospital discharge follow up call. Nurse offered to scheduled [facility] hospital follow-up appointment. The patient declined appointment at this time stating she and her  are leaving for a cruise and will reach out to Antony Lopez MD when they return from vacation.

## 2024-11-19 ENCOUNTER — LAB VISIT (OUTPATIENT)
Dept: LAB | Facility: HOSPITAL | Age: 79
End: 2024-11-19
Attending: FAMILY MEDICINE
Payer: MEDICARE

## 2024-11-19 DIAGNOSIS — I50.31 ACUTE DIASTOLIC HEART FAILURE: Primary | ICD-10-CM

## 2024-11-19 LAB
ANION GAP SERPL CALC-SCNC: 11 MMOL/L (ref 8–16)
BUN SERPL-MCNC: 22 MG/DL (ref 8–23)
CALCIUM SERPL-MCNC: 10.6 MG/DL (ref 8.7–10.5)
CHLORIDE SERPL-SCNC: 102 MMOL/L (ref 95–110)
CO2 SERPL-SCNC: 28 MMOL/L (ref 23–29)
CREAT SERPL-MCNC: 0.9 MG/DL (ref 0.5–1.4)
EST. GFR  (NO RACE VARIABLE): >60 ML/MIN/1.73 M^2
GLUCOSE SERPL-MCNC: 126 MG/DL (ref 70–110)
POTASSIUM SERPL-SCNC: 3.6 MMOL/L (ref 3.5–5.1)
SODIUM SERPL-SCNC: 141 MMOL/L (ref 136–145)

## 2024-11-19 PROCEDURE — 80048 BASIC METABOLIC PNL TOTAL CA: CPT | Performed by: FAMILY MEDICINE

## 2024-11-19 PROCEDURE — 36415 COLL VENOUS BLD VENIPUNCTURE: CPT | Performed by: FAMILY MEDICINE

## 2024-11-20 ENCOUNTER — OUTPATIENT CASE MANAGEMENT (OUTPATIENT)
Dept: ADMINISTRATIVE | Facility: OTHER | Age: 79
End: 2024-11-20
Payer: MEDICARE

## 2024-11-20 NOTE — PROGRESS NOTES
[employer] contacted patient regarding referral. DIEGO explained to patient reason for referral to assist with social needs. Patient denied having any needs but states she may need low sodium meals. DIEGO explored meals through [employer] , however meals may not cater to patient health needs. DIEGO discussed [employer]. DIEGO explained to patient [employer] are cater to health needs and company offers a self pay price. Patient an agremetn with [employer] mailing resources. Patient reports she will be leaving for a cruise on Friday 11/22/2024 but will check for the resources upon her return.

## 2024-11-22 DIAGNOSIS — J44.9 CHRONIC OBSTRUCTIVE PULMONARY DISEASE, UNSPECIFIED COPD TYPE: ICD-10-CM

## 2024-11-22 NOTE — TELEPHONE ENCOUNTER
----- Message from Jessica sent at 11/22/2024 10:11 AM CST -----  Pt needs refill on trelogy that dr. Conroy filled last for her   [facility]   [phone]

## 2024-11-24 NOTE — PROGRESS NOTES
Call patient.  Her potassium is back to normal at 3.6.  Kidneys look good, calcium slightly elevated 10.6 blood sugar 126.  Continue good hydration and current medications

## 2024-11-25 ENCOUNTER — TELEPHONE (OUTPATIENT)
Dept: FAMILY MEDICINE | Facility: CLINIC | Age: 79
End: 2024-11-25
Payer: MEDICARE

## 2024-11-25 NOTE — TELEPHONE ENCOUNTER
----- Message from Antony Lopez MD sent at 11/24/2024  5:01 PM CST -----  Call patient.  Her potassium is back to normal at 3.6.  Kidneys look good, calcium slightly elevated 10.6 blood sugar 126.  Continue good hydration and current medications

## 2024-12-03 ENCOUNTER — LAB VISIT (OUTPATIENT)
Dept: LAB | Facility: HOSPITAL | Age: 79
End: 2024-12-03
Attending: FAMILY MEDICINE
Payer: MEDICARE

## 2024-12-03 DIAGNOSIS — I10 HYPERTENSION, ESSENTIAL: Primary | ICD-10-CM

## 2024-12-03 LAB
ANION GAP SERPL CALC-SCNC: 10 MMOL/L (ref 8–16)
BUN SERPL-MCNC: 19 MG/DL (ref 8–23)
CALCIUM SERPL-MCNC: 10 MG/DL (ref 8.7–10.5)
CHLORIDE SERPL-SCNC: 105 MMOL/L (ref 95–110)
CO2 SERPL-SCNC: 26 MMOL/L (ref 23–29)
CREAT SERPL-MCNC: 0.8 MG/DL (ref 0.5–1.4)
EST. GFR  (NO RACE VARIABLE): >60 ML/MIN/1.73 M^2
GLUCOSE SERPL-MCNC: 94 MG/DL (ref 70–110)
POTASSIUM SERPL-SCNC: 3.5 MMOL/L (ref 3.5–5.1)
SODIUM SERPL-SCNC: 141 MMOL/L (ref 136–145)

## 2024-12-03 PROCEDURE — 80048 BASIC METABOLIC PNL TOTAL CA: CPT | Performed by: FAMILY MEDICINE

## 2024-12-03 PROCEDURE — 36415 COLL VENOUS BLD VENIPUNCTURE: CPT | Performed by: FAMILY MEDICINE

## 2024-12-06 ENCOUNTER — TELEPHONE (OUTPATIENT)
Dept: FAMILY MEDICINE | Facility: CLINIC | Age: 79
End: 2024-12-06
Payer: MEDICARE

## 2024-12-06 NOTE — TELEPHONE ENCOUNTER
Spoke with pt in regards to recent lab results. Verbalized verbatim per Dr. Lopez. Pt acknowledged understanding.

## 2024-12-06 NOTE — PROGRESS NOTES
Call patient.  Sugar kidneys  and potassium are all back in the normal range.  Continue current medications

## 2024-12-06 NOTE — TELEPHONE ENCOUNTER
----- Message from Antony Lopez MD sent at 12/6/2024 10:04 AM CST -----  Call patient.  Sugar kidneys  and potassium are all back in the normal range.  Continue current medications

## 2024-12-17 ENCOUNTER — EXTERNAL HOME HEALTH (OUTPATIENT)
Dept: HOME HEALTH SERVICES | Facility: HOSPITAL | Age: 79
End: 2024-12-17
Payer: MEDICARE

## 2024-12-17 NOTE — TELEPHONE ENCOUNTER
Spoke to pts  Shimon and he stated pt is having discomfort in her abdomen and when she urinates. Pharmacy is [facility] [place]    normal

## 2025-01-13 DIAGNOSIS — Z00.00 ENCOUNTER FOR MEDICARE ANNUAL WELLNESS EXAM: ICD-10-CM

## 2025-01-30 ENCOUNTER — DOCUMENT SCAN (OUTPATIENT)
Dept: HOME HEALTH SERVICES | Facility: HOSPITAL | Age: 80
End: 2025-01-30
Payer: MEDICARE

## 2025-02-04 ENCOUNTER — TELEPHONE (OUTPATIENT)
Dept: FAMILY MEDICINE | Facility: CLINIC | Age: 80
End: 2025-02-04
Payer: MEDICARE

## 2025-02-04 NOTE — TELEPHONE ENCOUNTER
Spoke with patient who states her medications are at her kitchen table and she is not sure what she needs. States she isn't worried about it tonight and will call back tomorrow.

## 2025-02-04 NOTE — TELEPHONE ENCOUNTER
----- Message from Jesscia sent at 2/4/2025  8:55 AM CST -----  Pt is going through her medications and some of them are out of date. Would like to go over with nurse   [phone]

## 2025-02-11 DIAGNOSIS — F41.9 ANXIETY: ICD-10-CM

## 2025-02-11 RX ORDER — ALPRAZOLAM 1 MG/1
1 TABLET ORAL 2 TIMES DAILY
Qty: 60 TABLET | Refills: 2 | Status: SHIPPED | OUTPATIENT
Start: 2025-02-11

## 2025-02-11 NOTE — TELEPHONE ENCOUNTER
----- Message from Jerri sent at 2/11/2025 11:06 AM CST -----  Refill for alprazolam. [facility] in [place]. Pt #[phone]

## 2025-02-24 DIAGNOSIS — F41.9 ANXIETY: ICD-10-CM

## 2025-02-24 RX ORDER — ALPRAZOLAM 1 MG/1
1 TABLET ORAL 2 TIMES DAILY
Qty: 60 TABLET | Refills: 2 | Status: SHIPPED | OUTPATIENT
Start: 2025-02-24

## 2025-02-24 NOTE — TELEPHONE ENCOUNTER
----- Message from Lizzette sent at 2/24/2025  3:46 PM CST -----  The patient said she never uses [facility]. She said to please take that pharmacy out of her chart. Can you send her [facility] to  [facility] [address] in [facility]. [facility] you put [facility] in [facility] chart.  Pharmacy's # [phone]  pt's # [phone] GH

## 2025-03-05 ENCOUNTER — TELEPHONE (OUTPATIENT)
Dept: FAMILY MEDICINE | Facility: CLINIC | Age: 80
End: 2025-03-05

## 2025-03-05 NOTE — TELEPHONE ENCOUNTER
----- Message from Jessica sent at 3/5/2025  9:00 AM CST -----  Vm- 7:08-pt will not make her appt today, please reschedule to later time if possible [phone]

## 2025-03-21 ENCOUNTER — OFFICE VISIT (OUTPATIENT)
Dept: FAMILY MEDICINE | Facility: CLINIC | Age: 80
End: 2025-03-21
Payer: MEDICARE

## 2025-03-21 VITALS
HEART RATE: 63 BPM | OXYGEN SATURATION: 98 % | BODY MASS INDEX: 38.62 KG/M2 | WEIGHT: 184 LBS | HEIGHT: 58 IN | SYSTOLIC BLOOD PRESSURE: 138 MMHG | DIASTOLIC BLOOD PRESSURE: 84 MMHG

## 2025-03-21 DIAGNOSIS — M08.90 JUVENILE ARTHRITIS, UNSPECIFIED, UNSPECIFIED SITE: ICD-10-CM

## 2025-03-21 DIAGNOSIS — N18.31 STAGE 3A CHRONIC KIDNEY DISEASE: ICD-10-CM

## 2025-03-21 DIAGNOSIS — F32.1 CURRENT MODERATE EPISODE OF MAJOR DEPRESSIVE DISORDER WITHOUT PRIOR EPISODE: ICD-10-CM

## 2025-03-21 DIAGNOSIS — J44.9 CHRONIC OBSTRUCTIVE PULMONARY DISEASE, UNSPECIFIED COPD TYPE: ICD-10-CM

## 2025-03-21 DIAGNOSIS — J01.01 ACUTE RECURRENT MAXILLARY SINUSITIS: Primary | ICD-10-CM

## 2025-03-21 DIAGNOSIS — I70.0 AORTIC ATHEROSCLEROSIS: ICD-10-CM

## 2025-03-21 DIAGNOSIS — E66.01 SEVERE OBESITY (BMI 35.0-39.9) WITH COMORBIDITY: ICD-10-CM

## 2025-03-21 RX ORDER — METHYLPREDNISOLONE 4 MG/1
TABLET ORAL
Qty: 21 EACH | Refills: 0 | Status: SHIPPED | OUTPATIENT
Start: 2025-03-21 | End: 2025-04-11

## 2025-03-21 RX ORDER — AMOXICILLIN AND CLAVULANATE POTASSIUM 875; 125 MG/1; MG/1
1 TABLET, FILM COATED ORAL 2 TIMES DAILY
Qty: 20 TABLET | Refills: 0 | Status: SHIPPED | OUTPATIENT
Start: 2025-03-21 | End: 2025-03-31

## 2025-03-21 NOTE — PROGRESS NOTES
SUBJECTIVE:    Patient ID: Danielle Martinez is a 79 y.o. female.    Chief Complaint: Dizziness (Brought bottles, discuss about medications, loss balance, sinus for 3 weeks, abc )    History of Present Illness    CHIEF COMPLAINT:  Danielle presents today with sinus symptoms and nausea for the past three weeks.    UPPER RESPIRATORY SYMPTOMS:  She reports thick mucus extending from her nose to throat for approximately three weeks, causing a gag reflex and choking sensation. The mucus varies in color from clear to light yellow to green. She denies fever, chills, or body aches.    CARDIOVASCULAR:  She reports chest pain with radiation down her arm. She has an upcoming nuclear stress test scheduled, which she typically undergoes annually but may have missed last year. She has a history of atrial fibrillation and continues to use oxygen.    SLEEP:  She reports difficulty sleeping at night. She has been prescribed sleep medication but has not obtained it from the pharmacy and cannot recall the name.    CURRENT MEDICATIONS:  She is currently taking Telmisartan and Eliquis for atrial fibrillation.      ROS:  General: -fever, -chills, -fatigue, -weight gain, -weight loss  Eyes: -vision changes, -redness, -discharge  ENT: -ear pain, -nasal congestion, -sore throat, +nasal discharge  Cardiovascular: +chest pain, -palpitations, -lower extremity edema  Respiratory: +cough, -shortness of breath, +waking at night coughing  Gastrointestinal: -abdominal pain, +nausea, -vomiting, -diarrhea, -constipation, -blood in stool  Genitourinary: -dysuria, -hematuria, -frequency  Musculoskeletal: -joint pain, -muscle pain, +limb pain  Skin: -rash, -lesion  Neurological: -headache, +dizziness, -numbness, -tingling  Psychiatric: -anxiety, -depression, +sleep difficulty         Lab Visit on 12/03/2024   Component Date Value Ref Range Status    Sodium 12/03/2024 141  136 - 145 mmol/L Final    Potassium 12/03/2024 3.5  3.5 - 5.1 mmol/L Final    Chloride  12/03/2024 105  95 - 110 mmol/L Final    CO2 12/03/2024 26  23 - 29 mmol/L Final    Glucose 12/03/2024 94  70 - 110 mg/dL Final    BUN 12/03/2024 19  8 - 23 mg/dL Final    Creatinine 12/03/2024 0.8  0.5 - 1.4 mg/dL Final    Calcium 12/03/2024 10.0  8.7 - 10.5 mg/dL Final    Anion Gap 12/03/2024 10  8 - 16 mmol/L Final    eGFR 12/03/2024 >60.0  >60 mL/min/1.73 m^2 Final   Lab Visit on 11/19/2024   Component Date Value Ref Range Status    Sodium 11/19/2024 141  136 - 145 mmol/L Final    Potassium 11/19/2024 3.6  3.5 - 5.1 mmol/L Final    Chloride 11/19/2024 102  95 - 110 mmol/L Final    CO2 11/19/2024 28  23 - 29 mmol/L Final    Glucose 11/19/2024 126 (H)  70 - 110 mg/dL Final    BUN 11/19/2024 22  8 - 23 mg/dL Final    Creatinine 11/19/2024 0.9  0.5 - 1.4 mg/dL Final    Calcium 11/19/2024 10.6 (H)  8.7 - 10.5 mg/dL Final    Anion Gap 11/19/2024 11  8 - 16 mmol/L Final    eGFR 11/19/2024 >60.0  >60 mL/min/1.73 m^2 Final   No results displayed because visit has over 200 results.          Past Medical History:   Diagnosis Date    HAILY (acute kidney injury)     Anxiety     Arthritis     Juvenile diagnosed age 17    Atrial fibrillation     COPD (chronic obstructive pulmonary disease)     Depression     Diverticulitis     Emphysema of lung     GERD (gastroesophageal reflux disease)     Hx of hiatal hernia     Hypertension     Osteoporosis     Pneumonia     Sleep apnea      Past Surgical History:   Procedure Laterality Date    BREAST LUMPECTOMY Left     in 40's    COLON SURGERY      COLONOSCOPY  2016    Dr. Crowe-RTADOLPH 5 years    COLONOSCOPY  2019    Dr. Crowe-JULISSA 5 years    HERNIA REPAIR      HIATAL HERNIA REPAIR  2005    HYSTERECTOMY      SHOULDER ARTHROSCOPY  2015     Family History   Problem Relation Name Age of Onset    Hyperlipidemia Mother         Marital Status:   Alcohol History:  reports no history of alcohol use.  Tobacco History:  reports that she has never smoked. She has never used smokeless  "tobacco.  Drug History:  reports no history of drug use.    Review of patient's allergies indicates:   Allergen Reactions    Promethazine Anaphylaxis    Clove flavoring [flavoring agent]     Codeine Itching    Latex, natural rubber     Lorazepam Other (See Comments)    Lovastatin Other (See Comments)    Meclizine Other (See Comments)    Morphine Itching    Simvastatin Other (See Comments)    Tramadol-acetaminophen Itching     Current Medications[1]    Objective:      Vitals:    03/21/25 0910   BP: 138/84   Pulse: 63   SpO2: 98%   Weight: 83.5 kg (184 lb)   Height: 4' 10" (1.473 m)     Physical Exam    General: No acute distress. Well-developed. Well-nourished.  Eyes: EOMI. Sclerae anicteric.  HENT: Normocephalic. Atraumatic. Nares patent. Moist oral mucosa.  Ears: Bilateral TMs clear. Bilateral EACs clear.  Cardiovascular: Regular rate. Regular rhythm. No murmurs. No rubs. No gallops. Normal S1, S2.  Respiratory: Normal respiratory effort. Clear to auscultation bilaterally. No rales. No rhonchi. No wheezing.  Abdomen: Soft. Non-tender. Non-distended. Normoactive bowel sounds.  Musculoskeletal: No  obvious deformity.  Extremities: No lower extremity edema.  Neurological: Alert & oriented x3. No slurred speech. Normal gait.  Psychiatric: Normal mood. Normal affect. Good insight. Good judgment.  Skin: Warm. Dry. No rash.         Assessment:       Assessment & Plan    - Assessed sinus symptoms, likely progressed from viral to bacterial infection after 3 weeks. Explained that condition is likely no longer contagious after 3 weeks. Discussed progression from viral to bacterial sinus infection.  - Evaluated for potential cardiac issues, noting upcoming nuclear stress test.  - Considered dizziness may be related to sinus and ear involvement.  - Performed throat exam.    CURRENT MODERATE EPISODE OF MAJOR DEPRESSIVE DISORDER WITHOUT PRIOR EPISODE:  - Noted patient's recent loss of approximately 4 people, indicating potential " emotional stress.  - Prescribed medication to aid with sleep and manage hyperactivity.    COPD (CHRONIC OBSTRUCTIVE PULMONARY DISEASE):  - Monitored patient's persistent cough with thick mucus and chest pain lasting 2-3 weeks.  - Mucus color varied from clear to yellow to green, suggesting progression from viral to bacterial infection affecting sinuses and ears.  - Noted improvement in voice clarity compared to the past 2 weeks.  - Continued oxygen therapy and performed physical exam of the patient's throat.    ATRIAL FIBRILLATION:  - Continue long-term anticoagulation therapy with Eliquis for atrial fibrillation management.  - Noted patient's history of atrial fibrillation and reviewed cardiologist's report indicating stable heart rate during recent visit.    CHEST PAIN:  - Noted patient's reports of chest pain radiating down the arm.  - Ordered nuclear stress test to evaluate the patient's heart condition and reported chest pain.  - Scheduled the test for Friday, the 27th, as part of ongoing cardiac evaluation.    CHRONIC SINUSITIS:  - Assessed condition as likely progressed from viral to bacterial infection.    INSOMNIA:  - Addressed issue with refill of previously prescribed sleep medication.       Plan:       Acute recurrent maxillary sinusitis  -     amoxicillin-clavulanate 875-125mg (AUGMENTIN) 875-125 mg per tablet; Take 1 tablet by mouth 2 (two) times daily. for 10 days  Dispense: 20 tablet; Refill: 0  -     methylPREDNISolone (MEDROL DOSEPACK) 4 mg tablet; use as directed  Dispense: 21 each; Refill: 0    Chronic obstructive pulmonary disease, unspecified COPD type    Severe obesity (BMI 35.0-39.9) with comorbidity    Juvenile arthritis, unspecified, unspecified site    Stage 3a chronic kidney disease    Aortic atherosclerosis    Current moderate episode of major depressive disorder without prior episode      Follow up if symptoms worsen or fail to improve, for as scheduled.    This note was generated with  the assistance of ambient listening technology. Verbal consent was obtained by the patient and accompanying visitor(s) for the recording of patient appointment to facilitate this note. I attest to having reviewed and edited the generated note for accuracy, though some syntax or spelling errors may persist. Please contact the author of this note for any clarification.          3/21/2025 Bimal Rossi PA-C           [1]   Current Outpatient Medications:     ALPRAZolam (XANAX) 1 MG tablet, Take 1 tablet (1 mg total) by mouth 2 (two) times daily., Disp: 60 tablet, Rfl: 2    ascorbic acid, vitamin C, (VITAMIN C) 100 MG tablet, Take 100 mg by mouth once daily., Disp: , Rfl:     ELIQUIS 5 mg Tab, Take 1 tablet (5 mg total) by mouth 2 (two) times daily., Disp: 60 tablet, Rfl: 5    esomeprazole (NEXIUM) 40 MG capsule, Take 1 capsule (40 mg total) by mouth before breakfast., Disp: 90 capsule, Rfl: 1    fluticasone propionate (FLONASE) 50 mcg/actuation nasal spray, 1 spray (50 mcg total) by Each Nostril route daily as needed for Allergies., Disp: 16 g, Rfl: 2    fluticasone-umeclidin-vilanter (TRELEGY ELLIPTA) 100-62.5-25 mcg DsDv, Inhale 1 puff into the lungs daily as needed., Disp: 60 each, Rfl: 2    furosemide (LASIX) 40 MG tablet, Take 1 tablet (40 mg total) by mouth once daily., Disp: 30 tablet, Rfl: 0    levothyroxine (SYNTHROID) 50 MCG tablet, Take 1 tablet (50 mcg total) by mouth before breakfast., Disp: 90 tablet, Rfl: 1    metoprolol succinate (TOPROL-XL) 100 MG 24 hr tablet, Take 1 tablet (100 mg total) by mouth once daily. For 30 days, Disp: 90 tablet, Rfl: 3    mv-min/folic/vit K/lut/[id number] (ALIVE WOMEN'S 50 PLUS, BLEND, ORAL), Take 1 tablet by mouth once daily., Disp: , Rfl:     nitroGLYCERIN (NITROSTAT) 0.4 MG SL tablet, Place 1 tablet (0.4 mg total) under the tongue every 5 (five) minutes as needed., Disp: 25 tablet, Rfl: 3    potassium chloride (KLOR-CON) 10 MEQ TbSR, Take 1 tablet (10 mEq total) by mouth  2 (two) times daily., Disp: 180 tablet, Rfl: 1    traZODone (DESYREL) 50 MG tablet, Take 1 tablet (50 mg total) by mouth every evening. For sleep, Disp: 30 tablet, Rfl: 2    albuterol (PROVENTIL) 2.5 mg /3 mL (0.083 %) nebulizer solution, Take 3 mLs (2.5 mg total) by nebulization every 6 (six) hours as needed for Wheezing. Rescue (Patient not taking: Reported on 3/21/2025), Disp: 300 mL, Rfl: 1    amoxicillin-clavulanate 875-125mg (AUGMENTIN) 875-125 mg per tablet, Take 1 tablet by mouth 2 (two) times daily. for 10 days, Disp: 20 tablet, Rfl: 0    aspirin (ECOTRIN) 81 MG EC tablet, Take 1 tablet (81 mg total) by mouth once daily. (Patient not taking: Reported on 3/21/2025), Disp: , Rfl:     clotrimazole-betamethasone 1-0.05% (LOTRISONE) cream, Apply twice a day to affected skin (Patient not taking: Reported on 3/21/2025), Disp: 45 g, Rfl: 1    methylPREDNISolone (MEDROL DOSEPACK) 4 mg tablet, use as directed, Disp: 21 each, Rfl: 0

## 2025-04-10 ENCOUNTER — TELEPHONE (OUTPATIENT)
Dept: FAMILY MEDICINE | Facility: CLINIC | Age: 80
End: 2025-04-10
Payer: MEDICARE

## 2025-04-10 NOTE — TELEPHONE ENCOUNTER
----- Message from Jerri sent at 4/10/2025 10:28 AM CDT -----  Pt has fluid on her lungs and needs to be seen asap. Pt #[phone]

## 2025-04-15 ENCOUNTER — TELEPHONE (OUTPATIENT)
Dept: PULMONOLOGY | Facility: CLINIC | Age: 80
End: 2025-04-15
Payer: MEDICARE

## 2025-04-15 NOTE — TELEPHONE ENCOUNTER
----- Message from Lizzette sent at 4/15/2025 11:08 AM CDT -----  Contact: self  Type:  Sooner Appointment RequestCaller is requesting a sooner appointment.  Caller declined first available appointment listed below.  Caller will not accept being placed on the waitlist and is requesting a message be sent to doctor.Name of Caller:  pt When is the first available appointment?  Could not get appt enrike Symptoms:  her doctor retired and she on oxygen and CPAP Would the patient rather a call back or a response via [facility]? Call back Best Call Back Number:  [phone] Information:  She needs to get est with a new pulmonologist so please call back to advise and thanks

## 2025-06-03 ENCOUNTER — HOSPITAL ENCOUNTER (OUTPATIENT)
Dept: PREADMISSION TESTING | Facility: HOSPITAL | Age: 80
Discharge: HOME | End: 2025-06-03
Attending: INTERNAL MEDICINE
Payer: MEDICARE

## 2025-06-03 VITALS
RESPIRATION RATE: 18 BRPM | HEIGHT: 58 IN | HEART RATE: 82 BPM | SYSTOLIC BLOOD PRESSURE: 136 MMHG | OXYGEN SATURATION: 97 % | BODY MASS INDEX: 38.64 KG/M2 | WEIGHT: 184.06 LBS | DIASTOLIC BLOOD PRESSURE: 82 MMHG

## 2025-06-03 DIAGNOSIS — Z01.818 PREOP TESTING: Primary | ICD-10-CM

## 2025-06-03 LAB
ABSOLUTE EOSINOPHIL (SMH): 0.28 K/UL
ABSOLUTE MONOCYTE (SMH): 0.78 K/UL (ref 0.3–1)
ABSOLUTE NEUTROPHIL COUNT (SMH): 5.2 K/UL (ref 1.8–7.7)
ANION GAP (SMH): 7 MMOL/L (ref 8–16)
BASOPHILS # BLD AUTO: 0.07 K/UL
BASOPHILS NFR BLD AUTO: 0.8 %
BUN SERPL-MCNC: 21 MG/DL (ref 8–23)
CALCIUM SERPL-MCNC: 10 MG/DL (ref 8.7–10.5)
CHLORIDE SERPL-SCNC: 101 MMOL/L (ref 95–110)
CO2 SERPL-SCNC: 31 MMOL/L (ref 23–29)
CREAT SERPL-MCNC: 1 MG/DL (ref 0.5–1.4)
ERYTHROCYTE [DISTWIDTH] IN BLOOD BY AUTOMATED COUNT: 14.8 % (ref 11.5–14.5)
GFR SERPLBLD CREATININE-BSD FMLA CKD-EPI: 58 ML/MIN/1.73/M2
GLUCOSE SERPL-MCNC: 125 MG/DL (ref 70–110)
HCT VFR BLD AUTO: 43.7 % (ref 37–48.5)
HGB BLD-MCNC: 13.7 GM/DL (ref 12–16)
IMM GRANULOCYTES # BLD AUTO: 0.04 K/UL (ref 0–0.04)
IMM GRANULOCYTES NFR BLD AUTO: 0.5 % (ref 0–0.5)
LYMPHOCYTES # BLD AUTO: 2.08 K/UL (ref 1–4.8)
MCH RBC QN AUTO: 28.8 PG (ref 27–31)
MCHC RBC AUTO-ENTMCNC: 31.4 G/DL (ref 32–36)
MCV RBC AUTO: 92 FL (ref 82–98)
NUCLEATED RBC (/100WBC) (SMH): 0 /100 WBC
PLATELET # BLD AUTO: 320 K/UL (ref 150–450)
PMV BLD AUTO: 9.7 FL (ref 9.2–12.9)
POTASSIUM SERPL-SCNC: 4.2 MMOL/L (ref 3.5–5.1)
RBC # BLD AUTO: 4.76 M/UL (ref 4–5.4)
RELATIVE EOSINOPHIL (SMH): 3.3 % (ref 0–8)
RELATIVE LYMPHOCYTE (SMH): 24.6 % (ref 18–48)
RELATIVE MONOCYTE (SMH): 9.2 % (ref 4–15)
RELATIVE NEUTROPHIL (SMH): 61.6 % (ref 38–73)
SODIUM SERPL-SCNC: 139 MMOL/L (ref 136–145)
WBC # BLD AUTO: 8.44 K/UL (ref 3.9–12.7)

## 2025-06-03 PROCEDURE — 85025 COMPLETE CBC W/AUTO DIFF WBC: CPT | Performed by: ANESTHESIOLOGY

## 2025-06-03 PROCEDURE — 84132 ASSAY OF SERUM POTASSIUM: CPT | Performed by: ANESTHESIOLOGY

## 2025-06-03 PROCEDURE — 36415 COLL VENOUS BLD VENIPUNCTURE: CPT | Performed by: ANESTHESIOLOGY

## 2025-06-06 ENCOUNTER — ANESTHESIA EVENT (OUTPATIENT)
Dept: SURGERY | Facility: HOSPITAL | Age: 80
End: 2025-06-06
Payer: MEDICARE

## 2025-06-06 ENCOUNTER — ANESTHESIA (OUTPATIENT)
Dept: SURGERY | Facility: HOSPITAL | Age: 80
End: 2025-06-06
Payer: MEDICARE

## 2025-06-06 ENCOUNTER — HOSPITAL ENCOUNTER (OUTPATIENT)
Facility: HOSPITAL | Age: 80
Discharge: HOME | End: 2025-06-06
Attending: INTERNAL MEDICINE | Admitting: INTERNAL MEDICINE
Payer: MEDICARE

## 2025-06-06 VITALS
DIASTOLIC BLOOD PRESSURE: 73 MMHG | SYSTOLIC BLOOD PRESSURE: 162 MMHG | TEMPERATURE: 98 F | OXYGEN SATURATION: 97 % | HEART RATE: 92 BPM | RESPIRATION RATE: 5 BRPM | OXYGEN SATURATION: 100 % | DIASTOLIC BLOOD PRESSURE: 75 MMHG | SYSTOLIC BLOOD PRESSURE: 156 MMHG | RESPIRATION RATE: 17 BRPM | HEART RATE: 91 BPM

## 2025-06-06 DIAGNOSIS — Z86.0101 PERSONAL HISTORY OF ADENOMATOUS AND SERRATED COLON POLYPS: ICD-10-CM

## 2025-06-06 PROCEDURE — 63600175 PHARM REV CODE 636 W HCPCS: Performed by: NURSE ANESTHETIST, CERTIFIED REGISTERED

## 2025-06-06 PROCEDURE — 25000003 PHARM REV CODE 250: Performed by: NURSE ANESTHETIST, CERTIFIED REGISTERED

## 2025-06-06 PROCEDURE — G0105 COLORECTAL SCRN; HI RISK IND: HCPCS | Performed by: INTERNAL MEDICINE

## 2025-06-06 PROCEDURE — 37000009 HC ANESTHESIA EA ADD 15 MINS: Performed by: INTERNAL MEDICINE

## 2025-06-06 PROCEDURE — 37000008 HC ANESTHESIA 1ST 15 MINUTES: Performed by: INTERNAL MEDICINE

## 2025-06-06 RX ORDER — PROPOFOL 10 MG/ML
VIAL (ML) INTRAVENOUS
Status: DISCONTINUED | OUTPATIENT
Start: 2025-06-06 | End: 2025-06-06

## 2025-06-06 RX ADMIN — PROPOFOL 20 MG: 10 INJECTION, EMULSION INTRAVENOUS at 08:06

## 2025-06-06 RX ADMIN — SODIUM CHLORIDE: 0.9 INJECTION, SOLUTION INTRAVENOUS at 08:06

## 2025-06-06 RX ADMIN — PROPOFOL 40 MG: 10 INJECTION, EMULSION INTRAVENOUS at 08:06

## 2025-06-06 NOTE — H&P
"GASTROENTEROLOGY PRE-PROCEDURE H&P NOTE  Patient Name: Danielle Skeltons  Patient MRN: [id number]  Patient : 1945    Service date: 2025    PCP: Antony Lopez MD    No chief complaint on file.      HPI: Patient is a 79 y.o. female with PMHx as below here for evaluation of with personal history of adenomatous colon polyps he is brought in today for colonoscopy..     Past Medical History:  Past Medical History:   Diagnosis Date    HAILY (acute kidney injury)     Anxiety     Arthritis     Juvenile diagnosed age 17    Atrial fibrillation     COPD (chronic obstructive pulmonary disease)     Depression     Diverticulitis     Emphysema of lung     GERD (gastroesophageal reflux disease)     Hx of hiatal hernia     Hypertension     Osteoporosis     Pneumonia     Sleep apnea         Past Surgical History:  Past Surgical History:   Procedure Laterality Date    BREAST LUMPECTOMY Left     in 40's    COLON SURGERY      COLONOSCOPY      Dr. Crowe-RTADOLPH 5 years    COLONOSCOPY      Dr. Reyes 5 years    HERNIA REPAIR      HIATAL HERNIA REPAIR      HYSTERECTOMY      SHOULDER ARTHROSCOPY  2015        Home Medications:  Prescriptions Prior to Admission[1]            Review of patient's allergies indicates:   Allergen Reactions    Promethazine Anaphylaxis    Clove flavoring [flavoring agent]     Codeine Itching    Latex, natural rubber     Lorazepam Other (See Comments)    Lovastatin Other (See Comments)    Meclizine Other (See Comments)    Morphine Itching    Simvastatin Other (See Comments)    Succinylcholine Other (See Comments)     "Was tested and told not to take"     Tramadol-acetaminophen Itching       Social History:   Social History     Occupational History    Not on file   Tobacco Use    Smoking status: Never    Smokeless tobacco: Never   Vaping Use    Vaping status: Never Used   Substance and Sexual Activity    Alcohol use: Never    Drug use: Never    Sexual activity: Not on file       Family " "History:   Family History   Problem Relation Name Age of Onset    Hyperlipidemia Mother         Review of Systems:  A 10 point review of systems was performed and was normal, except as mentioned in the HPI, including constitutional, HEENT, heme, lymph, cardiovascular, respiratory, gastrointestinal, genitourinary, neurologic, endocrine, psychiatric and musculoskeletal.      OBJECTIVE:    Physical Exam:  24 Hour Vital Sign Ranges: Temp:  [97.9 °F (36.6 °C)] 97.9 °F (36.6 °C)  Pulse:  [96] 96  Resp:  [18] 18  SpO2:  [97 %] 97 %  BP: (141)/(94) 141/94  Most recent vitals: BP (!) 141/94   Pulse 96   Temp 97.9 °F (36.6 °C)   Resp 18   SpO2 97%    GEN: well-developed, well-nourished, awake and alert, non-toxic appearing adult  HEENT: PERRL, sclera anicteric, oral mucosa pink and moist without lesion  NECK: trachea midline; Good ROM  CV: regular rate and rhythm, no murmurs or gallops  RESP: clear to auscultation bilaterally, no wheezes, rhonci or rales  ABD: soft, non-tender, non-distended, normal bowel sounds  EXT: no swelling or edema, 2+ pulses distally  SKIN: no rashes or jaundice  PSYCH: normal affect    Labs:   Recent Labs     06/03/25  1106   WBC 8.44   MCV 92        Recent Labs     06/03/25  1106      K 4.2      CO2 31*   BUN 21   *     No results for input(s): "ALB" in the last 72 hours.    Invalid input(s): "ALKP", "SGOT", "SGPT", "TBIL", "DBIL", "TPRO"  No results for input(s): "PT", "INR", "PTT" in the last 72 hours.      IMPRESSION / RECOMMENDATIONS:  Colonoscopy today for personal history of adenomatous colon polyps further recommendations to follow with interventions as warranted.   RIsks, benefits, alternatives discussed in detail regarding upcoming procedures and sedation. Some of the more common endoscopic complications include but not limited to immediate or delayed perforation, bleeding, infections, pain, inadvertent injury to surrounding tissue / organs and possible need " for surgical evaluation. Patient expressed understanding, all questions answered and will proceed with procedure as planned.     Tacho Crowe  6/6/2025  8:06 AM          [1]   Medications Prior to Admission   Medication Sig Dispense Refill Last Dose/Taking    albuterol (PROVENTIL) 2.5 mg /3 mL (0.083 %) nebulizer solution Take 3 mLs (2.5 mg total) by nebulization every 6 (six) hours as needed for Wheezing. Rescue 300 mL 1     ALPRAZolam (XANAX) 1 MG tablet Take 1 tablet (1 mg total) by mouth 2 (two) times daily. 60 tablet 2     ascorbic acid, vitamin C, (VITAMIN C) 100 MG tablet Take 100 mg by mouth once daily.       clotrimazole-betamethasone 1-0.05% (LOTRISONE) cream Apply twice a day to affected skin (Patient not taking: Reported on 3/21/2025) 45 g 1     ELIQUIS 5 mg Tab Take 1 tablet (5 mg total) by mouth 2 (two) times daily. 60 tablet 5     esomeprazole (NEXIUM) 40 MG capsule Take 1 capsule (40 mg total) by mouth before breakfast. 90 capsule 1     fluticasone-umeclidin-vilanter (TRELEGY ELLIPTA) 100-62.5-25 mcg DsDv Inhale 1 puff into the lungs daily as needed. 60 each 2     furosemide (LASIX) 40 MG tablet Take 1 tablet (40 mg total) by mouth once daily. 30 tablet 0     levothyroxine (SYNTHROID) 50 MCG tablet Take 1 tablet (50 mcg total) by mouth before breakfast. 90 tablet 1     metoprolol succinate (TOPROL-XL) 100 MG 24 hr tablet Take 1 tablet (100 mg total) by mouth once daily. For 30 days 90 tablet 3     mv-min/folic/vit K/lut/[id number] (ALIVE WOMEN'S 50 PLUS, BLEND, ORAL) Take 1 tablet by mouth once daily.       nitroGLYCERIN (NITROSTAT) 0.4 MG SL tablet Place 1 tablet (0.4 mg total) under the tongue every 5 (five) minutes as needed. 25 tablet 3     potassium chloride (KLOR-CON) 10 MEQ TbSR Take 1 tablet (10 mEq total) by mouth 2 (two) times daily. 180 tablet 1     traZODone (DESYREL) 50 MG tablet Take 1 tablet (50 mg total) by mouth every evening. For sleep 30 tablet 2

## 2025-06-06 NOTE — [employer] PATIENT INSTRUCTIONS
Discharge Summary/Instructions after an Endoscopic Procedure  Patient Name: Danielle Martinez  Patient MRN: [id number]  Patient YOB: 1945 Friday, June 6, 2025  Tacho Crowe MD  RESTRICTIONS:  During your procedure today, you received medications for sedation.  These   medications may affect your judgment, balance and coordination.  Therefore,   for 24 hours, you have the following restrictions:   - DO NOT drive a car, operate machinery, make legal/financial decisions,   sign important papers or drink alcohol.    ACTIVITY:  Today: no heavy lifting, straining or running due to procedural   sedation/anesthesia.  The following day: return to full activity including work.  DIET:  Eat and drink normally unless instructed otherwise.     TREATMENT FOR COMMON SIDE EFFECTS:  - Mild abdominal pain, nausea, belching, bloating or excessive gas:  rest,   eat lightly and use a heating pad.  - Sore Throat: treat with throat lozenges and/or gargle with warm salt   water.  - Because air was used during the procedure, expelling large amounts of air   from your rectum or belching is normal.  - If a bowel prep was taken, you may not have a bowel movement for 1-3 days.    This is normal.  SYMPTOMS TO WATCH FOR AND REPORT TO YOUR PHYSICIAN:  1. Abdominal pain or bloating, other than gas cramps.  2. Chest pain.  3. Back pain.  4. Signs of infection such as: chills or fever occurring within 24 hours   after the procedure.  5. Rectal bleeding, which would show as bright red, maroon, or black stools.   (A tablespoon of blood from the rectum is not serious, especially if   hemorrhoids are present.)  6. Vomiting.  7. Weakness or dizziness.  GO DIRECTLY TO THE NEAREST EMERGENCY ROOM IF YOU HAVE ANY OF THE FOLLOWING:      Difficulty breathing              Chills and/or fever over 101 F   Persistent vomiting and/or vomiting blood   Severe abdominal pain   Severe chest pain   Black, tarry stools   Bleeding- more than one tablespoon   Any  other symptom or condition that you feel may need urgent attention  Your doctor recommends these additional instructions:  If any biopsies were taken, your doctors clinic will contact you in 1 to 2   weeks with any results.  - Discharge patient to home (ambulatory).   - Repeat colonoscopy is not recommended for surveillance.  For questions, problems or results please call your physician - Tacho Crowe MD at Work:  [phone].  [facility], EMERGENCY ROOM PHONE NUMBER: [phone]  IF A COMPLICATION OR EMERGENCY SITUATION ARISES AND YOU ARE UNABLE TO REACH   YOUR PHYSICIAN - GO DIRECTLY TO THE EMERGENCY ROOM.  Tacho Croew MD  6/6/2025 8:28:11 AM  This report has been verified and signed electronically.  Dear patient,  As a result of recent federal legislation (The Federal Cures Act), you may   receive lab or pathology results from your procedure in your [facility]   account before your physician is able to contact you. Your physician or   their representative will relay the results to you with their   recommendations at their soonest availability.  Thank you,  [employer]

## 2025-06-26 DIAGNOSIS — F41.9 ANXIETY: ICD-10-CM

## 2025-06-26 DIAGNOSIS — E03.9 ACQUIRED HYPOTHYROIDISM: ICD-10-CM

## 2025-06-26 DIAGNOSIS — K21.9 GASTROESOPHAGEAL REFLUX DISEASE WITHOUT ESOPHAGITIS: ICD-10-CM

## 2025-06-26 DIAGNOSIS — I48.0 PAROXYSMAL ATRIAL FIBRILLATION: ICD-10-CM

## 2025-06-26 RX ORDER — LEVOTHYROXINE SODIUM 50 UG/1
50 TABLET ORAL
Qty: 90 TABLET | Refills: 0 | Status: SHIPPED | OUTPATIENT
Start: 2025-06-26 | End: 2026-06-26

## 2025-06-26 RX ORDER — ESOMEPRAZOLE MAGNESIUM 40 MG/1
40 CAPSULE, DELAYED RELEASE ORAL
Qty: 90 CAPSULE | Refills: 0 | Status: SHIPPED | OUTPATIENT
Start: 2025-06-26

## 2025-06-26 RX ORDER — FUROSEMIDE 40 MG/1
40 TABLET ORAL DAILY
Qty: 90 TABLET | Refills: 0 | Status: SHIPPED | OUTPATIENT
Start: 2025-06-26 | End: 2026-06-26

## 2025-06-26 RX ORDER — METOPROLOL SUCCINATE 100 MG/1
100 TABLET, EXTENDED RELEASE ORAL DAILY
Qty: 90 TABLET | Refills: 0 | Status: SHIPPED | OUTPATIENT
Start: 2025-06-26

## 2025-06-26 RX ORDER — ALPRAZOLAM 1 MG/1
1 TABLET ORAL 2 TIMES DAILY
Qty: 60 TABLET | Refills: 2 | Status: SHIPPED | OUTPATIENT
Start: 2025-06-26

## 2025-06-26 NOTE — TELEPHONE ENCOUNTER
Booked annual follow up with dr. Lopez for August, last visit here was acute with Ryan  Will pend enough refill until she comes in

## 2025-06-26 NOTE — TELEPHONE ENCOUNTER
----- Message from Nurse Alba sent at 6/26/2025  9:17 AM CDT -----    ----- Message -----  From: Chelsey Benitez  Sent: 6/26/2025   9:16 AM CDT  To: Antony Lopez Staff    Pt called to get a refill on levothyroxin 50mg, metoprolol ER 100mg, esomeprazole DR 40mg, furosemide 40mg, and alprazolam 1mg to be sent to [facility] [address] in [place].    Danielle:  [phone]

## 2025-07-14 ENCOUNTER — TELEPHONE (OUTPATIENT)
Dept: FAMILY MEDICINE | Facility: CLINIC | Age: 80
End: 2025-07-14
Payer: MEDICARE

## 2025-07-14 NOTE — TELEPHONE ENCOUNTER
----- Message from Jessica sent at 7/14/2025  7:39 AM CDT -----  Vm- 7/12-10:45 am- pt is calling about a message she received about an appt for 7/11, if it is us she needs to reschedule   [phone]

## 2025-08-04 ENCOUNTER — HOSPITAL ENCOUNTER (INPATIENT)
Facility: HOSPITAL | Age: 80
LOS: 1 days | Discharge: HOME | DRG: 392 | End: 2025-08-06
Attending: STUDENT IN AN ORGANIZED HEALTH CARE EDUCATION/TRAINING PROGRAM | Admitting: HOSPITALIST
Payer: MEDICARE

## 2025-08-04 DIAGNOSIS — R79.89 ELEVATED BRAIN NATRIURETIC PEPTIDE (BNP) LEVEL: ICD-10-CM

## 2025-08-04 DIAGNOSIS — R07.9 CHEST PAIN: ICD-10-CM

## 2025-08-04 DIAGNOSIS — R00.2 PALPITATIONS: ICD-10-CM

## 2025-08-04 DIAGNOSIS — N17.9 AKI (ACUTE KIDNEY INJURY): ICD-10-CM

## 2025-08-04 DIAGNOSIS — E87.70 HYPERVOLEMIA, UNSPECIFIED HYPERVOLEMIA TYPE: Primary | ICD-10-CM

## 2025-08-04 DIAGNOSIS — R06.02 SOB (SHORTNESS OF BREATH): ICD-10-CM

## 2025-08-04 DIAGNOSIS — K57.92 DIVERTICULITIS: ICD-10-CM

## 2025-08-04 DIAGNOSIS — R07.9 CHEST PAIN, UNSPECIFIED TYPE: ICD-10-CM

## 2025-08-04 PROBLEM — I50.32 CHRONIC DIASTOLIC HEART FAILURE: Status: RESOLVED | Noted: 2021-06-05 | Resolved: 2025-08-04

## 2025-08-04 LAB
ABSOLUTE EOSINOPHIL (SMH): 0.14 K/UL
ABSOLUTE MONOCYTE (SMH): 0.68 K/UL (ref 0.3–1)
ABSOLUTE NEUTROPHIL COUNT (SMH): 5.8 K/UL (ref 1.8–7.7)
ALBUMIN SERPL-MCNC: 4.6 G/DL (ref 3.5–5.2)
ALP SERPL-CCNC: 58 UNIT/L (ref 55–135)
ALT SERPL-CCNC: 7 UNIT/L (ref 10–44)
ANION GAP (SMH): 8 MMOL/L (ref 8–16)
AST SERPL-CCNC: 15 UNIT/L (ref 10–40)
BASOPHILS # BLD AUTO: 0.05 K/UL
BASOPHILS NFR BLD AUTO: 0.6 %
BILIRUB SERPL-MCNC: 1.4 MG/DL (ref 0.1–1)
BILIRUB UR QL STRIP.AUTO: NEGATIVE
BNP SERPL-MCNC: 263 PG/ML
BUN SERPL-MCNC: 15 MG/DL (ref 8–23)
CALCIUM SERPL-MCNC: 9.7 MG/DL (ref 8.7–10.5)
CHLORIDE SERPL-SCNC: 103 MMOL/L (ref 95–110)
CLARITY UR: CLEAR
CO2 SERPL-SCNC: 30 MMOL/L (ref 23–29)
COLOR UR AUTO: NORMAL
CREAT SERPL-MCNC: 0.9 MG/DL (ref 0.5–1.4)
ERYTHROCYTE [DISTWIDTH] IN BLOOD BY AUTOMATED COUNT: 15 % (ref 11.5–14.5)
GFR SERPLBLD CREATININE-BSD FMLA CKD-EPI: >60 ML/MIN/1.73/M2
GLUCOSE SERPL-MCNC: 118 MG/DL (ref 70–110)
GLUCOSE UR QL STRIP: NEGATIVE
HCT VFR BLD AUTO: 42.4 % (ref 37–48.5)
HGB BLD-MCNC: 13.5 GM/DL (ref 12–16)
HGB UR QL STRIP: NEGATIVE
IMM GRANULOCYTES # BLD AUTO: 0.04 K/UL (ref 0–0.04)
IMM GRANULOCYTES NFR BLD AUTO: 0.5 % (ref 0–0.5)
KETONES UR QL STRIP: NEGATIVE
LEUKOCYTE ESTERASE UR QL STRIP: NEGATIVE
LYMPHOCYTES # BLD AUTO: 1.94 K/UL (ref 1–4.8)
MCH RBC QN AUTO: 28.3 PG (ref 27–31)
MCHC RBC AUTO-ENTMCNC: 31.8 G/DL (ref 32–36)
MCV RBC AUTO: 89 FL (ref 82–98)
NITRITE UR QL STRIP: NEGATIVE
NUCLEATED RBC (/100WBC) (SMH): 0 /100 WBC
PH UR STRIP: 7 [PH]
PLATELET # BLD AUTO: 305 K/UL (ref 150–450)
PMV BLD AUTO: 10.4 FL (ref 9.2–12.9)
POTASSIUM SERPL-SCNC: 3.9 MMOL/L (ref 3.5–5.1)
PROT SERPL-MCNC: 7.5 GM/DL (ref 6–8.4)
PROT UR QL STRIP: NEGATIVE
RBC # BLD AUTO: 4.77 M/UL (ref 4–5.4)
RELATIVE EOSINOPHIL (SMH): 1.6 % (ref 0–8)
RELATIVE LYMPHOCYTE (SMH): 22.4 % (ref 18–48)
RELATIVE MONOCYTE (SMH): 7.9 % (ref 4–15)
RELATIVE NEUTROPHIL (SMH): 67 % (ref 38–73)
SODIUM SERPL-SCNC: 141 MMOL/L (ref 136–145)
SP GR UR STRIP: 1
TROPONIN HIGH SENSITIVE (SMH): 10.8 PG/ML
TSH SERPL-ACNC: 1.09 UIU/ML (ref 0.34–5.6)
UROBILINOGEN UR STRIP-ACNC: NEGATIVE EU/DL
WBC # BLD AUTO: 8.66 K/UL (ref 3.9–12.7)

## 2025-08-04 PROCEDURE — 25000003 PHARM REV CODE 250: Performed by: STUDENT IN AN ORGANIZED HEALTH CARE EDUCATION/TRAINING PROGRAM

## 2025-08-04 PROCEDURE — 81003 URINALYSIS AUTO W/O SCOPE: CPT | Performed by: NURSE PRACTITIONER

## 2025-08-04 PROCEDURE — 25500020 PHARM REV CODE 255: Performed by: STUDENT IN AN ORGANIZED HEALTH CARE EDUCATION/TRAINING PROGRAM

## 2025-08-04 PROCEDURE — 85025 COMPLETE CBC W/AUTO DIFF WBC: CPT | Performed by: NURSE PRACTITIONER

## 2025-08-04 PROCEDURE — 96365 THER/PROPH/DIAG IV INF INIT: CPT

## 2025-08-04 PROCEDURE — 93005 ELECTROCARDIOGRAM TRACING: CPT | Performed by: GENERAL PRACTICE

## 2025-08-04 PROCEDURE — 84443 ASSAY THYROID STIM HORMONE: CPT | Performed by: NURSE PRACTITIONER

## 2025-08-04 PROCEDURE — 80053 COMPREHEN METABOLIC PANEL: CPT | Performed by: NURSE PRACTITIONER

## 2025-08-04 PROCEDURE — 63600175 PHARM REV CODE 636 W HCPCS: Performed by: STUDENT IN AN ORGANIZED HEALTH CARE EDUCATION/TRAINING PROGRAM

## 2025-08-04 PROCEDURE — 99285 EMERGENCY DEPT VISIT HI MDM: CPT | Mod: 25

## 2025-08-04 PROCEDURE — G0378 HOSPITAL OBSERVATION PER HR: HCPCS

## 2025-08-04 PROCEDURE — 93010 ELECTROCARDIOGRAM REPORT: CPT | Mod: ,,, | Performed by: GENERAL PRACTICE

## 2025-08-04 PROCEDURE — 96375 TX/PRO/DX INJ NEW DRUG ADDON: CPT

## 2025-08-04 PROCEDURE — 83880 ASSAY OF NATRIURETIC PEPTIDE: CPT | Performed by: NURSE PRACTITIONER

## 2025-08-04 PROCEDURE — 84484 ASSAY OF TROPONIN QUANT: CPT | Performed by: NURSE PRACTITIONER

## 2025-08-04 RX ORDER — PANTOPRAZOLE SODIUM 40 MG/1
40 TABLET, DELAYED RELEASE ORAL
Status: DISCONTINUED | OUTPATIENT
Start: 2025-08-05 | End: 2025-08-06 | Stop reason: HOSPADM

## 2025-08-04 RX ORDER — IBUPROFEN 200 MG
24 TABLET ORAL
Status: DISCONTINUED | OUTPATIENT
Start: 2025-08-05 | End: 2025-08-06 | Stop reason: HOSPADM

## 2025-08-04 RX ORDER — SODIUM,POTASSIUM PHOSPHATES 280-250MG
2 POWDER IN PACKET (EA) ORAL
Status: DISCONTINUED | OUTPATIENT
Start: 2025-08-05 | End: 2025-08-06 | Stop reason: HOSPADM

## 2025-08-04 RX ORDER — IBUPROFEN 400 MG/1
400 TABLET, FILM COATED ORAL EVERY 6 HOURS PRN
Status: DISCONTINUED | OUTPATIENT
Start: 2025-08-05 | End: 2025-08-06 | Stop reason: HOSPADM

## 2025-08-04 RX ORDER — GLUCAGON 1 MG
1 KIT INJECTION
Status: DISCONTINUED | OUTPATIENT
Start: 2025-08-05 | End: 2025-08-06 | Stop reason: HOSPADM

## 2025-08-04 RX ORDER — IPRATROPIUM BROMIDE AND ALBUTEROL SULFATE 2.5; .5 MG/3ML; MG/3ML
3 SOLUTION RESPIRATORY (INHALATION) EVERY 6 HOURS PRN
Status: DISCONTINUED | OUTPATIENT
Start: 2025-08-05 | End: 2025-08-06 | Stop reason: HOSPADM

## 2025-08-04 RX ORDER — IBUPROFEN 200 MG
16 TABLET ORAL
Status: DISCONTINUED | OUTPATIENT
Start: 2025-08-05 | End: 2025-08-06 | Stop reason: HOSPADM

## 2025-08-04 RX ORDER — NALOXONE HCL 0.4 MG/ML
0.02 VIAL (ML) INJECTION
Status: DISCONTINUED | OUTPATIENT
Start: 2025-08-05 | End: 2025-08-06 | Stop reason: HOSPADM

## 2025-08-04 RX ORDER — FUROSEMIDE 40 MG/1
40 TABLET ORAL NIGHTLY
Status: DISCONTINUED | OUTPATIENT
Start: 2025-08-04 | End: 2025-08-06 | Stop reason: HOSPADM

## 2025-08-04 RX ORDER — LABETALOL HYDROCHLORIDE 5 MG/ML
10 INJECTION, SOLUTION INTRAVENOUS EVERY 6 HOURS PRN
Status: DISCONTINUED | OUTPATIENT
Start: 2025-08-05 | End: 2025-08-06 | Stop reason: HOSPADM

## 2025-08-04 RX ORDER — SODIUM CHLORIDE 0.9 % (FLUSH) 0.9 %
10 SYRINGE (ML) INJECTION
Status: DISCONTINUED | OUTPATIENT
Start: 2025-08-05 | End: 2025-08-06 | Stop reason: HOSPADM

## 2025-08-04 RX ORDER — SODIUM CHLORIDE, SODIUM LACTATE, POTASSIUM CHLORIDE, CALCIUM CHLORIDE 600; 310; 30; 20 MG/100ML; MG/100ML; MG/100ML; MG/100ML
INJECTION, SOLUTION INTRAVENOUS CONTINUOUS
Status: DISCONTINUED | OUTPATIENT
Start: 2025-08-05 | End: 2025-08-05

## 2025-08-04 RX ORDER — ONDANSETRON HYDROCHLORIDE 2 MG/ML
4 INJECTION, SOLUTION INTRAVENOUS EVERY 8 HOURS PRN
Status: DISCONTINUED | OUTPATIENT
Start: 2025-08-05 | End: 2025-08-06 | Stop reason: HOSPADM

## 2025-08-04 RX ORDER — ACETAMINOPHEN 325 MG/1
650 TABLET ORAL EVERY 4 HOURS PRN
Status: DISCONTINUED | OUTPATIENT
Start: 2025-08-05 | End: 2025-08-06 | Stop reason: HOSPADM

## 2025-08-04 RX ORDER — TALC
9 POWDER (GRAM) TOPICAL NIGHTLY PRN
Status: DISCONTINUED | OUTPATIENT
Start: 2025-08-05 | End: 2025-08-06 | Stop reason: HOSPADM

## 2025-08-04 RX ORDER — ONDANSETRON HYDROCHLORIDE 2 MG/ML
4 INJECTION, SOLUTION INTRAVENOUS
Status: COMPLETED | OUTPATIENT
Start: 2025-08-04 | End: 2025-08-04

## 2025-08-04 RX ORDER — LANOLIN ALCOHOL/MO/W.PET/CERES
800 CREAM (GRAM) TOPICAL
Status: DISCONTINUED | OUTPATIENT
Start: 2025-08-05 | End: 2025-08-06 | Stop reason: HOSPADM

## 2025-08-04 RX ORDER — HYDROCODONE BITARTRATE AND ACETAMINOPHEN 5; 325 MG/1; MG/1
1 TABLET ORAL EVERY 6 HOURS PRN
Refills: 0 | Status: DISCONTINUED | OUTPATIENT
Start: 2025-08-05 | End: 2025-08-06 | Stop reason: HOSPADM

## 2025-08-04 RX ORDER — METOPROLOL SUCCINATE 25 MG/1
100 TABLET, EXTENDED RELEASE ORAL DAILY
Status: DISCONTINUED | OUTPATIENT
Start: 2025-08-05 | End: 2025-08-06 | Stop reason: HOSPADM

## 2025-08-04 RX ORDER — LEVOTHYROXINE SODIUM 25 UG/1
50 TABLET ORAL
Status: DISCONTINUED | OUTPATIENT
Start: 2025-08-05 | End: 2025-08-06 | Stop reason: HOSPADM

## 2025-08-04 RX ORDER — HYDROCODONE BITARTRATE AND ACETAMINOPHEN 5; 325 MG/1; MG/1
1 TABLET ORAL
Refills: 0 | Status: COMPLETED | OUTPATIENT
Start: 2025-08-04 | End: 2025-08-04

## 2025-08-04 RX ADMIN — APIXABAN 5 MG: 5 TABLET, FILM COATED ORAL at 11:08

## 2025-08-04 RX ADMIN — IOHEXOL 100 ML: 350 INJECTION, SOLUTION INTRAVENOUS at 06:08

## 2025-08-04 RX ADMIN — HYDROCODONE BITARTRATE AND ACETAMINOPHEN 1 TABLET: 5; 325 TABLET ORAL at 09:08

## 2025-08-04 RX ADMIN — FUROSEMIDE 40 MG: 40 TABLET ORAL at 11:08

## 2025-08-04 RX ADMIN — ONDANSETRON 4 MG: 2 INJECTION INTRAMUSCULAR; INTRAVENOUS at 09:08

## 2025-08-04 RX ADMIN — SODIUM CHLORIDE, POTASSIUM CHLORIDE, SODIUM LACTATE AND CALCIUM CHLORIDE: 600; 310; 30; 20 INJECTION, SOLUTION INTRAVENOUS at 11:08

## 2025-08-04 RX ADMIN — PIPERACILLIN SODIUM AND TAZOBACTAM SODIUM 4.5 G: 4; .5 INJECTION, POWDER, LYOPHILIZED, FOR SOLUTION INTRAVENOUS at 09:08

## 2025-08-05 PROBLEM — J96.11 CHRONIC HYPOXEMIC RESPIRATORY FAILURE: Status: ACTIVE | Noted: 2025-08-05

## 2025-08-05 LAB
ABSOLUTE EOSINOPHIL (SMH): 0.17 K/UL
ABSOLUTE MONOCYTE (SMH): 0.87 K/UL (ref 0.3–1)
ABSOLUTE NEUTROPHIL COUNT (SMH): 5.3 K/UL (ref 1.8–7.7)
ANION GAP (SMH): 7 MMOL/L (ref 8–16)
BASOPHILS # BLD AUTO: 0.06 K/UL
BASOPHILS NFR BLD AUTO: 0.7 %
BUN SERPL-MCNC: 15 MG/DL (ref 8–23)
CALCIUM SERPL-MCNC: 8.9 MG/DL (ref 8.7–10.5)
CHLORIDE SERPL-SCNC: 102 MMOL/L (ref 95–110)
CO2 SERPL-SCNC: 30 MMOL/L (ref 23–29)
CREAT SERPL-MCNC: 1 MG/DL (ref 0.5–1.4)
ERYTHROCYTE [DISTWIDTH] IN BLOOD BY AUTOMATED COUNT: 14.9 % (ref 11.5–14.5)
GFR SERPLBLD CREATININE-BSD FMLA CKD-EPI: 57 ML/MIN/1.73/M2
GLUCOSE SERPL-MCNC: 193 MG/DL (ref 70–110)
HCT VFR BLD AUTO: 37.5 % (ref 37–48.5)
HGB BLD-MCNC: 11.8 GM/DL (ref 12–16)
IMM GRANULOCYTES # BLD AUTO: 0.02 K/UL (ref 0–0.04)
IMM GRANULOCYTES NFR BLD AUTO: 0.2 % (ref 0–0.5)
LYMPHOCYTES # BLD AUTO: 2.14 K/UL (ref 1–4.8)
MAGNESIUM SERPL-MCNC: 2 MG/DL (ref 1.6–2.6)
MCH RBC QN AUTO: 28.2 PG (ref 27–31)
MCHC RBC AUTO-ENTMCNC: 31.5 G/DL (ref 32–36)
MCV RBC AUTO: 90 FL (ref 82–98)
NUCLEATED RBC (/100WBC) (SMH): 0 /100 WBC
PHOSPHATE SERPL-MCNC: 3.7 MG/DL (ref 2.7–4.5)
PLATELET # BLD AUTO: 258 K/UL (ref 150–450)
PMV BLD AUTO: 10.6 FL (ref 9.2–12.9)
POTASSIUM SERPL-SCNC: 3.5 MMOL/L (ref 3.5–5.1)
RBC # BLD AUTO: 4.19 M/UL (ref 4–5.4)
RELATIVE EOSINOPHIL (SMH): 2 % (ref 0–8)
RELATIVE LYMPHOCYTE (SMH): 24.9 % (ref 18–48)
RELATIVE MONOCYTE (SMH): 10.1 % (ref 4–15)
RELATIVE NEUTROPHIL (SMH): 62.1 % (ref 38–73)
SODIUM SERPL-SCNC: 139 MMOL/L (ref 136–145)
WBC # BLD AUTO: 8.58 K/UL (ref 3.9–12.7)

## 2025-08-05 PROCEDURE — 11000001 HC ACUTE MED/SURG PRIVATE ROOM

## 2025-08-05 PROCEDURE — 94761 N-INVAS EAR/PLS OXIMETRY MLT: CPT

## 2025-08-05 PROCEDURE — 94660 CPAP INITIATION&MGMT: CPT

## 2025-08-05 PROCEDURE — 83735 ASSAY OF MAGNESIUM: CPT | Performed by: STUDENT IN AN ORGANIZED HEALTH CARE EDUCATION/TRAINING PROGRAM

## 2025-08-05 PROCEDURE — 85025 COMPLETE CBC W/AUTO DIFF WBC: CPT | Performed by: STUDENT IN AN ORGANIZED HEALTH CARE EDUCATION/TRAINING PROGRAM

## 2025-08-05 PROCEDURE — 27000221 HC OXYGEN, UP TO 24 HOURS

## 2025-08-05 PROCEDURE — 63600175 PHARM REV CODE 636 W HCPCS: Performed by: STUDENT IN AN ORGANIZED HEALTH CARE EDUCATION/TRAINING PROGRAM

## 2025-08-05 PROCEDURE — 84100 ASSAY OF PHOSPHORUS: CPT | Performed by: STUDENT IN AN ORGANIZED HEALTH CARE EDUCATION/TRAINING PROGRAM

## 2025-08-05 PROCEDURE — 99900035 HC TECH TIME PER 15 MIN (STAT)

## 2025-08-05 PROCEDURE — 25000003 PHARM REV CODE 250: Performed by: STUDENT IN AN ORGANIZED HEALTH CARE EDUCATION/TRAINING PROGRAM

## 2025-08-05 PROCEDURE — 80048 BASIC METABOLIC PNL TOTAL CA: CPT | Performed by: STUDENT IN AN ORGANIZED HEALTH CARE EDUCATION/TRAINING PROGRAM

## 2025-08-05 PROCEDURE — 94799 UNLISTED PULMONARY SVC/PX: CPT

## 2025-08-05 PROCEDURE — 36415 COLL VENOUS BLD VENIPUNCTURE: CPT | Performed by: STUDENT IN AN ORGANIZED HEALTH CARE EDUCATION/TRAINING PROGRAM

## 2025-08-05 RX ADMIN — METOPROLOL SUCCINATE 100 MG: 25 TABLET, EXTENDED RELEASE ORAL at 08:08

## 2025-08-05 RX ADMIN — APIXABAN 5 MG: 5 TABLET, FILM COATED ORAL at 09:08

## 2025-08-05 RX ADMIN — PIPERACILLIN AND TAZOBACTAM 4.5 G: 4; .5 INJECTION, POWDER, LYOPHILIZED, FOR SOLUTION INTRAVENOUS at 09:08

## 2025-08-05 RX ADMIN — APIXABAN 5 MG: 5 TABLET, FILM COATED ORAL at 08:08

## 2025-08-05 RX ADMIN — POTASSIUM BICARBONATE 50 MEQ: 977.5 TABLET, EFFERVESCENT ORAL at 05:08

## 2025-08-05 RX ADMIN — FUROSEMIDE 40 MG: 40 TABLET ORAL at 09:08

## 2025-08-05 RX ADMIN — PIPERACILLIN AND TAZOBACTAM 4.5 G: 4; .5 INJECTION, POWDER, LYOPHILIZED, FOR SOLUTION INTRAVENOUS at 05:08

## 2025-08-05 RX ADMIN — LEVOTHYROXINE SODIUM 50 MCG: 0.03 TABLET ORAL at 05:08

## 2025-08-05 RX ADMIN — PIPERACILLIN AND TAZOBACTAM 4.5 G: 4; .5 INJECTION, POWDER, LYOPHILIZED, FOR SOLUTION INTRAVENOUS at 01:08

## 2025-08-05 RX ADMIN — LABETALOL HYDROCHLORIDE 10 MG: 5 INJECTION INTRAVENOUS at 12:08

## 2025-08-05 RX ADMIN — PANTOPRAZOLE SODIUM 40 MG: 40 TABLET, DELAYED RELEASE ORAL at 05:08

## 2025-08-05 RX ADMIN — SODIUM CHLORIDE, POTASSIUM CHLORIDE, SODIUM LACTATE AND CALCIUM CHLORIDE: 600; 310; 30; 20 INJECTION, SOLUTION INTRAVENOUS at 09:08

## 2025-08-06 VITALS
WEIGHT: 179 LBS | DIASTOLIC BLOOD PRESSURE: 85 MMHG | HEART RATE: 64 BPM | TEMPERATURE: 98 F | OXYGEN SATURATION: 97 % | SYSTOLIC BLOOD PRESSURE: 127 MMHG | RESPIRATION RATE: 17 BRPM | BODY MASS INDEX: 36.08 KG/M2 | HEIGHT: 59 IN

## 2025-08-06 LAB
ABSOLUTE EOSINOPHIL (SMH): 0.24 K/UL
ABSOLUTE MONOCYTE (SMH): 0.71 K/UL (ref 0.3–1)
ABSOLUTE NEUTROPHIL COUNT (SMH): 5.2 K/UL (ref 1.8–7.7)
ANION GAP (SMH): 10 MMOL/L (ref 8–16)
BASOPHILS # BLD AUTO: 0.06 K/UL
BASOPHILS NFR BLD AUTO: 0.8 %
BUN SERPL-MCNC: 16 MG/DL (ref 8–23)
CALCIUM SERPL-MCNC: 10.2 MG/DL (ref 8.7–10.5)
CHLORIDE SERPL-SCNC: 98 MMOL/L (ref 95–110)
CO2 SERPL-SCNC: 32 MMOL/L (ref 23–29)
CREAT SERPL-MCNC: 1.1 MG/DL (ref 0.5–1.4)
ERYTHROCYTE [DISTWIDTH] IN BLOOD BY AUTOMATED COUNT: 15.1 % (ref 11.5–14.5)
GFR SERPLBLD CREATININE-BSD FMLA CKD-EPI: 51 ML/MIN/1.73/M2
GLUCOSE SERPL-MCNC: 134 MG/DL (ref 70–110)
HCT VFR BLD AUTO: 40.3 % (ref 37–48.5)
HGB BLD-MCNC: 12.6 GM/DL (ref 12–16)
IMM GRANULOCYTES # BLD AUTO: 0.03 K/UL (ref 0–0.04)
IMM GRANULOCYTES NFR BLD AUTO: 0.4 % (ref 0–0.5)
LYMPHOCYTES # BLD AUTO: 1.73 K/UL (ref 1–4.8)
MAGNESIUM SERPL-MCNC: 2.1 MG/DL (ref 1.6–2.6)
MCH RBC QN AUTO: 28 PG (ref 27–31)
MCHC RBC AUTO-ENTMCNC: 31.3 G/DL (ref 32–36)
MCV RBC AUTO: 90 FL (ref 82–98)
NUCLEATED RBC (/100WBC) (SMH): 0 /100 WBC
PHOSPHATE SERPL-MCNC: 4.1 MG/DL (ref 2.7–4.5)
PLATELET # BLD AUTO: 308 K/UL (ref 150–450)
PMV BLD AUTO: 10.5 FL (ref 9.2–12.9)
POTASSIUM SERPL-SCNC: 4.1 MMOL/L (ref 3.5–5.1)
RBC # BLD AUTO: 4.5 M/UL (ref 4–5.4)
RELATIVE EOSINOPHIL (SMH): 3 % (ref 0–8)
RELATIVE LYMPHOCYTE (SMH): 21.8 % (ref 18–48)
RELATIVE MONOCYTE (SMH): 9 % (ref 4–15)
RELATIVE NEUTROPHIL (SMH): 65 % (ref 38–73)
SODIUM SERPL-SCNC: 140 MMOL/L (ref 136–145)
WBC # BLD AUTO: 7.93 K/UL (ref 3.9–12.7)

## 2025-08-06 PROCEDURE — 25000003 PHARM REV CODE 250: Performed by: STUDENT IN AN ORGANIZED HEALTH CARE EDUCATION/TRAINING PROGRAM

## 2025-08-06 PROCEDURE — 80048 BASIC METABOLIC PNL TOTAL CA: CPT | Performed by: STUDENT IN AN ORGANIZED HEALTH CARE EDUCATION/TRAINING PROGRAM

## 2025-08-06 PROCEDURE — 36415 COLL VENOUS BLD VENIPUNCTURE: CPT | Performed by: STUDENT IN AN ORGANIZED HEALTH CARE EDUCATION/TRAINING PROGRAM

## 2025-08-06 PROCEDURE — 27000221 HC OXYGEN, UP TO 24 HOURS

## 2025-08-06 PROCEDURE — 83735 ASSAY OF MAGNESIUM: CPT | Performed by: STUDENT IN AN ORGANIZED HEALTH CARE EDUCATION/TRAINING PROGRAM

## 2025-08-06 PROCEDURE — 94761 N-INVAS EAR/PLS OXIMETRY MLT: CPT

## 2025-08-06 PROCEDURE — 94660 CPAP INITIATION&MGMT: CPT

## 2025-08-06 PROCEDURE — 63600175 PHARM REV CODE 636 W HCPCS: Performed by: STUDENT IN AN ORGANIZED HEALTH CARE EDUCATION/TRAINING PROGRAM

## 2025-08-06 PROCEDURE — 84100 ASSAY OF PHOSPHORUS: CPT | Performed by: STUDENT IN AN ORGANIZED HEALTH CARE EDUCATION/TRAINING PROGRAM

## 2025-08-06 PROCEDURE — 99900035 HC TECH TIME PER 15 MIN (STAT)

## 2025-08-06 PROCEDURE — 85025 COMPLETE CBC W/AUTO DIFF WBC: CPT | Performed by: STUDENT IN AN ORGANIZED HEALTH CARE EDUCATION/TRAINING PROGRAM

## 2025-08-06 RX ORDER — AMOXICILLIN AND CLAVULANATE POTASSIUM 875; 125 MG/1; MG/1
1 TABLET, FILM COATED ORAL EVERY 12 HOURS
Qty: 20 TABLET | Refills: 0 | Status: SHIPPED | OUTPATIENT
Start: 2025-08-06 | End: 2025-08-16

## 2025-08-06 RX ADMIN — PANTOPRAZOLE SODIUM 40 MG: 40 TABLET, DELAYED RELEASE ORAL at 05:08

## 2025-08-06 RX ADMIN — LEVOTHYROXINE SODIUM 50 MCG: 0.03 TABLET ORAL at 05:08

## 2025-08-06 RX ADMIN — APIXABAN 5 MG: 5 TABLET, FILM COATED ORAL at 08:08

## 2025-08-06 RX ADMIN — METOPROLOL SUCCINATE 100 MG: 25 TABLET, EXTENDED RELEASE ORAL at 08:08

## 2025-08-06 RX ADMIN — PIPERACILLIN AND TAZOBACTAM 4.5 G: 4; .5 INJECTION, POWDER, LYOPHILIZED, FOR SOLUTION INTRAVENOUS at 05:08

## 2025-08-07 ENCOUNTER — TELEPHONE (OUTPATIENT)
Dept: FAMILY MEDICINE | Facility: CLINIC | Age: 80
End: 2025-08-07
Payer: MEDICARE

## 2025-08-07 ENCOUNTER — PATIENT OUTREACH (OUTPATIENT)
Dept: FAMILY MEDICINE | Facility: CLINIC | Age: 80
End: 2025-08-07
Payer: MEDICARE

## 2025-08-07 NOTE — TELEPHONE ENCOUNTER
Spoke to pt and she is feeling fine she  is taking her meds. Confirmed appointment. Pt is coming. Cancelled appt on 8/26

## 2025-08-07 NOTE — PROGRESS NOTES
Discharge Information     Discharge Date:   8/06/2025    Primary Discharge Diagnosis:  Hypovolemia       Discharge Summary:  Reviewed      Medication & Order Review     Were medication changes made or new medications added?   Yes    If so, has the patient filled the prescriptions?  Yes     Was Home Health ordered? No    If so, has Home Health contacted patient and/or initiated services?  No    Name of Home Health Agency? N/A    Durable Medical Equipment ordered?  No     If so, has the DME provider contacted patient and delivered equipment?  N/A    Follow Up               Any problems since discharge? No    How is the patient feeling since returning home?      Have you set up recommended follow up appointments?  (cardiology, surgery, etc.)    Schedule Hospital Follow-up appointment within 7-14 days (preferably 7).      Notes:  Spoke to pt and she is feeling fine she  is taking her meds. Confirmed appointment. Pt is coming. Cancelled appt on 8/26             Anjali Acosta

## 2025-08-07 NOTE — TELEPHONE ENCOUNTER
----- Message from Nurse Pino sent at 8/7/2025  8:03 AM CDT -----  Call patient - needs post-hospital phone call within 2 business days and hospital follow up visit scheduled within 7-14 days.

## 2025-08-09 LAB
OHS QRS DURATION: 84 MS
OHS QTC CALCULATION: 460 MS

## 2025-08-12 ENCOUNTER — OFFICE VISIT (OUTPATIENT)
Dept: FAMILY MEDICINE | Facility: CLINIC | Age: 80
End: 2025-08-12
Payer: MEDICARE

## 2025-08-12 VITALS
OXYGEN SATURATION: 96 % | SYSTOLIC BLOOD PRESSURE: 110 MMHG | HEIGHT: 59 IN | HEART RATE: 81 BPM | BODY MASS INDEX: 36.15 KG/M2 | DIASTOLIC BLOOD PRESSURE: 70 MMHG

## 2025-08-12 DIAGNOSIS — G47.33 OSA ON CPAP: ICD-10-CM

## 2025-08-12 DIAGNOSIS — I10 ESSENTIAL (PRIMARY) HYPERTENSION: ICD-10-CM

## 2025-08-12 DIAGNOSIS — Z09 HOSPITAL DISCHARGE FOLLOW-UP: ICD-10-CM

## 2025-08-12 DIAGNOSIS — B37.0 ORAL CANDIDIASIS: ICD-10-CM

## 2025-08-12 DIAGNOSIS — J96.11 CHRONIC HYPOXEMIC RESPIRATORY FAILURE: ICD-10-CM

## 2025-08-12 DIAGNOSIS — I48.0 PAROXYSMAL ATRIAL FIBRILLATION: ICD-10-CM

## 2025-08-12 DIAGNOSIS — K57.92 ACUTE DIVERTICULITIS: Primary | ICD-10-CM

## 2025-08-12 DIAGNOSIS — I50.32 CHRONIC DIASTOLIC HEART FAILURE: ICD-10-CM

## 2025-08-12 DIAGNOSIS — F41.1 GENERALIZED ANXIETY DISORDER: ICD-10-CM

## 2025-08-12 DIAGNOSIS — J43.1 PANLOBULAR EMPHYSEMA: ICD-10-CM

## 2025-08-12 DIAGNOSIS — M51.9 LUMBAR DISC DISEASE: ICD-10-CM

## 2025-08-12 DIAGNOSIS — E03.9 ACQUIRED HYPOTHYROIDISM: ICD-10-CM

## 2025-08-12 PROCEDURE — 99496 TRANSJ CARE MGMT HIGH F2F 7D: CPT | Mod: S$GLB,,, | Performed by: FAMILY MEDICINE

## 2025-08-12 RX ORDER — ONDANSETRON 4 MG/1
4 TABLET, ORALLY DISINTEGRATING ORAL EVERY 6 HOURS PRN
Qty: 30 TABLET | Refills: 0 | Status: SHIPPED | OUTPATIENT
Start: 2025-08-12

## 2025-08-12 RX ORDER — NYSTATIN 100000 [USP'U]/ML
5 SUSPENSION ORAL 4 TIMES DAILY
Qty: 200 ML | Refills: 0 | Status: SHIPPED | OUTPATIENT
Start: 2025-08-12 | End: 2025-08-22

## 2025-08-12 RX ORDER — METRONIDAZOLE 500 MG/1
500 TABLET ORAL 3 TIMES DAILY
Qty: 21 TABLET | Refills: 0 | Status: SHIPPED | OUTPATIENT
Start: 2025-08-12

## 2025-08-12 RX ORDER — LEVOTHYROXINE SODIUM 50 UG/1
50 TABLET ORAL
Qty: 90 TABLET | Refills: 0 | Status: SHIPPED | OUTPATIENT
Start: 2025-08-12 | End: 2026-08-12

## 2025-08-12 RX ORDER — CIPROFLOXACIN 500 MG/1
500 TABLET, FILM COATED ORAL 2 TIMES DAILY
Qty: 20 TABLET | Refills: 0 | Status: SHIPPED | OUTPATIENT
Start: 2025-08-12

## 2025-08-19 ENCOUNTER — TELEPHONE (OUTPATIENT)
Dept: PULMONOLOGY | Facility: CLINIC | Age: 80
End: 2025-08-19
Payer: MEDICARE

## 2025-08-19 ENCOUNTER — OUTPATIENT CASE MANAGEMENT (OUTPATIENT)
Dept: ADMINISTRATIVE | Facility: OTHER | Age: 80
End: 2025-08-19
Payer: MEDICARE

## 2025-08-21 ENCOUNTER — PATIENT OUTREACH (OUTPATIENT)
Dept: ADMINISTRATIVE | Facility: OTHER | Age: 80
End: 2025-08-21
Payer: MEDICARE

## 2025-08-22 ENCOUNTER — OUTPATIENT CASE MANAGEMENT (OUTPATIENT)
Dept: ADMINISTRATIVE | Facility: OTHER | Age: 80
End: 2025-08-22
Payer: MEDICARE

## 2025-08-25 ENCOUNTER — PATIENT OUTREACH (OUTPATIENT)
Dept: ADMINISTRATIVE | Facility: OTHER | Age: 80
End: 2025-08-25
Payer: MEDICARE

## 2025-09-02 ENCOUNTER — OUTPATIENT CASE MANAGEMENT (OUTPATIENT)
Dept: ADMINISTRATIVE | Facility: OTHER | Age: 80
End: 2025-09-02
Payer: MEDICARE

## 2025-09-03 ENCOUNTER — PATIENT OUTREACH (OUTPATIENT)
Dept: ADMINISTRATIVE | Facility: OTHER | Age: 80
End: 2025-09-03
Payer: MEDICARE